# Patient Record
Sex: FEMALE | Race: WHITE | Employment: UNEMPLOYED | URBAN - METROPOLITAN AREA
[De-identification: names, ages, dates, MRNs, and addresses within clinical notes are randomized per-mention and may not be internally consistent; named-entity substitution may affect disease eponyms.]

---

## 2020-11-05 ENCOUNTER — TELEPHONE (OUTPATIENT)
Dept: INTERNAL MEDICINE | Age: 55
End: 2020-11-05

## 2021-03-25 ENCOUNTER — TELEPHONE (OUTPATIENT)
Dept: PODIATRY | Age: 56
End: 2021-03-25

## 2021-03-25 ENCOUNTER — TELEPHONE (OUTPATIENT)
Dept: PAIN MANAGEMENT | Age: 56
End: 2021-03-25

## 2021-06-07 ENCOUNTER — TELEPHONE (OUTPATIENT)
Dept: PAIN MANAGEMENT | Age: 56
End: 2021-06-07

## 2023-11-13 ENCOUNTER — OFFICE VISIT (OUTPATIENT)
Dept: NEUROLOGY | Age: 58
End: 2023-11-13
Payer: MEDICARE

## 2023-11-13 ENCOUNTER — TELEPHONE (OUTPATIENT)
Dept: NEUROLOGY | Age: 58
End: 2023-11-13

## 2023-11-13 VITALS
SYSTOLIC BLOOD PRESSURE: 104 MMHG | WEIGHT: 163 LBS | BODY MASS INDEX: 32 KG/M2 | DIASTOLIC BLOOD PRESSURE: 73 MMHG | HEART RATE: 89 BPM | HEIGHT: 60 IN

## 2023-11-13 DIAGNOSIS — Z79.899 HIGH RISK MEDICATION USE: Primary | ICD-10-CM

## 2023-11-13 DIAGNOSIS — G35 MULTIPLE SCLEROSIS (HCC): ICD-10-CM

## 2023-11-13 DIAGNOSIS — M79.2 NEUROPATHIC PAIN: ICD-10-CM

## 2023-11-13 DIAGNOSIS — G43.019 INTRACTABLE MIGRAINE WITHOUT AURA AND WITHOUT STATUS MIGRAINOSUS: ICD-10-CM

## 2023-11-13 DIAGNOSIS — Z11.59 NEED FOR HEPATITIS B SCREENING TEST: ICD-10-CM

## 2023-11-13 DIAGNOSIS — Z11.59 ENCOUNTER FOR SCREENING FOR OTHER VIRAL DISEASES: ICD-10-CM

## 2023-11-13 PROCEDURE — G8417 CALC BMI ABV UP PARAM F/U: HCPCS | Performed by: PSYCHIATRY & NEUROLOGY

## 2023-11-13 PROCEDURE — 4004F PT TOBACCO SCREEN RCVD TLK: CPT | Performed by: PSYCHIATRY & NEUROLOGY

## 2023-11-13 PROCEDURE — 3017F COLORECTAL CA SCREEN DOC REV: CPT | Performed by: PSYCHIATRY & NEUROLOGY

## 2023-11-13 PROCEDURE — 99205 OFFICE O/P NEW HI 60 MIN: CPT | Performed by: PSYCHIATRY & NEUROLOGY

## 2023-11-13 PROCEDURE — G8484 FLU IMMUNIZE NO ADMIN: HCPCS | Performed by: PSYCHIATRY & NEUROLOGY

## 2023-11-13 PROCEDURE — G8427 DOCREV CUR MEDS BY ELIG CLIN: HCPCS | Performed by: PSYCHIATRY & NEUROLOGY

## 2023-11-13 RX ORDER — ALBUTEROL SULFATE 90 UG/1
4 AEROSOL, METERED RESPIRATORY (INHALATION) PRN
OUTPATIENT
Start: 2023-11-14

## 2023-11-13 RX ORDER — TIZANIDINE 4 MG/1
TABLET ORAL
Status: ON HOLD | COMMUNITY
Start: 2023-10-31

## 2023-11-13 RX ORDER — TOLTERODINE 4 MG/1
4 CAPSULE, EXTENDED RELEASE ORAL DAILY
Status: ON HOLD | COMMUNITY
Start: 2023-10-16

## 2023-11-13 RX ORDER — SODIUM CHLORIDE 9 MG/ML
5-250 INJECTION, SOLUTION INTRAVENOUS PRN
OUTPATIENT
Start: 2023-11-14

## 2023-11-13 RX ORDER — ONDANSETRON 2 MG/ML
8 INJECTION INTRAMUSCULAR; INTRAVENOUS
OUTPATIENT
Start: 2023-11-14

## 2023-11-13 RX ORDER — BUPRENORPHINE 10 UG/H
PATCH TRANSDERMAL
Status: ON HOLD | COMMUNITY
Start: 2023-11-03

## 2023-11-13 RX ORDER — SODIUM CHLORIDE 0.9 % (FLUSH) 0.9 %
5-40 SYRINGE (ML) INJECTION PRN
OUTPATIENT
Start: 2023-11-14

## 2023-11-13 RX ORDER — SUCRALFATE 1 G/1
TABLET ORAL
Status: ON HOLD | COMMUNITY
Start: 2023-08-10

## 2023-11-13 RX ORDER — ZOLPIDEM TARTRATE 10 MG/1
TABLET ORAL
Status: ON HOLD | COMMUNITY
Start: 2023-11-07

## 2023-11-13 RX ORDER — DIAZEPAM 5 MG/1
5 TABLET ORAL DAILY
Status: ON HOLD | COMMUNITY
Start: 2023-11-07

## 2023-11-13 RX ORDER — RIMEGEPANT SULFATE 75 MG/75MG
TABLET, ORALLY DISINTEGRATING ORAL
Status: ON HOLD | COMMUNITY
Start: 2023-11-06

## 2023-11-13 RX ORDER — PREGABALIN 100 MG/1
100 CAPSULE ORAL 2 TIMES DAILY
Qty: 270 CAPSULE | Refills: 5 | Status: ON HOLD | OUTPATIENT
Start: 2023-11-13 | End: 2023-12-13

## 2023-11-13 RX ORDER — ACETAMINOPHEN 325 MG/1
650 TABLET ORAL
OUTPATIENT
Start: 2023-11-14

## 2023-11-13 RX ORDER — FAMOTIDINE 10 MG/ML
20 INJECTION, SOLUTION INTRAVENOUS
OUTPATIENT
Start: 2023-11-14

## 2023-11-13 RX ORDER — DIPHENHYDRAMINE HYDROCHLORIDE 50 MG/ML
50 INJECTION INTRAMUSCULAR; INTRAVENOUS
OUTPATIENT
Start: 2023-11-14

## 2023-11-13 RX ORDER — LORAZEPAM 0.5 MG/1
TABLET ORAL
Status: ON HOLD | COMMUNITY
Start: 2023-10-20

## 2023-11-13 RX ORDER — PREGABALIN 75 MG/1
75 CAPSULE ORAL 2 TIMES DAILY
COMMUNITY
Start: 2023-09-18 | End: 2023-11-13

## 2023-11-13 RX ORDER — SODIUM CHLORIDE 9 MG/ML
INJECTION, SOLUTION INTRAVENOUS CONTINUOUS
OUTPATIENT
Start: 2023-11-14

## 2023-11-13 RX ORDER — FLUTICASONE FUROATE AND VILANTEROL 100; 25 UG/1; UG/1
POWDER RESPIRATORY (INHALATION)
Status: ON HOLD | COMMUNITY
Start: 2023-10-19

## 2023-11-13 RX ORDER — DEXTROAMPHETAMINE SACCHARATE, AMPHETAMINE ASPARTATE, DEXTROAMPHETAMINE SULFATE AND AMPHETAMINE SULFATE 3.75; 3.75; 3.75; 3.75 MG/1; MG/1; MG/1; MG/1
TABLET ORAL
Status: ON HOLD | COMMUNITY
Start: 2023-11-07

## 2023-11-13 RX ORDER — HEPARIN SODIUM (PORCINE) LOCK FLUSH IV SOLN 100 UNIT/ML 100 UNIT/ML
500 SOLUTION INTRAVENOUS PRN
OUTPATIENT
Start: 2023-11-14

## 2023-11-13 RX ORDER — UBIDECARENONE 100 MG
CAPSULE ORAL
Status: ON HOLD | COMMUNITY
Start: 2023-08-07

## 2023-11-13 RX ORDER — FLUOXETINE HYDROCHLORIDE 40 MG/1
40 CAPSULE ORAL DAILY
Status: ON HOLD | COMMUNITY
Start: 2023-06-22

## 2023-11-13 RX ORDER — EPINEPHRINE 1 MG/ML
0.3 INJECTION, SOLUTION, CONCENTRATE INTRAVENOUS PRN
OUTPATIENT
Start: 2023-11-14

## 2023-11-13 RX ORDER — PALIPERIDONE PALMITATE 156 MG/ML
INJECTION INTRAMUSCULAR
Status: ON HOLD | COMMUNITY
Start: 2023-10-30

## 2023-11-13 NOTE — TELEPHONE ENCOUNTER
I called [facility] Radiology file room and requested her MRI images be pushed to [facility] system.

## 2023-11-14 ENCOUNTER — TELEPHONE (OUTPATIENT)
Dept: ONCOLOGY | Age: 58
End: 2023-11-14

## 2023-11-14 NOTE — TELEPHONE ENCOUNTER
I CALLED TO SCHEDULE KATHY FOR HER SOLUMEDROL INFUSION AND I OFFERED PT THURSDAY FOR TX AND SHE STATES THAT SHE HAS TO WORK. Eufemia Fernandez ASKED ME TO CALL HER BACK ON THURSDAY  AND SHE WILL KNOW HER SCHEDULE. I TOLD HER  THE ONLY DAY I HAVE TO ADD TO SCHEDULE IS MONDAY. PT STATES SHE WILL TRY TO GET MONDAY OFF. I WILL CALL KATHY BACK ON THURSDAY TO GET HER SCHEDULED.

## 2023-11-15 ENCOUNTER — TELEPHONE (OUTPATIENT)
Dept: NEUROLOGY | Age: 58
End: 2023-11-15

## 2023-11-15 NOTE — TELEPHONE ENCOUNTER
Patient called in requesting a prescription for Vicodin to help with the pain that she is having in her joints all over her body. She said that she has been treated with this before. She said that she has to work for the next 3 days and needs something to help her get through the day. Please advise.

## 2023-11-15 NOTE — TELEPHONE ENCOUNTER
I called [facility] again today and spoke with the file room at [phone].  She said she will send them through

## 2023-11-15 NOTE — TELEPHONE ENCOUNTER
Patient notified. She stated that she was not aware of this. She said that she started the increase dose of Lyrica on 11/14/23. Writer explained that it can take a few days to see change in chronic pain with some medications. She is asking if anything else can be sent in to help manage the pain or if she should wait a few more days to see the effects of the med increase.

## 2023-11-16 ENCOUNTER — TELEPHONE (OUTPATIENT)
Dept: NEUROLOGY | Age: 58
End: 2023-11-16

## 2023-11-17 ENCOUNTER — CLINICAL DOCUMENTATION (OUTPATIENT)
Facility: HOSPITAL | Age: 58
End: 2023-11-17

## 2023-11-17 DIAGNOSIS — G43.019 INTRACTABLE MIGRAINE WITHOUT AURA AND WITHOUT STATUS MIGRAINOSUS: Primary | ICD-10-CM

## 2023-11-17 RX ORDER — GALCANEZUMAB 120 MG/ML
120 INJECTION, SOLUTION SUBCUTANEOUS
Qty: 1 ML | Refills: 4 | Status: ON HOLD | OUTPATIENT
Start: 2023-12-17

## 2023-11-17 NOTE — TELEPHONE ENCOUNTER
Medication was denied by insurance stating Emgality needs to have been tried and failed first. Please advise, thanks.

## 2023-11-18 ENCOUNTER — HOSPITAL ENCOUNTER (EMERGENCY)
Age: 58
Discharge: ANOTHER HOSPITAL | DRG: 481 | End: 2023-11-19
Attending: EMERGENCY MEDICINE
Payer: MEDICARE

## 2023-11-18 ENCOUNTER — APPOINTMENT (OUTPATIENT)
Dept: GENERAL RADIOLOGY | Age: 58
DRG: 481 | End: 2023-11-18
Payer: MEDICARE

## 2023-11-18 ENCOUNTER — APPOINTMENT (OUTPATIENT)
Dept: CT IMAGING | Age: 58
DRG: 481 | End: 2023-11-18
Payer: MEDICARE

## 2023-11-18 VITALS
HEART RATE: 81 BPM | HEIGHT: 61 IN | TEMPERATURE: 99 F | BODY MASS INDEX: 29.45 KG/M2 | DIASTOLIC BLOOD PRESSURE: 73 MMHG | OXYGEN SATURATION: 94 % | SYSTOLIC BLOOD PRESSURE: 112 MMHG | WEIGHT: 156 LBS | RESPIRATION RATE: 17 BRPM

## 2023-11-18 DIAGNOSIS — S82.831A CLOSED FRACTURE OF PROXIMAL END OF RIGHT FIBULA, UNSPECIFIED FRACTURE MORPHOLOGY, INITIAL ENCOUNTER: ICD-10-CM

## 2023-11-18 DIAGNOSIS — M54.50 BILATERAL LOW BACK PAIN WITHOUT SCIATICA, UNSPECIFIED CHRONICITY: ICD-10-CM

## 2023-11-18 DIAGNOSIS — S82.101A CLOSED FRACTURE OF PROXIMAL END OF RIGHT TIBIA, UNSPECIFIED FRACTURE MORPHOLOGY, INITIAL ENCOUNTER: Primary | ICD-10-CM
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
## 2023-11-18 LAB
ANION GAP SERPL CALCULATED.3IONS-SCNC: 9 MMOL/L (ref 9–17)
BASOPHILS # BLD: <0.03 K/UL (ref 0–0.2)
BASOPHILS NFR BLD: 0 % (ref 0–2)
BUN SERPL-MCNC: 16 MG/DL (ref 6–20)
BUN/CREAT SERPL: 32 (ref 9–20)
CALCIUM SERPL-MCNC: 9.7 MG/DL (ref 8.6–10.4)
CHLORIDE SERPL-SCNC: 104 MMOL/L (ref 98–107)
CO2 SERPL-SCNC: 26 MMOL/L (ref 20–31)
CREAT SERPL-MCNC: 0.5 MG/DL (ref 0.5–0.9)
EOSINOPHIL # BLD: 0.03 K/UL (ref 0–0.44)
EOSINOPHILS RELATIVE PERCENT: 0 % (ref 1–4)
ERYTHROCYTE [DISTWIDTH] IN BLOOD BY AUTOMATED COUNT: 16.6 % (ref 11.8–14.4)
GFR SERPL CREATININE-BSD FRML MDRD: >60 ML/MIN/1.73M2
GLUCOSE SERPL-MCNC: 132 MG/DL (ref 70–99)
HCT VFR BLD AUTO: 38.6 % (ref 36.3–47.1)
HGB BLD-MCNC: 12 G/DL (ref 11.9–15.1)
IMM GRANULOCYTES # BLD AUTO: 0.05 K/UL (ref 0–0.3)
IMM GRANULOCYTES NFR BLD: 1 %
INR PPP: 1
LYMPHOCYTES NFR BLD: 0.95 K/UL (ref 1.1–3.7)
LYMPHOCYTES RELATIVE PERCENT: 14 % (ref 24–43)
MCH RBC QN AUTO: 27.2 PG (ref 25.2–33.5)
MCHC RBC AUTO-ENTMCNC: 31.1 G/DL (ref 28.4–34.8)
MCV RBC AUTO: 87.5 FL (ref 82.6–102.9)
MONOCYTES NFR BLD: 0.44 K/UL (ref 0.1–1.2)
MONOCYTES NFR BLD: 7 % (ref 3–12)
NEUTROPHILS NFR BLD: 78 % (ref 36–65)
NEUTS SEG NFR BLD: 5.2 K/UL (ref 1.5–8.1)
NRBC BLD-RTO: 0 PER 100 WBC
PARTIAL THROMBOPLASTIN TIME: 24.6 SEC (ref 23.9–33.8)
PLATELET # BLD AUTO: 317 K/UL (ref 138–453)
PMV BLD AUTO: 10 FL (ref 8.1–13.5)
POTASSIUM SERPL-SCNC: 4.2 MMOL/L (ref 3.7–5.3)
PROTHROMBIN TIME: 12.4 SEC (ref 11.5–14.2)
RBC # BLD AUTO: 4.41 M/UL (ref 3.95–5.11)
RBC # BLD: ABNORMAL 10*6/UL
SODIUM SERPL-SCNC: 139 MMOL/L (ref 135–144)
WBC OTHER # BLD: 6.7 K/UL (ref 3.5–11.3)

## 2023-11-18 PROCEDURE — 73590 X-RAY EXAM OF LOWER LEG: CPT

## 2023-11-18 PROCEDURE — 6360000002 HC RX W HCPCS: Performed by: NURSE PRACTITIONER

## 2023-11-18 PROCEDURE — 96374 THER/PROPH/DIAG INJ IV PUSH: CPT

## 2023-11-18 PROCEDURE — 73502 X-RAY EXAM HIP UNI 2-3 VIEWS: CPT

## 2023-11-18 PROCEDURE — 85025 COMPLETE CBC W/AUTO DIFF WBC: CPT

## 2023-11-18 PROCEDURE — 96372 THER/PROPH/DIAG INJ SC/IM: CPT

## 2023-11-18 PROCEDURE — 96376 TX/PRO/DX INJ SAME DRUG ADON: CPT

## 2023-11-18 PROCEDURE — 99285 EMERGENCY DEPT VISIT HI MDM: CPT

## 2023-11-18 PROCEDURE — 36415 COLL VENOUS BLD VENIPUNCTURE: CPT

## 2023-11-18 PROCEDURE — 96375 TX/PRO/DX INJ NEW DRUG ADDON: CPT

## 2023-11-18 PROCEDURE — 85730 THROMBOPLASTIN TIME PARTIAL: CPT

## 2023-11-18 PROCEDURE — 73560 X-RAY EXAM OF KNEE 1 OR 2: CPT

## 2023-11-18 PROCEDURE — 85610 PROTHROMBIN TIME: CPT

## 2023-11-18 PROCEDURE — 80048 BASIC METABOLIC PNL TOTAL CA: CPT

## 2023-11-18 PROCEDURE — 72131 CT LUMBAR SPINE W/O DYE: CPT

## 2023-11-18 RX ORDER — MORPHINE SULFATE 2 MG/ML
2 INJECTION, SOLUTION INTRAMUSCULAR; INTRAVENOUS ONCE
Status: COMPLETED | OUTPATIENT
Start: 2023-11-18 | End: 2023-11-18

## 2023-11-18 RX ORDER — HYDROMORPHONE HYDROCHLORIDE 1 MG/ML
1 INJECTION, SOLUTION INTRAMUSCULAR; INTRAVENOUS; SUBCUTANEOUS ONCE
Status: COMPLETED | OUTPATIENT
Start: 2023-11-18 | End: 2023-11-18

## 2023-11-18 RX ADMIN — HYDROMORPHONE HYDROCHLORIDE 0.5 MG: 1 INJECTION, SOLUTION INTRAMUSCULAR; INTRAVENOUS; SUBCUTANEOUS at 17:01

## 2023-11-18 RX ADMIN — MORPHINE SULFATE 2 MG: 2 INJECTION, SOLUTION INTRAMUSCULAR; INTRAVENOUS at 21:00

## 2023-11-18 RX ADMIN — HYDROMORPHONE HYDROCHLORIDE 0.5 MG: 1 INJECTION, SOLUTION INTRAMUSCULAR; INTRAVENOUS; SUBCUTANEOUS at 21:30

## 2023-11-18 RX ADMIN — HYDROMORPHONE HYDROCHLORIDE 0.5 MG: 1 INJECTION, SOLUTION INTRAMUSCULAR; INTRAVENOUS; SUBCUTANEOUS at 18:33

## 2023-11-18 RX ADMIN — HYDROMORPHONE HYDROCHLORIDE 1 MG: 1 INJECTION, SOLUTION INTRAMUSCULAR; INTRAVENOUS; SUBCUTANEOUS at 16:05

## 2023-11-18 RX ADMIN — HYDROMORPHONE HYDROCHLORIDE 0.5 MG: 1 INJECTION, SOLUTION INTRAMUSCULAR; INTRAVENOUS; SUBCUTANEOUS at 23:27

## 2023-11-18 ASSESSMENT — PAIN SCALES - GENERAL
PAINLEVEL_OUTOF10: 10
PAINLEVEL_OUTOF10: 8
PAINLEVEL_OUTOF10: 10

## 2023-11-18 ASSESSMENT — ENCOUNTER SYMPTOMS
NAUSEA: 0
BACK PAIN: 1
PHOTOPHOBIA: 0
ABDOMINAL PAIN: 0
EYE PAIN: 0
VOMITING: 0
SHORTNESS OF BREATH: 0
COLOR CHANGE: 0

## 2023-11-18 ASSESSMENT — PAIN - FUNCTIONAL ASSESSMENT: PAIN_FUNCTIONAL_ASSESSMENT: 0-10

## 2023-11-18 NOTE — ED NOTES
Patient presents to ER from home by EMS due to leg injury. Patient states she was going to dump coffee when she tripped and fell. Patient denies hitting head or LOC. EMS states while moving patient leg was unstable. EMS stabilized patients leg. Patient is A/O x 4, equal chest expansion with non labored breathing, wheels locked , bed in lowest position, call light in reach.      Julee Ch RN  11/18/23 7138 Marisol [place], Jacqueline, RN  11/18/23 7165

## 2023-11-19 ENCOUNTER — APPOINTMENT (OUTPATIENT)
Dept: CT IMAGING | Age: 58
DRG: 481 | End: 2023-11-19
Payer: MEDICARE

## 2023-11-19 ENCOUNTER — ANESTHESIA EVENT (OUTPATIENT)
Dept: OPERATING ROOM | Age: 58
End: 2023-11-19
Payer: MEDICARE

## 2023-11-19 ENCOUNTER — HOSPITAL ENCOUNTER (INPATIENT)
Age: 58
LOS: 15 days | Discharge: NURSING, REHAB OR LONG-TERM CARE | DRG: 481 | End: 2023-12-04
Attending: EMERGENCY MEDICINE | Admitting: SURGERY
Payer: MEDICARE

## 2023-11-19 ENCOUNTER — APPOINTMENT (OUTPATIENT)
Dept: GENERAL RADIOLOGY | Age: 58
DRG: 481 | End: 2023-11-19
Payer: MEDICARE

## 2023-11-19 ENCOUNTER — ANESTHESIA (OUTPATIENT)
Dept: OPERATING ROOM | Age: 58
End: 2023-11-19
Payer: MEDICARE

## 2023-11-19 DIAGNOSIS — S82.201A TIBIA/FIBULA FRACTURE, RIGHT, CLOSED, INITIAL ENCOUNTER: Primary | ICD-10-CM

## 2023-11-19 DIAGNOSIS — S82.401A TIBIA/FIBULA FRACTURE, RIGHT, CLOSED, INITIAL ENCOUNTER: Primary | ICD-10-CM

## 2023-11-19 PROBLEM — S82.201B: Status: ACTIVE | Noted: 2023-11-19

## 2023-11-19 PROBLEM — S82.401B: Status: ACTIVE | Noted: 2023-11-19

## 2023-11-19 PROBLEM — Z01.810 PREOPERATIVE CARDIOVASCULAR EXAMINATION: Status: ACTIVE | Noted: 2023-11-19

## 2023-11-19 LAB
25(OH)D3 SERPL-MCNC: 13.3 NG/ML (ref 30–100)
25(OH)D3 SERPL-MCNC: 15.2 NG/ML
ABO + RH BLD: NORMAL
ALBUMIN SERPL-MCNC: 3.1 G/DL (ref 3.5–5.2)
ANION GAP SERPL CALCULATED.3IONS-SCNC: 6 MMOL/L (ref 9–17)
ANION GAP SERPL CALCULATED.3IONS-SCNC: 8 MMOL/L (ref 9–17)
ARM BAND NUMBER: NORMAL
BASOPHILS # BLD: <0.03 K/UL (ref 0–0.2)
BASOPHILS NFR BLD: 0 % (ref 0–2)
BLOOD BANK SAMPLE EXPIRATION: NORMAL
BLOOD BANK SPECIMEN: ABNORMAL
BLOOD GROUP ANTIBODIES SERPL: NEGATIVE
BODY TEMPERATURE: 37
BUN SERPL-MCNC: 12 MG/DL (ref 6–20)
BUN SERPL-MCNC: 12 MG/DL (ref 6–20)
CALCIUM SERPL-MCNC: 9 MG/DL (ref 8.6–10.4)
CHLORIDE SERPL-SCNC: 104 MMOL/L (ref 98–107)
CHLORIDE SERPL-SCNC: 104 MMOL/L (ref 98–107)
CO2 SERPL-SCNC: 27 MMOL/L (ref 20–31)
CO2 SERPL-SCNC: 27 MMOL/L (ref 20–31)
COHGB MFR BLD: 2.9 % (ref 0–5)
CREAT SERPL-MCNC: 0.3 MG/DL (ref 0.5–0.9)
CREAT SERPL-MCNC: 0.4 MG/DL (ref 0.5–0.9)
EOSINOPHIL # BLD: 0.04 K/UL (ref 0–0.44)
EOSINOPHILS RELATIVE PERCENT: 1 % (ref 1–4)
ERYTHROCYTE [DISTWIDTH] IN BLOOD BY AUTOMATED COUNT: 16.3 % (ref 11.8–14.4)
ERYTHROCYTE [DISTWIDTH] IN BLOOD BY AUTOMATED COUNT: 16.3 % (ref 11.8–14.4)
EST. AVERAGE GLUCOSE BLD GHB EST-MCNC: 117 MG/DL
ETHANOL PERCENT: <0.01 %
ETHANOL PERCENT: <0.01 %
ETHANOLAMINE SERPL-MCNC: <10 MG/DL
ETHANOLAMINE SERPL-MCNC: <10 MG/DL
FIO2 ON VENT: ABNORMAL %
FOLATE SERPL-MCNC: 10.6 NG/ML
GFR SERPL CREATININE-BSD FRML MDRD: >60 ML/MIN/1.73M2
GFR SERPL CREATININE-BSD FRML MDRD: >60 ML/MIN/1.73M2
GLUCOSE BLD-MCNC: 171 MG/DL (ref 65–105)
GLUCOSE SERPL-MCNC: 100 MG/DL (ref 70–99)
GLUCOSE SERPL-MCNC: 103 MG/DL (ref 70–99)
HBA1C MFR BLD: 5.7 % (ref 4–6)
HCG SERPL QL: NEGATIVE
HCO3 VENOUS: 28.9 MMOL/L (ref 24–30)
HCT VFR BLD AUTO: 31.4 % (ref 36.3–47.1)
HCT VFR BLD AUTO: 32.1 % (ref 36.3–47.1)
HCT VFR BLD AUTO: 32.7 % (ref 36.3–47.1)
HGB BLD-MCNC: 10.2 G/DL (ref 11.9–15.1)
HGB BLD-MCNC: 10.2 G/DL (ref 11.9–15.1)
HGB BLD-MCNC: 9.6 G/DL (ref 11.9–15.1)
IMM GRANULOCYTES # BLD AUTO: 0.03 K/UL (ref 0–0.3)
IMM GRANULOCYTES NFR BLD: 0 %
INR PPP: 1
LYMPHOCYTES NFR BLD: 0.89 K/UL (ref 1.1–3.7)
LYMPHOCYTES RELATIVE PERCENT: 13 % (ref 24–43)
MCH RBC QN AUTO: 27.3 PG (ref 25.2–33.5)
MCH RBC QN AUTO: 27.7 PG (ref 25.2–33.5)
MCHC RBC AUTO-ENTMCNC: 31.2 G/DL (ref 28.4–34.8)
MCHC RBC AUTO-ENTMCNC: 31.8 G/DL (ref 28.4–34.8)
MCV RBC AUTO: 87.2 FL (ref 82.6–102.9)
MCV RBC AUTO: 87.4 FL (ref 82.6–102.9)
MONOCYTES NFR BLD: 0.65 K/UL (ref 0.1–1.2)
MONOCYTES NFR BLD: 9 % (ref 3–12)
NEUTROPHILS NFR BLD: 77 % (ref 36–65)
NEUTS SEG NFR BLD: 5.41 K/UL (ref 1.5–8.1)
NRBC BLD-RTO: 0 PER 100 WBC
NRBC BLD-RTO: 0 PER 100 WBC
O2 SAT, VEN: 73.7 % (ref 60–85)
PARTIAL THROMBOPLASTIN TIME: 24 SEC (ref 23–36.5)
PCO2, VEN: 55.4 MM HG (ref 39–55)
PH VENOUS: 7.34 (ref 7.32–7.42)
PLATELET # BLD AUTO: 275 K/UL (ref 138–453)
PLATELET # BLD AUTO: 281 K/UL (ref 138–453)
PMV BLD AUTO: 10.2 FL (ref 8.1–13.5)
PMV BLD AUTO: 9.9 FL (ref 8.1–13.5)
PO2, VEN: 43.2 MM HG (ref 30–50)
POSITIVE BASE EXCESS, VEN: 2.7 MMOL/L (ref 0–2)
POTASSIUM SERPL-SCNC: 4.6 MMOL/L (ref 3.7–5.3)
POTASSIUM SERPL-SCNC: 4.8 MMOL/L (ref 3.7–5.3)
PROTHROMBIN TIME: 12.6 SEC (ref 11.7–14.9)
RBC # BLD AUTO: 3.68 M/UL (ref 3.95–5.11)
RBC # BLD AUTO: 3.74 M/UL (ref 3.95–5.11)
RBC # BLD: ABNORMAL 10*6/UL
SODIUM SERPL-SCNC: 137 MMOL/L (ref 135–144)
SODIUM SERPL-SCNC: 139 MMOL/L (ref 135–144)
T4 FREE SERPL-MCNC: 1 NG/DL (ref 0.9–1.7)
TROPONIN I SERPL HS-MCNC: 6 NG/L (ref 0–14)
TROPONIN I SERPL HS-MCNC: <6 NG/L (ref 0–14)
TSH SERPL DL<=0.05 MIU/L-ACNC: 1.16 UIU/ML (ref 0.3–5)
VIT B12 SERPL-MCNC: 526 PG/ML (ref 232–1245)
WBC OTHER # BLD: 7 K/UL (ref 3.5–11.3)
WBC OTHER # BLD: 7 K/UL (ref 3.5–11.3)

## 2023-11-19 PROCEDURE — 2700000000 HC OXYGEN THERAPY PER DAY

## 2023-11-19 PROCEDURE — 82607 VITAMIN B-12: CPT

## 2023-11-19 PROCEDURE — 82040 ASSAY OF SERUM ALBUMIN: CPT

## 2023-11-19 PROCEDURE — 6360000002 HC RX W HCPCS: Performed by: ANESTHESIOLOGY

## 2023-11-19 PROCEDURE — 2709999900 HC NON-CHARGEABLE SUPPLY: Performed by: STUDENT IN AN ORGANIZED HEALTH CARE EDUCATION/TRAINING PROGRAM

## 2023-11-19 PROCEDURE — 84439 ASSAY OF FREE THYROXINE: CPT

## 2023-11-19 PROCEDURE — 6360000002 HC RX W HCPCS: Performed by: CHIROPRACTOR

## 2023-11-19 PROCEDURE — 94761 N-INVAS EAR/PLS OXIMETRY MLT: CPT

## 2023-11-19 PROCEDURE — 6360000002 HC RX W HCPCS: Performed by: STUDENT IN AN ORGANIZED HEALTH CARE EDUCATION/TRAINING PROGRAM

## 2023-11-19 PROCEDURE — 84520 ASSAY OF UREA NITROGEN: CPT

## 2023-11-19 PROCEDURE — 85027 COMPLETE CBC AUTOMATED: CPT

## 2023-11-19 PROCEDURE — 82947 ASSAY GLUCOSE BLOOD QUANT: CPT

## 2023-11-19 PROCEDURE — 85730 THROMBOPLASTIN TIME PARTIAL: CPT

## 2023-11-19 PROCEDURE — 82565 ASSAY OF CREATININE: CPT

## 2023-11-19 PROCEDURE — 7100000000 HC PACU RECOVERY - FIRST 15 MIN: Performed by: STUDENT IN AN ORGANIZED HEALTH CARE EDUCATION/TRAINING PROGRAM

## 2023-11-19 PROCEDURE — 85014 HEMATOCRIT: CPT

## 2023-11-19 PROCEDURE — 82746 ASSAY OF FOLIC ACID SERUM: CPT

## 2023-11-19 PROCEDURE — 7100000001 HC PACU RECOVERY - ADDTL 15 MIN: Performed by: STUDENT IN AN ORGANIZED HEALTH CARE EDUCATION/TRAINING PROGRAM

## 2023-11-19 PROCEDURE — 20690 APPL UNIPLN UNI EXT FIXJ SYS: CPT | Performed by: STUDENT IN AN ORGANIZED HEALTH CARE EDUCATION/TRAINING PROGRAM

## 2023-11-19 PROCEDURE — 6360000002 HC RX W HCPCS

## 2023-11-19 PROCEDURE — 82805 BLOOD GASES W/O2 SATURATION: CPT

## 2023-11-19 PROCEDURE — 27532 TREAT KNEE FRACTURE: CPT | Performed by: STUDENT IN AN ORGANIZED HEALTH CARE EDUCATION/TRAINING PROGRAM

## 2023-11-19 PROCEDURE — 84443 ASSAY THYROID STIM HORMONE: CPT

## 2023-11-19 PROCEDURE — 2580000003 HC RX 258

## 2023-11-19 PROCEDURE — 3700000000 HC ANESTHESIA ATTENDED CARE: Performed by: STUDENT IN AN ORGANIZED HEALTH CARE EDUCATION/TRAINING PROGRAM

## 2023-11-19 PROCEDURE — 73562 X-RAY EXAM OF KNEE 3: CPT

## 2023-11-19 PROCEDURE — 85025 COMPLETE CBC W/AUTO DIFF WBC: CPT

## 2023-11-19 PROCEDURE — 6370000000 HC RX 637 (ALT 250 FOR IP): Performed by: STUDENT IN AN ORGANIZED HEALTH CARE EDUCATION/TRAINING PROGRAM

## 2023-11-19 PROCEDURE — 99222 1ST HOSP IP/OBS MODERATE 55: CPT | Performed by: INTERNAL MEDICINE

## 2023-11-19 PROCEDURE — 94640 AIRWAY INHALATION TREATMENT: CPT

## 2023-11-19 PROCEDURE — 1200000000 HC SEMI PRIVATE

## 2023-11-19 PROCEDURE — 73700 CT LOWER EXTREMITY W/O DYE: CPT

## 2023-11-19 PROCEDURE — 2580000003 HC RX 258: Performed by: CHIROPRACTOR

## 2023-11-19 PROCEDURE — 2580000003 HC RX 258: Performed by: STUDENT IN AN ORGANIZED HEALTH CARE EDUCATION/TRAINING PROGRAM

## 2023-11-19 PROCEDURE — 84484 ASSAY OF TROPONIN QUANT: CPT

## 2023-11-19 PROCEDURE — 99222 1ST HOSP IP/OBS MODERATE 55: CPT | Performed by: STUDENT IN AN ORGANIZED HEALTH CARE EDUCATION/TRAINING PROGRAM

## 2023-11-19 PROCEDURE — 86900 BLOOD TYPING SEROLOGIC ABO: CPT

## 2023-11-19 PROCEDURE — 3700000001 HC ADD 15 MINUTES (ANESTHESIA): Performed by: STUDENT IN AN ORGANIZED HEALTH CARE EDUCATION/TRAINING PROGRAM

## 2023-11-19 PROCEDURE — 99282 EMERGENCY DEPT VISIT SF MDM: CPT | Performed by: SURGERY

## 2023-11-19 PROCEDURE — 2500000003 HC RX 250 WO HCPCS: Performed by: REGISTERED NURSE

## 2023-11-19 PROCEDURE — G0480 DRUG TEST DEF 1-7 CLASSES: HCPCS

## 2023-11-19 PROCEDURE — 76376 3D RENDER W/INTRP POSTPROCES: CPT

## 2023-11-19 PROCEDURE — 93005 ELECTROCARDIOGRAM TRACING: CPT | Performed by: SURGERY

## 2023-11-19 PROCEDURE — 36415 COLL VENOUS BLD VENIPUNCTURE: CPT

## 2023-11-19 PROCEDURE — 93005 ELECTROCARDIOGRAM TRACING: CPT

## 2023-11-19 PROCEDURE — 80048 BASIC METABOLIC PNL TOTAL CA: CPT

## 2023-11-19 PROCEDURE — 71260 CT THORAX DX C+: CPT

## 2023-11-19 PROCEDURE — 86901 BLOOD TYPING SEROLOGIC RH(D): CPT

## 2023-11-19 PROCEDURE — 70450 CT HEAD/BRAIN W/O DYE: CPT

## 2023-11-19 PROCEDURE — 6360000004 HC RX CONTRAST MEDICATION

## 2023-11-19 PROCEDURE — 85018 HEMOGLOBIN: CPT

## 2023-11-19 PROCEDURE — 84703 CHORIONIC GONADOTROPIN ASSAY: CPT

## 2023-11-19 PROCEDURE — 73552 X-RAY EXAM OF FEMUR 2/>: CPT

## 2023-11-19 PROCEDURE — 82306 VITAMIN D 25 HYDROXY: CPT

## 2023-11-19 PROCEDURE — 72125 CT NECK SPINE W/O DYE: CPT

## 2023-11-19 PROCEDURE — 2720000010 HC SURG SUPPLY STERILE: Performed by: STUDENT IN AN ORGANIZED HEALTH CARE EDUCATION/TRAINING PROGRAM

## 2023-11-19 PROCEDURE — 3600000014 HC SURGERY LEVEL 4 ADDTL 15MIN: Performed by: STUDENT IN AN ORGANIZED HEALTH CARE EDUCATION/TRAINING PROGRAM

## 2023-11-19 PROCEDURE — 83036 HEMOGLOBIN GLYCOSYLATED A1C: CPT

## 2023-11-19 PROCEDURE — 99285 EMERGENCY DEPT VISIT HI MDM: CPT

## 2023-11-19 PROCEDURE — 6370000000 HC RX 637 (ALT 250 FOR IP): Performed by: CHIROPRACTOR

## 2023-11-19 PROCEDURE — 6360000002 HC RX W HCPCS: Performed by: REGISTERED NURSE

## 2023-11-19 PROCEDURE — 96374 THER/PROPH/DIAG INJ IV PUSH: CPT

## 2023-11-19 PROCEDURE — 3600000004 HC SURGERY LEVEL 4 BASE: Performed by: STUDENT IN AN ORGANIZED HEALTH CARE EDUCATION/TRAINING PROGRAM

## 2023-11-19 PROCEDURE — 6370000000 HC RX 637 (ALT 250 FOR IP)

## 2023-11-19 PROCEDURE — 85610 PROTHROMBIN TIME: CPT

## 2023-11-19 PROCEDURE — 80051 ELECTROLYTE PANEL: CPT

## 2023-11-19 PROCEDURE — 86850 RBC ANTIBODY SCREEN: CPT

## 2023-11-19 RX ORDER — PANTOPRAZOLE SODIUM 40 MG/1
40 TABLET, DELAYED RELEASE ORAL
Status: DISCONTINUED | OUTPATIENT
Start: 2023-11-20 | End: 2023-12-04 | Stop reason: HOSPADM

## 2023-11-19 RX ORDER — ENOXAPARIN SODIUM 100 MG/ML
30 INJECTION SUBCUTANEOUS DAILY
Status: DISCONTINUED | OUTPATIENT
Start: 2023-11-20 | End: 2023-11-20

## 2023-11-19 RX ORDER — PANTOPRAZOLE SODIUM 40 MG/1
40 TABLET, DELAYED RELEASE ORAL
Status: DISCONTINUED | OUTPATIENT
Start: 2023-11-19 | End: 2023-11-19

## 2023-11-19 RX ORDER — ATROPINE SULFATE 10 MG/ML
1 SOLUTION/ DROPS OPHTHALMIC 3 TIMES DAILY PRN
Status: DISCONTINUED | OUTPATIENT
Start: 2023-11-19 | End: 2023-11-28

## 2023-11-19 RX ORDER — SODIUM CHLORIDE 0.9 % (FLUSH) 0.9 %
5-40 SYRINGE (ML) INJECTION PRN
Status: DISCONTINUED | OUTPATIENT
Start: 2023-11-19 | End: 2023-11-19 | Stop reason: HOSPADM

## 2023-11-19 RX ORDER — FAMOTIDINE 20 MG/1
20 TABLET, FILM COATED ORAL 2 TIMES DAILY
Status: DISCONTINUED | OUTPATIENT
Start: 2023-11-19 | End: 2023-11-19

## 2023-11-19 RX ORDER — ERGOCALCIFEROL 1.25 MG/1
50000 CAPSULE ORAL WEEKLY
Qty: 8 CAPSULE | Refills: 1 | Status: SHIPPED | OUTPATIENT
Start: 2023-11-19

## 2023-11-19 RX ORDER — ALBUTEROL SULFATE 2.5 MG/.5ML
5 SOLUTION RESPIRATORY (INHALATION)
Status: DISCONTINUED | OUTPATIENT
Start: 2023-11-19 | End: 2023-11-20

## 2023-11-19 RX ORDER — FENTANYL CITRATE 50 UG/ML
INJECTION, SOLUTION INTRAMUSCULAR; INTRAVENOUS PRN
Status: DISCONTINUED | OUTPATIENT
Start: 2023-11-19 | End: 2023-11-19 | Stop reason: SDUPTHER

## 2023-11-19 RX ORDER — BUDESONIDE AND FORMOTEROL FUMARATE DIHYDRATE 80; 4.5 UG/1; UG/1
2 AEROSOL RESPIRATORY (INHALATION)
Status: DISCONTINUED | OUTPATIENT
Start: 2023-11-19 | End: 2023-12-04 | Stop reason: HOSPADM

## 2023-11-19 RX ORDER — SODIUM CHLORIDE, SODIUM LACTATE, POTASSIUM CHLORIDE, CALCIUM CHLORIDE 600; 310; 30; 20 MG/100ML; MG/100ML; MG/100ML; MG/100ML
INJECTION, SOLUTION INTRAVENOUS CONTINUOUS
Status: DISCONTINUED | OUTPATIENT
Start: 2023-11-19 | End: 2023-11-20

## 2023-11-19 RX ORDER — FENTANYL CITRATE 50 UG/ML
25 INJECTION, SOLUTION INTRAMUSCULAR; INTRAVENOUS ONCE
Status: COMPLETED | OUTPATIENT
Start: 2023-11-19 | End: 2023-11-19

## 2023-11-19 RX ORDER — METHOCARBAMOL 750 MG/1
750 TABLET, FILM COATED ORAL 4 TIMES DAILY
Status: DISCONTINUED | OUTPATIENT
Start: 2023-11-19 | End: 2023-11-20

## 2023-11-19 RX ORDER — GABAPENTIN 100 MG/1
100 CAPSULE ORAL EVERY 8 HOURS
Status: DISCONTINUED | OUTPATIENT
Start: 2023-11-19 | End: 2023-11-19

## 2023-11-19 RX ORDER — ALBUTEROL SULFATE 90 UG/1
2 AEROSOL, METERED RESPIRATORY (INHALATION) EVERY 6 HOURS PRN
Status: DISCONTINUED | OUTPATIENT
Start: 2023-11-19 | End: 2023-12-04 | Stop reason: HOSPADM

## 2023-11-19 RX ORDER — SENNA AND DOCUSATE SODIUM 50; 8.6 MG/1; MG/1
1 TABLET, FILM COATED ORAL 2 TIMES DAILY
Status: DISCONTINUED | OUTPATIENT
Start: 2023-11-19 | End: 2023-12-04 | Stop reason: HOSPADM

## 2023-11-19 RX ORDER — LIDOCAINE HYDROCHLORIDE 10 MG/ML
INJECTION, SOLUTION EPIDURAL; INFILTRATION; INTRACAUDAL; PERINEURAL PRN
Status: DISCONTINUED | OUTPATIENT
Start: 2023-11-19 | End: 2023-11-19 | Stop reason: SDUPTHER

## 2023-11-19 RX ORDER — ONDANSETRON 2 MG/ML
INJECTION INTRAMUSCULAR; INTRAVENOUS PRN
Status: DISCONTINUED | OUTPATIENT
Start: 2023-11-19 | End: 2023-11-19 | Stop reason: SDUPTHER

## 2023-11-19 RX ORDER — ONDANSETRON 4 MG/1
4 TABLET, ORALLY DISINTEGRATING ORAL EVERY 8 HOURS PRN
Status: DISCONTINUED | OUTPATIENT
Start: 2023-11-19 | End: 2023-12-04 | Stop reason: HOSPADM

## 2023-11-19 RX ORDER — DEXAMETHASONE SODIUM PHOSPHATE 10 MG/ML
INJECTION INTRAMUSCULAR; INTRAVENOUS PRN
Status: DISCONTINUED | OUTPATIENT
Start: 2023-11-19 | End: 2023-11-19 | Stop reason: SDUPTHER

## 2023-11-19 RX ORDER — CEFAZOLIN SODIUM 1 G/3ML
INJECTION, POWDER, FOR SOLUTION INTRAMUSCULAR; INTRAVENOUS PRN
Status: DISCONTINUED | OUTPATIENT
Start: 2023-11-19 | End: 2023-11-19 | Stop reason: SDUPTHER

## 2023-11-19 RX ORDER — KETAMINE HCL IN NACL, ISO-OSM 100MG/10ML
SYRINGE (ML) INJECTION PRN
Status: DISCONTINUED | OUTPATIENT
Start: 2023-11-19 | End: 2023-11-19 | Stop reason: SDUPTHER

## 2023-11-19 RX ORDER — MAGNESIUM SULFATE IN WATER 40 MG/ML
2000 INJECTION, SOLUTION INTRAVENOUS PRN
Status: DISCONTINUED | OUTPATIENT
Start: 2023-11-19 | End: 2023-11-20

## 2023-11-19 RX ORDER — OXYCODONE HYDROCHLORIDE 5 MG/1
5 TABLET ORAL EVERY 4 HOURS PRN
Status: DISCONTINUED | OUTPATIENT
Start: 2023-11-19 | End: 2023-11-20

## 2023-11-19 RX ORDER — ONDANSETRON 2 MG/ML
4 INJECTION INTRAMUSCULAR; INTRAVENOUS EVERY 6 HOURS PRN
Status: DISCONTINUED | OUTPATIENT
Start: 2023-11-19 | End: 2023-12-04 | Stop reason: HOSPADM

## 2023-11-19 RX ORDER — SODIUM CHLORIDE 0.9 % (FLUSH) 0.9 %
5-40 SYRINGE (ML) INJECTION PRN
Status: DISCONTINUED | OUTPATIENT
Start: 2023-11-19 | End: 2023-12-04 | Stop reason: HOSPADM

## 2023-11-19 RX ORDER — GABAPENTIN 300 MG/1
600 CAPSULE ORAL EVERY 8 HOURS
Status: DISCONTINUED | OUTPATIENT
Start: 2023-11-19 | End: 2023-11-27

## 2023-11-19 RX ORDER — SODIUM CHLORIDE 9 MG/ML
INJECTION, SOLUTION INTRAVENOUS PRN
Status: DISCONTINUED | OUTPATIENT
Start: 2023-11-19 | End: 2023-12-04 | Stop reason: HOSPADM

## 2023-11-19 RX ORDER — FENTANYL CITRATE 50 UG/ML
50 INJECTION, SOLUTION INTRAMUSCULAR; INTRAVENOUS ONCE
Status: COMPLETED | OUTPATIENT
Start: 2023-11-19 | End: 2023-11-19

## 2023-11-19 RX ORDER — PROPOFOL 10 MG/ML
INJECTION, EMULSION INTRAVENOUS PRN
Status: DISCONTINUED | OUTPATIENT
Start: 2023-11-19 | End: 2023-11-19 | Stop reason: SDUPTHER

## 2023-11-19 RX ORDER — ROCURONIUM BROMIDE 10 MG/ML
INJECTION, SOLUTION INTRAVENOUS PRN
Status: DISCONTINUED | OUTPATIENT
Start: 2023-11-19 | End: 2023-11-19 | Stop reason: SDUPTHER

## 2023-11-19 RX ORDER — DEXTROAMPHETAMINE SACCHARATE, AMPHETAMINE ASPARTATE, DEXTROAMPHETAMINE SULFATE AND AMPHETAMINE SULFATE 1.25; 1.25; 1.25; 1.25 MG/1; MG/1; MG/1; MG/1
15 TABLET ORAL DAILY
Status: DISCONTINUED | OUTPATIENT
Start: 2023-11-19 | End: 2023-11-30

## 2023-11-19 RX ORDER — MAGNESIUM HYDROXIDE 1200 MG/15ML
LIQUID ORAL CONTINUOUS PRN
Status: COMPLETED | OUTPATIENT
Start: 2023-11-19 | End: 2023-11-19

## 2023-11-19 RX ORDER — FENTANYL CITRATE 50 UG/ML
50 INJECTION, SOLUTION INTRAMUSCULAR; INTRAVENOUS
Status: DISCONTINUED | OUTPATIENT
Start: 2023-11-19 | End: 2023-11-20

## 2023-11-19 RX ORDER — ALBUTEROL SULFATE 2.5 MG/3ML
2.5 SOLUTION RESPIRATORY (INHALATION) EVERY 6 HOURS PRN
Status: DISCONTINUED | OUTPATIENT
Start: 2023-11-19 | End: 2023-11-20

## 2023-11-19 RX ORDER — ACETAMINOPHEN 500 MG
1000 TABLET ORAL EVERY 6 HOURS
Status: DISCONTINUED | OUTPATIENT
Start: 2023-11-19 | End: 2023-11-26

## 2023-11-19 RX ORDER — FENTANYL CITRATE 50 UG/ML
INJECTION, SOLUTION INTRAMUSCULAR; INTRAVENOUS
Status: DISPENSED
Start: 2023-11-19 | End: 2023-11-19

## 2023-11-19 RX ORDER — MEPERIDINE HYDROCHLORIDE 50 MG/ML
12.5 INJECTION INTRAMUSCULAR; INTRAVENOUS; SUBCUTANEOUS EVERY 5 MIN PRN
Status: DISCONTINUED | OUTPATIENT
Start: 2023-11-19 | End: 2023-11-19 | Stop reason: HOSPADM

## 2023-11-19 RX ORDER — FLUOXETINE HYDROCHLORIDE 20 MG/1
40 CAPSULE ORAL DAILY
Status: DISCONTINUED | OUTPATIENT
Start: 2023-11-19 | End: 2023-12-04 | Stop reason: HOSPADM

## 2023-11-19 RX ORDER — POLYETHYLENE GLYCOL 3350 17 G/17G
17 POWDER, FOR SOLUTION ORAL DAILY
Status: DISCONTINUED | OUTPATIENT
Start: 2023-11-19 | End: 2023-12-04 | Stop reason: HOSPADM

## 2023-11-19 RX ORDER — SODIUM CHLORIDE 0.9 % (FLUSH) 0.9 %
5-40 SYRINGE (ML) INJECTION EVERY 12 HOURS SCHEDULED
Status: DISCONTINUED | OUTPATIENT
Start: 2023-11-19 | End: 2023-11-19 | Stop reason: HOSPADM

## 2023-11-19 RX ORDER — ONDANSETRON 2 MG/ML
4 INJECTION INTRAMUSCULAR; INTRAVENOUS
Status: DISCONTINUED | OUTPATIENT
Start: 2023-11-19 | End: 2023-11-19 | Stop reason: HOSPADM

## 2023-11-19 RX ORDER — MIDAZOLAM HYDROCHLORIDE 1 MG/ML
INJECTION INTRAMUSCULAR; INTRAVENOUS PRN
Status: DISCONTINUED | OUTPATIENT
Start: 2023-11-19 | End: 2023-11-19 | Stop reason: SDUPTHER

## 2023-11-19 RX ORDER — SODIUM CHLORIDE 0.9 % (FLUSH) 0.9 %
5-40 SYRINGE (ML) INJECTION EVERY 12 HOURS SCHEDULED
Status: DISCONTINUED | OUTPATIENT
Start: 2023-11-19 | End: 2023-12-04 | Stop reason: HOSPADM

## 2023-11-19 RX ORDER — SODIUM CHLORIDE 9 MG/ML
INJECTION, SOLUTION INTRAVENOUS PRN
Status: DISCONTINUED | OUTPATIENT
Start: 2023-11-19 | End: 2023-11-19 | Stop reason: HOSPADM

## 2023-11-19 RX ADMIN — DOCUSATE SODIUM 50 MG AND SENNOSIDES 8.6 MG 1 TABLET: 8.6; 5 TABLET, FILM COATED ORAL at 04:43

## 2023-11-19 RX ADMIN — SODIUM CHLORIDE, POTASSIUM CHLORIDE, SODIUM LACTATE AND CALCIUM CHLORIDE: 600; 310; 30; 20 INJECTION, SOLUTION INTRAVENOUS at 10:44

## 2023-11-19 RX ADMIN — Medication 20 MG: at 10:10

## 2023-11-19 RX ADMIN — MIDAZOLAM 1 MG: 1 INJECTION INTRAMUSCULAR; INTRAVENOUS at 09:57

## 2023-11-19 RX ADMIN — CEFAZOLIN 2 G: 1 INJECTION, POWDER, FOR SOLUTION INTRAMUSCULAR; INTRAVENOUS at 10:00

## 2023-11-19 RX ADMIN — SUGAMMADEX 200 MG: 100 INJECTION, SOLUTION INTRAVENOUS at 10:28

## 2023-11-19 RX ADMIN — DOCUSATE SODIUM 50 MG AND SENNOSIDES 8.6 MG 1 TABLET: 8.6; 5 TABLET, FILM COATED ORAL at 19:26

## 2023-11-19 RX ADMIN — PHENYLEPHRINE HYDROCHLORIDE 100 MCG: 10 INJECTION INTRAVENOUS at 09:46

## 2023-11-19 RX ADMIN — IOPAMIDOL 75 ML: 755 INJECTION, SOLUTION INTRAVENOUS at 03:26

## 2023-11-19 RX ADMIN — METHOCARBAMOL TABLETS 750 MG: 750 TABLET, COATED ORAL at 17:54

## 2023-11-19 RX ADMIN — GABAPENTIN 600 MG: 300 CAPSULE ORAL at 19:26

## 2023-11-19 RX ADMIN — ACETAMINOPHEN 1000 MG: 500 TABLET ORAL at 19:26

## 2023-11-19 RX ADMIN — SODIUM CHLORIDE, PRESERVATIVE FREE 10 ML: 5 INJECTION INTRAVENOUS at 19:27

## 2023-11-19 RX ADMIN — METHOCARBAMOL TABLETS 750 MG: 750 TABLET, COATED ORAL at 19:26

## 2023-11-19 RX ADMIN — BUDESONIDE AND FORMOTEROL FUMARATE DIHYDRATE 2 PUFF: 80; 4.5 AEROSOL RESPIRATORY (INHALATION) at 19:51

## 2023-11-19 RX ADMIN — ACETAMINOPHEN 1000 MG: 500 TABLET ORAL at 04:42

## 2023-11-19 RX ADMIN — ROCURONIUM BROMIDE 50 MG: 10 INJECTION, SOLUTION INTRAVENOUS at 09:44

## 2023-11-19 RX ADMIN — SODIUM CHLORIDE, POTASSIUM CHLORIDE, SODIUM LACTATE AND CALCIUM CHLORIDE 100 ML/HR: 600; 310; 30; 20 INJECTION, SOLUTION INTRAVENOUS at 04:20

## 2023-11-19 RX ADMIN — DEXAMETHASONE SODIUM PHOSPHATE 10 MG: 10 INJECTION INTRAMUSCULAR; INTRAVENOUS at 10:03

## 2023-11-19 RX ADMIN — DOCUSATE SODIUM 50 MG AND SENNOSIDES 8.6 MG 1 TABLET: 8.6; 5 TABLET, FILM COATED ORAL at 07:42

## 2023-11-19 RX ADMIN — GABAPENTIN 600 MG: 300 CAPSULE ORAL at 04:41

## 2023-11-19 RX ADMIN — FENTANYL CITRATE 50 MCG: 0.05 INJECTION, SOLUTION INTRAMUSCULAR; INTRAVENOUS at 09:44

## 2023-11-19 RX ADMIN — FENTANYL CITRATE 50 MCG: 50 INJECTION, SOLUTION INTRAMUSCULAR; INTRAVENOUS at 07:42

## 2023-11-19 RX ADMIN — OXYCODONE 5 MG: 5 TABLET ORAL at 15:30

## 2023-11-19 RX ADMIN — ALBUTEROL SULFATE 5 MG: 2.5 SOLUTION RESPIRATORY (INHALATION) at 19:51

## 2023-11-19 RX ADMIN — OXYCODONE 5 MG: 5 TABLET ORAL at 19:25

## 2023-11-19 RX ADMIN — ACETAMINOPHEN 1000 MG: 500 TABLET ORAL at 15:29

## 2023-11-19 RX ADMIN — FENTANYL CITRATE 50 MCG: 50 INJECTION, SOLUTION INTRAMUSCULAR; INTRAVENOUS at 13:42

## 2023-11-19 RX ADMIN — PANTOPRAZOLE SODIUM 40 MG: 40 TABLET, DELAYED RELEASE ORAL at 07:42

## 2023-11-19 RX ADMIN — FENTANYL CITRATE 50 MCG: 50 INJECTION, SOLUTION INTRAMUSCULAR; INTRAVENOUS at 01:44

## 2023-11-19 RX ADMIN — SODIUM CHLORIDE, PRESERVATIVE FREE 10 ML: 5 INJECTION INTRAVENOUS at 07:48

## 2023-11-19 RX ADMIN — FENTANYL CITRATE 50 MCG: 50 INJECTION, SOLUTION INTRAMUSCULAR; INTRAVENOUS at 17:54

## 2023-11-19 RX ADMIN — HYDROMORPHONE HYDROCHLORIDE 0.5 MG: 1 INJECTION, SOLUTION INTRAMUSCULAR; INTRAVENOUS; SUBCUTANEOUS at 11:07

## 2023-11-19 RX ADMIN — FENTANYL CITRATE 25 MCG: 50 INJECTION, SOLUTION INTRAMUSCULAR; INTRAVENOUS at 02:35

## 2023-11-19 RX ADMIN — Medication 2000 MG: at 17:54

## 2023-11-19 RX ADMIN — OXYCODONE 5 MG: 5 TABLET ORAL at 04:42

## 2023-11-19 RX ADMIN — METHOCARBAMOL TABLETS 750 MG: 750 TABLET, COATED ORAL at 07:42

## 2023-11-19 RX ADMIN — FENTANYL CITRATE 50 MCG: 50 INJECTION, SOLUTION INTRAMUSCULAR; INTRAVENOUS at 23:37

## 2023-11-19 RX ADMIN — LIDOCAINE HYDROCHLORIDE 50 MG: 10 INJECTION, SOLUTION EPIDURAL; INFILTRATION; INTRACAUDAL; PERINEURAL at 09:44

## 2023-11-19 RX ADMIN — ONDANSETRON 4 MG: 2 INJECTION INTRAMUSCULAR; INTRAVENOUS at 10:28

## 2023-11-19 RX ADMIN — ACETAMINOPHEN 1000 MG: 500 TABLET ORAL at 07:42

## 2023-11-19 RX ADMIN — PROPOFOL 120 MG: 10 INJECTION, EMULSION INTRAVENOUS at 09:44

## 2023-11-19 ASSESSMENT — PAIN DESCRIPTION - ONSET: ONSET: ON-GOING

## 2023-11-19 ASSESSMENT — PAIN DESCRIPTION - DESCRIPTORS
DESCRIPTORS: ACHING;DISCOMFORT;STABBING;THROBBING
DESCRIPTORS: ACHING;DISCOMFORT;STABBING
DESCRIPTORS: ACHING;STABBING;THROBBING

## 2023-11-19 ASSESSMENT — PAIN SCALES - GENERAL
PAINLEVEL_OUTOF10: 10
PAINLEVEL_OUTOF10: 9
PAINLEVEL_OUTOF10: 10
PAINLEVEL_OUTOF10: 9
PAINLEVEL_OUTOF10: 10
PAINLEVEL_OUTOF10: 9

## 2023-11-19 ASSESSMENT — PAIN DESCRIPTION - LOCATION
LOCATION: LEG

## 2023-11-19 ASSESSMENT — PAIN DESCRIPTION - ORIENTATION
ORIENTATION: RIGHT
ORIENTATION: RIGHT
ORIENTATION: LOWER
ORIENTATION: RIGHT

## 2023-11-19 ASSESSMENT — ENCOUNTER SYMPTOMS
COUGH: 0
BACK PAIN: 0
DIARRHEA: 0
ABDOMINAL PAIN: 0
SHORTNESS OF BREATH: 0
NAUSEA: 0
BACK PAIN: 1
VOMITING: 0

## 2023-11-19 ASSESSMENT — PAIN DESCRIPTION - FREQUENCY: FREQUENCY: CONTINUOUS

## 2023-11-19 ASSESSMENT — PAIN - FUNCTIONAL ASSESSMENT
PAIN_FUNCTIONAL_ASSESSMENT: PREVENTS OR INTERFERES WITH ALL ACTIVE AND SOME PASSIVE ACTIVITIES
PAIN_FUNCTIONAL_ASSESSMENT: PREVENTS OR INTERFERES SOME ACTIVE ACTIVITIES AND ADLS
PAIN_FUNCTIONAL_ASSESSMENT: 0-10

## 2023-11-19 ASSESSMENT — PAIN DESCRIPTION - PAIN TYPE: TYPE: ACUTE PAIN

## 2023-11-19 NOTE — CONSULTS
Orthopaedic Surgery Consult  (Dr. Sabiha Stoddard)      CC/Reason for consult: Right proximal tibia and fibula fracture. HPI:      The patient is a 66 y.o. right hand-dominant female who was transferred from an outlying facility. Patient roughly 3 PM the prior day was returning home from her part-time job. Around her front door there is a large step. Patient lost footing while trying to open the door resulting her tripping. She immediately felt and heard a large pop. While on the ground patient was not able to get up or place any weight through the right leg. Patient denies hitting her head or any loss of consciousness. Her  found her on the floor and called EMS. She was subsequently transferred to [facility] with obtained imaging of the right knee demonstrating a proximal tibial plateau fracture with associated proximal fibular fracture. 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 She is on Xarelto for prior DVTs and pulmonary embolisms. Patient at baseline can ambulate without a cane or rolling walker. No prior injury to the right lower extremity. Back in 2020 patient sustained a nondisplaced fracture of the proximal phalanx of the left little finger that Dr. Jacqueline García managed. She has bilateral carpal tunnel releases done by Dr. Kerri Vick in 2010. In 2010 she had a left tibia shaft and trimalleolar ankle fracture treated with ORIF done by Dr. Anel Tejada. She has a past medical history of recurrent DVTs, recurrent PEs, MS, stroke, alcohol abuse, and bipolar 1 disorder. We are consulted for evaluation and treatment of a right tibial plateau fracture and associated proximal fibula fracture. Of note patient removed c-collar on her own and refuses to place back on. Trauma surgery has not yet cleared her c-spine.       Past Medical History:    Past Medical History:   Diagnosis Date    Alcoholism ([address])     hx of alcoholism; pt sober since November 12, 2019; pt currently

## 2023-11-19 NOTE — ED NOTES
Yola Rios  73-year-old female  S/p fall right tib/fib fracture  On Xarelto for history of DVT  No head injury     Savage Luna, RN  11/19/23 1510

## 2023-11-19 NOTE — ED NOTES
Patient removed aspen collar. Writer explain and risks of removing it and encourage to put it back but patient refused to.      Edwin Corey RN  11/19/23 8365

## 2023-11-19 NOTE — ED TRIAGE NOTES
Patient to ED via [facility], Patient was transferred from [address] [place] [address] for Trauma consult due to Mechanical fall, Patient states she loss her balance. Patient had severe comminuted fracture right tib fib (Splinted) as per x-ray from [facility] as EMS reports, Bilateral pedal pulses present, CT spine negative,. Patient states she's been taking xarelto, history of MS, osteoporosis, DVT. GCS of 15, A&OX4, VSS, On full cardiac monitor. Will continue to monitor.

## 2023-11-19 NOTE — ED NOTES
Dr. Elisa Villagran from trauma at bedside for consult and evaluation, C-spine precaution verbal order, aspen collar placed by Dr. Elisa Villagran.      Hal Garcia RN  11/19/23 5645

## 2023-11-19 NOTE — H&P
Conjunctiva/sclera: Conjunctivae normal.      Pupils: Pupils are equal, round, and reactive to light. Cardiovascular:      Rate and Rhythm: Normal rate and regular rhythm. Pulses:           Radial pulses are 2+ on the right side and 2+ on the left side. Femoral pulses are 2+ on the right side and 2+ on the left side. Dorsalis pedis pulses are 2+ on the right side and 2+ on the left side. Heart sounds: Normal heart sounds. Pulmonary:      Effort: Pulmonary effort is normal. No respiratory distress. Breath sounds: Normal breath sounds. Abdominal:      General: Bowel sounds are normal.      Palpations: Abdomen is soft. Tenderness: There is no abdominal tenderness. Musculoskeletal:         General: Normal range of motion. Cervical back: Tenderness and bony tenderness present. Thoracic back: Tenderness and bony tenderness present. Right lower leg: Tenderness and bony tenderness present. Left lower leg: No tenderness or bony tenderness. Skin:     General: Skin is warm and dry. Neurological:      Mental Status: She is alert and oriented to person, place, and time. GCS: GCS eye subscore is 4. GCS verbal subscore is 5. GCS motor subscore is 6. Sensory: Sensation is intact. FOCUSED ABDOMINAL SONOGRAM FOR TRAUMA (FAST): A good  quality examination was performed by Dr. Du Varner and representative images were obtained.     [x] No free fluid in the abdomen   [] Free fluid in RUQ   [] Free fluid in LUQ  [] Free fluid in Pelvis  [] Pericardial fluid  [] Other:        RADIOLOGY  CT HEAD WO CONTRAST    (Results Pending)   CT CERVICAL SPINE WO CONTRAST    (Results Pending)   CT CHEST ABDOMEN PELVIS W CONTRAST Additional Contrast? None    (Results Pending)   CT THORACIC SPINE BONY RECONSTRUCTION    (Results Pending)   XR FEMUR RIGHT (MIN 2 VIEWS)    (Results Pending)   CT KNEE RIGHT WO CONTRAST    (Results Pending)         LABS  Labs Reviewed   CBC WITH

## 2023-11-19 NOTE — ED PROVIDER NOTES
eMERGENCY dEPARTMENT eNCOUnter   [address]  Name: Mona Madden  MRN: [id number]  [address] [facility], [place], operated by [facility] 1965  Date of evaluation: 11/18/23     Mona Madden is a 66 y.o. female with CC: Leg Injury (Right leg)        This visit was performed by both a physician and an APC. I performed all aspects of the MDM as documented.       Amalia Babcock MD  Attending Emergency Physician           Amalia Babcock MD  11/18/23 7682

## 2023-11-19 NOTE — ED NOTES
Dr. Du Varner from trauma at bedside for consult and evaluation.        Heather Varela, ARCHIE  11/19/23 5741

## 2023-11-19 NOTE — ED NOTES
Dr. Uyen Saldaña from [facility] at bedside for consult and evaluation.        Thi Lyles, RN  11/19/23 7210

## 2023-11-19 NOTE — CARE COORDINATION
Case Management Assessment  Initial Evaluation    Date/Time of Evaluation: 11/19/2023 12:16 PM  Assessment Completed by: Melanie Baca RN    If patient is discharged prior to next notation, then this note serves as note for discharge by case management. Patient Name: Anamaria Mckoy                   YOB: 1965  Diagnosis: Tibia and fibula open fracture, right, type I or II, initial encounter [S82.201B, S82.401B]  Tibia/fibula fracture, right, closed, initial encounter [S82.201A, S82.401A]                   Date / Time: 11/19/2023  1:34 AM    Patient Admission Status: Inpatient   Readmission Risk (Low < 19, Mod (19-27), High > 27): Readmission Risk Score: 16.2    Current PCP: Nadja Eagle MD  PCP verified by CM? No (states goes to [facility])    Chart Reviewed: Yes      History Provided by: Patient  Patient Orientation: Alert and Oriented    Patient Cognition: Alert    Hospitalization in the last 30 days (Readmission):  No    If yes, Readmission Assessment in CM Navigator will be completed. Advance Directives:      Code Status: Full Code   Patient's Primary Decision Maker is: Legal Next of Kin    Primary Decision Maker: Brisa Morse *HIPAA* - Brother/Sister - [phone]    Discharge Planning:    Patient lives with: Spouse/Significant Other Type of Home: Acute Rehab  Primary Care Giver: Self  Patient Support Systems include: Spouse/Significant Other   Current Financial resources: Medicaid, Medicare  Current community resources:    Current services prior to admission: None            Current DME:              Type of Home Care services:  None    ADLS  Prior functional level: Independent in ADLs/IADLs  Current functional level: Mobility, Assistance with the following:    PT AM-PAC:   /24  OT AM-PAC:   /24    Family can provide assistance at DC: Yes  Would you like Case Management to discuss the discharge plan with any other family members/significant others, and if so, who?     Plans to Return

## 2023-11-19 NOTE — CONSULTS
[facility]   Consult Note                 Date:   2023  Patient name: Jerrod Sharma  Date of admission:  2023  1:34 AM  MRN:   [id number]  YOB: 1965    Reason for Consult:  cardiac evaluation    CHIEF COMPLAINT:  fracture    History Obtained From:  Patient     HISTORY OF PRESENT ILLNESS:    The patient is a 66 y.o. female  presenting to the hospital after a mechanical fall. Patient was evaluated in the in the ED and was noted to have tibial fracture. Patient plan for surgery with Ortho. Cardio consulted for further evaluation and preoperative stratification. Patient history of DVT and PE and is on home Xarelto. Past Medical History:   has a past medical history of Alcoholism ([address]), Anxiety, Asthma, Asthma, Bipolar 1 disorder ([address]), Borderline personality disorder ([address]), Chronic uveitis of both eyes, Depression, Drug abuse, opioid type ([address]), DVT (deep vein thrombosis) in pregnancy, History of blood transfusion, May-Thurner syndrome, Pneumonia, Pulmonary emboli ([address]), Suicide gesture ([address]), and Uveitis of both eyes. Past Surgical History:   has a past surgical history that includes Gastric bypass surgery; Cholecystectomy; Hysterectomy;  section; other surgical history (); Leg Surgery (Left, 2011); and Carpal tunnel release (Bilateral). Home Medications:    Prior to Admission medications    Medication Sig Start Date End Date Taking? Authorizing Provider   vitamin D (ERGOCALCIFEROL) 1.25 MG (44799 UT) CAPS capsule Take 1 capsule by mouth once a week 23  Yes Mindy Asencio DO   Galcanezumab-gnlm (EMGALITY) 120 MG/ML SOAJ Inject 120 mg into the skin every 30 days 23   Soraida Stone,    diazePAM (VALIUM) 5 MG tablet Take 1 tablet by mouth daily.  23   Rossy Michel MD   FLUoxetine (PROZAC) 40 MG capsule Take 1 capsule by mouth daily 23   Provider, Historical, MD   INVEGA SUSTENNA 156 MG/ML OMAR IM injection Inject 1

## 2023-11-20 ENCOUNTER — HOSPITAL ENCOUNTER (OUTPATIENT)
Dept: INFUSION THERAPY | Facility: MEDICAL CENTER | Age: 58
Discharge: HOME | End: 2023-11-20

## 2023-11-20 ENCOUNTER — ANESTHESIA EVENT (OUTPATIENT)
Dept: OPERATING ROOM | Age: 58
End: 2023-11-20
Payer: MEDICARE

## 2023-11-20 LAB
AMPHET UR QL SCN: POSITIVE
ANION GAP SERPL CALCULATED.3IONS-SCNC: 13 MMOL/L (ref 9–17)
ANION GAP SERPL CALCULATED.3IONS-SCNC: 6 MMOL/L (ref 9–17)
BACTERIA URNS QL MICRO: ABNORMAL
BARBITURATES UR QL SCN: NEGATIVE
BASOPHILS # BLD: <0.03 K/UL (ref 0–0.2)
BASOPHILS NFR BLD: 0 % (ref 0–2)
BENZODIAZ UR QL: POSITIVE
BILIRUB UR QL STRIP: NEGATIVE
BUN SERPL-MCNC: 16 MG/DL (ref 6–20)
BUN SERPL-MCNC: 17 MG/DL (ref 6–20)
CA-I BLD-SCNC: 1.25 MMOL/L (ref 1.13–1.33)
CALCIUM SERPL-MCNC: 9.1 MG/DL (ref 8.6–10.4)
CALCIUM SERPL-MCNC: 9.3 MG/DL (ref 8.6–10.4)
CANNABINOIDS UR QL SCN: POSITIVE
CASTS #/AREA URNS LPF: ABNORMAL /LPF (ref 0–8)
CHLORIDE SERPL-SCNC: 106 MMOL/L (ref 98–107)
CHLORIDE SERPL-SCNC: 107 MMOL/L (ref 98–107)
CLARITY UR: CLEAR
CO2 SERPL-SCNC: 21 MMOL/L (ref 20–31)
CO2 SERPL-SCNC: 25 MMOL/L (ref 20–31)
COCAINE UR QL SCN: NEGATIVE
COLOR UR: ABNORMAL
CREAT SERPL-MCNC: 0.3 MG/DL (ref 0.5–0.9)
CREAT SERPL-MCNC: 0.4 MG/DL (ref 0.5–0.9)
EKG ATRIAL RATE: 67 BPM
EKG ATRIAL RATE: 70 BPM
EKG P AXIS: 56 DEGREES
EKG P AXIS: 56 DEGREES
EKG P-R INTERVAL: 120 MS
EKG P-R INTERVAL: 122 MS
EKG Q-T INTERVAL: 404 MS
EKG Q-T INTERVAL: 406 MS
EKG QRS DURATION: 76 MS
EKG QRS DURATION: 76 MS
EKG QTC CALCULATION (BAZETT): 426 MS
EKG QTC CALCULATION (BAZETT): 438 MS
EKG R AXIS: 18 DEGREES
EKG R AXIS: 19 DEGREES
EKG T AXIS: 17 DEGREES
EKG T AXIS: 20 DEGREES
EKG VENTRICULAR RATE: 67 BPM
EKG VENTRICULAR RATE: 70 BPM
EOSINOPHIL # BLD: <0.03 K/UL (ref 0–0.44)
EOSINOPHILS RELATIVE PERCENT: 0 % (ref 1–4)
EPI CELLS #/AREA URNS HPF: ABNORMAL /HPF (ref 0–5)
ERYTHROCYTE [DISTWIDTH] IN BLOOD BY AUTOMATED COUNT: 16.7 % (ref 11.8–14.4)
FENTANYL UR QL: POSITIVE
GFR SERPL CREATININE-BSD FRML MDRD: >60 ML/MIN/1.73M2
GFR SERPL CREATININE-BSD FRML MDRD: >60 ML/MIN/1.73M2
GLUCOSE SERPL-MCNC: 116 MG/DL (ref 70–99)
GLUCOSE SERPL-MCNC: 62 MG/DL (ref 70–99)
GLUCOSE UR STRIP-MCNC: ABNORMAL MG/DL
HCT VFR BLD AUTO: 34.7 % (ref 36.3–47.1)
HGB BLD-MCNC: 10.5 G/DL (ref 11.9–15.1)
HGB UR QL STRIP.AUTO: NEGATIVE
IMM GRANULOCYTES # BLD AUTO: 0.06 K/UL (ref 0–0.3)
IMM GRANULOCYTES NFR BLD: 1 %
KETONES UR STRIP-MCNC: NEGATIVE MG/DL
LEUKOCYTE ESTERASE UR QL STRIP: NEGATIVE
LYMPHOCYTES NFR BLD: 0.89 K/UL (ref 1.1–3.7)
LYMPHOCYTES RELATIVE PERCENT: 7 % (ref 24–43)
MAGNESIUM SERPL-MCNC: 2.1 MG/DL (ref 1.6–2.6)
MCH RBC QN AUTO: 27.5 PG (ref 25.2–33.5)
MCHC RBC AUTO-ENTMCNC: 30.3 G/DL (ref 28.4–34.8)
MCV RBC AUTO: 90.8 FL (ref 82.6–102.9)
METHADONE UR QL: NEGATIVE
MONOCYTES NFR BLD: 1.22 K/UL (ref 0.1–1.2)
MONOCYTES NFR BLD: 10 % (ref 3–12)
NEUTROPHILS NFR BLD: 82 % (ref 36–65)
NEUTS SEG NFR BLD: 9.91 K/UL (ref 1.5–8.1)
NITRITE UR QL STRIP: NEGATIVE
NRBC BLD-RTO: 0 PER 100 WBC
OPIATES UR QL SCN: POSITIVE
OXYCODONE UR QL SCN: POSITIVE
PCP UR QL SCN: NEGATIVE
PH UR STRIP: 6.5 [PH] (ref 5–8)
PHOSPHATE SERPL-MCNC: 3.3 MG/DL (ref 2.6–4.5)
PLATELET # BLD AUTO: 286 K/UL (ref 138–453)
PMV BLD AUTO: 10.4 FL (ref 8.1–13.5)
POTASSIUM SERPL-SCNC: 3.6 MMOL/L (ref 3.7–5.3)
POTASSIUM SERPL-SCNC: 4.5 MMOL/L (ref 3.7–5.3)
PROT UR STRIP-MCNC: ABNORMAL MG/DL
RBC # BLD AUTO: 3.82 M/UL (ref 3.95–5.11)
RBC # BLD: ABNORMAL 10*6/UL
RBC #/AREA URNS HPF: ABNORMAL /HPF (ref 0–4)
SODIUM SERPL-SCNC: 138 MMOL/L (ref 135–144)
SODIUM SERPL-SCNC: 140 MMOL/L (ref 135–144)
SP GR UR STRIP: 1.04 (ref 1–1.03)
TEST INFORMATION: ABNORMAL
UROBILINOGEN UR STRIP-ACNC: NORMAL EU/DL (ref 0–1)
WBC #/AREA URNS HPF: ABNORMAL /HPF (ref 0–5)
WBC OTHER # BLD: 12.1 K/UL (ref 3.5–11.3)

## 2023-11-20 PROCEDURE — 80048 BASIC METABOLIC PNL TOTAL CA: CPT

## 2023-11-20 PROCEDURE — 1200000000 HC SEMI PRIVATE

## 2023-11-20 PROCEDURE — 2580000003 HC RX 258

## 2023-11-20 PROCEDURE — 6370000000 HC RX 637 (ALT 250 FOR IP): Performed by: STUDENT IN AN ORGANIZED HEALTH CARE EDUCATION/TRAINING PROGRAM

## 2023-11-20 PROCEDURE — 80307 DRUG TEST PRSMV CHEM ANLYZR: CPT

## 2023-11-20 PROCEDURE — 6360000002 HC RX W HCPCS: Performed by: CHIROPRACTOR

## 2023-11-20 PROCEDURE — 6370000000 HC RX 637 (ALT 250 FOR IP)

## 2023-11-20 PROCEDURE — 83735 ASSAY OF MAGNESIUM: CPT

## 2023-11-20 PROCEDURE — 82330 ASSAY OF CALCIUM: CPT

## 2023-11-20 PROCEDURE — 81001 URINALYSIS AUTO W/SCOPE: CPT

## 2023-11-20 PROCEDURE — 36415 COLL VENOUS BLD VENIPUNCTURE: CPT

## 2023-11-20 PROCEDURE — 85025 COMPLETE CBC W/AUTO DIFF WBC: CPT

## 2023-11-20 PROCEDURE — 6370000000 HC RX 637 (ALT 250 FOR IP): Performed by: NURSE PRACTITIONER

## 2023-11-20 PROCEDURE — 6370000000 HC RX 637 (ALT 250 FOR IP): Performed by: CHIROPRACTOR

## 2023-11-20 PROCEDURE — 6360000002 HC RX W HCPCS: Performed by: STUDENT IN AN ORGANIZED HEALTH CARE EDUCATION/TRAINING PROGRAM

## 2023-11-20 PROCEDURE — 93010 ELECTROCARDIOGRAM REPORT: CPT | Performed by: INTERNAL MEDICINE

## 2023-11-20 PROCEDURE — 6360000002 HC RX W HCPCS: Performed by: NURSE PRACTITIONER

## 2023-11-20 PROCEDURE — 94761 N-INVAS EAR/PLS OXIMETRY MLT: CPT

## 2023-11-20 PROCEDURE — 84100 ASSAY OF PHOSPHORUS: CPT

## 2023-11-20 PROCEDURE — 2580000003 HC RX 258: Performed by: CHIROPRACTOR

## 2023-11-20 PROCEDURE — 99232 SBSQ HOSP IP/OBS MODERATE 35: CPT | Performed by: SURGERY

## 2023-11-20 PROCEDURE — 94640 AIRWAY INHALATION TREATMENT: CPT

## 2023-11-20 RX ORDER — ENOXAPARIN SODIUM 100 MG/ML
30 INJECTION SUBCUTANEOUS 2 TIMES DAILY
Status: DISCONTINUED | OUTPATIENT
Start: 2023-11-20 | End: 2023-11-20

## 2023-11-20 RX ORDER — DIAZEPAM 5 MG/1
5 TABLET ORAL DAILY PRN
Status: DISCONTINUED | OUTPATIENT
Start: 2023-11-20 | End: 2023-11-25

## 2023-11-20 RX ORDER — OXYCODONE HYDROCHLORIDE 5 MG/1
10 TABLET ORAL EVERY 4 HOURS PRN
Status: DISCONTINUED | OUTPATIENT
Start: 2023-11-20 | End: 2023-11-26

## 2023-11-20 RX ORDER — OXYCODONE HYDROCHLORIDE 5 MG/1
5 TABLET ORAL EVERY 4 HOURS PRN
Status: DISCONTINUED | OUTPATIENT
Start: 2023-11-20 | End: 2023-11-26

## 2023-11-20 RX ORDER — ENOXAPARIN SODIUM 100 MG/ML
30 INJECTION SUBCUTANEOUS DAILY
Status: DISCONTINUED | OUTPATIENT
Start: 2023-11-20 | End: 2023-11-20

## 2023-11-20 RX ORDER — ENOXAPARIN SODIUM 100 MG/ML
30 INJECTION SUBCUTANEOUS DAILY
Status: DISCONTINUED | OUTPATIENT
Start: 2023-11-20 | End: 2023-11-21

## 2023-11-20 RX ORDER — TROSPIUM CHLORIDE 20 MG/1
20 TABLET, FILM COATED ORAL
Status: DISCONTINUED | OUTPATIENT
Start: 2023-11-20 | End: 2023-12-04 | Stop reason: HOSPADM

## 2023-11-20 RX ORDER — MORPHINE SULFATE 2 MG/ML
1 INJECTION, SOLUTION INTRAMUSCULAR; INTRAVENOUS
Status: DISCONTINUED | OUTPATIENT
Start: 2023-11-20 | End: 2023-11-23

## 2023-11-20 RX ORDER — ZOLPIDEM TARTRATE 5 MG/1
10 TABLET ORAL NIGHTLY PRN
Status: DISCONTINUED | OUTPATIENT
Start: 2023-11-20 | End: 2023-12-04 | Stop reason: HOSPADM

## 2023-11-20 RX ORDER — LORAZEPAM 0.5 MG/1
0.5 TABLET ORAL 2 TIMES DAILY PRN
Status: DISCONTINUED | OUTPATIENT
Start: 2023-11-20 | End: 2023-11-25

## 2023-11-20 RX ORDER — TIZANIDINE 4 MG/1
4 TABLET ORAL 3 TIMES DAILY
Status: DISCONTINUED | OUTPATIENT
Start: 2023-11-20 | End: 2023-11-26

## 2023-11-20 RX ORDER — PREGABALIN 100 MG/1
200 CAPSULE ORAL 2 TIMES DAILY
Status: DISCONTINUED | OUTPATIENT
Start: 2023-11-20 | End: 2023-12-04 | Stop reason: HOSPADM

## 2023-11-20 RX ORDER — ALBUTEROL SULFATE 2.5 MG/3ML
2.5 SOLUTION RESPIRATORY (INHALATION) EVERY 4 HOURS PRN
Status: DISCONTINUED | OUTPATIENT
Start: 2023-11-20 | End: 2023-12-04 | Stop reason: HOSPADM

## 2023-11-20 RX ORDER — KETOROLAC TROMETHAMINE 15 MG/ML
15 INJECTION, SOLUTION INTRAMUSCULAR; INTRAVENOUS EVERY 6 HOURS
Status: DISCONTINUED | OUTPATIENT
Start: 2023-11-20 | End: 2023-11-20

## 2023-11-20 RX ORDER — SODIUM CHLORIDE, SODIUM LACTATE, POTASSIUM CHLORIDE, CALCIUM CHLORIDE 600; 310; 30; 20 MG/100ML; MG/100ML; MG/100ML; MG/100ML
INJECTION, SOLUTION INTRAVENOUS CONTINUOUS
Status: DISCONTINUED | OUTPATIENT
Start: 2023-11-21 | End: 2023-11-22

## 2023-11-20 RX ORDER — TOLTERODINE 2 MG/1
4 CAPSULE, EXTENDED RELEASE ORAL DAILY
Status: DISCONTINUED | OUTPATIENT
Start: 2023-11-20 | End: 2023-11-20 | Stop reason: CLARIF

## 2023-11-20 RX ORDER — SUCRALFATE 1 G/1
1 TABLET ORAL 4 TIMES DAILY
Status: DISCONTINUED | OUTPATIENT
Start: 2023-11-20 | End: 2023-11-28

## 2023-11-20 RX ADMIN — TIZANIDINE 4 MG: 4 TABLET ORAL at 15:25

## 2023-11-20 RX ADMIN — TROSPIUM CHLORIDE 20 MG: 20 TABLET, FILM COATED ORAL at 15:23

## 2023-11-20 RX ADMIN — LORAZEPAM 0.5 MG: 0.5 TABLET ORAL at 11:00

## 2023-11-20 RX ADMIN — OXYCODONE 10 MG: 5 TABLET ORAL at 23:49

## 2023-11-20 RX ADMIN — FLUOXETINE HYDROCHLORIDE 40 MG: 20 CAPSULE ORAL at 09:23

## 2023-11-20 RX ADMIN — GABAPENTIN 600 MG: 300 CAPSULE ORAL at 03:37

## 2023-11-20 RX ADMIN — OXYCODONE 10 MG: 5 TABLET ORAL at 15:20

## 2023-11-20 RX ADMIN — ZOLPIDEM TARTRATE 10 MG: 5 TABLET ORAL at 20:35

## 2023-11-20 RX ADMIN — DOCUSATE SODIUM 50 MG AND SENNOSIDES 8.6 MG 1 TABLET: 8.6; 5 TABLET, FILM COATED ORAL at 09:23

## 2023-11-20 RX ADMIN — FENTANYL CITRATE 50 MCG: 50 INJECTION, SOLUTION INTRAMUSCULAR; INTRAVENOUS at 07:05

## 2023-11-20 RX ADMIN — MORPHINE SULFATE 1 MG: 2 INJECTION, SOLUTION INTRAMUSCULAR; INTRAVENOUS at 11:39

## 2023-11-20 RX ADMIN — PREGABALIN 200 MG: 100 CAPSULE ORAL at 20:32

## 2023-11-20 RX ADMIN — SODIUM CHLORIDE, PRESERVATIVE FREE 10 ML: 5 INJECTION INTRAVENOUS at 20:32

## 2023-11-20 RX ADMIN — Medication 2000 MG: at 03:31

## 2023-11-20 RX ADMIN — PREGABALIN 200 MG: 100 CAPSULE ORAL at 11:38

## 2023-11-20 RX ADMIN — GABAPENTIN 600 MG: 300 CAPSULE ORAL at 09:24

## 2023-11-20 RX ADMIN — ACETAMINOPHEN 1000 MG: 500 TABLET ORAL at 03:37

## 2023-11-20 RX ADMIN — DIAZEPAM 5 MG: 5 TABLET ORAL at 11:00

## 2023-11-20 RX ADMIN — SODIUM CHLORIDE, POTASSIUM CHLORIDE, SODIUM LACTATE AND CALCIUM CHLORIDE: 600; 310; 30; 20 INJECTION, SOLUTION INTRAVENOUS at 23:53

## 2023-11-20 RX ADMIN — SODIUM CHLORIDE, PRESERVATIVE FREE 10 ML: 5 INJECTION INTRAVENOUS at 09:26

## 2023-11-20 RX ADMIN — METHOCARBAMOL TABLETS 750 MG: 750 TABLET, COATED ORAL at 09:24

## 2023-11-20 RX ADMIN — MORPHINE SULFATE 1 MG: 2 INJECTION, SOLUTION INTRAMUSCULAR; INTRAVENOUS at 20:32

## 2023-11-20 RX ADMIN — TIZANIDINE 4 MG: 4 TABLET ORAL at 20:33

## 2023-11-20 RX ADMIN — PANTOPRAZOLE SODIUM 40 MG: 40 TABLET, DELAYED RELEASE ORAL at 09:24

## 2023-11-20 RX ADMIN — OXYCODONE 10 MG: 5 TABLET ORAL at 19:37

## 2023-11-20 RX ADMIN — ACETAMINOPHEN 1000 MG: 500 TABLET ORAL at 09:24

## 2023-11-20 RX ADMIN — FENTANYL CITRATE 50 MCG: 50 INJECTION, SOLUTION INTRAMUSCULAR; INTRAVENOUS at 04:24

## 2023-11-20 RX ADMIN — BUDESONIDE AND FORMOTEROL FUMARATE DIHYDRATE 2 PUFF: 80; 4.5 AEROSOL RESPIRATORY (INHALATION) at 15:44

## 2023-11-20 RX ADMIN — MORPHINE SULFATE 1 MG: 2 INJECTION, SOLUTION INTRAMUSCULAR; INTRAVENOUS at 17:17

## 2023-11-20 RX ADMIN — SUCRALFATE 1 G: 1 TABLET ORAL at 20:32

## 2023-11-20 RX ADMIN — OXYCODONE 5 MG: 5 TABLET ORAL at 03:37

## 2023-11-20 RX ADMIN — OXYCODONE 10 MG: 5 TABLET ORAL at 07:46

## 2023-11-20 RX ADMIN — ALBUTEROL SULFATE 2.5 MG: 2.5 SOLUTION RESPIRATORY (INHALATION) at 15:44

## 2023-11-20 RX ADMIN — BUDESONIDE AND FORMOTEROL FUMARATE DIHYDRATE 2 PUFF: 80; 4.5 AEROSOL RESPIRATORY (INHALATION) at 21:42

## 2023-11-20 RX ADMIN — DOCUSATE SODIUM 50 MG AND SENNOSIDES 8.6 MG 1 TABLET: 8.6; 5 TABLET, FILM COATED ORAL at 20:32

## 2023-11-20 ASSESSMENT — PAIN DESCRIPTION - DESCRIPTORS
DESCRIPTORS: ACHING;DISCOMFORT
DESCRIPTORS: ACHING;DISCOMFORT;STABBING;THROBBING
DESCRIPTORS: ACHING
DESCRIPTORS: ACHING;BURNING;SHARP;SHOOTING
DESCRIPTORS: BURNING
DESCRIPTORS: ACHING
DESCRIPTORS: ACHING
DESCRIPTORS: ACHING;DISCOMFORT;STABBING;THROBBING

## 2023-11-20 ASSESSMENT — PAIN DESCRIPTION - LOCATION
LOCATION: LEG

## 2023-11-20 ASSESSMENT — PAIN SCALES - GENERAL
PAINLEVEL_OUTOF10: 8
PAINLEVEL_OUTOF10: 7
PAINLEVEL_OUTOF10: 9
PAINLEVEL_OUTOF10: 8
PAINLEVEL_OUTOF10: 7
PAINLEVEL_OUTOF10: 10
PAINLEVEL_OUTOF10: 9
PAINLEVEL_OUTOF10: 8
PAINLEVEL_OUTOF10: 9
PAINLEVEL_OUTOF10: 9

## 2023-11-20 ASSESSMENT — PAIN DESCRIPTION - ORIENTATION
ORIENTATION: RIGHT

## 2023-11-20 ASSESSMENT — PAIN - FUNCTIONAL ASSESSMENT
PAIN_FUNCTIONAL_ASSESSMENT: PREVENTS OR INTERFERES SOME ACTIVE ACTIVITIES AND ADLS
PAIN_FUNCTIONAL_ASSESSMENT: PREVENTS OR INTERFERES SOME ACTIVE ACTIVITIES AND ADLS

## 2023-11-20 NOTE — OP NOTE
[address] [facility]                                OPERATIVE REPORT    PATIENT NAME: Hiwot Degroot                  :        1965  MED REC NO:   [id number]                             ROOM:       0424  ACCOUNT NO:   [de-identified]                           ADMIT DATE: 2023  PROVIDER:     Michoacano Prakash    DATE OF PROCEDURE:  2023    PREOPERATIVE DIAGNOSIS:  Right bicondylar tibial plateau fracture. POSTOPERATIVE DIAGNOSIS:  Right bicondylar tibial plateau fracture. OPERATIONS PERFORMED:  1.  Closed reduction of right proximal tibial fracture under anesthesia  and manipulation. 2.  Application of right knee spanning external fixator. SURGEON:  Michoacano Kruger. Cyr,     ASSISTANTS:  Tim Mandujano DO, PGY-2; Brianda Andrews DO, PGY-5; and  Sindhu Elias MD, PGY-1.    ANESTHESIA:  General.    ESTIMATED BLOOD LOSS:  10 mL. COMPLICATIONS:  None. SPECIMENS:  None. IMPLANTS:  5.0 mm Schanz pins x4. FINDINGS:  Right displaced bicondylar tibial plateau fracture. INDICATIONS:  This is a 42-year-old female who presented to [email or web address] as a transfer from [facility] after she  fell while at her part-time job directly on her right side. Imaging  performed demonstrated right bicondylar tibial plateau fracture for  which Orthopedics was consulted. Given some swelling, recommended  operative intervention in the form of application of knee spanning  external fixator as part of a staged procedure until soft tissue  amenable for definitive fixation. The patient was amenable to this. Consent was obtained and placed in the chart. All questions were  answered appropriately.   Surgical risks including but not limited to  bleeding; blood clots; infection; damage to nearby tissues, vessels, and  nerves; wound healing complications; failure of procedure; stiffness;  loss of

## 2023-11-20 NOTE — DISCHARGE INSTRUCTIONS
Discharge Instructions for Trauma       What to do after you leave the hospital:      Please continue to use your Incentive Spirometer as directed. You can practice 10 deep breaths/hour while awake. Using the [address] will promote the health of your lungs by taking slow, deep breaths in. It is also important in preventing pneumonia or a pneumothorax from developing. For resources transitioning back to the community following your trauma, visit [facility]    General questions or concerns please call the Trauma and [address] [place] [address] at [phone]. If needed, the clinic fax number is [phone]. Trauma is a life-threatening condition. Your doctor will want to closely monitor you. Be sure to go to all of your appointments. Orthopaedic Instructions:  -Weight bearing status: Non weight bearing with the right leg  -Do not remove Optifoam bandage (the large sealed \"Band-aid\"-like dressing) until your follow-up date in clinic. It is okay to shower with the Optifoam bandage. Should it fall off, replace with band-aids or gauze & tape until dry. It is still okay to shower if it falls off, but avoid baths and underwater submersion.  -Always look for signs of compartment syndrome: pain out of proportion to the injury, pain not controlled with pain medication, numbness in digits, changing of color of digits (paleness). If these signs occur return to ED immediately for reassessment.  -Always work on toe motion to decrease swelling.  -Ice (20 minutes on and off 1 hour) and elevate above the level of the heart to reduce swelling and throbbing pain.  -Should urinate within 8 hours of surgery.  -Call the office or come to Emergency Room if signs of infection appear (hot, swollen, red, draining pus, fever)  -Take medications as prescribed.  -Wean off narcotics (percocet/norco) as soon as possible.  Do not take tylenol if still taking narcotics.  -Follow up with

## 2023-11-20 NOTE — CARE COORDINATION
Transitional planning    Plan for OR tomorrow, referral to [facility]. 1100 Call from Alfonzo Jj with [facility], referral received, most likely can accept, will follow.

## 2023-11-20 NOTE — CARE COORDINATION
Met with pt to complete an SBIRT. Pt states that she fell at home. She denies alcohol and drug use. She also denies depression. SBIRT is negative. Alcohol Screening and Brief Intervention        Recent Labs     11/19/23  0258   ALC <10       Alcohol Pre-screening     (WOMEN ONLY) How many times in the past year have you had 4 or more drinks in a day?: None       Drug Pre-Screening        Drug Screening DAST       Mood Pre-Screening (PHQ-2)  During the past 2 weeks, have you been bothered by, feeling down, depressed or hopeless? No        I have interviewed Tim Winslow, [id number] regarding  Her alcohol consumption/drug use and risk for excessive use. Screenings were negative. Patient  N/A intervention at this time.    Deferred []    Completed on: 11/20/2023   SINA Layton

## 2023-11-21 ENCOUNTER — APPOINTMENT (OUTPATIENT)
Dept: GENERAL RADIOLOGY | Age: 58
DRG: 481 | End: 2023-11-21
Payer: MEDICARE

## 2023-11-21 ENCOUNTER — ANESTHESIA (OUTPATIENT)
Dept: OPERATING ROOM | Age: 58
End: 2023-11-21
Payer: MEDICARE

## 2023-11-21 LAB
ANION GAP SERPL CALCULATED.3IONS-SCNC: 9 MMOL/L (ref 9–17)
BUN SERPL-MCNC: 14 MG/DL (ref 6–20)
CALCIUM SERPL-MCNC: 9.1 MG/DL (ref 8.6–10.4)
CHLORIDE SERPL-SCNC: 107 MMOL/L (ref 98–107)
CO2 SERPL-SCNC: 24 MMOL/L (ref 20–31)
CREAT SERPL-MCNC: 0.3 MG/DL (ref 0.5–0.9)
ERYTHROCYTE [DISTWIDTH] IN BLOOD BY AUTOMATED COUNT: 17 % (ref 11.8–14.4)
GFR SERPL CREATININE-BSD FRML MDRD: >60 ML/MIN/1.73M2
GLUCOSE SERPL-MCNC: 79 MG/DL (ref 70–99)
HCT VFR BLD AUTO: 32.2 % (ref 36.3–47.1)
HGB BLD-MCNC: 9.7 G/DL (ref 11.9–15.1)
MCH RBC QN AUTO: 27.7 PG (ref 25.2–33.5)
MCHC RBC AUTO-ENTMCNC: 30.1 G/DL (ref 28.4–34.8)
MCV RBC AUTO: 92 FL (ref 82.6–102.9)
NRBC BLD-RTO: 0 PER 100 WBC
PLATELET # BLD AUTO: 221 K/UL (ref 138–453)
PMV BLD AUTO: 10.6 FL (ref 8.1–13.5)
POTASSIUM SERPL-SCNC: 4.3 MMOL/L (ref 3.7–5.3)
RBC # BLD AUTO: 3.5 M/UL (ref 3.95–5.11)
SODIUM SERPL-SCNC: 140 MMOL/L (ref 135–144)
WBC OTHER # BLD: 8.1 K/UL (ref 3.5–11.3)

## 2023-11-21 PROCEDURE — 27536 TREAT KNEE FRACTURE: CPT | Performed by: STUDENT IN AN ORGANIZED HEALTH CARE EDUCATION/TRAINING PROGRAM

## 2023-11-21 PROCEDURE — 7100000000 HC PACU RECOVERY - FIRST 15 MIN: Performed by: STUDENT IN AN ORGANIZED HEALTH CARE EDUCATION/TRAINING PROGRAM

## 2023-11-21 PROCEDURE — 80048 BASIC METABOLIC PNL TOTAL CA: CPT

## 2023-11-21 PROCEDURE — 0QHB3BZ INSERTION OF MONOPLANAR EXTERNAL FIXATION DEVICE INTO RIGHT LOWER FEMUR, PERCUTANEOUS APPROACH: ICD-10-PCS | Performed by: STUDENT IN AN ORGANIZED HEALTH CARE EDUCATION/TRAINING PROGRAM

## 2023-11-21 PROCEDURE — 36415 COLL VENOUS BLD VENIPUNCTURE: CPT

## 2023-11-21 PROCEDURE — 94640 AIRWAY INHALATION TREATMENT: CPT

## 2023-11-21 PROCEDURE — C1713 ANCHOR/SCREW BN/BN,TIS/BN: HCPCS | Performed by: STUDENT IN AN ORGANIZED HEALTH CARE EDUCATION/TRAINING PROGRAM

## 2023-11-21 PROCEDURE — 6360000002 HC RX W HCPCS: Performed by: NURSE PRACTITIONER

## 2023-11-21 PROCEDURE — 3600000004 HC SURGERY LEVEL 4 BASE: Performed by: STUDENT IN AN ORGANIZED HEALTH CARE EDUCATION/TRAINING PROGRAM

## 2023-11-21 PROCEDURE — 99232 SBSQ HOSP IP/OBS MODERATE 35: CPT | Performed by: SURGERY

## 2023-11-21 PROCEDURE — 0QPG35Z REMOVAL OF EXTERNAL FIXATION DEVICE FROM RIGHT TIBIA, PERCUTANEOUS APPROACH: ICD-10-PCS | Performed by: STUDENT IN AN ORGANIZED HEALTH CARE EDUCATION/TRAINING PROGRAM

## 2023-11-21 PROCEDURE — 6360000002 HC RX W HCPCS: Performed by: NURSE ANESTHETIST, CERTIFIED REGISTERED

## 2023-11-21 PROCEDURE — 0QSG04Z REPOSITION RIGHT TIBIA WITH INTERNAL FIXATION DEVICE, OPEN APPROACH: ICD-10-PCS | Performed by: STUDENT IN AN ORGANIZED HEALTH CARE EDUCATION/TRAINING PROGRAM

## 2023-11-21 PROCEDURE — 2580000003 HC RX 258: Performed by: NURSE ANESTHETIST, CERTIFIED REGISTERED

## 2023-11-21 PROCEDURE — 27403 REPAIR OF KNEE CARTILAGE: CPT | Performed by: STUDENT IN AN ORGANIZED HEALTH CARE EDUCATION/TRAINING PROGRAM

## 2023-11-21 PROCEDURE — 27780 TREATMENT OF FIBULA FRACTURE: CPT | Performed by: STUDENT IN AN ORGANIZED HEALTH CARE EDUCATION/TRAINING PROGRAM

## 2023-11-21 PROCEDURE — 6360000002 HC RX W HCPCS: Performed by: STUDENT IN AN ORGANIZED HEALTH CARE EDUCATION/TRAINING PROGRAM

## 2023-11-21 PROCEDURE — 6360000002 HC RX W HCPCS

## 2023-11-21 PROCEDURE — 2580000003 HC RX 258

## 2023-11-21 PROCEDURE — 6370000000 HC RX 637 (ALT 250 FOR IP)

## 2023-11-21 PROCEDURE — 85027 COMPLETE CBC AUTOMATED: CPT

## 2023-11-21 PROCEDURE — 1200000000 HC SEMI PRIVATE

## 2023-11-21 PROCEDURE — 2580000003 HC RX 258: Performed by: STUDENT IN AN ORGANIZED HEALTH CARE EDUCATION/TRAINING PROGRAM

## 2023-11-21 PROCEDURE — 20694 RMVL EXT FIXJ SYS UNDER ANES: CPT | Performed by: STUDENT IN AN ORGANIZED HEALTH CARE EDUCATION/TRAINING PROGRAM

## 2023-11-21 PROCEDURE — 0SQC0ZZ REPAIR RIGHT KNEE JOINT, OPEN APPROACH: ICD-10-PCS | Performed by: STUDENT IN AN ORGANIZED HEALTH CARE EDUCATION/TRAINING PROGRAM

## 2023-11-21 PROCEDURE — 3700000000 HC ANESTHESIA ATTENDED CARE: Performed by: STUDENT IN AN ORGANIZED HEALTH CARE EDUCATION/TRAINING PROGRAM

## 2023-11-21 PROCEDURE — 0QSG3BZ REPOSITION RIGHT TIBIA WITH MONOPLANAR EXTERNAL FIXATION DEVICE, PERCUTANEOUS APPROACH: ICD-10-PCS | Performed by: STUDENT IN AN ORGANIZED HEALTH CARE EDUCATION/TRAINING PROGRAM

## 2023-11-21 PROCEDURE — 3700000001 HC ADD 15 MINUTES (ANESTHESIA): Performed by: STUDENT IN AN ORGANIZED HEALTH CARE EDUCATION/TRAINING PROGRAM

## 2023-11-21 PROCEDURE — 6370000000 HC RX 637 (ALT 250 FOR IP): Performed by: CHIROPRACTOR

## 2023-11-21 PROCEDURE — 73562 X-RAY EXAM OF KNEE 3: CPT

## 2023-11-21 PROCEDURE — 7100000001 HC PACU RECOVERY - ADDTL 15 MIN: Performed by: STUDENT IN AN ORGANIZED HEALTH CARE EDUCATION/TRAINING PROGRAM

## 2023-11-21 PROCEDURE — 2720000010 HC SURG SUPPLY STERILE: Performed by: STUDENT IN AN ORGANIZED HEALTH CARE EDUCATION/TRAINING PROGRAM

## 2023-11-21 PROCEDURE — 94760 N-INVAS EAR/PLS OXIMETRY 1: CPT

## 2023-11-21 PROCEDURE — 2500000003 HC RX 250 WO HCPCS: Performed by: NURSE ANESTHETIST, CERTIFIED REGISTERED

## 2023-11-21 PROCEDURE — 2709999900 HC NON-CHARGEABLE SUPPLY: Performed by: STUDENT IN AN ORGANIZED HEALTH CARE EDUCATION/TRAINING PROGRAM

## 2023-11-21 PROCEDURE — 0QPB35Z REMOVAL OF EXTERNAL FIXATION DEVICE FROM RIGHT LOWER FEMUR, PERCUTANEOUS APPROACH: ICD-10-PCS | Performed by: STUDENT IN AN ORGANIZED HEALTH CARE EDUCATION/TRAINING PROGRAM

## 2023-11-21 PROCEDURE — 6370000000 HC RX 637 (ALT 250 FOR IP): Performed by: STUDENT IN AN ORGANIZED HEALTH CARE EDUCATION/TRAINING PROGRAM

## 2023-11-21 PROCEDURE — 3600000014 HC SURGERY LEVEL 4 ADDTL 15MIN: Performed by: STUDENT IN AN ORGANIZED HEALTH CARE EDUCATION/TRAINING PROGRAM

## 2023-11-21 PROCEDURE — 64447 NJX AA&/STRD FEMORAL NRV IMG: CPT | Performed by: ANESTHESIOLOGY

## 2023-11-21 DEVICE — IMPLANTABLE DEVICE: Type: IMPLANTABLE DEVICE | Site: TIBIA | Status: FUNCTIONAL

## 2023-11-21 DEVICE — SCREW BNE L26MM DIA3.5MM CORT S STL ST NONCANNULATED LOK: Type: IMPLANTABLE DEVICE | Site: TIBIA | Status: FUNCTIONAL

## 2023-11-21 DEVICE — SCREW BNE L70MM DIA3.5MM PROX TIB S STL ST FULL THRD T15: Type: IMPLANTABLE DEVICE | Site: TIBIA | Status: FUNCTIONAL

## 2023-11-21 DEVICE — SCREW BNE L60MM DIA3.5MM PROX TIB S STL ST FULL THRD T15: Type: IMPLANTABLE DEVICE | Site: TIBIA | Status: FUNCTIONAL

## 2023-11-21 DEVICE — SCREW BNE L40MM DIA3.5MM PROX TIB S STL ST FULL THRD W/ T15: Type: IMPLANTABLE DEVICE | Site: TIBIA | Status: FUNCTIONAL

## 2023-11-21 DEVICE — SCREW BNE L32MM DIA3.5MM CORT S STL ST NONCANNULATED LOK: Type: IMPLANTABLE DEVICE | Site: TIBIA | Status: FUNCTIONAL

## 2023-11-21 DEVICE — SCREW BNE L48MM DIA3.5MM CORT S STL ST NONCANNULATED LOK: Type: IMPLANTABLE DEVICE | Site: TIBIA | Status: FUNCTIONAL

## 2023-11-21 DEVICE — SCREW BNE L30MM DIA3.5MM PROX TIB S STL ST FULL THRD T15: Type: IMPLANTABLE DEVICE | Site: TIBIA | Status: FUNCTIONAL

## 2023-11-21 DEVICE — SCREW BNE L75MM DIA3.5MM PROX TIB S STL ST FULL THRD T15: Type: IMPLANTABLE DEVICE | Site: TIBIA | Status: FUNCTIONAL

## 2023-11-21 DEVICE — SCREW BNE L50MM DIA3.5MM PROX TIB S STL ST FULL THRD T15: Type: IMPLANTABLE DEVICE | Site: TIBIA | Status: FUNCTIONAL

## 2023-11-21 DEVICE — SCREW BNE L65MM DIA3.5MM PROX TIB S STL ST FULL THRD T15: Type: IMPLANTABLE DEVICE | Site: TIBIA | Status: FUNCTIONAL

## 2023-11-21 DEVICE — GRAFT BNE SUB 15CC 1 8MM CANC CRUSH CHIP READIGRFT: Type: IMPLANTABLE DEVICE | Site: TIBIA | Status: FUNCTIONAL

## 2023-11-21 DEVICE — SCREW BNE L24MM DIA3.5MM CORT S STL ST NONCANNULATED LOK: Type: IMPLANTABLE DEVICE | Site: TIBIA | Status: FUNCTIONAL

## 2023-11-21 DEVICE — SCREW BNE L80MM DIA3.5MM PROX TIB S STL ST FULL THRD T15: Type: IMPLANTABLE DEVICE | Site: TIBIA | Status: FUNCTIONAL

## 2023-11-21 RX ORDER — SODIUM CHLORIDE 0.9 % (FLUSH) 0.9 %
5-40 SYRINGE (ML) INJECTION PRN
Status: DISCONTINUED | OUTPATIENT
Start: 2023-11-21 | End: 2023-11-21 | Stop reason: HOSPADM

## 2023-11-21 RX ORDER — FENTANYL CITRATE 50 UG/ML
100 INJECTION, SOLUTION INTRAMUSCULAR; INTRAVENOUS ONCE
Status: COMPLETED | OUTPATIENT
Start: 2023-11-21 | End: 2023-11-21

## 2023-11-21 RX ORDER — MIDAZOLAM HYDROCHLORIDE 2 MG/2ML
2 INJECTION, SOLUTION INTRAMUSCULAR; INTRAVENOUS ONCE
Status: COMPLETED | OUTPATIENT
Start: 2023-11-21 | End: 2023-11-21

## 2023-11-21 RX ORDER — GLYCOPYRROLATE 0.2 MG/ML
INJECTION INTRAMUSCULAR; INTRAVENOUS PRN
Status: DISCONTINUED | OUTPATIENT
Start: 2023-11-21 | End: 2023-11-21 | Stop reason: SDUPTHER

## 2023-11-21 RX ORDER — ONDANSETRON 2 MG/ML
INJECTION INTRAMUSCULAR; INTRAVENOUS PRN
Status: DISCONTINUED | OUTPATIENT
Start: 2023-11-21 | End: 2023-11-21 | Stop reason: SDUPTHER

## 2023-11-21 RX ORDER — LIDOCAINE HYDROCHLORIDE 10 MG/ML
INJECTION, SOLUTION EPIDURAL; INFILTRATION; INTRACAUDAL; PERINEURAL PRN
Status: DISCONTINUED | OUTPATIENT
Start: 2023-11-21 | End: 2023-11-21 | Stop reason: SDUPTHER

## 2023-11-21 RX ORDER — NEOSTIGMINE METHYLSULFATE 5 MG/5 ML
SYRINGE (ML) INTRAVENOUS PRN
Status: DISCONTINUED | OUTPATIENT
Start: 2023-11-21 | End: 2023-11-21 | Stop reason: SDUPTHER

## 2023-11-21 RX ORDER — FENTANYL CITRATE 50 UG/ML
INJECTION, SOLUTION INTRAMUSCULAR; INTRAVENOUS PRN
Status: DISCONTINUED | OUTPATIENT
Start: 2023-11-21 | End: 2023-11-21 | Stop reason: SDUPTHER

## 2023-11-21 RX ORDER — FENTANYL CITRATE 50 UG/ML
25 INJECTION, SOLUTION INTRAMUSCULAR; INTRAVENOUS EVERY 5 MIN PRN
Status: DISCONTINUED | OUTPATIENT
Start: 2023-11-21 | End: 2023-11-21 | Stop reason: HOSPADM

## 2023-11-21 RX ORDER — PROPOFOL 10 MG/ML
INJECTION, EMULSION INTRAVENOUS PRN
Status: DISCONTINUED | OUTPATIENT
Start: 2023-11-21 | End: 2023-11-21 | Stop reason: SDUPTHER

## 2023-11-21 RX ORDER — SODIUM CHLORIDE, SODIUM LACTATE, POTASSIUM CHLORIDE, CALCIUM CHLORIDE 600; 310; 30; 20 MG/100ML; MG/100ML; MG/100ML; MG/100ML
INJECTION, SOLUTION INTRAVENOUS CONTINUOUS PRN
Status: DISCONTINUED | OUTPATIENT
Start: 2023-11-21 | End: 2023-11-21 | Stop reason: SDUPTHER

## 2023-11-21 RX ORDER — SODIUM CHLORIDE 0.9 % (FLUSH) 0.9 %
5-40 SYRINGE (ML) INJECTION EVERY 12 HOURS SCHEDULED
Status: DISCONTINUED | OUTPATIENT
Start: 2023-11-21 | End: 2023-11-21 | Stop reason: HOSPADM

## 2023-11-21 RX ORDER — BUPIVACAINE HYDROCHLORIDE 5 MG/ML
INJECTION, SOLUTION EPIDURAL; INTRACAUDAL
Status: COMPLETED | OUTPATIENT
Start: 2023-11-21 | End: 2023-11-21

## 2023-11-21 RX ORDER — VANCOMYCIN HYDROCHLORIDE 1 G/20ML
INJECTION, POWDER, LYOPHILIZED, FOR SOLUTION INTRAVENOUS PRN
Status: DISCONTINUED | OUTPATIENT
Start: 2023-11-21 | End: 2023-11-21 | Stop reason: ALTCHOICE

## 2023-11-21 RX ORDER — TOBRAMYCIN 1.2 G/30ML
INJECTION, POWDER, LYOPHILIZED, FOR SOLUTION INTRAVENOUS PRN
Status: DISCONTINUED | OUTPATIENT
Start: 2023-11-21 | End: 2023-11-21 | Stop reason: ALTCHOICE

## 2023-11-21 RX ORDER — SODIUM CHLORIDE 9 MG/ML
INJECTION, SOLUTION INTRAVENOUS PRN
Status: DISCONTINUED | OUTPATIENT
Start: 2023-11-21 | End: 2023-11-21 | Stop reason: HOSPADM

## 2023-11-21 RX ORDER — SODIUM CHLORIDE, SODIUM LACTATE, POTASSIUM CHLORIDE, CALCIUM CHLORIDE 600; 310; 30; 20 MG/100ML; MG/100ML; MG/100ML; MG/100ML
INJECTION, SOLUTION INTRAVENOUS CONTINUOUS
Status: DISCONTINUED | OUTPATIENT
Start: 2023-11-21 | End: 2023-11-21

## 2023-11-21 RX ORDER — ACETAMINOPHEN 650 MG
TABLET, EXTENDED RELEASE ORAL PRN
Status: DISCONTINUED | OUTPATIENT
Start: 2023-11-21 | End: 2023-11-21 | Stop reason: ALTCHOICE

## 2023-11-21 RX ORDER — ROCURONIUM BROMIDE 10 MG/ML
INJECTION, SOLUTION INTRAVENOUS PRN
Status: DISCONTINUED | OUTPATIENT
Start: 2023-11-21 | End: 2023-11-21 | Stop reason: SDUPTHER

## 2023-11-21 RX ORDER — TRANEXAMIC ACID 10 MG/ML
INJECTION, SOLUTION INTRAVENOUS PRN
Status: DISCONTINUED | OUTPATIENT
Start: 2023-11-21 | End: 2023-11-21 | Stop reason: SDUPTHER

## 2023-11-21 RX ORDER — DEXAMETHASONE SODIUM PHOSPHATE 10 MG/ML
INJECTION INTRAMUSCULAR; INTRAVENOUS PRN
Status: DISCONTINUED | OUTPATIENT
Start: 2023-11-21 | End: 2023-11-21 | Stop reason: SDUPTHER

## 2023-11-21 RX ORDER — MAGNESIUM HYDROXIDE 1200 MG/15ML
LIQUID ORAL CONTINUOUS PRN
Status: COMPLETED | OUTPATIENT
Start: 2023-11-21 | End: 2023-11-21

## 2023-11-21 RX ADMIN — FENTANYL CITRATE 50 MCG: 0.05 INJECTION, SOLUTION INTRAMUSCULAR; INTRAVENOUS at 16:38

## 2023-11-21 RX ADMIN — FENTANYL CITRATE 50 MCG: 0.05 INJECTION, SOLUTION INTRAMUSCULAR; INTRAVENOUS at 17:45

## 2023-11-21 RX ADMIN — HYDROMORPHONE HYDROCHLORIDE 0.5 MG: 1 INJECTION, SOLUTION INTRAMUSCULAR; INTRAVENOUS; SUBCUTANEOUS at 20:31

## 2023-11-21 RX ADMIN — DEXAMETHASONE SODIUM PHOSPHATE 4 MG: 10 INJECTION INTRAMUSCULAR; INTRAVENOUS at 16:04

## 2023-11-21 RX ADMIN — GABAPENTIN 600 MG: 300 CAPSULE ORAL at 06:17

## 2023-11-21 RX ADMIN — Medication 2 G: at 16:10

## 2023-11-21 RX ADMIN — LIDOCAINE HYDROCHLORIDE 50 MG: 10 INJECTION, SOLUTION EPIDURAL; INFILTRATION; INTRACAUDAL; PERINEURAL at 15:51

## 2023-11-21 RX ADMIN — MIDAZOLAM HYDROCHLORIDE 2 MG: 1 INJECTION, SOLUTION INTRAMUSCULAR; INTRAVENOUS at 11:21

## 2023-11-21 RX ADMIN — PROPOFOL 100 MG: 10 INJECTION, EMULSION INTRAVENOUS at 15:51

## 2023-11-21 RX ADMIN — MORPHINE SULFATE 1 MG: 2 INJECTION, SOLUTION INTRAMUSCULAR; INTRAVENOUS at 01:50

## 2023-11-21 RX ADMIN — GLYCOPYRROLATE 0.4 MG: 0.2 INJECTION INTRAMUSCULAR; INTRAVENOUS at 19:45

## 2023-11-21 RX ADMIN — ONDANSETRON 4 MG: 2 INJECTION INTRAMUSCULAR; INTRAVENOUS at 19:40

## 2023-11-21 RX ADMIN — FENTANYL CITRATE 25 MCG: 50 INJECTION, SOLUTION INTRAMUSCULAR; INTRAVENOUS at 20:58

## 2023-11-21 RX ADMIN — MORPHINE SULFATE 1 MG: 2 INJECTION, SOLUTION INTRAMUSCULAR; INTRAVENOUS at 07:54

## 2023-11-21 RX ADMIN — ROCURONIUM BROMIDE 20 MG: 10 INJECTION, SOLUTION INTRAVENOUS at 18:30

## 2023-11-21 RX ADMIN — BUDESONIDE AND FORMOTEROL FUMARATE DIHYDRATE 2 PUFF: 80; 4.5 AEROSOL RESPIRATORY (INHALATION) at 09:20

## 2023-11-21 RX ADMIN — FENTANYL CITRATE 50 MCG: 50 INJECTION, SOLUTION INTRAMUSCULAR; INTRAVENOUS at 11:21

## 2023-11-21 RX ADMIN — MORPHINE SULFATE 1 MG: 2 INJECTION, SOLUTION INTRAMUSCULAR; INTRAVENOUS at 22:46

## 2023-11-21 RX ADMIN — FENTANYL CITRATE 100 MCG: 0.05 INJECTION, SOLUTION INTRAMUSCULAR; INTRAVENOUS at 15:51

## 2023-11-21 RX ADMIN — FENTANYL CITRATE 50 MCG: 0.05 INJECTION, SOLUTION INTRAMUSCULAR; INTRAVENOUS at 18:04

## 2023-11-21 RX ADMIN — SODIUM CHLORIDE, POTASSIUM CHLORIDE, SODIUM LACTATE AND CALCIUM CHLORIDE: 600; 310; 30; 20 INJECTION, SOLUTION INTRAVENOUS at 22:35

## 2023-11-21 RX ADMIN — SODIUM CHLORIDE, POTASSIUM CHLORIDE, SODIUM LACTATE AND CALCIUM CHLORIDE: 600; 310; 30; 20 INJECTION, SOLUTION INTRAVENOUS at 18:50

## 2023-11-21 RX ADMIN — BUPIVACAINE HYDROCHLORIDE 15 ML: 5 INJECTION, SOLUTION EPIDURAL; INTRACAUDAL; PERINEURAL at 11:21

## 2023-11-21 RX ADMIN — OXYCODONE 10 MG: 5 TABLET ORAL at 04:02

## 2023-11-21 RX ADMIN — SODIUM CHLORIDE, POTASSIUM CHLORIDE, SODIUM LACTATE AND CALCIUM CHLORIDE: 600; 310; 30; 20 INJECTION, SOLUTION INTRAVENOUS at 15:45

## 2023-11-21 RX ADMIN — Medication 3 MG: at 19:45

## 2023-11-21 RX ADMIN — ZOLPIDEM TARTRATE 10 MG: 5 TABLET ORAL at 22:48

## 2023-11-21 RX ADMIN — ROCURONIUM BROMIDE 20 MG: 10 INJECTION, SOLUTION INTRAVENOUS at 18:00

## 2023-11-21 RX ADMIN — TRANEXAMIC ACID 1 G: 10 INJECTION, SOLUTION INTRAVENOUS at 16:24

## 2023-11-21 RX ADMIN — MORPHINE SULFATE 1 MG: 2 INJECTION, SOLUTION INTRAMUSCULAR; INTRAVENOUS at 05:16

## 2023-11-21 RX ADMIN — ROCURONIUM BROMIDE 50 MG: 10 INJECTION, SOLUTION INTRAVENOUS at 15:51

## 2023-11-21 ASSESSMENT — PAIN SCALES - WONG BAKER: WONGBAKER_NUMERICALRESPONSE: 0

## 2023-11-21 ASSESSMENT — PAIN DESCRIPTION - FREQUENCY
FREQUENCY: CONTINUOUS
FREQUENCY: CONTINUOUS

## 2023-11-21 ASSESSMENT — PAIN DESCRIPTION - LOCATION
LOCATION: LEG

## 2023-11-21 ASSESSMENT — PAIN DESCRIPTION - DESCRIPTORS
DESCRIPTORS: ACHING
DESCRIPTORS: ACHING
DESCRIPTORS: SHARP;THROBBING;DISCOMFORT
DESCRIPTORS: DISCOMFORT
DESCRIPTORS: ACHING;DISCOMFORT;STABBING
DESCRIPTORS: ACHING

## 2023-11-21 ASSESSMENT — PAIN DESCRIPTION - ORIENTATION
ORIENTATION: LEFT
ORIENTATION: RIGHT

## 2023-11-21 ASSESSMENT — PAIN DESCRIPTION - ONSET
ONSET: GRADUAL
ONSET: ON-GOING

## 2023-11-21 ASSESSMENT — PAIN SCALES - GENERAL
PAINLEVEL_OUTOF10: 8
PAINLEVEL_OUTOF10: 10
PAINLEVEL_OUTOF10: 9
PAINLEVEL_OUTOF10: 8
PAINLEVEL_OUTOF10: 8
PAINLEVEL_OUTOF10: 7
PAINLEVEL_OUTOF10: 10
PAINLEVEL_OUTOF10: 8
PAINLEVEL_OUTOF10: 8
PAINLEVEL_OUTOF10: 10

## 2023-11-21 ASSESSMENT — PAIN DESCRIPTION - PAIN TYPE
TYPE: SURGICAL PAIN
TYPE: ACUTE PAIN

## 2023-11-21 ASSESSMENT — PAIN - FUNCTIONAL ASSESSMENT
PAIN_FUNCTIONAL_ASSESSMENT: PREVENTS OR INTERFERES SOME ACTIVE ACTIVITIES AND ADLS
PAIN_FUNCTIONAL_ASSESSMENT: PREVENTS OR INTERFERES WITH ALL ACTIVE AND SOME PASSIVE ACTIVITIES
PAIN_FUNCTIONAL_ASSESSMENT: 0-10

## 2023-11-21 NOTE — CARE COORDINATION
Transitional planning    Writer to room to discuss d/c plan,  plan is OR tomorrow, referral to [facility]. 1045 call from [facility], cannot accept. Call from Samia Polanco with [facility], [address] accept, insurance OON. Referrals to [facility].

## 2023-11-21 NOTE — ANESTHESIA PROCEDURE NOTES
Peripheral Block    Patient location during procedure: pre-op  Reason for block: post-op pain management  Start time: 11/21/2023 11:21 AM  End time: 11/21/2023 11:26 AM  Staffing  Performed: anesthesiologist   Anesthesiologist: Gita Powers MD  Performed by: Gita Powers MD  Authorized by: Gita Powers MD    Preanesthetic Checklist  Completed: patient identified, IV checked, site marked, risks and benefits discussed, surgical/procedural consents, equipment checked, pre-op evaluation, timeout performed, anesthesia consent given, oxygen available, monitors applied/VS acknowledged, fire risk safety assessment completed and verbalized and blood product R/B/A discussed and consented  Peripheral Block   Patient position: supine  Prep: ChloraPrep  Provider prep: mask and sterile gloves  Patient monitoring: cardiac monitor, continuous pulse ox, frequent blood pressure checks, IV access, oxygen and responsive to questions  Block type: Femoral  Adductor canal  Laterality: right  Injection technique: single-shot  Guidance: ultrasound guided    Needle   Needle type: insulated echogenic nerve stimulator needle   Needle localization: ultrasound guidance  Assessment   Injection assessment: negative aspiration for heme, no paresthesia on injection, local visualized surrounding nerve on ultrasound and no intravascular symptoms  Paresthesia pain: none  Slow fractionated injection: yes  Hemodynamics: stable  Outcomes: uncomplicated and patient tolerated procedure well    Medications Administered  bupivacaine (MARCAINE) PF injection 0.5% - Perineural   15 mL - 11/21/2023 11:21:00 AM

## 2023-11-22 LAB
ANION GAP SERPL CALCULATED.3IONS-SCNC: 10 MMOL/L (ref 9–17)
BASOPHILS # BLD: <0.03 K/UL (ref 0–0.2)
BASOPHILS NFR BLD: 0 % (ref 0–2)
BUN SERPL-MCNC: 11 MG/DL (ref 6–20)
CALCIUM SERPL-MCNC: 9.3 MG/DL (ref 8.6–10.4)
CHLORIDE SERPL-SCNC: 101 MMOL/L (ref 98–107)
CO2 SERPL-SCNC: 26 MMOL/L (ref 20–31)
CREAT SERPL-MCNC: 0.4 MG/DL (ref 0.5–0.9)
EOSINOPHIL # BLD: <0.03 K/UL (ref 0–0.44)
EOSINOPHILS RELATIVE PERCENT: 0 % (ref 1–4)
ERYTHROCYTE [DISTWIDTH] IN BLOOD BY AUTOMATED COUNT: 17.1 % (ref 11.8–14.4)
GFR SERPL CREATININE-BSD FRML MDRD: >60 ML/MIN/1.73M2
GLUCOSE SERPL-MCNC: 102 MG/DL (ref 70–99)
HCT VFR BLD AUTO: 29.9 % (ref 36.3–47.1)
HGB BLD-MCNC: 9.7 G/DL (ref 11.9–15.1)
IMM GRANULOCYTES # BLD AUTO: 0.04 K/UL (ref 0–0.3)
IMM GRANULOCYTES NFR BLD: 0 %
LYMPHOCYTES NFR BLD: 0.98 K/UL (ref 1.1–3.7)
LYMPHOCYTES RELATIVE PERCENT: 11 % (ref 24–43)
MCH RBC QN AUTO: 27.6 PG (ref 25.2–33.5)
MCHC RBC AUTO-ENTMCNC: 32.4 G/DL (ref 28.4–34.8)
MCV RBC AUTO: 85.2 FL (ref 82.6–102.9)
MONOCYTES NFR BLD: 0.88 K/UL (ref 0.1–1.2)
MONOCYTES NFR BLD: 10 % (ref 3–12)
NEUTROPHILS NFR BLD: 79 % (ref 36–65)
NEUTS SEG NFR BLD: 7.08 K/UL (ref 1.5–8.1)
NRBC BLD-RTO: 0 PER 100 WBC
PLATELET # BLD AUTO: 239 K/UL (ref 138–453)
PMV BLD AUTO: 10.4 FL (ref 8.1–13.5)
POTASSIUM SERPL-SCNC: 4.1 MMOL/L (ref 3.7–5.3)
RBC # BLD AUTO: 3.51 M/UL (ref 3.95–5.11)
RBC # BLD: ABNORMAL 10*6/UL
SODIUM SERPL-SCNC: 137 MMOL/L (ref 135–144)
WBC OTHER # BLD: 9 K/UL (ref 3.5–11.3)

## 2023-11-22 PROCEDURE — 6370000000 HC RX 637 (ALT 250 FOR IP)

## 2023-11-22 PROCEDURE — 97162 PT EVAL MOD COMPLEX 30 MIN: CPT

## 2023-11-22 PROCEDURE — 80048 BASIC METABOLIC PNL TOTAL CA: CPT

## 2023-11-22 PROCEDURE — 94640 AIRWAY INHALATION TREATMENT: CPT

## 2023-11-22 PROCEDURE — 2580000003 HC RX 258

## 2023-11-22 PROCEDURE — 85025 COMPLETE CBC W/AUTO DIFF WBC: CPT

## 2023-11-22 PROCEDURE — 97535 SELF CARE MNGMENT TRAINING: CPT

## 2023-11-22 PROCEDURE — 6360000002 HC RX W HCPCS: Performed by: SURGERY

## 2023-11-22 PROCEDURE — 6360000002 HC RX W HCPCS

## 2023-11-22 PROCEDURE — 99232 SBSQ HOSP IP/OBS MODERATE 35: CPT | Performed by: SURGERY

## 2023-11-22 PROCEDURE — 1200000000 HC SEMI PRIVATE

## 2023-11-22 PROCEDURE — 97166 OT EVAL MOD COMPLEX 45 MIN: CPT

## 2023-11-22 PROCEDURE — 97530 THERAPEUTIC ACTIVITIES: CPT

## 2023-11-22 PROCEDURE — 36415 COLL VENOUS BLD VENIPUNCTURE: CPT

## 2023-11-22 RX ORDER — ENOXAPARIN SODIUM 100 MG/ML
40 INJECTION SUBCUTANEOUS DAILY
Status: DISCONTINUED | OUTPATIENT
Start: 2023-11-22 | End: 2023-11-27

## 2023-11-22 RX ADMIN — GABAPENTIN 600 MG: 300 CAPSULE ORAL at 18:30

## 2023-11-22 RX ADMIN — TIZANIDINE 4 MG: 4 TABLET ORAL at 08:39

## 2023-11-22 RX ADMIN — ZOLPIDEM TARTRATE 10 MG: 5 TABLET ORAL at 20:51

## 2023-11-22 RX ADMIN — ACETAMINOPHEN 1000 MG: 500 TABLET ORAL at 01:18

## 2023-11-22 RX ADMIN — TIZANIDINE 4 MG: 4 TABLET ORAL at 14:32

## 2023-11-22 RX ADMIN — ACETAMINOPHEN 1000 MG: 500 TABLET ORAL at 08:39

## 2023-11-22 RX ADMIN — DOCUSATE SODIUM 50 MG AND SENNOSIDES 8.6 MG 1 TABLET: 8.6; 5 TABLET, FILM COATED ORAL at 20:45

## 2023-11-22 RX ADMIN — OXYCODONE 10 MG: 5 TABLET ORAL at 01:18

## 2023-11-22 RX ADMIN — OXYCODONE 10 MG: 5 TABLET ORAL at 10:41

## 2023-11-22 RX ADMIN — DIAZEPAM 5 MG: 5 TABLET ORAL at 09:12

## 2023-11-22 RX ADMIN — ENOXAPARIN SODIUM 40 MG: 100 INJECTION SUBCUTANEOUS at 10:41

## 2023-11-22 RX ADMIN — OXYCODONE 10 MG: 5 TABLET ORAL at 14:32

## 2023-11-22 RX ADMIN — GABAPENTIN 600 MG: 300 CAPSULE ORAL at 10:41

## 2023-11-22 RX ADMIN — MORPHINE SULFATE 1 MG: 2 INJECTION, SOLUTION INTRAMUSCULAR; INTRAVENOUS at 08:34

## 2023-11-22 RX ADMIN — TIZANIDINE 4 MG: 4 TABLET ORAL at 20:45

## 2023-11-22 RX ADMIN — OXYCODONE 10 MG: 5 TABLET ORAL at 18:30

## 2023-11-22 RX ADMIN — MORPHINE SULFATE 1 MG: 2 INJECTION, SOLUTION INTRAMUSCULAR; INTRAVENOUS at 15:49

## 2023-11-22 RX ADMIN — LORAZEPAM 0.5 MG: 0.5 TABLET ORAL at 14:37

## 2023-11-22 RX ADMIN — MORPHINE SULFATE 1 MG: 2 INJECTION, SOLUTION INTRAMUSCULAR; INTRAVENOUS at 12:02

## 2023-11-22 RX ADMIN — FLUOXETINE HYDROCHLORIDE 40 MG: 20 CAPSULE ORAL at 08:38

## 2023-11-22 RX ADMIN — Medication 2000 MG: at 08:39

## 2023-11-22 RX ADMIN — BUDESONIDE AND FORMOTEROL FUMARATE DIHYDRATE 2 PUFF: 80; 4.5 AEROSOL RESPIRATORY (INHALATION) at 19:54

## 2023-11-22 RX ADMIN — ACETAMINOPHEN 1000 MG: 500 TABLET ORAL at 20:50

## 2023-11-22 RX ADMIN — PREGABALIN 200 MG: 100 CAPSULE ORAL at 08:39

## 2023-11-22 RX ADMIN — Medication 2000 MG: at 01:08

## 2023-11-22 RX ADMIN — SODIUM CHLORIDE, PRESERVATIVE FREE 10 ML: 5 INJECTION INTRAVENOUS at 20:51

## 2023-11-22 RX ADMIN — OXYCODONE 10 MG: 5 TABLET ORAL at 06:32

## 2023-11-22 RX ADMIN — ACETAMINOPHEN 1000 MG: 500 TABLET ORAL at 14:32

## 2023-11-22 RX ADMIN — MORPHINE SULFATE 1 MG: 2 INJECTION, SOLUTION INTRAMUSCULAR; INTRAVENOUS at 19:27

## 2023-11-22 RX ADMIN — DOCUSATE SODIUM 50 MG AND SENNOSIDES 8.6 MG 1 TABLET: 8.6; 5 TABLET, FILM COATED ORAL at 08:39

## 2023-11-22 RX ADMIN — PANTOPRAZOLE SODIUM 40 MG: 40 TABLET, DELAYED RELEASE ORAL at 08:39

## 2023-11-22 RX ADMIN — GABAPENTIN 600 MG: 300 CAPSULE ORAL at 01:18

## 2023-11-22 RX ADMIN — PREGABALIN 200 MG: 100 CAPSULE ORAL at 20:46

## 2023-11-22 RX ADMIN — SODIUM CHLORIDE, POTASSIUM CHLORIDE, SODIUM LACTATE AND CALCIUM CHLORIDE: 600; 310; 30; 20 INJECTION, SOLUTION INTRAVENOUS at 01:28

## 2023-11-22 RX ADMIN — OXYCODONE 10 MG: 5 TABLET ORAL at 23:41

## 2023-11-22 ASSESSMENT — PAIN DESCRIPTION - LOCATION
LOCATION: LEG

## 2023-11-22 ASSESSMENT — PAIN DESCRIPTION - ORIENTATION
ORIENTATION: RIGHT

## 2023-11-22 ASSESSMENT — PAIN SCALES - GENERAL
PAINLEVEL_OUTOF10: 7
PAINLEVEL_OUTOF10: 10
PAINLEVEL_OUTOF10: 8
PAINLEVEL_OUTOF10: 7
PAINLEVEL_OUTOF10: 10
PAINLEVEL_OUTOF10: 9
PAINLEVEL_OUTOF10: 10
PAINLEVEL_OUTOF10: 9

## 2023-11-22 ASSESSMENT — PAIN DESCRIPTION - DESCRIPTORS
DESCRIPTORS: DISCOMFORT

## 2023-11-22 NOTE — OP NOTE
[address] [facility] [place]                                OPERATIVE REPORT    PATIENT NAME: Hiwot Degroot                  :        1965  MED REC NO:   [id number]                             ROOM:       7778  ACCOUNT NO:   [de-identified]                           ADMIT DATE: 2023  PROVIDER:     Michoacano Prakash    DATE OF PROCEDURE:  2023    PREOPERATIVE DIAGNOSIS:  Right bicondylar tibial plateau fracture. POSTOPERATIVE DIAGNOSES:  1. Right bicondylar tibial plateau fracture. 2.  Right lateral meniscus tear. OPERATION PERFORMED:  1. Open reduction and internal fixation of right bicondylar tibial  plateau fracture. 2.  Removal of the knee spanning external fixator. 3.  Open right lateral meniscus repair. 4.  Closed treatment, right proximal fibula fracture. SURGEON:  Dereck Hernandez DO    ASSISTANTS:  Brianda Andrews DO, PGY-5; Carlos Alberto Soto DO, PGY-3    ANESTHESIA:  General.    ESTIMATED BLOOD LOSS:  100 mL. IV FLUIDS:  1500 mL crystalloid. COMPLICATIONS:  None. SPECIMENS:  None. IMPLANTS:  Synthes direct medial plate and Synthes anterior lateral  proximal tibial plate and cortical cancellous bone graft. FINDINGS:  Right displaced bicondylar tibial plateau fracture with right  lateral meniscus tear. INDICATIONS:  This is a 82-year-old female, who presented to [facility] this past weekend after she sustained a fall while  at work onto her right side. She was found to have a bicondylar tibial  plateau fracture. She initially underwent application of knee spanning  external fixator as part of a staged procedure. She presents today for  definitive fixation of her right lower extremity. Consent was obtained,  placed in chart. All questions were answered appropriately.   Surgical  risks including but not limited to bleeding, blood clots, infection,  damage

## 2023-11-22 NOTE — DISCHARGE INSTR - COC
Discharging to Facility/ Agency   Name: [facility]  Address:  Phone:  Fax:    Dialysis Facility (if applicable)   Name:  Address:  Dialysis Schedule:  Phone:  Fax:    / signature: Electronically signed by Leila Wolf RN on 12/4/23 at 8:45 AM EST    PHYSICIAN SECTION    Prognosis: Good    Condition at Discharge: Stable    Rehab Potential (if transferring to Rehab): Good    Recommended Labs or Other Treatments After Discharge: PT/OT    Physician Certification: I certify the above information and transfer of Katina Kong  is necessary for the continuing treatment of the diagnosis listed and that she requires Acute Rehab for less 30 days.      Update Admission H&P: No change in H&P    PHYSICIAN SIGNATURE:  Electronically signed by Ami Martin MD ON 12/04/2023 AT 10:14 AM EST

## 2023-11-23 LAB
ANION GAP SERPL CALCULATED.3IONS-SCNC: 9 MMOL/L (ref 9–17)
BASOPHILS # BLD: <0.03 K/UL (ref 0–0.2)
BASOPHILS NFR BLD: 0 % (ref 0–2)
BUN SERPL-MCNC: 13 MG/DL (ref 6–20)
CALCIUM SERPL-MCNC: 9.2 MG/DL (ref 8.6–10.4)
CHLORIDE SERPL-SCNC: 100 MMOL/L (ref 98–107)
CO2 SERPL-SCNC: 26 MMOL/L (ref 20–31)
CREAT SERPL-MCNC: 0.4 MG/DL (ref 0.5–0.9)
EOSINOPHIL # BLD: 0.06 K/UL (ref 0–0.44)
EOSINOPHILS RELATIVE PERCENT: 1 % (ref 1–4)
ERYTHROCYTE [DISTWIDTH] IN BLOOD BY AUTOMATED COUNT: 17.4 % (ref 11.8–14.4)
GFR SERPL CREATININE-BSD FRML MDRD: >60 ML/MIN/1.73M2
GLUCOSE SERPL-MCNC: 103 MG/DL (ref 70–99)
HCT VFR BLD AUTO: 29.5 % (ref 36.3–47.1)
HGB BLD-MCNC: 8.9 G/DL (ref 11.9–15.1)
IMM GRANULOCYTES # BLD AUTO: 0.05 K/UL (ref 0–0.3)
IMM GRANULOCYTES NFR BLD: 1 %
LYMPHOCYTES NFR BLD: 0.83 K/UL (ref 1.1–3.7)
LYMPHOCYTES RELATIVE PERCENT: 11 % (ref 24–43)
MCH RBC QN AUTO: 27.1 PG (ref 25.2–33.5)
MCHC RBC AUTO-ENTMCNC: 30.2 G/DL (ref 28.4–34.8)
MCV RBC AUTO: 89.7 FL (ref 82.6–102.9)
MONOCYTES NFR BLD: 0.63 K/UL (ref 0.1–1.2)
MONOCYTES NFR BLD: 8 % (ref 3–12)
NEUTROPHILS NFR BLD: 79 % (ref 36–65)
NEUTS SEG NFR BLD: 6.09 K/UL (ref 1.5–8.1)
NRBC BLD-RTO: 0 PER 100 WBC
PLATELET # BLD AUTO: 240 K/UL (ref 138–453)
PMV BLD AUTO: 10.3 FL (ref 8.1–13.5)
POTASSIUM SERPL-SCNC: 3.7 MMOL/L (ref 3.7–5.3)
RBC # BLD AUTO: 3.29 M/UL (ref 3.95–5.11)
RBC # BLD: ABNORMAL 10*6/UL
SODIUM SERPL-SCNC: 135 MMOL/L (ref 135–144)
WBC OTHER # BLD: 7.7 K/UL (ref 3.5–11.3)

## 2023-11-23 PROCEDURE — 1200000000 HC SEMI PRIVATE

## 2023-11-23 PROCEDURE — 85025 COMPLETE CBC W/AUTO DIFF WBC: CPT

## 2023-11-23 PROCEDURE — 99232 SBSQ HOSP IP/OBS MODERATE 35: CPT | Performed by: SURGERY

## 2023-11-23 PROCEDURE — 97116 GAIT TRAINING THERAPY: CPT

## 2023-11-23 PROCEDURE — 6360000002 HC RX W HCPCS

## 2023-11-23 PROCEDURE — 97530 THERAPEUTIC ACTIVITIES: CPT

## 2023-11-23 PROCEDURE — 6360000002 HC RX W HCPCS: Performed by: CHIROPRACTOR

## 2023-11-23 PROCEDURE — 94640 AIRWAY INHALATION TREATMENT: CPT

## 2023-11-23 PROCEDURE — 6370000000 HC RX 637 (ALT 250 FOR IP)

## 2023-11-23 PROCEDURE — 97110 THERAPEUTIC EXERCISES: CPT

## 2023-11-23 PROCEDURE — 36415 COLL VENOUS BLD VENIPUNCTURE: CPT

## 2023-11-23 PROCEDURE — 94761 N-INVAS EAR/PLS OXIMETRY MLT: CPT

## 2023-11-23 PROCEDURE — 80048 BASIC METABOLIC PNL TOTAL CA: CPT

## 2023-11-23 RX ORDER — ASPIRIN 81 MG/1
81 TABLET ORAL 2 TIMES DAILY
Qty: 60 TABLET | Refills: 0 | Status: SHIPPED | OUTPATIENT
Start: 2023-11-23

## 2023-11-23 RX ORDER — MORPHINE SULFATE 2 MG/ML
1 INJECTION, SOLUTION INTRAMUSCULAR; INTRAVENOUS EVERY 4 HOURS PRN
Status: DISCONTINUED | OUTPATIENT
Start: 2023-11-23 | End: 2023-11-24

## 2023-11-23 RX ORDER — ASPIRIN 81 MG/1
81 TABLET ORAL 2 TIMES DAILY
Qty: 30 TABLET | Refills: 0 | Status: SHIPPED | OUTPATIENT
Start: 2023-11-23 | End: 2023-11-23 | Stop reason: SDUPTHER

## 2023-11-23 RX ORDER — FENTANYL CITRATE 50 UG/ML
INJECTION, SOLUTION INTRAMUSCULAR; INTRAVENOUS
Status: DISCONTINUED
Start: 2023-11-23 | End: 2023-11-23

## 2023-11-23 RX ORDER — FENTANYL CITRATE 50 UG/ML
100 INJECTION, SOLUTION INTRAMUSCULAR; INTRAVENOUS ONCE
Status: COMPLETED | OUTPATIENT
Start: 2023-11-23 | End: 2023-11-23

## 2023-11-23 RX ADMIN — OXYCODONE 10 MG: 5 TABLET ORAL at 03:47

## 2023-11-23 RX ADMIN — GABAPENTIN 600 MG: 300 CAPSULE ORAL at 01:39

## 2023-11-23 RX ADMIN — MORPHINE SULFATE 1 MG: 2 INJECTION, SOLUTION INTRAMUSCULAR; INTRAVENOUS at 17:50

## 2023-11-23 RX ADMIN — MORPHINE SULFATE 1 MG: 2 INJECTION, SOLUTION INTRAMUSCULAR; INTRAVENOUS at 21:47

## 2023-11-23 RX ADMIN — GABAPENTIN 600 MG: 300 CAPSULE ORAL at 17:50

## 2023-11-23 RX ADMIN — ZOLPIDEM TARTRATE 10 MG: 5 TABLET ORAL at 22:02

## 2023-11-23 RX ADMIN — PREGABALIN 200 MG: 100 CAPSULE ORAL at 08:26

## 2023-11-23 RX ADMIN — FENTANYL CITRATE 100 MCG: 50 INJECTION, SOLUTION INTRAMUSCULAR; INTRAVENOUS at 07:13

## 2023-11-23 RX ADMIN — TIZANIDINE 4 MG: 4 TABLET ORAL at 21:47

## 2023-11-23 RX ADMIN — DIAZEPAM 5 MG: 5 TABLET ORAL at 08:23

## 2023-11-23 RX ADMIN — MORPHINE SULFATE 1 MG: 2 INJECTION, SOLUTION INTRAMUSCULAR; INTRAVENOUS at 06:07

## 2023-11-23 RX ADMIN — TIZANIDINE 4 MG: 4 TABLET ORAL at 08:26

## 2023-11-23 RX ADMIN — DOCUSATE SODIUM 50 MG AND SENNOSIDES 8.6 MG 1 TABLET: 8.6; 5 TABLET, FILM COATED ORAL at 08:26

## 2023-11-23 RX ADMIN — TIZANIDINE 4 MG: 4 TABLET ORAL at 16:42

## 2023-11-23 RX ADMIN — OXYCODONE 10 MG: 5 TABLET ORAL at 12:43

## 2023-11-23 RX ADMIN — MORPHINE SULFATE 1 MG: 2 INJECTION, SOLUTION INTRAMUSCULAR; INTRAVENOUS at 09:46

## 2023-11-23 RX ADMIN — TROSPIUM CHLORIDE 20 MG: 20 TABLET, FILM COATED ORAL at 08:26

## 2023-11-23 RX ADMIN — ACETAMINOPHEN 1000 MG: 500 TABLET ORAL at 20:48

## 2023-11-23 RX ADMIN — ACETAMINOPHEN 1000 MG: 500 TABLET ORAL at 09:45

## 2023-11-23 RX ADMIN — PANTOPRAZOLE SODIUM 40 MG: 40 TABLET, DELAYED RELEASE ORAL at 08:26

## 2023-11-23 RX ADMIN — LORAZEPAM 0.5 MG: 0.5 TABLET ORAL at 12:45

## 2023-11-23 RX ADMIN — FLUOXETINE HYDROCHLORIDE 40 MG: 20 CAPSULE ORAL at 08:27

## 2023-11-23 RX ADMIN — ENOXAPARIN SODIUM 40 MG: 100 INJECTION SUBCUTANEOUS at 08:27

## 2023-11-23 RX ADMIN — BUDESONIDE AND FORMOTEROL FUMARATE DIHYDRATE 2 PUFF: 80; 4.5 AEROSOL RESPIRATORY (INHALATION) at 20:20

## 2023-11-23 RX ADMIN — MORPHINE SULFATE 1 MG: 2 INJECTION, SOLUTION INTRAMUSCULAR; INTRAVENOUS at 13:43

## 2023-11-23 RX ADMIN — OXYCODONE 10 MG: 5 TABLET ORAL at 08:23

## 2023-11-23 RX ADMIN — OXYCODONE 10 MG: 5 TABLET ORAL at 16:42

## 2023-11-23 RX ADMIN — ACETAMINOPHEN 1000 MG: 500 TABLET ORAL at 16:42

## 2023-11-23 RX ADMIN — LORAZEPAM 0.5 MG: 0.5 TABLET ORAL at 02:14

## 2023-11-23 RX ADMIN — GABAPENTIN 600 MG: 300 CAPSULE ORAL at 09:45

## 2023-11-23 RX ADMIN — DOCUSATE SODIUM 50 MG AND SENNOSIDES 8.6 MG 1 TABLET: 8.6; 5 TABLET, FILM COATED ORAL at 21:47

## 2023-11-23 RX ADMIN — PREGABALIN 200 MG: 100 CAPSULE ORAL at 21:47

## 2023-11-23 RX ADMIN — OXYCODONE 10 MG: 5 TABLET ORAL at 20:48

## 2023-11-23 RX ADMIN — MORPHINE SULFATE 1 MG: 2 INJECTION, SOLUTION INTRAMUSCULAR; INTRAVENOUS at 01:36

## 2023-11-23 RX ADMIN — BUDESONIDE AND FORMOTEROL FUMARATE DIHYDRATE 2 PUFF: 80; 4.5 AEROSOL RESPIRATORY (INHALATION) at 07:26

## 2023-11-23 ASSESSMENT — PAIN SCALES - GENERAL
PAINLEVEL_OUTOF10: 9
PAINLEVEL_OUTOF10: 6
PAINLEVEL_OUTOF10: 10
PAINLEVEL_OUTOF10: 8
PAINLEVEL_OUTOF10: 7
PAINLEVEL_OUTOF10: 10
PAINLEVEL_OUTOF10: 8
PAINLEVEL_OUTOF10: 9
PAINLEVEL_OUTOF10: 8
PAINLEVEL_OUTOF10: 10
PAINLEVEL_OUTOF10: 8
PAINLEVEL_OUTOF10: 9
PAINLEVEL_OUTOF10: 10
PAINLEVEL_OUTOF10: 10

## 2023-11-23 ASSESSMENT — PAIN DESCRIPTION - LOCATION
LOCATION: LEG

## 2023-11-23 ASSESSMENT — PAIN DESCRIPTION - ORIENTATION
ORIENTATION: RIGHT

## 2023-11-23 ASSESSMENT — PAIN DESCRIPTION - DESCRIPTORS
DESCRIPTORS: DISCOMFORT

## 2023-11-23 ASSESSMENT — PAIN SCALES - WONG BAKER: WONGBAKER_NUMERICALRESPONSE: 0

## 2023-11-24 PROCEDURE — 6370000000 HC RX 637 (ALT 250 FOR IP)

## 2023-11-24 PROCEDURE — 94640 AIRWAY INHALATION TREATMENT: CPT

## 2023-11-24 PROCEDURE — 1200000000 HC SEMI PRIVATE

## 2023-11-24 PROCEDURE — 94761 N-INVAS EAR/PLS OXIMETRY MLT: CPT

## 2023-11-24 PROCEDURE — 99232 SBSQ HOSP IP/OBS MODERATE 35: CPT | Performed by: SURGERY

## 2023-11-24 PROCEDURE — 2580000003 HC RX 258

## 2023-11-24 PROCEDURE — 97110 THERAPEUTIC EXERCISES: CPT

## 2023-11-24 PROCEDURE — 6360000002 HC RX W HCPCS

## 2023-11-24 PROCEDURE — 97530 THERAPEUTIC ACTIVITIES: CPT

## 2023-11-24 PROCEDURE — 6360000002 HC RX W HCPCS: Performed by: STUDENT IN AN ORGANIZED HEALTH CARE EDUCATION/TRAINING PROGRAM

## 2023-11-24 RX ORDER — KETOROLAC TROMETHAMINE 15 MG/ML
15 INJECTION, SOLUTION INTRAMUSCULAR; INTRAVENOUS EVERY 6 HOURS
Status: DISCONTINUED | OUTPATIENT
Start: 2023-11-24 | End: 2023-11-25

## 2023-11-24 RX ORDER — MORPHINE SULFATE 2 MG/ML
1 INJECTION, SOLUTION INTRAMUSCULAR; INTRAVENOUS EVERY 4 HOURS PRN
Status: DISCONTINUED | OUTPATIENT
Start: 2023-11-24 | End: 2023-11-26

## 2023-11-24 RX ORDER — MORPHINE SULFATE 2 MG/ML
1 INJECTION, SOLUTION INTRAMUSCULAR; INTRAVENOUS EVERY 6 HOURS PRN
Status: DISCONTINUED | OUTPATIENT
Start: 2023-11-24 | End: 2023-11-24

## 2023-11-24 RX ADMIN — LORAZEPAM 0.5 MG: 0.5 TABLET ORAL at 02:50

## 2023-11-24 RX ADMIN — NYSTATIN 500000 UNITS: 100000 SUSPENSION ORAL at 09:40

## 2023-11-24 RX ADMIN — GABAPENTIN 600 MG: 300 CAPSULE ORAL at 01:47

## 2023-11-24 RX ADMIN — BUDESONIDE AND FORMOTEROL FUMARATE DIHYDRATE 2 PUFF: 80; 4.5 AEROSOL RESPIRATORY (INHALATION) at 20:20

## 2023-11-24 RX ADMIN — OXYCODONE 10 MG: 5 TABLET ORAL at 09:37

## 2023-11-24 RX ADMIN — NYSTATIN 500000 UNITS: 100000 SUSPENSION ORAL at 17:35

## 2023-11-24 RX ADMIN — TIZANIDINE 4 MG: 4 TABLET ORAL at 13:21

## 2023-11-24 RX ADMIN — PANTOPRAZOLE SODIUM 40 MG: 40 TABLET, DELAYED RELEASE ORAL at 07:41

## 2023-11-24 RX ADMIN — GABAPENTIN 600 MG: 300 CAPSULE ORAL at 17:37

## 2023-11-24 RX ADMIN — LORAZEPAM 0.5 MG: 0.5 TABLET ORAL at 19:10

## 2023-11-24 RX ADMIN — TIZANIDINE 4 MG: 4 TABLET ORAL at 21:31

## 2023-11-24 RX ADMIN — MORPHINE SULFATE 1 MG: 2 INJECTION, SOLUTION INTRAMUSCULAR; INTRAVENOUS at 07:03

## 2023-11-24 RX ADMIN — ACETAMINOPHEN 1000 MG: 500 TABLET ORAL at 01:46

## 2023-11-24 RX ADMIN — GABAPENTIN 600 MG: 300 CAPSULE ORAL at 08:38

## 2023-11-24 RX ADMIN — SODIUM CHLORIDE, PRESERVATIVE FREE 10 ML: 5 INJECTION INTRAVENOUS at 00:03

## 2023-11-24 RX ADMIN — MORPHINE SULFATE 1 MG: 2 INJECTION, SOLUTION INTRAMUSCULAR; INTRAVENOUS at 02:45

## 2023-11-24 RX ADMIN — MORPHINE SULFATE 1 MG: 2 INJECTION, SOLUTION INTRAMUSCULAR; INTRAVENOUS at 20:33

## 2023-11-24 RX ADMIN — PREGABALIN 200 MG: 100 CAPSULE ORAL at 08:38

## 2023-11-24 RX ADMIN — OXYCODONE 10 MG: 5 TABLET ORAL at 21:29

## 2023-11-24 RX ADMIN — TIZANIDINE 4 MG: 4 TABLET ORAL at 08:13

## 2023-11-24 RX ADMIN — DOCUSATE SODIUM 50 MG AND SENNOSIDES 8.6 MG 1 TABLET: 8.6; 5 TABLET, FILM COATED ORAL at 08:37

## 2023-11-24 RX ADMIN — FLUOXETINE HYDROCHLORIDE 40 MG: 20 CAPSULE ORAL at 08:38

## 2023-11-24 RX ADMIN — DIAZEPAM 5 MG: 5 TABLET ORAL at 08:14

## 2023-11-24 RX ADMIN — ACETAMINOPHEN 1000 MG: 500 TABLET ORAL at 21:29

## 2023-11-24 RX ADMIN — KETOROLAC TROMETHAMINE 15 MG: 15 INJECTION, SOLUTION INTRAMUSCULAR; INTRAVENOUS at 09:40

## 2023-11-24 RX ADMIN — NYSTATIN 500000 UNITS: 100000 SUSPENSION ORAL at 13:53

## 2023-11-24 RX ADMIN — OXYCODONE 10 MG: 5 TABLET ORAL at 01:47

## 2023-11-24 RX ADMIN — NYSTATIN 500000 UNITS: 100000 SUSPENSION ORAL at 21:38

## 2023-11-24 RX ADMIN — BUDESONIDE AND FORMOTEROL FUMARATE DIHYDRATE 2 PUFF: 80; 4.5 AEROSOL RESPIRATORY (INHALATION) at 08:48

## 2023-11-24 RX ADMIN — KETOROLAC TROMETHAMINE 15 MG: 15 INJECTION, SOLUTION INTRAMUSCULAR; INTRAVENOUS at 16:36

## 2023-11-24 RX ADMIN — PREGABALIN 200 MG: 100 CAPSULE ORAL at 21:37

## 2023-11-24 RX ADMIN — ZOLPIDEM TARTRATE 10 MG: 5 TABLET ORAL at 21:42

## 2023-11-24 RX ADMIN — OXYCODONE 10 MG: 5 TABLET ORAL at 17:37

## 2023-11-24 RX ADMIN — KETOROLAC TROMETHAMINE 15 MG: 15 INJECTION, SOLUTION INTRAMUSCULAR; INTRAVENOUS at 21:31

## 2023-11-24 RX ADMIN — MORPHINE SULFATE 1 MG: 2 INJECTION, SOLUTION INTRAMUSCULAR; INTRAVENOUS at 16:36

## 2023-11-24 RX ADMIN — ENOXAPARIN SODIUM 40 MG: 100 INJECTION SUBCUTANEOUS at 08:37

## 2023-11-24 RX ADMIN — MORPHINE SULFATE 1 MG: 2 INJECTION, SOLUTION INTRAMUSCULAR; INTRAVENOUS at 11:14

## 2023-11-24 RX ADMIN — SODIUM CHLORIDE, PRESERVATIVE FREE 10 ML: 5 INJECTION INTRAVENOUS at 21:37

## 2023-11-24 RX ADMIN — DOCUSATE SODIUM 50 MG AND SENNOSIDES 8.6 MG 1 TABLET: 8.6; 5 TABLET, FILM COATED ORAL at 21:37

## 2023-11-24 RX ADMIN — SODIUM CHLORIDE, PRESERVATIVE FREE 10 ML: 5 INJECTION INTRAVENOUS at 08:39

## 2023-11-24 RX ADMIN — OXYCODONE 10 MG: 5 TABLET ORAL at 13:21

## 2023-11-24 RX ADMIN — ACETAMINOPHEN 1000 MG: 500 TABLET ORAL at 13:21

## 2023-11-24 RX ADMIN — OXYCODONE 10 MG: 5 TABLET ORAL at 05:40

## 2023-11-24 ASSESSMENT — PAIN DESCRIPTION - LOCATION
LOCATION: LEG

## 2023-11-24 ASSESSMENT — PAIN SCALES - GENERAL
PAINLEVEL_OUTOF10: 8
PAINLEVEL_OUTOF10: 8
PAINLEVEL_OUTOF10: 7
PAINLEVEL_OUTOF10: 8
PAINLEVEL_OUTOF10: 6
PAINLEVEL_OUTOF10: 8
PAINLEVEL_OUTOF10: 6
PAINLEVEL_OUTOF10: 10
PAINLEVEL_OUTOF10: 6
PAINLEVEL_OUTOF10: 7
PAINLEVEL_OUTOF10: 6
PAINLEVEL_OUTOF10: 8
PAINLEVEL_OUTOF10: 8
PAINLEVEL_OUTOF10: 5
PAINLEVEL_OUTOF10: 8
PAINLEVEL_OUTOF10: 6
PAINLEVEL_OUTOF10: 7
PAINLEVEL_OUTOF10: 10

## 2023-11-24 ASSESSMENT — PAIN DESCRIPTION - ORIENTATION
ORIENTATION: RIGHT
ORIENTATION: RIGHT
ORIENTATION: RIGHT;LOWER;UPPER
ORIENTATION: RIGHT

## 2023-11-24 ASSESSMENT — PAIN DESCRIPTION - DESCRIPTORS
DESCRIPTORS: DISCOMFORT;SHARP
DESCRIPTORS: SHARP;DISCOMFORT

## 2023-11-24 ASSESSMENT — PAIN - FUNCTIONAL ASSESSMENT: PAIN_FUNCTIONAL_ASSESSMENT: PREVENTS OR INTERFERES SOME ACTIVE ACTIVITIES AND ADLS

## 2023-11-24 ASSESSMENT — PAIN DESCRIPTION - PAIN TYPE
TYPE: SURGICAL PAIN
TYPE: SURGICAL PAIN

## 2023-11-24 ASSESSMENT — PAIN DESCRIPTION - FREQUENCY: FREQUENCY: CONTINUOUS

## 2023-11-24 NOTE — CARE COORDINATION
Transitional planning: mami woody/ Darnell Barone at [facility] and precert still pending

## 2023-11-25 PROCEDURE — 94640 AIRWAY INHALATION TREATMENT: CPT

## 2023-11-25 PROCEDURE — 6370000000 HC RX 637 (ALT 250 FOR IP)

## 2023-11-25 PROCEDURE — 6360000002 HC RX W HCPCS: Performed by: STUDENT IN AN ORGANIZED HEALTH CARE EDUCATION/TRAINING PROGRAM

## 2023-11-25 PROCEDURE — 97116 GAIT TRAINING THERAPY: CPT

## 2023-11-25 PROCEDURE — 99232 SBSQ HOSP IP/OBS MODERATE 35: CPT | Performed by: SURGERY

## 2023-11-25 PROCEDURE — 94760 N-INVAS EAR/PLS OXIMETRY 1: CPT

## 2023-11-25 PROCEDURE — 2580000003 HC RX 258

## 2023-11-25 PROCEDURE — 97530 THERAPEUTIC ACTIVITIES: CPT

## 2023-11-25 PROCEDURE — 97110 THERAPEUTIC EXERCISES: CPT

## 2023-11-25 PROCEDURE — 6360000002 HC RX W HCPCS

## 2023-11-25 PROCEDURE — 1200000000 HC SEMI PRIVATE

## 2023-11-25 RX ORDER — KETOROLAC TROMETHAMINE 30 MG/ML
30 INJECTION, SOLUTION INTRAMUSCULAR; INTRAVENOUS EVERY 6 HOURS
Status: DISCONTINUED | OUTPATIENT
Start: 2023-11-25 | End: 2023-11-28

## 2023-11-25 RX ORDER — DIAZEPAM 5 MG/1
5 TABLET ORAL DAILY
Status: DISCONTINUED | OUTPATIENT
Start: 2023-11-26 | End: 2023-11-26

## 2023-11-25 RX ORDER — LORAZEPAM 0.5 MG/1
0.5 TABLET ORAL 2 TIMES DAILY
Status: DISCONTINUED | OUTPATIENT
Start: 2023-11-25 | End: 2023-12-04 | Stop reason: HOSPADM

## 2023-11-25 RX ADMIN — OXYCODONE 10 MG: 5 TABLET ORAL at 07:40

## 2023-11-25 RX ADMIN — DIAZEPAM 5 MG: 5 TABLET ORAL at 05:43

## 2023-11-25 RX ADMIN — GABAPENTIN 600 MG: 300 CAPSULE ORAL at 17:42

## 2023-11-25 RX ADMIN — OXYCODONE 10 MG: 5 TABLET ORAL at 03:35

## 2023-11-25 RX ADMIN — KETOROLAC TROMETHAMINE 30 MG: 30 INJECTION, SOLUTION INTRAMUSCULAR; INTRAVENOUS at 21:27

## 2023-11-25 RX ADMIN — GABAPENTIN 600 MG: 300 CAPSULE ORAL at 03:35

## 2023-11-25 RX ADMIN — MORPHINE SULFATE 1 MG: 2 INJECTION, SOLUTION INTRAMUSCULAR; INTRAVENOUS at 18:33

## 2023-11-25 RX ADMIN — ACETAMINOPHEN 1000 MG: 500 TABLET ORAL at 20:24

## 2023-11-25 RX ADMIN — NYSTATIN 500000 UNITS: 100000 SUSPENSION ORAL at 21:28

## 2023-11-25 RX ADMIN — SODIUM CHLORIDE, PRESERVATIVE FREE 10 ML: 5 INJECTION INTRAVENOUS at 21:26

## 2023-11-25 RX ADMIN — SODIUM CHLORIDE, PRESERVATIVE FREE 10 ML: 5 INJECTION INTRAVENOUS at 08:51

## 2023-11-25 RX ADMIN — PANTOPRAZOLE SODIUM 40 MG: 40 TABLET, DELAYED RELEASE ORAL at 07:40

## 2023-11-25 RX ADMIN — MORPHINE SULFATE 1 MG: 2 INJECTION, SOLUTION INTRAMUSCULAR; INTRAVENOUS at 14:18

## 2023-11-25 RX ADMIN — PREGABALIN 200 MG: 100 CAPSULE ORAL at 21:27

## 2023-11-25 RX ADMIN — ACETAMINOPHEN 1000 MG: 500 TABLET ORAL at 03:35

## 2023-11-25 RX ADMIN — BUDESONIDE AND FORMOTEROL FUMARATE DIHYDRATE 2 PUFF: 80; 4.5 AEROSOL RESPIRATORY (INHALATION) at 19:55

## 2023-11-25 RX ADMIN — LORAZEPAM 0.5 MG: 0.5 TABLET ORAL at 21:26

## 2023-11-25 RX ADMIN — KETOROLAC TROMETHAMINE 15 MG: 15 INJECTION, SOLUTION INTRAMUSCULAR; INTRAVENOUS at 08:50

## 2023-11-25 RX ADMIN — FLUOXETINE HYDROCHLORIDE 40 MG: 20 CAPSULE ORAL at 08:51

## 2023-11-25 RX ADMIN — MORPHINE SULFATE 1 MG: 2 INJECTION, SOLUTION INTRAMUSCULAR; INTRAVENOUS at 06:31

## 2023-11-25 RX ADMIN — TIZANIDINE 4 MG: 4 TABLET ORAL at 13:57

## 2023-11-25 RX ADMIN — NYSTATIN 500000 UNITS: 100000 SUSPENSION ORAL at 11:57

## 2023-11-25 RX ADMIN — DOCUSATE SODIUM 50 MG AND SENNOSIDES 8.6 MG 1 TABLET: 8.6; 5 TABLET, FILM COATED ORAL at 21:28

## 2023-11-25 RX ADMIN — MORPHINE SULFATE 1 MG: 2 INJECTION, SOLUTION INTRAMUSCULAR; INTRAVENOUS at 22:30

## 2023-11-25 RX ADMIN — MORPHINE SULFATE 1 MG: 2 INJECTION, SOLUTION INTRAMUSCULAR; INTRAVENOUS at 10:26

## 2023-11-25 RX ADMIN — LORAZEPAM 0.5 MG: 0.5 TABLET ORAL at 09:03

## 2023-11-25 RX ADMIN — BUDESONIDE AND FORMOTEROL FUMARATE DIHYDRATE 2 PUFF: 80; 4.5 AEROSOL RESPIRATORY (INHALATION) at 09:59

## 2023-11-25 RX ADMIN — NYSTATIN 500000 UNITS: 100000 SUSPENSION ORAL at 08:50

## 2023-11-25 RX ADMIN — OXYCODONE 10 MG: 5 TABLET ORAL at 16:57

## 2023-11-25 RX ADMIN — ACETAMINOPHEN 1000 MG: 500 TABLET ORAL at 14:20

## 2023-11-25 RX ADMIN — TIZANIDINE 4 MG: 4 TABLET ORAL at 08:51

## 2023-11-25 RX ADMIN — TIZANIDINE 4 MG: 4 TABLET ORAL at 21:27

## 2023-11-25 RX ADMIN — NYSTATIN 500000 UNITS: 100000 SUSPENSION ORAL at 17:42

## 2023-11-25 RX ADMIN — KETOROLAC TROMETHAMINE 15 MG: 15 INJECTION, SOLUTION INTRAMUSCULAR; INTRAVENOUS at 04:27

## 2023-11-25 RX ADMIN — DOCUSATE SODIUM 50 MG AND SENNOSIDES 8.6 MG 1 TABLET: 8.6; 5 TABLET, FILM COATED ORAL at 08:51

## 2023-11-25 RX ADMIN — OXYCODONE 10 MG: 5 TABLET ORAL at 11:57

## 2023-11-25 RX ADMIN — ENOXAPARIN SODIUM 40 MG: 100 INJECTION SUBCUTANEOUS at 08:50

## 2023-11-25 RX ADMIN — ZOLPIDEM TARTRATE 10 MG: 5 TABLET ORAL at 22:27

## 2023-11-25 RX ADMIN — OXYCODONE 10 MG: 5 TABLET ORAL at 20:23

## 2023-11-25 RX ADMIN — PREGABALIN 200 MG: 100 CAPSULE ORAL at 08:51

## 2023-11-25 RX ADMIN — KETOROLAC TROMETHAMINE 30 MG: 30 INJECTION, SOLUTION INTRAMUSCULAR; INTRAVENOUS at 15:17

## 2023-11-25 ASSESSMENT — PAIN SCALES - GENERAL
PAINLEVEL_OUTOF10: 10
PAINLEVEL_OUTOF10: 8
PAINLEVEL_OUTOF10: 7
PAINLEVEL_OUTOF10: 7
PAINLEVEL_OUTOF10: 9
PAINLEVEL_OUTOF10: 7
PAINLEVEL_OUTOF10: 9
PAINLEVEL_OUTOF10: 9
PAINLEVEL_OUTOF10: 5
PAINLEVEL_OUTOF10: 7
PAINLEVEL_OUTOF10: 6
PAINLEVEL_OUTOF10: 10
PAINLEVEL_OUTOF10: 7
PAINLEVEL_OUTOF10: 6
PAINLEVEL_OUTOF10: 7

## 2023-11-25 ASSESSMENT — PAIN DESCRIPTION - LOCATION
LOCATION: LEG

## 2023-11-25 ASSESSMENT — PAIN DESCRIPTION - FREQUENCY: FREQUENCY: CONTINUOUS

## 2023-11-25 ASSESSMENT — PAIN DESCRIPTION - ORIENTATION
ORIENTATION: RIGHT

## 2023-11-25 ASSESSMENT — PAIN DESCRIPTION - DESCRIPTORS: DESCRIPTORS: ACHING;DISCOMFORT;SHARP

## 2023-11-25 ASSESSMENT — PAIN DESCRIPTION - PAIN TYPE: TYPE: SURGICAL PAIN

## 2023-11-26 ENCOUNTER — ANESTHESIA EVENT (OUTPATIENT)
Dept: INPATIENT UNIT | Age: 58
End: 2023-11-26
Payer: MEDICARE

## 2023-11-26 ENCOUNTER — ANESTHESIA (OUTPATIENT)
Dept: INPATIENT UNIT | Age: 58
End: 2023-11-26
Payer: MEDICARE

## 2023-11-26 PROCEDURE — 6370000000 HC RX 637 (ALT 250 FOR IP): Performed by: STUDENT IN AN ORGANIZED HEALTH CARE EDUCATION/TRAINING PROGRAM

## 2023-11-26 PROCEDURE — 94640 AIRWAY INHALATION TREATMENT: CPT

## 2023-11-26 PROCEDURE — 6370000000 HC RX 637 (ALT 250 FOR IP)

## 2023-11-26 PROCEDURE — 1200000000 HC SEMI PRIVATE

## 2023-11-26 PROCEDURE — 2580000003 HC RX 258

## 2023-11-26 PROCEDURE — 6360000002 HC RX W HCPCS

## 2023-11-26 PROCEDURE — 99232 SBSQ HOSP IP/OBS MODERATE 35: CPT | Performed by: SURGERY

## 2023-11-26 RX ORDER — DIAZEPAM 5 MG/1
5 TABLET ORAL EVERY 8 HOURS
Status: DISCONTINUED | OUTPATIENT
Start: 2023-11-26 | End: 2023-12-04 | Stop reason: HOSPADM

## 2023-11-26 RX ORDER — TIZANIDINE 4 MG/1
4 TABLET ORAL EVERY 8 HOURS
Status: DISCONTINUED | OUTPATIENT
Start: 2023-11-26 | End: 2023-12-04 | Stop reason: HOSPADM

## 2023-11-26 RX ORDER — HYDROCODONE BITARTRATE AND ACETAMINOPHEN 5; 325 MG/1; MG/1
2 TABLET ORAL EVERY 4 HOURS
Status: DISCONTINUED | OUTPATIENT
Start: 2023-11-26 | End: 2023-11-27

## 2023-11-26 RX ORDER — NICOTINE 21 MG/24HR
1 PATCH, TRANSDERMAL 24 HOURS TRANSDERMAL DAILY
Status: DISCONTINUED | OUTPATIENT
Start: 2023-11-26 | End: 2023-12-04 | Stop reason: HOSPADM

## 2023-11-26 RX ADMIN — KETOROLAC TROMETHAMINE 30 MG: 30 INJECTION, SOLUTION INTRAMUSCULAR; INTRAVENOUS at 03:31

## 2023-11-26 RX ADMIN — TROSPIUM CHLORIDE 20 MG: 20 TABLET, FILM COATED ORAL at 06:55

## 2023-11-26 RX ADMIN — OXYCODONE 10 MG: 5 TABLET ORAL at 08:25

## 2023-11-26 RX ADMIN — SODIUM CHLORIDE, PRESERVATIVE FREE 10 ML: 5 INJECTION INTRAVENOUS at 21:34

## 2023-11-26 RX ADMIN — MORPHINE SULFATE 1 MG: 2 INJECTION, SOLUTION INTRAMUSCULAR; INTRAVENOUS at 06:27

## 2023-11-26 RX ADMIN — GABAPENTIN 600 MG: 300 CAPSULE ORAL at 18:31

## 2023-11-26 RX ADMIN — MORPHINE SULFATE 1 MG: 2 INJECTION, SOLUTION INTRAMUSCULAR; INTRAVENOUS at 02:28

## 2023-11-26 RX ADMIN — DIAZEPAM 5 MG: 5 TABLET ORAL at 21:34

## 2023-11-26 RX ADMIN — PANTOPRAZOLE SODIUM 40 MG: 40 TABLET, DELAYED RELEASE ORAL at 06:55

## 2023-11-26 RX ADMIN — PREGABALIN 200 MG: 100 CAPSULE ORAL at 22:21

## 2023-11-26 RX ADMIN — NYSTATIN 500000 UNITS: 100000 SUSPENSION ORAL at 07:59

## 2023-11-26 RX ADMIN — DOCUSATE SODIUM 50 MG AND SENNOSIDES 8.6 MG 1 TABLET: 8.6; 5 TABLET, FILM COATED ORAL at 21:33

## 2023-11-26 RX ADMIN — TIZANIDINE 4 MG: 4 TABLET ORAL at 08:00

## 2023-11-26 RX ADMIN — ACETAMINOPHEN 1000 MG: 500 TABLET ORAL at 02:27

## 2023-11-26 RX ADMIN — PREGABALIN 200 MG: 100 CAPSULE ORAL at 08:00

## 2023-11-26 RX ADMIN — SODIUM CHLORIDE, PRESERVATIVE FREE 10 ML: 5 INJECTION INTRAVENOUS at 08:29

## 2023-11-26 RX ADMIN — LORAZEPAM 0.5 MG: 0.5 TABLET ORAL at 08:24

## 2023-11-26 RX ADMIN — GABAPENTIN 600 MG: 300 CAPSULE ORAL at 10:48

## 2023-11-26 RX ADMIN — BUDESONIDE AND FORMOTEROL FUMARATE DIHYDRATE 2 PUFF: 80; 4.5 AEROSOL RESPIRATORY (INHALATION) at 19:49

## 2023-11-26 RX ADMIN — NYSTATIN 500000 UNITS: 100000 SUSPENSION ORAL at 12:20

## 2023-11-26 RX ADMIN — TIZANIDINE 4 MG: 4 TABLET ORAL at 13:52

## 2023-11-26 RX ADMIN — DIAZEPAM 5 MG: 5 TABLET ORAL at 13:52

## 2023-11-26 RX ADMIN — HYDROCODONE BITARTRATE AND ACETAMINOPHEN 2 TABLET: 5; 325 TABLET ORAL at 20:00

## 2023-11-26 RX ADMIN — DIAZEPAM 5 MG: 5 TABLET ORAL at 08:00

## 2023-11-26 RX ADMIN — NYSTATIN 500000 UNITS: 100000 SUSPENSION ORAL at 21:30

## 2023-11-26 RX ADMIN — OXYCODONE 10 MG: 5 TABLET ORAL at 00:28

## 2023-11-26 RX ADMIN — HYDROCODONE BITARTRATE AND ACETAMINOPHEN 2 TABLET: 5; 325 TABLET ORAL at 12:20

## 2023-11-26 RX ADMIN — KETOROLAC TROMETHAMINE 30 MG: 30 INJECTION, SOLUTION INTRAMUSCULAR; INTRAVENOUS at 14:51

## 2023-11-26 RX ADMIN — KETOROLAC TROMETHAMINE 30 MG: 30 INJECTION, SOLUTION INTRAMUSCULAR; INTRAVENOUS at 21:34

## 2023-11-26 RX ADMIN — ZOLPIDEM TARTRATE 10 MG: 5 TABLET ORAL at 21:33

## 2023-11-26 RX ADMIN — FLUOXETINE HYDROCHLORIDE 40 MG: 20 CAPSULE ORAL at 08:00

## 2023-11-26 RX ADMIN — NYSTATIN 500000 UNITS: 100000 SUSPENSION ORAL at 16:13

## 2023-11-26 RX ADMIN — TIZANIDINE 4 MG: 4 TABLET ORAL at 21:33

## 2023-11-26 RX ADMIN — LORAZEPAM 0.5 MG: 0.5 TABLET ORAL at 20:00

## 2023-11-26 RX ADMIN — KETOROLAC TROMETHAMINE 30 MG: 30 INJECTION, SOLUTION INTRAMUSCULAR; INTRAVENOUS at 09:29

## 2023-11-26 RX ADMIN — GABAPENTIN 600 MG: 300 CAPSULE ORAL at 02:28

## 2023-11-26 RX ADMIN — DOCUSATE SODIUM 50 MG AND SENNOSIDES 8.6 MG 1 TABLET: 8.6; 5 TABLET, FILM COATED ORAL at 08:27

## 2023-11-26 RX ADMIN — BUDESONIDE AND FORMOTEROL FUMARATE DIHYDRATE 2 PUFF: 80; 4.5 AEROSOL RESPIRATORY (INHALATION) at 08:48

## 2023-11-26 RX ADMIN — HYDROCODONE BITARTRATE AND ACETAMINOPHEN 2 TABLET: 5; 325 TABLET ORAL at 16:11

## 2023-11-26 RX ADMIN — OXYCODONE 10 MG: 5 TABLET ORAL at 04:31

## 2023-11-26 RX ADMIN — ENOXAPARIN SODIUM 40 MG: 100 INJECTION SUBCUTANEOUS at 07:59

## 2023-11-26 ASSESSMENT — PAIN DESCRIPTION - LOCATION
LOCATION: LEG

## 2023-11-26 ASSESSMENT — PAIN SCALES - GENERAL
PAINLEVEL_OUTOF10: 10
PAINLEVEL_OUTOF10: 7
PAINLEVEL_OUTOF10: 7
PAINLEVEL_OUTOF10: 6
PAINLEVEL_OUTOF10: 7
PAINLEVEL_OUTOF10: 10
PAINLEVEL_OUTOF10: 8
PAINLEVEL_OUTOF10: 10
PAINLEVEL_OUTOF10: 7
PAINLEVEL_OUTOF10: 7

## 2023-11-26 ASSESSMENT — PAIN DESCRIPTION - FREQUENCY: FREQUENCY: CONTINUOUS

## 2023-11-26 ASSESSMENT — PAIN DESCRIPTION - PAIN TYPE: TYPE: SURGICAL PAIN;ACUTE PAIN

## 2023-11-26 ASSESSMENT — PAIN DESCRIPTION - ORIENTATION
ORIENTATION: RIGHT

## 2023-11-26 ASSESSMENT — PAIN DESCRIPTION - DESCRIPTORS
DESCRIPTORS: ACHING;DISCOMFORT
DESCRIPTORS: ACHING;DISCOMFORT

## 2023-11-26 ASSESSMENT — PAIN DESCRIPTION - ONSET: ONSET: GRADUAL

## 2023-11-26 NOTE — CARE COORDINATION
Transitional planning      Writer to room to update patient on plan for [facility] and waiting on insurance approval.

## 2023-11-27 ENCOUNTER — TELEPHONE (OUTPATIENT)
Dept: ORTHOPEDIC SURGERY | Age: 58
End: 2023-11-27

## 2023-11-27 PROCEDURE — 97535 SELF CARE MNGMENT TRAINING: CPT

## 2023-11-27 PROCEDURE — 99231 SBSQ HOSP IP/OBS SF/LOW 25: CPT | Performed by: SURGERY

## 2023-11-27 PROCEDURE — 6370000000 HC RX 637 (ALT 250 FOR IP)

## 2023-11-27 PROCEDURE — 6370000000 HC RX 637 (ALT 250 FOR IP): Performed by: STUDENT IN AN ORGANIZED HEALTH CARE EDUCATION/TRAINING PROGRAM

## 2023-11-27 PROCEDURE — 6370000000 HC RX 637 (ALT 250 FOR IP): Performed by: NURSE PRACTITIONER

## 2023-11-27 PROCEDURE — 6360000002 HC RX W HCPCS

## 2023-11-27 PROCEDURE — 97116 GAIT TRAINING THERAPY: CPT

## 2023-11-27 PROCEDURE — 97110 THERAPEUTIC EXERCISES: CPT

## 2023-11-27 PROCEDURE — 2580000003 HC RX 258

## 2023-11-27 PROCEDURE — 1200000000 HC SEMI PRIVATE

## 2023-11-27 PROCEDURE — 6360000002 HC RX W HCPCS: Performed by: NURSE PRACTITIONER

## 2023-11-27 PROCEDURE — 97530 THERAPEUTIC ACTIVITIES: CPT

## 2023-11-27 PROCEDURE — 94640 AIRWAY INHALATION TREATMENT: CPT

## 2023-11-27 RX ORDER — ENOXAPARIN SODIUM 100 MG/ML
30 INJECTION SUBCUTANEOUS 2 TIMES DAILY
Status: DISCONTINUED | OUTPATIENT
Start: 2023-11-27 | End: 2023-12-04 | Stop reason: HOSPADM

## 2023-11-27 RX ORDER — OXYCODONE HYDROCHLORIDE 5 MG/1
10 TABLET ORAL EVERY 4 HOURS PRN
Status: DISCONTINUED | OUTPATIENT
Start: 2023-11-27 | End: 2023-12-04

## 2023-11-27 RX ORDER — GABAPENTIN 300 MG/1
900 CAPSULE ORAL EVERY 8 HOURS SCHEDULED
Status: DISCONTINUED | OUTPATIENT
Start: 2023-11-27 | End: 2023-12-04 | Stop reason: HOSPADM

## 2023-11-27 RX ORDER — SENNA AND DOCUSATE SODIUM 50; 8.6 MG/1; MG/1
1 TABLET, FILM COATED ORAL 2 TIMES DAILY
Qty: 60 TABLET | Refills: 0
Start: 2023-11-27 | End: 2023-11-30 | Stop reason: SDUPTHER

## 2023-11-27 RX ORDER — ACETAMINOPHEN 500 MG
1000 TABLET ORAL EVERY 8 HOURS SCHEDULED
Status: DISCONTINUED | OUTPATIENT
Start: 2023-11-27 | End: 2023-12-04 | Stop reason: HOSPADM

## 2023-11-27 RX ORDER — OXYCODONE HYDROCHLORIDE 5 MG/1
15 TABLET ORAL EVERY 4 HOURS PRN
Status: DISCONTINUED | OUTPATIENT
Start: 2023-11-27 | End: 2023-12-04

## 2023-11-27 RX ORDER — POLYETHYLENE GLYCOL 3350 17 G/17G
17 POWDER, FOR SOLUTION ORAL DAILY
Qty: 30 PACKET | Refills: 0
Start: 2023-11-28 | End: 2023-11-30 | Stop reason: SDUPTHER

## 2023-11-27 RX ADMIN — KETOROLAC TROMETHAMINE 30 MG: 30 INJECTION, SOLUTION INTRAMUSCULAR; INTRAVENOUS at 02:47

## 2023-11-27 RX ADMIN — TIZANIDINE 4 MG: 4 TABLET ORAL at 21:56

## 2023-11-27 RX ADMIN — KETOROLAC TROMETHAMINE 30 MG: 30 INJECTION, SOLUTION INTRAMUSCULAR; INTRAVENOUS at 15:42

## 2023-11-27 RX ADMIN — LORAZEPAM 0.5 MG: 0.5 TABLET ORAL at 21:56

## 2023-11-27 RX ADMIN — OXYCODONE 15 MG: 5 TABLET ORAL at 20:04

## 2023-11-27 RX ADMIN — KETOROLAC TROMETHAMINE 30 MG: 30 INJECTION, SOLUTION INTRAMUSCULAR; INTRAVENOUS at 21:54

## 2023-11-27 RX ADMIN — DIAZEPAM 5 MG: 5 TABLET ORAL at 21:55

## 2023-11-27 RX ADMIN — SODIUM CHLORIDE, PRESERVATIVE FREE 10 ML: 5 INJECTION INTRAVENOUS at 08:47

## 2023-11-27 RX ADMIN — GABAPENTIN 900 MG: 300 CAPSULE ORAL at 13:43

## 2023-11-27 RX ADMIN — ACETAMINOPHEN 1000 MG: 500 TABLET ORAL at 21:54

## 2023-11-27 RX ADMIN — FLUOXETINE HYDROCHLORIDE 40 MG: 20 CAPSULE ORAL at 08:45

## 2023-11-27 RX ADMIN — DEXTROAMPHETAMINE SACCHARATE, AMPHETAMINE ASPARTATE, DEXTROAMPHETAMINE SULFATE, AMPHETAMINE SULFATE TABLETS, 5 MG,CLL 15 MG: 1.25; 1.25; 1.25; 1.25 TABLET ORAL at 08:45

## 2023-11-27 RX ADMIN — ENOXAPARIN SODIUM 30 MG: 100 INJECTION SUBCUTANEOUS at 21:54

## 2023-11-27 RX ADMIN — OXYCODONE 15 MG: 5 TABLET ORAL at 16:05

## 2023-11-27 RX ADMIN — KETOROLAC TROMETHAMINE 30 MG: 30 INJECTION, SOLUTION INTRAMUSCULAR; INTRAVENOUS at 09:50

## 2023-11-27 RX ADMIN — DIAZEPAM 5 MG: 5 TABLET ORAL at 06:04

## 2023-11-27 RX ADMIN — DIAZEPAM 5 MG: 5 TABLET ORAL at 13:43

## 2023-11-27 RX ADMIN — HYDROCODONE BITARTRATE AND ACETAMINOPHEN 2 TABLET: 5; 325 TABLET ORAL at 00:56

## 2023-11-27 RX ADMIN — GABAPENTIN 900 MG: 300 CAPSULE ORAL at 21:55

## 2023-11-27 RX ADMIN — HYDROCODONE BITARTRATE AND ACETAMINOPHEN 2 TABLET: 5; 325 TABLET ORAL at 04:00

## 2023-11-27 RX ADMIN — GABAPENTIN 600 MG: 300 CAPSULE ORAL at 02:20

## 2023-11-27 RX ADMIN — LORAZEPAM 0.5 MG: 0.5 TABLET ORAL at 08:46

## 2023-11-27 RX ADMIN — DOCUSATE SODIUM 50 MG AND SENNOSIDES 8.6 MG 1 TABLET: 8.6; 5 TABLET, FILM COATED ORAL at 08:45

## 2023-11-27 RX ADMIN — PANTOPRAZOLE SODIUM 40 MG: 40 TABLET, DELAYED RELEASE ORAL at 08:45

## 2023-11-27 RX ADMIN — SODIUM CHLORIDE, PRESERVATIVE FREE 10 ML: 5 INJECTION INTRAVENOUS at 21:57

## 2023-11-27 RX ADMIN — PREGABALIN 200 MG: 100 CAPSULE ORAL at 08:45

## 2023-11-27 RX ADMIN — TIZANIDINE 4 MG: 4 TABLET ORAL at 13:45

## 2023-11-27 RX ADMIN — ZOLPIDEM TARTRATE 10 MG: 5 TABLET ORAL at 21:56

## 2023-11-27 RX ADMIN — ENOXAPARIN SODIUM 40 MG: 100 INJECTION SUBCUTANEOUS at 09:50

## 2023-11-27 RX ADMIN — PREGABALIN 200 MG: 100 CAPSULE ORAL at 21:56

## 2023-11-27 RX ADMIN — OXYCODONE 15 MG: 5 TABLET ORAL at 11:59

## 2023-11-27 RX ADMIN — TIZANIDINE 4 MG: 4 TABLET ORAL at 06:04

## 2023-11-27 RX ADMIN — NYSTATIN 500000 UNITS: 100000 SUSPENSION ORAL at 21:50

## 2023-11-27 RX ADMIN — HYDROCODONE BITARTRATE AND ACETAMINOPHEN 2 TABLET: 5; 325 TABLET ORAL at 08:46

## 2023-11-27 RX ADMIN — BUDESONIDE AND FORMOTEROL FUMARATE DIHYDRATE 2 PUFF: 80; 4.5 AEROSOL RESPIRATORY (INHALATION) at 07:58

## 2023-11-27 ASSESSMENT — PAIN SCALES - GENERAL
PAINLEVEL_OUTOF10: 7
PAINLEVEL_OUTOF10: 7
PAINLEVEL_OUTOF10: 9
PAINLEVEL_OUTOF10: 10
PAINLEVEL_OUTOF10: 8
PAINLEVEL_OUTOF10: 7
PAINLEVEL_OUTOF10: 10

## 2023-11-27 ASSESSMENT — PAIN DESCRIPTION - LOCATION
LOCATION: LEG

## 2023-11-27 ASSESSMENT — PAIN DESCRIPTION - DESCRIPTORS
DESCRIPTORS: ACHING;DISCOMFORT
DESCRIPTORS: ACHING;DISCOMFORT
DESCRIPTORS: ACHING;DISCOMFORT;THROBBING

## 2023-11-27 ASSESSMENT — PAIN DESCRIPTION - ORIENTATION
ORIENTATION: RIGHT

## 2023-11-27 NOTE — DISCHARGE SUMMARY
DISCHARGE SUMMARY:    PATIENT NAME:  Sierra Gilbert  YOB: 1965  MEDICAL RECORD NO. [id number]  DATE: 12/05/23  PRIMARY CARE PHYSICIAN: Sidney Sage MD  ADMIT DATE:  11/19/2023    DISCHARGE DATE:  12/4/202312/4/2023  DISPOSITION:  rehab  ADMITTING DIAGNOSIS:   Fall    DIAGNOSIS:   Patient Active Problem List   Diagnosis    Overdose of drug/medicinal substance, accidental or unintentional, initial encounter    Chronic anticoagulation    Multifocal pneumonia    Pulmonary hypertension (HCC)    Mild cardiomegaly    Borderline personality disorder ([address])    Bipolar 1 disorder (HCC)    Recurrent major depressive disorder, in remission ([address])    Anxiety disorder    Transient alteration of awareness    Symptomatic anemia    Hyponatremia    Hypocalcemia    Hypoalbuminemia    COVID-19 virus not detected    Encephalopathy, metabolic    Iron deficiency anemia secondary to inadequate dietary iron intake    Iron malabsorption    History of psychiatric disorder    Chronic bilateral low back pain with left-sided sciatica    H/O opioid abuse ([address])    Polypharmacy    Tremor due to multiple drugs    Serotonin syndrome    Confusion    Asthma with acute exacerbation    May-Thurner syndrome    Migraine without status migrainosus, not intractable    Change in mental status    Multiple sclerosis ([address])    Tibia and fibula open fracture, right, type I or II, initial encounter    Preoperative cardiovascular examination    Tibia/fibula fracture, right, closed, initial encounter       CONSULTANTS:  Ortho, Cardio, anesthesia    PROCEDURES:   11/19: OR-closed treatment of right bicondylar tibial plateau fracture.  Application of ex fix  73/21: OR-ORIF right bicondylar tibial plateau, right lateral meniscus repair, removal of ex fix, closed treatment right proximal fibula    HOSPITAL COURSE:   Sierra Gilbert is a 66 y.o. female who was admitted on 11/19/2023  Hospital Course:  TRAUMA  CC: [address], (-) LOC, AC w/ Xarelto for DVT/PE's

## 2023-11-28 ENCOUNTER — TELEPHONE (OUTPATIENT)
Dept: NEUROLOGY | Age: 58
End: 2023-11-28

## 2023-11-28 LAB
ABO + RH BLD: NORMAL
ANION GAP SERPL CALCULATED.3IONS-SCNC: 7 MMOL/L (ref 9–17)
ARM BAND NUMBER: NORMAL
BASOPHILS # BLD: <0.03 K/UL (ref 0–0.2)
BASOPHILS NFR BLD: 0 % (ref 0–2)
BLOOD BANK SAMPLE EXPIRATION: NORMAL
BLOOD GROUP ANTIBODIES SERPL: NEGATIVE
BUN SERPL-MCNC: 29 MG/DL (ref 6–20)
CA-I BLD-SCNC: 1.28 MMOL/L (ref 1.13–1.33)
CALCIUM SERPL-MCNC: 8.6 MG/DL (ref 8.6–10.4)
CHLORIDE SERPL-SCNC: 108 MMOL/L (ref 98–107)
CO2 SERPL-SCNC: 25 MMOL/L (ref 20–31)
CREAT SERPL-MCNC: 0.4 MG/DL (ref 0.5–0.9)
EOSINOPHIL # BLD: 0.39 K/UL (ref 0–0.44)
EOSINOPHILS RELATIVE PERCENT: 7 % (ref 1–4)
ERYTHROCYTE [DISTWIDTH] IN BLOOD BY AUTOMATED COUNT: 17.6 % (ref 11.8–14.4)
GFR SERPL CREATININE-BSD FRML MDRD: >60 ML/MIN/1.73M2
GLUCOSE SERPL-MCNC: 94 MG/DL (ref 70–99)
HCT VFR BLD AUTO: 21.7 % (ref 36.3–47.1)
HGB BLD-MCNC: 6.8 G/DL (ref 11.9–15.1)
IMM GRANULOCYTES # BLD AUTO: 0.03 K/UL (ref 0–0.3)
IMM GRANULOCYTES NFR BLD: 1 %
LACTIC ACID, WHOLE BLOOD: 1.1 MMOL/L (ref 0.7–2.1)
LYMPHOCYTES NFR BLD: 0.93 K/UL (ref 1.1–3.7)
LYMPHOCYTES RELATIVE PERCENT: 16 % (ref 24–43)
MAGNESIUM SERPL-MCNC: 2 MG/DL (ref 1.6–2.6)
MCH RBC QN AUTO: 27.5 PG (ref 25.2–33.5)
MCHC RBC AUTO-ENTMCNC: 31.3 G/DL (ref 28.4–34.8)
MCV RBC AUTO: 87.9 FL (ref 82.6–102.9)
MONOCYTES NFR BLD: 0.64 K/UL (ref 0.1–1.2)
MONOCYTES NFR BLD: 11 % (ref 3–12)
NEUTROPHILS NFR BLD: 66 % (ref 36–65)
NEUTS SEG NFR BLD: 3.95 K/UL (ref 1.5–8.1)
NRBC BLD-RTO: 0 PER 100 WBC
PHOSPHATE SERPL-MCNC: 3.6 MG/DL (ref 2.6–4.5)
PLATELET # BLD AUTO: 264 K/UL (ref 138–453)
PMV BLD AUTO: 10.7 FL (ref 8.1–13.5)
POTASSIUM SERPL-SCNC: 4.6 MMOL/L (ref 3.7–5.3)
RBC # BLD AUTO: 2.47 M/UL (ref 3.95–5.11)
RBC # BLD: ABNORMAL 10*6/UL
SODIUM SERPL-SCNC: 140 MMOL/L (ref 135–144)
WBC OTHER # BLD: 6 K/UL (ref 3.5–11.3)

## 2023-11-28 PROCEDURE — 97530 THERAPEUTIC ACTIVITIES: CPT

## 2023-11-28 PROCEDURE — 97110 THERAPEUTIC EXERCISES: CPT

## 2023-11-28 PROCEDURE — 6370000000 HC RX 637 (ALT 250 FOR IP)

## 2023-11-28 PROCEDURE — 6370000000 HC RX 637 (ALT 250 FOR IP): Performed by: STUDENT IN AN ORGANIZED HEALTH CARE EDUCATION/TRAINING PROGRAM

## 2023-11-28 PROCEDURE — 1200000000 HC SEMI PRIVATE

## 2023-11-28 PROCEDURE — 36415 COLL VENOUS BLD VENIPUNCTURE: CPT

## 2023-11-28 PROCEDURE — 6360000002 HC RX W HCPCS: Performed by: NURSE PRACTITIONER

## 2023-11-28 PROCEDURE — 86850 RBC ANTIBODY SCREEN: CPT

## 2023-11-28 PROCEDURE — 84100 ASSAY OF PHOSPHORUS: CPT

## 2023-11-28 PROCEDURE — 94761 N-INVAS EAR/PLS OXIMETRY MLT: CPT

## 2023-11-28 PROCEDURE — 97116 GAIT TRAINING THERAPY: CPT

## 2023-11-28 PROCEDURE — 86901 BLOOD TYPING SEROLOGIC RH(D): CPT

## 2023-11-28 PROCEDURE — 82330 ASSAY OF CALCIUM: CPT

## 2023-11-28 PROCEDURE — 83735 ASSAY OF MAGNESIUM: CPT

## 2023-11-28 PROCEDURE — 85025 COMPLETE CBC W/AUTO DIFF WBC: CPT

## 2023-11-28 PROCEDURE — 86900 BLOOD TYPING SEROLOGIC ABO: CPT

## 2023-11-28 PROCEDURE — 2580000003 HC RX 258

## 2023-11-28 PROCEDURE — 6360000002 HC RX W HCPCS

## 2023-11-28 PROCEDURE — 6370000000 HC RX 637 (ALT 250 FOR IP): Performed by: NURSE PRACTITIONER

## 2023-11-28 PROCEDURE — 80048 BASIC METABOLIC PNL TOTAL CA: CPT

## 2023-11-28 PROCEDURE — 99231 SBSQ HOSP IP/OBS SF/LOW 25: CPT | Performed by: SURGERY

## 2023-11-28 PROCEDURE — 94640 AIRWAY INHALATION TREATMENT: CPT

## 2023-11-28 PROCEDURE — 83605 ASSAY OF LACTIC ACID: CPT

## 2023-11-28 RX ORDER — 0.9 % SODIUM CHLORIDE 0.9 %
500 INTRAVENOUS SOLUTION INTRAVENOUS ONCE
Status: COMPLETED | OUTPATIENT
Start: 2023-11-28 | End: 2023-11-28

## 2023-11-28 RX ADMIN — NYSTATIN 500000 UNITS: 100000 SUSPENSION ORAL at 08:39

## 2023-11-28 RX ADMIN — GABAPENTIN 900 MG: 300 CAPSULE ORAL at 13:36

## 2023-11-28 RX ADMIN — FLUOXETINE HYDROCHLORIDE 40 MG: 20 CAPSULE ORAL at 09:27

## 2023-11-28 RX ADMIN — SODIUM CHLORIDE: 9 INJECTION, SOLUTION INTRAVENOUS at 01:28

## 2023-11-28 RX ADMIN — OXYCODONE 15 MG: 5 TABLET ORAL at 18:32

## 2023-11-28 RX ADMIN — ENOXAPARIN SODIUM 30 MG: 100 INJECTION SUBCUTANEOUS at 21:29

## 2023-11-28 RX ADMIN — DEXTROAMPHETAMINE SACCHARATE, AMPHETAMINE ASPARTATE, DEXTROAMPHETAMINE SULFATE, AMPHETAMINE SULFATE TABLETS, 5 MG,CLL 15 MG: 1.25; 1.25; 1.25; 1.25 TABLET ORAL at 08:44

## 2023-11-28 RX ADMIN — LORAZEPAM 0.5 MG: 0.5 TABLET ORAL at 21:28

## 2023-11-28 RX ADMIN — OXYCODONE 15 MG: 5 TABLET ORAL at 07:02

## 2023-11-28 RX ADMIN — PREGABALIN 200 MG: 100 CAPSULE ORAL at 21:28

## 2023-11-28 RX ADMIN — ZOLPIDEM TARTRATE 10 MG: 5 TABLET ORAL at 22:42

## 2023-11-28 RX ADMIN — KETOROLAC TROMETHAMINE 30 MG: 30 INJECTION, SOLUTION INTRAMUSCULAR; INTRAVENOUS at 03:30

## 2023-11-28 RX ADMIN — TIZANIDINE 4 MG: 4 TABLET ORAL at 15:19

## 2023-11-28 RX ADMIN — ACETAMINOPHEN 1000 MG: 500 TABLET ORAL at 13:34

## 2023-11-28 RX ADMIN — DIAZEPAM 5 MG: 5 TABLET ORAL at 21:28

## 2023-11-28 RX ADMIN — NYSTATIN 500000 UNITS: 100000 SUSPENSION ORAL at 17:10

## 2023-11-28 RX ADMIN — BUDESONIDE AND FORMOTEROL FUMARATE DIHYDRATE 2 PUFF: 80; 4.5 AEROSOL RESPIRATORY (INHALATION) at 07:34

## 2023-11-28 RX ADMIN — SODIUM CHLORIDE 500 ML: 9 INJECTION, SOLUTION INTRAVENOUS at 01:30

## 2023-11-28 RX ADMIN — OXYCODONE 15 MG: 5 TABLET ORAL at 15:04

## 2023-11-28 RX ADMIN — PREGABALIN 200 MG: 100 CAPSULE ORAL at 09:27

## 2023-11-28 RX ADMIN — DOCUSATE SODIUM 50 MG AND SENNOSIDES 8.6 MG 1 TABLET: 8.6; 5 TABLET, FILM COATED ORAL at 21:28

## 2023-11-28 RX ADMIN — ACETAMINOPHEN 1000 MG: 500 TABLET ORAL at 21:28

## 2023-11-28 RX ADMIN — OXYCODONE 15 MG: 5 TABLET ORAL at 22:42

## 2023-11-28 RX ADMIN — ACETAMINOPHEN 1000 MG: 500 TABLET ORAL at 05:50

## 2023-11-28 RX ADMIN — ENOXAPARIN SODIUM 30 MG: 100 INJECTION SUBCUTANEOUS at 08:39

## 2023-11-28 RX ADMIN — GABAPENTIN 900 MG: 300 CAPSULE ORAL at 21:28

## 2023-11-28 RX ADMIN — DIAZEPAM 5 MG: 5 TABLET ORAL at 13:32

## 2023-11-28 RX ADMIN — GABAPENTIN 900 MG: 300 CAPSULE ORAL at 05:50

## 2023-11-28 RX ADMIN — SODIUM CHLORIDE, PRESERVATIVE FREE 10 ML: 5 INJECTION INTRAVENOUS at 08:40

## 2023-11-28 RX ADMIN — DIAZEPAM 5 MG: 5 TABLET ORAL at 05:50

## 2023-11-28 RX ADMIN — OXYCODONE 10 MG: 5 TABLET ORAL at 03:30

## 2023-11-28 RX ADMIN — LORAZEPAM 0.5 MG: 0.5 TABLET ORAL at 08:39

## 2023-11-28 RX ADMIN — SODIUM CHLORIDE, PRESERVATIVE FREE 10 ML: 5 INJECTION INTRAVENOUS at 21:29

## 2023-11-28 RX ADMIN — OXYCODONE 15 MG: 5 TABLET ORAL at 11:11

## 2023-11-28 RX ADMIN — DOCUSATE SODIUM 50 MG AND SENNOSIDES 8.6 MG 1 TABLET: 8.6; 5 TABLET, FILM COATED ORAL at 08:39

## 2023-11-28 RX ADMIN — PANTOPRAZOLE SODIUM 40 MG: 40 TABLET, DELAYED RELEASE ORAL at 07:12

## 2023-11-28 ASSESSMENT — PAIN DESCRIPTION - ONSET
ONSET: ON-GOING

## 2023-11-28 ASSESSMENT — PAIN SCALES - GENERAL
PAINLEVEL_OUTOF10: 10
PAINLEVEL_OUTOF10: 7
PAINLEVEL_OUTOF10: 10
PAINLEVEL_OUTOF10: 10
PAINLEVEL_OUTOF10: 5
PAINLEVEL_OUTOF10: 7
PAINLEVEL_OUTOF10: 5
PAINLEVEL_OUTOF10: 10
PAINLEVEL_OUTOF10: 5
PAINLEVEL_OUTOF10: 3
PAINLEVEL_OUTOF10: 8
PAINLEVEL_OUTOF10: 9

## 2023-11-28 ASSESSMENT — PAIN DESCRIPTION - ORIENTATION
ORIENTATION: RIGHT

## 2023-11-28 ASSESSMENT — PAIN DESCRIPTION - DESCRIPTORS
DESCRIPTORS: DISCOMFORT;SORE;ACHING
DESCRIPTORS: DISCOMFORT;SORE;TENDER
DESCRIPTORS: ACHING;SHARP

## 2023-11-28 ASSESSMENT — PAIN DESCRIPTION - LOCATION
LOCATION: LEG

## 2023-11-28 ASSESSMENT — PAIN DESCRIPTION - FREQUENCY
FREQUENCY: CONTINUOUS

## 2023-11-28 ASSESSMENT — PAIN DESCRIPTION - PAIN TYPE
TYPE: ACUTE PAIN;SURGICAL PAIN
TYPE: ACUTE PAIN
TYPE: SURGICAL PAIN
TYPE: ACUTE PAIN;SURGICAL PAIN

## 2023-11-28 ASSESSMENT — PAIN - FUNCTIONAL ASSESSMENT
PAIN_FUNCTIONAL_ASSESSMENT: PREVENTS OR INTERFERES SOME ACTIVE ACTIVITIES AND ADLS
PAIN_FUNCTIONAL_ASSESSMENT: PREVENTS OR INTERFERES WITH MANY ACTIVE NOT PASSIVE ACTIVITIES
PAIN_FUNCTIONAL_ASSESSMENT: PREVENTS OR INTERFERES SOME ACTIVE ACTIVITIES AND ADLS

## 2023-11-28 ASSESSMENT — PAIN SCALES - WONG BAKER: WONGBAKER_NUMERICALRESPONSE: 4

## 2023-11-28 NOTE — CARE COORDINATION
Transitional planning: Phone call to Paris Tracy with [employer] regarding a missed Peer to Peer per CM manager. As far as Paris Rossana is aware there has not been any denial from the insurance or P2P offered. She will look into it and call back. 8600 Phone call from Paris Tracy with [employer]. The denial was for Karyle Ripple acute inpatient hospital stay, not acute inpatient rehab. She will let us know when she hears about rehab. 2267 Phone call from Paris Tracy with [employer]. Precert is still pending at this time. She will call if anything changes.

## 2023-11-28 NOTE — TELEPHONE ENCOUNTER
Bobby Singer called in today. She is currently in [facility]. V's. She had a bad fall and broke her leg and had to have surgery. She was wondering if she could just get her infusion (the solumedrol) while she is inpt. now. I told her that she will have to talk with her surgeon. They can see Dr. Carmine Rosenthal notes. They may want to consult neurology but if they want to talk to her they can call her.

## 2023-11-29 ENCOUNTER — TELEPHONE (OUTPATIENT)
Dept: NEUROLOGY | Age: 58
End: 2023-11-29

## 2023-11-29 DIAGNOSIS — G35 MULTIPLE SCLEROSIS (HCC): Primary | ICD-10-CM

## 2023-11-29 LAB
ANION GAP SERPL CALCULATED.3IONS-SCNC: 6 MMOL/L (ref 9–17)
BASOPHILS # BLD: 0 K/UL (ref 0–0.2)
BASOPHILS NFR BLD: 0 % (ref 0–2)
BUN SERPL-MCNC: 16 MG/DL (ref 6–20)
CALCIUM SERPL-MCNC: 8.9 MG/DL (ref 8.6–10.4)
CHLORIDE SERPL-SCNC: 108 MMOL/L (ref 98–107)
CO2 SERPL-SCNC: 18 MMOL/L (ref 20–31)
CREAT SERPL-MCNC: 0.4 MG/DL (ref 0.5–0.9)
EOSINOPHIL # BLD: 0.43 K/UL (ref 0–0.44)
EOSINOPHILS RELATIVE PERCENT: 7 % (ref 1–4)
ERYTHROCYTE [DISTWIDTH] IN BLOOD BY AUTOMATED COUNT: 18.2 % (ref 11.8–14.4)
GFR SERPL CREATININE-BSD FRML MDRD: >60 ML/MIN/1.73M2
GLUCOSE SERPL-MCNC: 85 MG/DL (ref 70–99)
HCT VFR BLD AUTO: 29.2 % (ref 36.3–47.1)
HGB BLD-MCNC: 8.2 G/DL (ref 11.9–15.1)
IMM GRANULOCYTES # BLD AUTO: 0.06 K/UL (ref 0–0.3)
IMM GRANULOCYTES NFR BLD: 1 %
LYMPHOCYTES NFR BLD: 1.16 K/UL (ref 1.1–3.7)
LYMPHOCYTES RELATIVE PERCENT: 19 % (ref 24–43)
MCH RBC QN AUTO: 27.2 PG (ref 25.2–33.5)
MCHC RBC AUTO-ENTMCNC: 28.1 G/DL (ref 28.4–34.8)
MCV RBC AUTO: 97 FL (ref 82.6–102.9)
MONOCYTES NFR BLD: 0.98 K/UL (ref 0.1–1.2)
MONOCYTES NFR BLD: 16 % (ref 3–12)
MORPHOLOGY: ABNORMAL
NEUTROPHILS NFR BLD: 57 % (ref 36–65)
NEUTS SEG NFR BLD: 3.47 K/UL (ref 1.5–8.1)
NRBC BLD-RTO: 0.3 PER 100 WBC
PLATELET # BLD AUTO: 301 K/UL (ref 138–453)
PMV BLD AUTO: 10.6 FL (ref 8.1–13.5)
POTASSIUM SERPL-SCNC: 5 MMOL/L (ref 3.7–5.3)
RBC # BLD AUTO: 3.01 M/UL (ref 3.95–5.11)
SODIUM SERPL-SCNC: 132 MMOL/L (ref 135–144)
WBC OTHER # BLD: 6.1 K/UL (ref 3.5–11.3)

## 2023-11-29 PROCEDURE — 97530 THERAPEUTIC ACTIVITIES: CPT

## 2023-11-29 PROCEDURE — 6370000000 HC RX 637 (ALT 250 FOR IP)

## 2023-11-29 PROCEDURE — 1200000000 HC SEMI PRIVATE

## 2023-11-29 PROCEDURE — 6370000000 HC RX 637 (ALT 250 FOR IP): Performed by: STUDENT IN AN ORGANIZED HEALTH CARE EDUCATION/TRAINING PROGRAM

## 2023-11-29 PROCEDURE — 2580000003 HC RX 258

## 2023-11-29 PROCEDURE — 97535 SELF CARE MNGMENT TRAINING: CPT

## 2023-11-29 PROCEDURE — 6360000002 HC RX W HCPCS: Performed by: NURSE PRACTITIONER

## 2023-11-29 PROCEDURE — 97116 GAIT TRAINING THERAPY: CPT

## 2023-11-29 PROCEDURE — 6370000000 HC RX 637 (ALT 250 FOR IP): Performed by: NURSE PRACTITIONER

## 2023-11-29 PROCEDURE — 80048 BASIC METABOLIC PNL TOTAL CA: CPT

## 2023-11-29 PROCEDURE — 94640 AIRWAY INHALATION TREATMENT: CPT

## 2023-11-29 PROCEDURE — 99231 SBSQ HOSP IP/OBS SF/LOW 25: CPT | Performed by: SURGERY

## 2023-11-29 PROCEDURE — 97110 THERAPEUTIC EXERCISES: CPT

## 2023-11-29 PROCEDURE — 85025 COMPLETE CBC W/AUTO DIFF WBC: CPT

## 2023-11-29 PROCEDURE — 36415 COLL VENOUS BLD VENIPUNCTURE: CPT

## 2023-11-29 RX ORDER — HEPARIN SODIUM (PORCINE) LOCK FLUSH IV SOLN 100 UNIT/ML 100 UNIT/ML
500 SOLUTION INTRAVENOUS PRN
OUTPATIENT
Start: 2023-11-29

## 2023-11-29 RX ORDER — ALBUTEROL SULFATE 90 UG/1
4 AEROSOL, METERED RESPIRATORY (INHALATION) PRN
OUTPATIENT
Start: 2023-11-29

## 2023-11-29 RX ORDER — ACETAMINOPHEN 325 MG/1
650 TABLET ORAL ONCE
OUTPATIENT
Start: 2023-11-29 | End: 2023-11-29

## 2023-11-29 RX ORDER — SODIUM CHLORIDE 0.9 % (FLUSH) 0.9 %
5-40 SYRINGE (ML) INJECTION PRN
OUTPATIENT
Start: 2023-11-29

## 2023-11-29 RX ORDER — SODIUM CHLORIDE 9 MG/ML
5-250 INJECTION, SOLUTION INTRAVENOUS PRN
OUTPATIENT
Start: 2023-11-29

## 2023-11-29 RX ORDER — ONDANSETRON 2 MG/ML
8 INJECTION INTRAMUSCULAR; INTRAVENOUS
OUTPATIENT
Start: 2023-11-29

## 2023-11-29 RX ORDER — FAMOTIDINE 10 MG/ML
20 INJECTION, SOLUTION INTRAVENOUS
OUTPATIENT
Start: 2023-11-29

## 2023-11-29 RX ORDER — EPINEPHRINE 1 MG/ML
0.3 INJECTION, SOLUTION, CONCENTRATE INTRAVENOUS PRN
OUTPATIENT
Start: 2023-11-29

## 2023-11-29 RX ORDER — ACETAMINOPHEN 325 MG/1
650 TABLET ORAL
OUTPATIENT
Start: 2023-11-29

## 2023-11-29 RX ORDER — DIPHENHYDRAMINE HYDROCHLORIDE 50 MG/ML
50 INJECTION INTRAMUSCULAR; INTRAVENOUS
OUTPATIENT
Start: 2023-11-29

## 2023-11-29 RX ORDER — DIPHENHYDRAMINE HYDROCHLORIDE 50 MG/ML
25 INJECTION INTRAMUSCULAR; INTRAVENOUS ONCE
OUTPATIENT
Start: 2023-11-29 | End: 2023-11-29

## 2023-11-29 RX ORDER — SODIUM CHLORIDE 9 MG/ML
INJECTION, SOLUTION INTRAVENOUS CONTINUOUS
OUTPATIENT
Start: 2023-11-29

## 2023-11-29 RX ADMIN — LORAZEPAM 0.5 MG: 0.5 TABLET ORAL at 22:22

## 2023-11-29 RX ADMIN — FLUOXETINE HYDROCHLORIDE 40 MG: 20 CAPSULE ORAL at 08:03

## 2023-11-29 RX ADMIN — OXYCODONE 15 MG: 5 TABLET ORAL at 07:58

## 2023-11-29 RX ADMIN — OXYCODONE 15 MG: 5 TABLET ORAL at 16:08

## 2023-11-29 RX ADMIN — LORAZEPAM 0.5 MG: 0.5 TABLET ORAL at 08:00

## 2023-11-29 RX ADMIN — ACETAMINOPHEN 1000 MG: 500 TABLET ORAL at 13:20

## 2023-11-29 RX ADMIN — TROSPIUM CHLORIDE 20 MG: 20 TABLET, FILM COATED ORAL at 08:02

## 2023-11-29 RX ADMIN — TIZANIDINE 4 MG: 4 TABLET ORAL at 13:22

## 2023-11-29 RX ADMIN — OXYCODONE 15 MG: 5 TABLET ORAL at 02:49

## 2023-11-29 RX ADMIN — PREGABALIN 200 MG: 100 CAPSULE ORAL at 08:01

## 2023-11-29 RX ADMIN — GABAPENTIN 900 MG: 300 CAPSULE ORAL at 05:59

## 2023-11-29 RX ADMIN — OXYCODONE 15 MG: 5 TABLET ORAL at 20:22

## 2023-11-29 RX ADMIN — ZOLPIDEM TARTRATE 10 MG: 5 TABLET ORAL at 22:20

## 2023-11-29 RX ADMIN — DOCUSATE SODIUM 50 MG AND SENNOSIDES 8.6 MG 1 TABLET: 8.6; 5 TABLET, FILM COATED ORAL at 22:22

## 2023-11-29 RX ADMIN — TROSPIUM CHLORIDE 20 MG: 20 TABLET, FILM COATED ORAL at 16:02

## 2023-11-29 RX ADMIN — DOCUSATE SODIUM 50 MG AND SENNOSIDES 8.6 MG 1 TABLET: 8.6; 5 TABLET, FILM COATED ORAL at 08:01

## 2023-11-29 RX ADMIN — PANTOPRAZOLE SODIUM 40 MG: 40 TABLET, DELAYED RELEASE ORAL at 08:02

## 2023-11-29 RX ADMIN — OXYCODONE 15 MG: 5 TABLET ORAL at 12:05

## 2023-11-29 RX ADMIN — DIAZEPAM 5 MG: 5 TABLET ORAL at 05:59

## 2023-11-29 RX ADMIN — PREGABALIN 200 MG: 100 CAPSULE ORAL at 22:21

## 2023-11-29 RX ADMIN — ACETAMINOPHEN 1000 MG: 500 TABLET ORAL at 05:59

## 2023-11-29 RX ADMIN — ENOXAPARIN SODIUM 30 MG: 100 INJECTION SUBCUTANEOUS at 22:23

## 2023-11-29 RX ADMIN — SODIUM CHLORIDE, PRESERVATIVE FREE 10 ML: 5 INJECTION INTRAVENOUS at 08:03

## 2023-11-29 RX ADMIN — DIAZEPAM 5 MG: 5 TABLET ORAL at 13:20

## 2023-11-29 RX ADMIN — ACETAMINOPHEN 1000 MG: 500 TABLET ORAL at 22:21

## 2023-11-29 RX ADMIN — DEXTROAMPHETAMINE SACCHARATE, AMPHETAMINE ASPARTATE, DEXTROAMPHETAMINE SULFATE, AMPHETAMINE SULFATE TABLETS, 5 MG,CLL 15 MG: 1.25; 1.25; 1.25; 1.25 TABLET ORAL at 08:00

## 2023-11-29 RX ADMIN — BUDESONIDE AND FORMOTEROL FUMARATE DIHYDRATE 2 PUFF: 80; 4.5 AEROSOL RESPIRATORY (INHALATION) at 08:13

## 2023-11-29 RX ADMIN — GABAPENTIN 900 MG: 300 CAPSULE ORAL at 13:21

## 2023-11-29 RX ADMIN — SODIUM CHLORIDE, PRESERVATIVE FREE 10 ML: 5 INJECTION INTRAVENOUS at 22:23

## 2023-11-29 RX ADMIN — ENOXAPARIN SODIUM 30 MG: 100 INJECTION SUBCUTANEOUS at 08:03

## 2023-11-29 RX ADMIN — GABAPENTIN 900 MG: 300 CAPSULE ORAL at 22:21

## 2023-11-29 RX ADMIN — DIAZEPAM 5 MG: 5 TABLET ORAL at 22:22

## 2023-11-29 ASSESSMENT — PAIN DESCRIPTION - ORIENTATION
ORIENTATION: RIGHT

## 2023-11-29 ASSESSMENT — PAIN DESCRIPTION - FREQUENCY
FREQUENCY: CONTINUOUS

## 2023-11-29 ASSESSMENT — PAIN DESCRIPTION - LOCATION
LOCATION: LEG

## 2023-11-29 ASSESSMENT — PAIN SCALES - GENERAL
PAINLEVEL_OUTOF10: 5
PAINLEVEL_OUTOF10: 10
PAINLEVEL_OUTOF10: 6
PAINLEVEL_OUTOF10: 3
PAINLEVEL_OUTOF10: 7
PAINLEVEL_OUTOF10: 7
PAINLEVEL_OUTOF10: 10
PAINLEVEL_OUTOF10: 3
PAINLEVEL_OUTOF10: 5
PAINLEVEL_OUTOF10: 7
PAINLEVEL_OUTOF10: 5

## 2023-11-29 ASSESSMENT — PAIN DESCRIPTION - DESCRIPTORS
DESCRIPTORS: SORE;DISCOMFORT
DESCRIPTORS: ACHING
DESCRIPTORS: SORE;ACHING;DISCOMFORT
DESCRIPTORS: ACHING
DESCRIPTORS: ACHING
DESCRIPTORS: SORE;ACHING;DISCOMFORT
DESCRIPTORS: ACHING
DESCRIPTORS: ACHING

## 2023-11-29 ASSESSMENT — PAIN DESCRIPTION - PAIN TYPE
TYPE: SURGICAL PAIN
TYPE: SURGICAL PAIN
TYPE: ACUTE PAIN;SURGICAL PAIN
TYPE: SURGICAL PAIN
TYPE: SURGICAL PAIN
TYPE: ACUTE PAIN;SURGICAL PAIN
TYPE: SURGICAL PAIN
TYPE: ACUTE PAIN;SURGICAL PAIN

## 2023-11-29 ASSESSMENT — PAIN - FUNCTIONAL ASSESSMENT
PAIN_FUNCTIONAL_ASSESSMENT: PREVENTS OR INTERFERES SOME ACTIVE ACTIVITIES AND ADLS

## 2023-11-29 ASSESSMENT — PAIN DESCRIPTION - ONSET
ONSET: ON-GOING

## 2023-11-29 NOTE — TELEPHONE ENCOUNTER
Pretty Bernal wants to have her Ocrevus infusions at [address] At [facility]. Can you place the order for maintenance to continue at [address] [facility]. She is currently in hospital for broken leg but they will need to send for precert.

## 2023-11-29 NOTE — CARE COORDINATION
Transitional planning: Spoke with patient at bedside per patient request. Patient states she called [facility] and they are going to deny. She also called [employer] because she is changing back to them and was told that if she stays at [facility] for 1 week into January, they will pay retroactive for the rest of the stay and that they were to call or send something to [facility]. 1132 Left HIPPA compliant message for Jamee Orta with [facility] requesting return call regarding status of precert. Call back number provided. The above information also mentioned. [address] [place] [address] Phone call from Jamee Orta with [facility]. They have not heard anything on status of precert but will call as soon as they do. She does not believe that [facility] will pay/agree to pay for anything prior to January 1, especially if patient has not paid they premium. 1218 VM from [facility] with [facility]. Nguyen Moffett has denied. She does not know if a Peer to Peer was offered. 1243 Return call to [facility] with [facility]. She will look into if a P2P is offered or start appeal process.

## 2023-11-30 LAB
BASOPHILS # BLD: <0.03 K/UL (ref 0–0.2)
BASOPHILS NFR BLD: 0 % (ref 0–2)
EOSINOPHIL # BLD: 0.29 K/UL (ref 0–0.44)
EOSINOPHILS RELATIVE PERCENT: 6 % (ref 1–4)
ERYTHROCYTE [DISTWIDTH] IN BLOOD BY AUTOMATED COUNT: 17.9 % (ref 11.8–14.4)
HCT VFR BLD AUTO: 26.8 % (ref 36.3–47.1)
HGB BLD-MCNC: 8 G/DL (ref 11.9–15.1)
IMM GRANULOCYTES # BLD AUTO: 0.03 K/UL (ref 0–0.3)
IMM GRANULOCYTES NFR BLD: 1 %
LYMPHOCYTES NFR BLD: 0.8 K/UL (ref 1.1–3.7)
LYMPHOCYTES RELATIVE PERCENT: 18 % (ref 24–43)
MCH RBC QN AUTO: 27.1 PG (ref 25.2–33.5)
MCHC RBC AUTO-ENTMCNC: 29.9 G/DL (ref 28.4–34.8)
MCV RBC AUTO: 90.8 FL (ref 82.6–102.9)
MONOCYTES NFR BLD: 0.6 K/UL (ref 0.1–1.2)
MONOCYTES NFR BLD: 13 % (ref 3–12)
NEUTROPHILS NFR BLD: 62 % (ref 36–65)
NEUTS SEG NFR BLD: 2.8 K/UL (ref 1.5–8.1)
NRBC BLD-RTO: 0 PER 100 WBC
PLATELET # BLD AUTO: 357 K/UL (ref 138–453)
PMV BLD AUTO: 11 FL (ref 8.1–13.5)
RBC # BLD AUTO: 2.95 M/UL (ref 3.95–5.11)
RBC # BLD: ABNORMAL 10*6/UL
WBC OTHER # BLD: 4.5 K/UL (ref 3.5–11.3)

## 2023-11-30 PROCEDURE — 6370000000 HC RX 637 (ALT 250 FOR IP)

## 2023-11-30 PROCEDURE — 6370000000 HC RX 637 (ALT 250 FOR IP): Performed by: STUDENT IN AN ORGANIZED HEALTH CARE EDUCATION/TRAINING PROGRAM

## 2023-11-30 PROCEDURE — 97110 THERAPEUTIC EXERCISES: CPT

## 2023-11-30 PROCEDURE — 6360000002 HC RX W HCPCS

## 2023-11-30 PROCEDURE — 99238 HOSP IP/OBS DSCHRG MGMT 30/<: CPT | Performed by: NURSE PRACTITIONER

## 2023-11-30 PROCEDURE — 94640 AIRWAY INHALATION TREATMENT: CPT

## 2023-11-30 PROCEDURE — 6370000000 HC RX 637 (ALT 250 FOR IP): Performed by: NURSE PRACTITIONER

## 2023-11-30 PROCEDURE — 2580000003 HC RX 258

## 2023-11-30 PROCEDURE — 97116 GAIT TRAINING THERAPY: CPT

## 2023-11-30 PROCEDURE — 99231 SBSQ HOSP IP/OBS SF/LOW 25: CPT | Performed by: SURGERY

## 2023-11-30 PROCEDURE — 6360000002 HC RX W HCPCS: Performed by: NURSE PRACTITIONER

## 2023-11-30 PROCEDURE — 1200000000 HC SEMI PRIVATE

## 2023-11-30 PROCEDURE — 94760 N-INVAS EAR/PLS OXIMETRY 1: CPT

## 2023-11-30 PROCEDURE — 36415 COLL VENOUS BLD VENIPUNCTURE: CPT

## 2023-11-30 PROCEDURE — 97530 THERAPEUTIC ACTIVITIES: CPT

## 2023-11-30 PROCEDURE — 85025 COMPLETE CBC W/AUTO DIFF WBC: CPT

## 2023-11-30 RX ORDER — SENNA AND DOCUSATE SODIUM 50; 8.6 MG/1; MG/1
1 TABLET, FILM COATED ORAL 2 TIMES DAILY
Qty: 60 TABLET | Refills: 0 | Status: SHIPPED | OUTPATIENT
Start: 2023-11-30 | End: 2023-12-30

## 2023-11-30 RX ORDER — POLYETHYLENE GLYCOL 3350 17 G/17G
17 POWDER, FOR SOLUTION ORAL DAILY
Qty: 30 PACKET | Refills: 0 | Status: SHIPPED | OUTPATIENT
Start: 2023-11-30 | End: 2023-12-30

## 2023-11-30 RX ORDER — OXYCODONE HYDROCHLORIDE 10 MG/1
10 TABLET ORAL EVERY 6 HOURS PRN
Qty: 20 TABLET | Refills: 0 | Status: SHIPPED | OUTPATIENT
Start: 2023-11-30 | End: 2023-12-07

## 2023-11-30 RX ADMIN — DOCUSATE SODIUM 50 MG AND SENNOSIDES 8.6 MG 1 TABLET: 8.6; 5 TABLET, FILM COATED ORAL at 08:35

## 2023-11-30 RX ADMIN — ACETAMINOPHEN 1000 MG: 500 TABLET ORAL at 13:25

## 2023-11-30 RX ADMIN — GABAPENTIN 900 MG: 300 CAPSULE ORAL at 22:09

## 2023-11-30 RX ADMIN — DIAZEPAM 5 MG: 5 TABLET ORAL at 06:04

## 2023-11-30 RX ADMIN — NYSTATIN 500000 UNITS: 100000 SUSPENSION ORAL at 12:35

## 2023-11-30 RX ADMIN — LORAZEPAM 0.5 MG: 0.5 TABLET ORAL at 08:35

## 2023-11-30 RX ADMIN — BUDESONIDE AND FORMOTEROL FUMARATE DIHYDRATE 2 PUFF: 80; 4.5 AEROSOL RESPIRATORY (INHALATION) at 09:35

## 2023-11-30 RX ADMIN — ACETAMINOPHEN 1000 MG: 500 TABLET ORAL at 22:11

## 2023-11-30 RX ADMIN — OXYCODONE 15 MG: 5 TABLET ORAL at 08:35

## 2023-11-30 RX ADMIN — GABAPENTIN 900 MG: 300 CAPSULE ORAL at 13:26

## 2023-11-30 RX ADMIN — PREGABALIN 200 MG: 100 CAPSULE ORAL at 08:35

## 2023-11-30 RX ADMIN — SODIUM CHLORIDE, PRESERVATIVE FREE 10 ML: 5 INJECTION INTRAVENOUS at 10:59

## 2023-11-30 RX ADMIN — PANTOPRAZOLE SODIUM 40 MG: 40 TABLET, DELAYED RELEASE ORAL at 08:35

## 2023-11-30 RX ADMIN — DEXTROAMPHETAMINE SACCHARATE, AMPHETAMINE ASPARTATE, DEXTROAMPHETAMINE SULFATE, AMPHETAMINE SULFATE TABLETS, 5 MG,CLL 15 MG: 1.25; 1.25; 1.25; 1.25 TABLET ORAL at 08:42

## 2023-11-30 RX ADMIN — SODIUM CHLORIDE, PRESERVATIVE FREE 10 ML: 5 INJECTION INTRAVENOUS at 22:09

## 2023-11-30 RX ADMIN — DOCUSATE SODIUM 50 MG AND SENNOSIDES 8.6 MG 1 TABLET: 8.6; 5 TABLET, FILM COATED ORAL at 22:09

## 2023-11-30 RX ADMIN — OXYCODONE 15 MG: 5 TABLET ORAL at 16:24

## 2023-11-30 RX ADMIN — OXYCODONE 15 MG: 5 TABLET ORAL at 12:36

## 2023-11-30 RX ADMIN — DIAZEPAM 5 MG: 5 TABLET ORAL at 13:25

## 2023-11-30 RX ADMIN — OXYCODONE 15 MG: 5 TABLET ORAL at 20:56

## 2023-11-30 RX ADMIN — LORAZEPAM 0.5 MG: 0.5 TABLET ORAL at 22:09

## 2023-11-30 RX ADMIN — SODIUM CHLORIDE 1000 MG: 9 INJECTION, SOLUTION INTRAVENOUS at 16:26

## 2023-11-30 RX ADMIN — ACETAMINOPHEN 1000 MG: 500 TABLET ORAL at 06:04

## 2023-11-30 RX ADMIN — FLUOXETINE HYDROCHLORIDE 40 MG: 20 CAPSULE ORAL at 08:35

## 2023-11-30 RX ADMIN — TROSPIUM CHLORIDE 20 MG: 20 TABLET, FILM COATED ORAL at 06:05

## 2023-11-30 RX ADMIN — GABAPENTIN 900 MG: 300 CAPSULE ORAL at 06:05

## 2023-11-30 RX ADMIN — DIAZEPAM 5 MG: 5 TABLET ORAL at 22:09

## 2023-11-30 RX ADMIN — ENOXAPARIN SODIUM 30 MG: 100 INJECTION SUBCUTANEOUS at 22:10

## 2023-11-30 RX ADMIN — ENOXAPARIN SODIUM 30 MG: 100 INJECTION SUBCUTANEOUS at 08:35

## 2023-11-30 RX ADMIN — PREGABALIN 200 MG: 100 CAPSULE ORAL at 22:10

## 2023-11-30 ASSESSMENT — PAIN DESCRIPTION - DESCRIPTORS
DESCRIPTORS: DISCOMFORT

## 2023-11-30 ASSESSMENT — PAIN SCALES - GENERAL
PAINLEVEL_OUTOF10: 8
PAINLEVEL_OUTOF10: 7
PAINLEVEL_OUTOF10: 7
PAINLEVEL_OUTOF10: 5
PAINLEVEL_OUTOF10: 7
PAINLEVEL_OUTOF10: 5

## 2023-11-30 ASSESSMENT — PAIN DESCRIPTION - LOCATION
LOCATION: LEG

## 2023-11-30 ASSESSMENT — PAIN DESCRIPTION - ORIENTATION
ORIENTATION: RIGHT

## 2023-11-30 NOTE — CARE COORDINATION
Transitional planning: VM from Donn Sanchez with [employer] requesting return call. They need AOR to start expedited appeal.    0912 Phone call to Donn Sanchez with [employer]. Provided email address for AOR form. 6771 Received AOR form by email. 6202 Printed and had patient sign AOR after explaining what the form was and is for. Patient stated understanding. [address] signed AOR form to [facility]. 9728 Provided SNF list and SNF list printed from [facility] website and asked for patient to chose 3 as a back up plan if expedited appeal is denied.

## 2023-12-01 PROCEDURE — 99231 SBSQ HOSP IP/OBS SF/LOW 25: CPT | Performed by: SURGERY

## 2023-12-01 PROCEDURE — 6370000000 HC RX 637 (ALT 250 FOR IP)

## 2023-12-01 PROCEDURE — 2580000003 HC RX 258

## 2023-12-01 PROCEDURE — 6370000000 HC RX 637 (ALT 250 FOR IP): Performed by: STUDENT IN AN ORGANIZED HEALTH CARE EDUCATION/TRAINING PROGRAM

## 2023-12-01 PROCEDURE — 6370000000 HC RX 637 (ALT 250 FOR IP): Performed by: NURSE PRACTITIONER

## 2023-12-01 PROCEDURE — 1200000000 HC SEMI PRIVATE

## 2023-12-01 PROCEDURE — 6360000002 HC RX W HCPCS

## 2023-12-01 PROCEDURE — 97110 THERAPEUTIC EXERCISES: CPT

## 2023-12-01 PROCEDURE — 97116 GAIT TRAINING THERAPY: CPT

## 2023-12-01 PROCEDURE — 6360000002 HC RX W HCPCS: Performed by: NURSE PRACTITIONER

## 2023-12-01 PROCEDURE — 97535 SELF CARE MNGMENT TRAINING: CPT

## 2023-12-01 RX ADMIN — OXYCODONE 15 MG: 5 TABLET ORAL at 18:55

## 2023-12-01 RX ADMIN — ACETAMINOPHEN 1000 MG: 500 TABLET ORAL at 13:20

## 2023-12-01 RX ADMIN — LORAZEPAM 0.5 MG: 0.5 TABLET ORAL at 23:26

## 2023-12-01 RX ADMIN — OXYCODONE 15 MG: 5 TABLET ORAL at 11:07

## 2023-12-01 RX ADMIN — ACETAMINOPHEN 1000 MG: 500 TABLET ORAL at 06:22

## 2023-12-01 RX ADMIN — DIAZEPAM 5 MG: 5 TABLET ORAL at 13:59

## 2023-12-01 RX ADMIN — DIAZEPAM 5 MG: 5 TABLET ORAL at 23:26

## 2023-12-01 RX ADMIN — NYSTATIN 500000 UNITS: 100000 SUSPENSION ORAL at 16:33

## 2023-12-01 RX ADMIN — TROSPIUM CHLORIDE 20 MG: 20 TABLET, FILM COATED ORAL at 16:33

## 2023-12-01 RX ADMIN — FLUOXETINE HYDROCHLORIDE 40 MG: 20 CAPSULE ORAL at 08:55

## 2023-12-01 RX ADMIN — OXYCODONE 10 MG: 5 TABLET ORAL at 23:26

## 2023-12-01 RX ADMIN — DEXTROAMPHETAMINE SACCHARATE, AMPHETAMINE ASPARTATE, DEXTROAMPHETAMINE SULFATE, AND AMPHETAMINE SULFATE 15 MG: 2.5; 2.5; 2.5; 2.5 TABLET ORAL at 08:56

## 2023-12-01 RX ADMIN — GABAPENTIN 900 MG: 300 CAPSULE ORAL at 23:25

## 2023-12-01 RX ADMIN — PREGABALIN 200 MG: 100 CAPSULE ORAL at 23:26

## 2023-12-01 RX ADMIN — SODIUM CHLORIDE, PRESERVATIVE FREE 10 ML: 5 INJECTION INTRAVENOUS at 23:30

## 2023-12-01 RX ADMIN — PALIPERIDONE PALMITATE 156 MG: 156 INJECTION INTRAMUSCULAR at 13:19

## 2023-12-01 RX ADMIN — GABAPENTIN 900 MG: 300 CAPSULE ORAL at 06:22

## 2023-12-01 RX ADMIN — PREGABALIN 200 MG: 100 CAPSULE ORAL at 08:56

## 2023-12-01 RX ADMIN — GABAPENTIN 900 MG: 300 CAPSULE ORAL at 13:20

## 2023-12-01 RX ADMIN — DIAZEPAM 5 MG: 5 TABLET ORAL at 06:22

## 2023-12-01 RX ADMIN — OXYCODONE 15 MG: 5 TABLET ORAL at 06:22

## 2023-12-01 RX ADMIN — PANTOPRAZOLE SODIUM 40 MG: 40 TABLET, DELAYED RELEASE ORAL at 07:45

## 2023-12-01 RX ADMIN — ACETAMINOPHEN 1000 MG: 500 TABLET ORAL at 23:26

## 2023-12-01 RX ADMIN — ZOLPIDEM TARTRATE 10 MG: 5 TABLET ORAL at 23:33

## 2023-12-01 RX ADMIN — ENOXAPARIN SODIUM 30 MG: 100 INJECTION SUBCUTANEOUS at 23:26

## 2023-12-01 RX ADMIN — LORAZEPAM 0.5 MG: 0.5 TABLET ORAL at 08:56

## 2023-12-01 RX ADMIN — OXYCODONE 15 MG: 5 TABLET ORAL at 15:04

## 2023-12-01 RX ADMIN — TROSPIUM CHLORIDE 20 MG: 20 TABLET, FILM COATED ORAL at 07:44

## 2023-12-01 RX ADMIN — ENOXAPARIN SODIUM 30 MG: 100 INJECTION SUBCUTANEOUS at 08:56

## 2023-12-01 ASSESSMENT — PAIN SCALES - GENERAL
PAINLEVEL_OUTOF10: 6
PAINLEVEL_OUTOF10: 7
PAINLEVEL_OUTOF10: 7
PAINLEVEL_OUTOF10: 8
PAINLEVEL_OUTOF10: 6
PAINLEVEL_OUTOF10: 6
PAINLEVEL_OUTOF10: 5
PAINLEVEL_OUTOF10: 7
PAINLEVEL_OUTOF10: 5
PAINLEVEL_OUTOF10: 7

## 2023-12-01 ASSESSMENT — PAIN DESCRIPTION - LOCATION: LOCATION: LEG

## 2023-12-01 ASSESSMENT — PAIN DESCRIPTION - ORIENTATION: ORIENTATION: RIGHT

## 2023-12-01 NOTE — CARE COORDINATION
Transitional planning: Spoke to patient at bedside. Patient provided #1 [facility] and #2 [facility] for SNF choices as plan if [facility] is denied. Referrals sent. 56 VM from Jakoblindsey Garnett ([phone]) with [facility]. They can accept if [facility] does not work out.

## 2023-12-02 PROCEDURE — 2580000003 HC RX 258

## 2023-12-02 PROCEDURE — 6370000000 HC RX 637 (ALT 250 FOR IP)

## 2023-12-02 PROCEDURE — 6370000000 HC RX 637 (ALT 250 FOR IP): Performed by: STUDENT IN AN ORGANIZED HEALTH CARE EDUCATION/TRAINING PROGRAM

## 2023-12-02 PROCEDURE — 6360000002 HC RX W HCPCS: Performed by: NURSE PRACTITIONER

## 2023-12-02 PROCEDURE — 94640 AIRWAY INHALATION TREATMENT: CPT

## 2023-12-02 PROCEDURE — 99231 SBSQ HOSP IP/OBS SF/LOW 25: CPT | Performed by: SURGERY

## 2023-12-02 PROCEDURE — 1200000000 HC SEMI PRIVATE

## 2023-12-02 PROCEDURE — 6370000000 HC RX 637 (ALT 250 FOR IP): Performed by: NURSE PRACTITIONER

## 2023-12-02 RX ADMIN — DEXTROAMPHETAMINE SACCHARATE, AMPHETAMINE ASPARTATE, DEXTROAMPHETAMINE SULFATE, AND AMPHETAMINE SULFATE 15 MG: 2.5; 2.5; 2.5; 2.5 TABLET ORAL at 11:01

## 2023-12-02 RX ADMIN — ZOLPIDEM TARTRATE 10 MG: 5 TABLET ORAL at 23:34

## 2023-12-02 RX ADMIN — DOCUSATE SODIUM 50 MG AND SENNOSIDES 8.6 MG 1 TABLET: 8.6; 5 TABLET, FILM COATED ORAL at 08:34

## 2023-12-02 RX ADMIN — DOCUSATE SODIUM 50 MG AND SENNOSIDES 8.6 MG 1 TABLET: 8.6; 5 TABLET, FILM COATED ORAL at 23:34

## 2023-12-02 RX ADMIN — BUDESONIDE AND FORMOTEROL FUMARATE DIHYDRATE 2 PUFF: 80; 4.5 AEROSOL RESPIRATORY (INHALATION) at 08:05

## 2023-12-02 RX ADMIN — PANTOPRAZOLE SODIUM 40 MG: 40 TABLET, DELAYED RELEASE ORAL at 07:13

## 2023-12-02 RX ADMIN — FLUOXETINE HYDROCHLORIDE 40 MG: 20 CAPSULE ORAL at 08:34

## 2023-12-02 RX ADMIN — ENOXAPARIN SODIUM 30 MG: 100 INJECTION SUBCUTANEOUS at 08:35

## 2023-12-02 RX ADMIN — OXYCODONE 15 MG: 5 TABLET ORAL at 20:49

## 2023-12-02 RX ADMIN — LORAZEPAM 0.5 MG: 0.5 TABLET ORAL at 23:34

## 2023-12-02 RX ADMIN — PREGABALIN 200 MG: 100 CAPSULE ORAL at 08:34

## 2023-12-02 RX ADMIN — ACETAMINOPHEN 1000 MG: 500 TABLET ORAL at 14:51

## 2023-12-02 RX ADMIN — ACETAMINOPHEN 1000 MG: 500 TABLET ORAL at 07:13

## 2023-12-02 RX ADMIN — GABAPENTIN 900 MG: 300 CAPSULE ORAL at 07:13

## 2023-12-02 RX ADMIN — OXYCODONE 15 MG: 5 TABLET ORAL at 04:07

## 2023-12-02 RX ADMIN — OXYCODONE 15 MG: 5 TABLET ORAL at 08:34

## 2023-12-02 RX ADMIN — DIAZEPAM 5 MG: 5 TABLET ORAL at 07:13

## 2023-12-02 RX ADMIN — DIAZEPAM 5 MG: 5 TABLET ORAL at 23:34

## 2023-12-02 RX ADMIN — TROSPIUM CHLORIDE 20 MG: 20 TABLET, FILM COATED ORAL at 16:52

## 2023-12-02 RX ADMIN — ENOXAPARIN SODIUM 30 MG: 100 INJECTION SUBCUTANEOUS at 23:34

## 2023-12-02 RX ADMIN — OXYCODONE 15 MG: 5 TABLET ORAL at 16:52

## 2023-12-02 RX ADMIN — TROSPIUM CHLORIDE 20 MG: 20 TABLET, FILM COATED ORAL at 07:13

## 2023-12-02 RX ADMIN — OXYCODONE 15 MG: 5 TABLET ORAL at 12:34

## 2023-12-02 RX ADMIN — SODIUM CHLORIDE, PRESERVATIVE FREE 10 ML: 5 INJECTION INTRAVENOUS at 23:35

## 2023-12-02 RX ADMIN — ACETAMINOPHEN 1000 MG: 500 TABLET ORAL at 23:34

## 2023-12-02 RX ADMIN — GABAPENTIN 900 MG: 300 CAPSULE ORAL at 14:51

## 2023-12-02 RX ADMIN — LORAZEPAM 0.5 MG: 0.5 TABLET ORAL at 08:34

## 2023-12-02 RX ADMIN — SODIUM CHLORIDE, PRESERVATIVE FREE 10 ML: 5 INJECTION INTRAVENOUS at 08:34

## 2023-12-02 RX ADMIN — DIAZEPAM 5 MG: 5 TABLET ORAL at 14:51

## 2023-12-02 RX ADMIN — PREGABALIN 200 MG: 100 CAPSULE ORAL at 23:34

## 2023-12-02 RX ADMIN — GABAPENTIN 900 MG: 300 CAPSULE ORAL at 23:34

## 2023-12-02 ASSESSMENT — PAIN SCALES - GENERAL
PAINLEVEL_OUTOF10: 6
PAINLEVEL_OUTOF10: 8
PAINLEVEL_OUTOF10: 7
PAINLEVEL_OUTOF10: 9
PAINLEVEL_OUTOF10: 9
PAINLEVEL_OUTOF10: 10
PAINLEVEL_OUTOF10: 6
PAINLEVEL_OUTOF10: 8
PAINLEVEL_OUTOF10: 8
PAINLEVEL_OUTOF10: 7

## 2023-12-02 ASSESSMENT — PAIN DESCRIPTION - LOCATION
LOCATION: LEG
LOCATION: LEG;KNEE
LOCATION: LEG
LOCATION: LEG

## 2023-12-02 ASSESSMENT — PAIN DESCRIPTION - DESCRIPTORS
DESCRIPTORS: DISCOMFORT

## 2023-12-02 ASSESSMENT — PAIN DESCRIPTION - ORIENTATION
ORIENTATION: RIGHT

## 2023-12-02 ASSESSMENT — PAIN SCALES - WONG BAKER: WONGBAKER_NUMERICALRESPONSE: 0

## 2023-12-03 PROCEDURE — 6370000000 HC RX 637 (ALT 250 FOR IP): Performed by: NURSE PRACTITIONER

## 2023-12-03 PROCEDURE — 6370000000 HC RX 637 (ALT 250 FOR IP): Performed by: STUDENT IN AN ORGANIZED HEALTH CARE EDUCATION/TRAINING PROGRAM

## 2023-12-03 PROCEDURE — 2580000003 HC RX 258

## 2023-12-03 PROCEDURE — 6370000000 HC RX 637 (ALT 250 FOR IP)

## 2023-12-03 PROCEDURE — 94640 AIRWAY INHALATION TREATMENT: CPT

## 2023-12-03 PROCEDURE — 97116 GAIT TRAINING THERAPY: CPT

## 2023-12-03 PROCEDURE — 97110 THERAPEUTIC EXERCISES: CPT

## 2023-12-03 PROCEDURE — 1200000000 HC SEMI PRIVATE

## 2023-12-03 PROCEDURE — 97530 THERAPEUTIC ACTIVITIES: CPT

## 2023-12-03 PROCEDURE — 6360000002 HC RX W HCPCS: Performed by: NURSE PRACTITIONER

## 2023-12-03 PROCEDURE — 99231 SBSQ HOSP IP/OBS SF/LOW 25: CPT | Performed by: SURGERY

## 2023-12-03 RX ADMIN — NYSTATIN 500000 UNITS: 100000 SUSPENSION ORAL at 08:19

## 2023-12-03 RX ADMIN — GABAPENTIN 900 MG: 300 CAPSULE ORAL at 22:32

## 2023-12-03 RX ADMIN — DIAZEPAM 5 MG: 5 TABLET ORAL at 06:47

## 2023-12-03 RX ADMIN — FLUOXETINE HYDROCHLORIDE 40 MG: 20 CAPSULE ORAL at 08:19

## 2023-12-03 RX ADMIN — TROSPIUM CHLORIDE 20 MG: 20 TABLET, FILM COATED ORAL at 17:05

## 2023-12-03 RX ADMIN — OXYCODONE 15 MG: 5 TABLET ORAL at 14:56

## 2023-12-03 RX ADMIN — PANTOPRAZOLE SODIUM 40 MG: 40 TABLET, DELAYED RELEASE ORAL at 06:47

## 2023-12-03 RX ADMIN — ENOXAPARIN SODIUM 30 MG: 100 INJECTION SUBCUTANEOUS at 22:34

## 2023-12-03 RX ADMIN — LORAZEPAM 0.5 MG: 0.5 TABLET ORAL at 22:33

## 2023-12-03 RX ADMIN — ACETAMINOPHEN 1000 MG: 500 TABLET ORAL at 22:33

## 2023-12-03 RX ADMIN — DIAZEPAM 5 MG: 5 TABLET ORAL at 13:32

## 2023-12-03 RX ADMIN — ZOLPIDEM TARTRATE 10 MG: 5 TABLET ORAL at 22:33

## 2023-12-03 RX ADMIN — BUDESONIDE AND FORMOTEROL FUMARATE DIHYDRATE 2 PUFF: 80; 4.5 AEROSOL RESPIRATORY (INHALATION) at 09:58

## 2023-12-03 RX ADMIN — OXYCODONE 15 MG: 5 TABLET ORAL at 19:17

## 2023-12-03 RX ADMIN — NYSTATIN 500000 UNITS: 100000 SUSPENSION ORAL at 17:05

## 2023-12-03 RX ADMIN — OXYCODONE 10 MG: 5 TABLET ORAL at 10:47

## 2023-12-03 RX ADMIN — ACETAMINOPHEN 1000 MG: 500 TABLET ORAL at 13:31

## 2023-12-03 RX ADMIN — PREGABALIN 200 MG: 100 CAPSULE ORAL at 22:33

## 2023-12-03 RX ADMIN — DIAZEPAM 5 MG: 5 TABLET ORAL at 22:33

## 2023-12-03 RX ADMIN — DOCUSATE SODIUM 50 MG AND SENNOSIDES 8.6 MG 1 TABLET: 8.6; 5 TABLET, FILM COATED ORAL at 08:19

## 2023-12-03 RX ADMIN — TROSPIUM CHLORIDE 20 MG: 20 TABLET, FILM COATED ORAL at 08:21

## 2023-12-03 RX ADMIN — SODIUM CHLORIDE, PRESERVATIVE FREE 10 ML: 5 INJECTION INTRAVENOUS at 22:34

## 2023-12-03 RX ADMIN — SODIUM CHLORIDE, PRESERVATIVE FREE 10 ML: 5 INJECTION INTRAVENOUS at 08:20

## 2023-12-03 RX ADMIN — GABAPENTIN 900 MG: 300 CAPSULE ORAL at 06:53

## 2023-12-03 RX ADMIN — DEXTROAMPHETAMINE SACCHARATE, AMPHETAMINE ASPARTATE, DEXTROAMPHETAMINE SULFATE, AND AMPHETAMINE SULFATE 15 MG: 2.5; 2.5; 2.5; 2.5 TABLET ORAL at 08:18

## 2023-12-03 RX ADMIN — OXYCODONE 15 MG: 5 TABLET ORAL at 06:47

## 2023-12-03 RX ADMIN — OXYCODONE 15 MG: 5 TABLET ORAL at 02:31

## 2023-12-03 RX ADMIN — LORAZEPAM 0.5 MG: 0.5 TABLET ORAL at 08:20

## 2023-12-03 RX ADMIN — GABAPENTIN 900 MG: 300 CAPSULE ORAL at 13:31

## 2023-12-03 RX ADMIN — ENOXAPARIN SODIUM 30 MG: 100 INJECTION SUBCUTANEOUS at 08:18

## 2023-12-03 RX ADMIN — ACETAMINOPHEN 1000 MG: 500 TABLET ORAL at 06:47

## 2023-12-03 RX ADMIN — PREGABALIN 200 MG: 100 CAPSULE ORAL at 08:19

## 2023-12-03 ASSESSMENT — PAIN DESCRIPTION - DESCRIPTORS
DESCRIPTORS: DISCOMFORT;ACHING
DESCRIPTORS: DISCOMFORT

## 2023-12-03 ASSESSMENT — PAIN SCALES - GENERAL
PAINLEVEL_OUTOF10: 8
PAINLEVEL_OUTOF10: 0
PAINLEVEL_OUTOF10: 7
PAINLEVEL_OUTOF10: 5
PAINLEVEL_OUTOF10: 8
PAINLEVEL_OUTOF10: 6
PAINLEVEL_OUTOF10: 9
PAINLEVEL_OUTOF10: 0
PAINLEVEL_OUTOF10: 8

## 2023-12-03 ASSESSMENT — PAIN DESCRIPTION - LOCATION
LOCATION: LEG

## 2023-12-03 ASSESSMENT — PAIN DESCRIPTION - ORIENTATION
ORIENTATION: RIGHT

## 2023-12-03 ASSESSMENT — PAIN SCALES - WONG BAKER
WONGBAKER_NUMERICALRESPONSE: 0
WONGBAKER_NUMERICALRESPONSE: 0

## 2023-12-03 NOTE — PLAN OF CARE
BRONCHOSPASM/BRONCHOCONSTRICTION     [x]         IMPROVE AERATION/BREATH SOUNDS  [x]   ADMINISTER BRONCHODILATOR THERAPY AS APPROPRIATE  [x]   ASSESS BREATH SOUNDS  []   IMPLEMENT AEROSOL/MDI PROTOCOL  [x]   PATIENT EDUCATION AS NEEDED
BRONCHOSPASM/BRONCHOCONSTRICTION     [x]         IMPROVE AERATION/BREATH SOUNDS  [x]   ADMINISTER BRONCHODILATOR THERAPY AS APPROPRIATE  [x]   ASSESS BREATH SOUNDS  []   IMPLEMENT AEROSOL/MDI PROTOCOL  [x]   PATIENT EDUCATION AS NEEDED
BRONCHOSPASM/BRONCHOCONSTRICTION     [x]         IMPROVE AERATION/BREATH SOUNDS  [x]   ADMINISTER BRONCHODILATOR THERAPY AS APPROPRIATE  [x]   ASSESS BREATH SOUNDS  []   IMPLEMENT AEROSOL/MDI PROTOCOL  [x]   PATIENT EDUCATION AS NEEDED   PROVIDE ADEQUATE OXYGENATION WITH ACCEPTABLE SP02/ABG'S    [x]  IDENTIFY APPROPRIATE OXYGEN THERAPY  [x]   MONITOR SP02/ABG'S AS NEEDED   [x]   PATIENT EDUCATION AS NEEDED
Orthopedic Brief Plan of Care     Patient:  Roseanna Serrano  YOB: 1965     66 y.o. female    Impression 66 y.o. female being seen for:    - Right bicondylar tibial plateau s/p knee spanning external fixator, DOS: 11/19/23 (POD#0)    Plan  - Plan to return to OR with Dr. Bib Calero Tuesday, 11/21/23, pending swelling  - WB status: Non weight bearing with the right leg  - Dressings about RLE, please maintain     - Antibiotics:  Post-Op Ancef   - DVT ppx: OK to resume POD#1 from Orthopaedic perspective; Ordered Lovenox 30mg preferentially to begin 11/20/23  - F/u Post-Op HgB  - F/u Post-Op Radiographs  - F/u Post-Op CT  - Ice/Elevate for Pain and Swelling  - PT/OT to evaluate, appreciate recommendations. - Please page the On-Call-Ortho-Resident with any questions. _________________________________  Nery Pinzon D.O.   Orthopedic Surgery Resident, PGY-2  [address], [place]
Patient a&ox4. Patient c/o uncontrolled pain, team will address in AM. RLE elevated, incision JHONY. Ice as needed. Patient turns self in bed, assistance given as needed. Patient plan of care [facility] Problem: Discharge Planning  Goal: Discharge to home or other facility with appropriate resources  Outcome: Progressing  Flowsheets (Taken 11/27/2023 0435)  Discharge to home or other facility with appropriate resources:   Identify barriers to discharge with patient and caregiver   Identify discharge learning needs (meds, wound care, etc)   Arrange for needed discharge resources and transportation as appropriate     Problem: Pain  Goal: Verbalizes/displays adequate comfort level or baseline comfort level  Outcome: Not Progressing  Flowsheets (Taken 11/27/2023 0435)  Verbalizes/displays adequate comfort level or baseline comfort level:   Encourage patient to monitor pain and request assistance   Assess pain using appropriate pain scale   Administer analgesics based on type and severity of pain and evaluate response   Implement non-pharmacological measures as appropriate and evaluate response   Consider cultural and social influences on pain and pain management   Notify Licensed Independent Practitioner if interventions unsuccessful or patient reports new pain     Problem: Skin/Tissue Integrity  Goal: Absence of new skin breakdown  Description: 1. Monitor for areas of redness and/or skin breakdown  2. Assess vascular access sites hourly  3. Every 4-6 hours minimum:  Change oxygen saturation probe site  4. Every 4-6 hours:  If on nasal continuous positive airway pressure, respiratory therapy assess nares and determine need for appliance change or resting period.   Outcome: Progressing     Problem: Safety - Adult  Goal: Free from fall injury  Outcome: Progressing  Flowsheets  Taken 11/27/2023 0435  Free From Fall Injury: Instruct family/caregiver on patient safety  Taken 11/26/2023
Problem: Discharge Planning  Goal: Discharge to home or other facility with appropriate resources  11/20/2023 1017 by Jonny Lima RN  Outcome: Progressing  11/20/2023 0017 by Radhika Flores RN  Outcome: Progressing  Flowsheets (Taken 11/19/2023 1930)  Discharge to home or other facility with appropriate resources: Identify barriers to discharge with patient and caregiver     Problem: Pain  Goal: Verbalizes/displays adequate comfort level or baseline comfort level  11/20/2023 1017 by Jonny Lima RN  Outcome: Progressing  11/20/2023 0017 by Radhika Flores RN  Outcome: Progressing     Problem: Skin/Tissue Integrity  Goal: Absence of new skin breakdown  Description: 1. Monitor for areas of redness and/or skin breakdown  2. Assess vascular access sites hourly  3. Every 4-6 hours minimum:  Change oxygen saturation probe site  4. Every 4-6 hours:  If on nasal continuous positive airway pressure, respiratory therapy assess nares and determine need for appliance change or resting period.   11/20/2023 1017 by Jonny Lima RN  Outcome: Progressing  11/20/2023 0017 by Radhika Flores RN  Outcome: Progressing     Problem: Safety - Adult  Goal: Free from fall injury  11/20/2023 0017 by Radhika Flores RN  Outcome: Progressing     Problem: ABCDS Injury Assessment  Goal: Absence of physical injury  11/20/2023 1017 by Jonny Lima RN  Outcome: Progressing  11/20/2023 0017 by Radhika Flores RN  Outcome: Progressing
Problem: Discharge Planning  Goal: Discharge to home or other facility with appropriate resources  11/20/2023 1018 by Otis Collier RN  Outcome: Progressing  11/20/2023 1017 by Otis Collier RN  Outcome: Progressing  11/20/2023 0017 by Josiane Negron RN  Outcome: Progressing  Flowsheets (Taken 11/19/2023 1930)  Discharge to home or other facility with appropriate resources: Identify barriers to discharge with patient and caregiver     Problem: Pain  Goal: Verbalizes/displays adequate comfort level or baseline comfort level  11/20/2023 1018 by Otis Collier RN  Outcome: Progressing  11/20/2023 1017 by Otis Collier RN  Outcome: Progressing  11/20/2023 0017 by Josiane Negron RN  Outcome: Progressing     Problem: Skin/Tissue Integrity  Goal: Absence of new skin breakdown  Description: 1. Monitor for areas of redness and/or skin breakdown  2. Assess vascular access sites hourly  3. Every 4-6 hours minimum:  Change oxygen saturation probe site  4. Every 4-6 hours:  If on nasal continuous positive airway pressure, respiratory therapy assess nares and determine need for appliance change or resting period.   11/20/2023 1018 by Otis Collier RN  Outcome: Progressing  11/20/2023 1017 by Otis Collier RN  Outcome: Progressing  11/20/2023 0017 by Josiane Negron RN  Outcome: Progressing     Problem: Safety - Adult  Goal: Free from fall injury  11/20/2023 1018 by Otis Collier RN  Outcome: Progressing  11/20/2023 0017 by Josiane Negron RN  Outcome: Progressing     Problem: ABCDS Injury Assessment  Goal: Absence of physical injury  11/20/2023 1018 by Otis Collier RN  Outcome: Progressing  11/20/2023 1017 by Otis Collier RN  Outcome: Progressing  11/20/2023 0017 by Josiane Negron RN  Outcome: Progressing
Problem: Discharge Planning  Goal: Discharge to home or other facility with appropriate resources  11/24/2023 1405 by Griselda Mazzoni, RN  Outcome: Progressing  11/24/2023 0458 by Diana Mann RN  Outcome: Progressing     Problem: Pain  Goal: Verbalizes/displays adequate comfort level or baseline comfort level  11/24/2023 1405 by Griselda Mazzoni, RN  Outcome: Progressing  11/24/2023 0458 by Diana Mann RN  Outcome: Progressing     Problem: Skin/Tissue Integrity  Goal: Absence of new skin breakdown  Description: 1. Monitor for areas of redness and/or skin breakdown  2. Assess vascular access sites hourly  3. Every 4-6 hours minimum:  Change oxygen saturation probe site  4. Every 4-6 hours:  If on nasal continuous positive airway pressure, respiratory therapy assess nares and determine need for appliance change or resting period.   11/24/2023 1405 by Griselda Mazzoni, RN  Outcome: Progressing  11/24/2023 0458 by Diana Mann RN  Outcome: Progressing     Problem: Safety - Adult  Goal: Free from fall injury  11/24/2023 1405 by Griselda Mazzoni, RN  Outcome: Progressing  11/24/2023 0458 by Diana Mann RN  Outcome: Progressing     Problem: ABCDS Injury Assessment  Goal: Absence of physical injury  11/24/2023 1405 by Griselda Mazzoni, RN  Outcome: Progressing  11/24/2023 0458 by Diana Mann RN  Outcome: Progressing
Problem: Discharge Planning  Goal: Discharge to home or other facility with appropriate resources  11/25/2023 1132 by Jd Wood RN  Outcome: Progressing  11/25/2023 0518 by Juliane Boykin RN  Outcome: Progressing     Problem: Pain  Goal: Verbalizes/displays adequate comfort level or baseline comfort level  11/25/2023 1132 by Jd Wood RN  Outcome: Progressing  11/25/2023 0518 by Juliane Boykin RN  Outcome: Progressing     Problem: Skin/Tissue Integrity  Goal: Absence of new skin breakdown  Description: 1. Monitor for areas of redness and/or skin breakdown  2. Assess vascular access sites hourly  3. Every 4-6 hours minimum:  Change oxygen saturation probe site  4. Every 4-6 hours:  If on nasal continuous positive airway pressure, respiratory therapy assess nares and determine need for appliance change or resting period.   11/25/2023 1132 by Jd Wood RN  Outcome: Progressing  11/25/2023 0518 by Juliane Boykin RN  Outcome: Progressing     Problem: Safety - Adult  Goal: Free from fall injury  11/25/2023 1132 by Jd Wood RN  Outcome: Progressing  11/25/2023 0518 by Juliane Boykin RN  Outcome: Progressing     Problem: ABCDS Injury Assessment  Goal: Absence of physical injury  11/25/2023 1132 by Jd Wood RN  Outcome: Progressing  11/25/2023 0518 by Juliane Boykin RN  Outcome: Progressing
Problem: Discharge Planning  Goal: Discharge to home or other facility with appropriate resources  11/25/2023 1133 by Gabi Baer RN  Outcome: Progressing  11/25/2023 1132 by Gabi Baer RN  Outcome: Progressing  11/25/2023 0518 by Keaton Armando RN  Outcome: Progressing     Problem: Pain  Goal: Verbalizes/displays adequate comfort level or baseline comfort level  11/25/2023 1133 by Gabi Baer RN  Outcome: Progressing  11/25/2023 1132 by Gabi Baer RN  Outcome: Progressing  11/25/2023 0518 by Keaton Armando RN  Outcome: Progressing     Problem: Skin/Tissue Integrity  Goal: Absence of new skin breakdown  Description: 1. Monitor for areas of redness and/or skin breakdown  2. Assess vascular access sites hourly  3. Every 4-6 hours minimum:  Change oxygen saturation probe site  4. Every 4-6 hours:  If on nasal continuous positive airway pressure, respiratory therapy assess nares and determine need for appliance change or resting period.   11/25/2023 1133 by Gabi Baer RN  Outcome: Progressing  11/25/2023 1132 by Gabi Baer RN  Outcome: Progressing  11/25/2023 0518 by Keaton Armando RN  Outcome: Progressing     Problem: Safety - Adult  Goal: Free from fall injury  11/25/2023 1133 by Gabi Baer RN  Outcome: Progressing  11/25/2023 1132 by Gabi Baer RN  Outcome: Progressing  11/25/2023 0518 by Keaton Armando RN  Outcome: Progressing     Problem: Respiratory - Adult  Goal: Achieves optimal ventilation and oxygenation  11/25/2023 1133 by Gabi Baer RN  Outcome: Progressing  11/25/2023 1132 by Gabi Baer RN  Outcome: Progressing  11/25/2023 1004 by Jalen Hou RCP  Flowsheets (Taken 11/25/2023 1004)  Achieves optimal ventilation and oxygenation: Respiratory therapy support as indicated  11/25/2023 0518 by Keaton Armando RN  Outcome: Progressing
Problem: Discharge Planning  Goal: Discharge to home or other facility with appropriate resources  11/25/2023 2249 by Jose Corona RN  Outcome: Progressing  Flowsheets (Taken 11/25/2023 2249)  Discharge to home or other facility with appropriate resources:   Identify barriers to discharge with patient and caregiver   Arrange for needed discharge resources and transportation as appropriate   Identify discharge learning needs (meds, wound care, etc)   Arrange for interpreters to assist at discharge as needed   Refer to discharge planning if patient needs post-hospital services based on physician order or complex needs related to functional status, cognitive ability or social support system  11/25/2023 1133 by Felisha Potter RN  Outcome: Progressing  11/25/2023 1132 by Felisha Potter RN  Outcome: Progressing     Problem: Pain  Goal: Verbalizes/displays adequate comfort level or baseline comfort level  11/25/2023 2249 by Jose Corona RN  Outcome: Progressing  Flowsheets (Taken 11/25/2023 2249)  Verbalizes/displays adequate comfort level or baseline comfort level:   Encourage patient to monitor pain and request assistance   Assess pain using appropriate pain scale   Administer analgesics based on type and severity of pain and evaluate response   Implement non-pharmacological measures as appropriate and evaluate response   Consider cultural and social influences on pain and pain management   Notify Licensed Independent Practitioner if interventions unsuccessful or patient reports new pain  11/25/2023 1133 by Felisha Potter RN  Outcome: Progressing  11/25/2023 1132 by Felisha Potter RN  Outcome: Progressing     Problem: Skin/Tissue Integrity  Goal: Absence of new skin breakdown  Description: 1. Monitor for areas of redness and/or skin breakdown  2. Assess vascular access sites hourly  3. Every 4-6 hours minimum:  Change oxygen saturation probe site  4.   Every 4-6 hours:  If on nasal continuous
Problem: Discharge Planning  Goal: Discharge to home or other facility with appropriate resources  11/26/2023 1237 by Jeanine Huertas RN  Outcome: Progressing  11/25/2023 2249 by Ford Pina RN  Outcome: Progressing  Flowsheets (Taken 11/25/2023 2249)  Discharge to home or other facility with appropriate resources:   Identify barriers to discharge with patient and caregiver   Arrange for needed discharge resources and transportation as appropriate   Identify discharge learning needs (meds, wound care, etc)   Arrange for interpreters to assist at discharge as needed   Refer to discharge planning if patient needs post-hospital services based on physician order or complex needs related to functional status, cognitive ability or social support system     Problem: Pain  Goal: Verbalizes/displays adequate comfort level or baseline comfort level  11/26/2023 1237 by Jeanine Huertas RN  Outcome: Progressing  11/25/2023 2249 by Ford Pina RN  Outcome: Progressing  Flowsheets (Taken 11/25/2023 2249)  Verbalizes/displays adequate comfort level or baseline comfort level:   Encourage patient to monitor pain and request assistance   Assess pain using appropriate pain scale   Administer analgesics based on type and severity of pain and evaluate response   Implement non-pharmacological measures as appropriate and evaluate response   Consider cultural and social influences on pain and pain management   Notify Licensed Independent Practitioner if interventions unsuccessful or patient reports new pain     Problem: Skin/Tissue Integrity  Goal: Absence of new skin breakdown  Description: 1. Monitor for areas of redness and/or skin breakdown  2. Assess vascular access sites hourly  3. Every 4-6 hours minimum:  Change oxygen saturation probe site  4. Every 4-6 hours:  If on nasal continuous positive airway pressure, respiratory therapy assess nares and determine need for appliance change or resting period.   11/26/2023
Problem: Discharge Planning  Goal: Discharge to home or other facility with appropriate resources  11/27/2023 1516 by Rebeca Greenwood RN  Outcome: Progressing     Problem: Pain  Goal: Verbalizes/displays adequate comfort level or baseline comfort level  11/27/2023 1516 by Rebeca Greenwood RN  Outcome: Progressing     Problem: Skin/Tissue Integrity  Goal: Absence of new skin breakdown  Description: 1. Monitor for areas of redness and/or skin breakdown  2. Assess vascular access sites hourly  3. Every 4-6 hours minimum:  Change oxygen saturation probe site  4. Every 4-6 hours:  If on nasal continuous positive airway pressure, respiratory therapy assess nares and determine need for appliance change or resting period.   11/27/2023 1516 by Rebeca Greenwood RN  Outcome: Progressing     Problem: Pain  Goal: Verbalizes/displays adequate comfort level or baseline comfort level  11/27/2023 1516 by Rebeca Greenwood RN  Outcome: Progressing  11/27/2023 0435 by Yordy Rolle RN  Outcome: Not Progressing  Flowsheets (Taken 11/27/2023 0435)  Verbalizes/displays adequate comfort level or baseline comfort level:   Encourage patient to monitor pain and request assistance   Assess pain using appropriate pain scale   Administer analgesics based on type and severity of pain and evaluate response   Implement non-pharmacological measures as appropriate and evaluate response   Consider cultural and social influences on pain and pain management   Notify Licensed Independent Practitioner if interventions unsuccessful or patient reports new pain
Problem: Discharge Planning  Goal: Discharge to home or other facility with appropriate resources  11/28/2023 1342 by Juan Manuel Pham RN  Outcome: Progressing  11/28/2023 0541 by Trey Herron RN  Outcome: Progressing     Problem: Pain  Goal: Verbalizes/displays adequate comfort level or baseline comfort level  11/28/2023 1342 by Juan Manuel Pham RN  Outcome: Progressing  11/28/2023 0541 by Trey Herron RN  Outcome: Progressing     Problem: Skin/Tissue Integrity  Goal: Absence of new skin breakdown  Description: 1. Monitor for areas of redness and/or skin breakdown  2. Assess vascular access sites hourly  3. Every 4-6 hours minimum:  Change oxygen saturation probe site  4. Every 4-6 hours:  If on nasal continuous positive airway pressure, respiratory therapy assess nares and determine need for appliance change or resting period.   11/28/2023 1342 by Juan Manuel Pham RN  Outcome: Progressing  11/28/2023 0541 by Trey Herron RN  Outcome: Progressing     Problem: Safety - Adult  Goal: Free from fall injury  11/28/2023 1342 by Juan Manuel Pham RN  Outcome: Progressing  11/28/2023 0541 by Trey Herron RN  Outcome: Progressing     Problem: ABCDS Injury Assessment  Goal: Absence of physical injury  11/28/2023 1342 by Juan Manuel Pham RN  Outcome: Progressing  11/28/2023 0541 by Trey Herron RN  Outcome: Progressing
Problem: Discharge Planning  Goal: Discharge to home or other facility with appropriate resources  11/29/2023 0221 by Steph Garcia RN  Outcome: Progressing  Flowsheets (Taken 11/28/2023 1935)  Discharge to home or other facility with appropriate resources:   Identify barriers to discharge with patient and caregiver   Identify discharge learning needs (meds, wound care, etc)  11/28/2023 1342 by Hollis Pete RN  Outcome: Progressing     Problem: Pain  Goal: Verbalizes/displays adequate comfort level or baseline comfort level  11/29/2023 0221 by Steph Garcia RN  Outcome: Progressing  Flowsheets (Taken 11/28/2023 1935)  Verbalizes/displays adequate comfort level or baseline comfort level:   Encourage patient to monitor pain and request assistance   Assess pain using appropriate pain scale   Administer analgesics based on type and severity of pain and evaluate response   Implement non-pharmacological measures as appropriate and evaluate response   Consider cultural and social influences on pain and pain management   Notify Licensed Independent Practitioner if interventions unsuccessful or patient reports new pain  11/28/2023 1342 by Hollis Pete RN  Outcome: Progressing     Problem: Skin/Tissue Integrity  Goal: Absence of new skin breakdown  Description: 1. Monitor for areas of redness and/or skin breakdown  2. Assess vascular access sites hourly  3. Every 4-6 hours minimum:  Change oxygen saturation probe site  4. Every 4-6 hours:  If on nasal continuous positive airway pressure, respiratory therapy assess nares and determine need for appliance change or resting period.   11/29/2023 0221 by Steph Garcia RN  Outcome: Progressing  11/28/2023 1342 by Hollis Pete RN  Outcome: Progressing     Problem: Safety - Adult  Goal: Free from fall injury  11/29/2023 0221 by Steph Garcia RN  Outcome: Progressing  11/28/2023 1342 by Hollis Pete RN  Outcome: Progressing     Problem:
Problem: Discharge Planning  Goal: Discharge to home or other facility with appropriate resources  12/2/2023 0510 by Carrillo Izaguirre RN  Outcome: Progressing  12/1/2023 1842 by Heather Guzman RN  Outcome: Progressing     Problem: Pain  Goal: Verbalizes/displays adequate comfort level or baseline comfort level  Outcome: Progressing     Problem: Skin/Tissue Integrity  Goal: Absence of new skin breakdown  Description: 1. Monitor for areas of redness and/or skin breakdown  2. Assess vascular access sites hourly  3. Every 4-6 hours minimum:  Change oxygen saturation probe site  4. Every 4-6 hours:  If on nasal continuous positive airway pressure, respiratory therapy assess nares and determine need for appliance change or resting period.   Outcome: Progressing     Problem: Safety - Adult  Goal: Free from fall injury  12/2/2023 0510 by Carrillo Izaguirre RN  Outcome: Progressing  12/1/2023 1842 by Heather Guzman RN  Outcome: Progressing     Problem: ABCDS Injury Assessment  Goal: Absence of physical injury  12/2/2023 0510 by Carrillo Izaguirre RN  Outcome: Progressing  12/1/2023 1842 by Heather Guzman RN  Outcome: Progressing     Problem: Respiratory - Adult  Goal: Achieves optimal ventilation and oxygenation  Outcome: Progressing
Problem: Discharge Planning  Goal: Discharge to home or other facility with appropriate resources  Outcome: Progressing     Problem: Pain  Goal: Verbalizes/displays adequate comfort level or baseline comfort level  Outcome: Progressing     Problem: Skin/Tissue Integrity  Goal: Absence of new skin breakdown  Description: 1. Monitor for areas of redness and/or skin breakdown  2. Assess vascular access sites hourly  3. Every 4-6 hours minimum:  Change oxygen saturation probe site  4. Every 4-6 hours:  If on nasal continuous positive airway pressure, respiratory therapy assess nares and determine need for appliance change or resting period.   Outcome: Progressing     Problem: Safety - Adult  Goal: Free from fall injury  Outcome: Progressing     Problem: ABCDS Injury Assessment  Goal: Absence of physical injury  Outcome: Progressing
Problem: Discharge Planning  Goal: Discharge to home or other facility with appropriate resources  Outcome: Progressing     Problem: Pain  Goal: Verbalizes/displays adequate comfort level or baseline comfort level  Outcome: Progressing     Problem: Skin/Tissue Integrity  Goal: Absence of new skin breakdown  Description: 1. Monitor for areas of redness and/or skin breakdown  2. Assess vascular access sites hourly  3. Every 4-6 hours minimum:  Change oxygen saturation probe site  4. Every 4-6 hours:  If on nasal continuous positive airway pressure, respiratory therapy assess nares and determine need for appliance change or resting period.   Outcome: Progressing     Problem: Safety - Adult  Goal: Free from fall injury  Outcome: Progressing     Problem: ABCDS Injury Assessment  Goal: Absence of physical injury  Outcome: Progressing     Problem: Respiratory - Adult  Goal: Achieves optimal ventilation and oxygenation  11/25/2023 0518 by Felisha Boyer RN  Outcome: Progressing  11/24/2023 2035 by Sabine Gage RCP  Outcome: Progressing
Problem: Discharge Planning  Goal: Discharge to home or other facility with appropriate resources  Outcome: Progressing     Problem: Pain  Goal: Verbalizes/displays adequate comfort level or baseline comfort level  Outcome: Progressing     Problem: Skin/Tissue Integrity  Goal: Absence of new skin breakdown  Description: 1. Monitor for areas of redness and/or skin breakdown  2. Assess vascular access sites hourly  3. Every 4-6 hours minimum:  Change oxygen saturation probe site  4. Every 4-6 hours:  If on nasal continuous positive airway pressure, respiratory therapy assess nares and determine need for appliance change or resting period.   Outcome: Progressing     Problem: Safety - Adult  Goal: Free from fall injury  Outcome: Progressing     Problem: ABCDS Injury Assessment  Goal: Absence of physical injury  Outcome: Progressing     Problem: Respiratory - Adult  Goal: Achieves optimal ventilation and oxygenation  11/28/2023 0541 by Josh Fernandes RN  Outcome: Progressing  11/28/2023 0531 by Chirag Saavedra RCP  Outcome: Progressing
Problem: Discharge Planning  Goal: Discharge to home or other facility with appropriate resources  Outcome: Progressing     Problem: Pain  Goal: Verbalizes/displays adequate comfort level or baseline comfort level  Outcome: Progressing     Problem: Skin/Tissue Integrity  Goal: Absence of new skin breakdown  Description: 1. Monitor for areas of redness and/or skin breakdown  2. Assess vascular access sites hourly  3. Every 4-6 hours minimum:  Change oxygen saturation probe site  4. Every 4-6 hours:  If on nasal continuous positive airway pressure, respiratory therapy assess nares and determine need for appliance change or resting period.   Outcome: Progressing     Problem: Safety - Adult  Goal: Free from fall injury  Outcome: Progressing     Problem: ABCDS Injury Assessment  Goal: Absence of physical injury  Outcome: Progressing     Problem: Respiratory - Adult  Goal: Achieves optimal ventilation and oxygenation  Outcome: Progressing
Problem: Discharge Planning  Goal: Discharge to home or other facility with appropriate resources  Outcome: Progressing  Flowsheets (Taken 11/19/2023 0426 by Lilly Cabrera, RN)  Discharge to home or other facility with appropriate resources: Identify barriers to discharge with patient and caregiver     Problem: Pain  Goal: Verbalizes/displays adequate comfort level or baseline comfort level  Outcome: Progressing     Problem: Skin/Tissue Integrity  Goal: Absence of new skin breakdown  Description: 1. Monitor for areas of redness and/or skin breakdown  2. Assess vascular access sites hourly  3. Every 4-6 hours minimum:  Change oxygen saturation probe site  4. Every 4-6 hours:  If on nasal continuous positive airway pressure, respiratory therapy assess nares and determine need for appliance change or resting period.   Outcome: Progressing     Problem: Safety - Adult  Goal: Free from fall injury  Outcome: Progressing     Problem: ABCDS Injury Assessment  Goal: Absence of physical injury  Outcome: Progressing
Problem: Discharge Planning  Goal: Discharge to home or other facility with appropriate resources  Outcome: Progressing  Flowsheets (Taken 11/19/2023 1930)  Discharge to home or other facility with appropriate resources: Identify barriers to discharge with patient and caregiver     Problem: Pain  Goal: Verbalizes/displays adequate comfort level or baseline comfort level  Outcome: Progressing     Problem: Skin/Tissue Integrity  Goal: Absence of new skin breakdown  Description: 1. Monitor for areas of redness and/or skin breakdown  2. Assess vascular access sites hourly  3. Every 4-6 hours minimum:  Change oxygen saturation probe site  4. Every 4-6 hours:  If on nasal continuous positive airway pressure, respiratory therapy assess nares and determine need for appliance change or resting period.   Outcome: Progressing     Problem: Safety - Adult  Goal: Free from fall injury  Outcome: Progressing     Problem: ABCDS Injury Assessment  Goal: Absence of physical injury  Outcome: Progressing
Problem: Respiratory - Adult  Goal: Achieves optimal ventilation and oxygenation  11/24/2023 0904 by Milton Mitchell RCP  Outcome: Progressing  Flowsheets (Taken 11/24/2023 0848)  Achieves optimal ventilation and oxygenation:   Assess for changes in respiratory status   Encourage broncho-pulmonary hygiene including cough, deep breathe, incentive spirometry   Assess and instruct to report shortness of breath or any respiratory difficulty   Respiratory therapy support as indicated     BRONCHOSPASM/BRONCHOCONSTRICTION     [x]         IMPROVE AERATION/BREATH SOUNDS  [x]   ADMINISTER BRONCHODILATOR THERAPY AS APPROPRIATE  [x]   ASSESS BREATH SOUNDS  []   IMPLEMENT AEROSOL/MDI PROTOCOL  [x]   PATIENT EDUCATION AS NEEDED
Problem: Respiratory - Adult  Goal: Achieves optimal ventilation and oxygenation  11/24/2023 2035 by Alma Parham RCP  Outcome: Progressing   BRONCHOSPASM/BRONCHOCONSTRICTION     [x]         IMPROVE AERATION/BREATH SOUNDS  [x]   ADMINISTER BRONCHODILATOR THERAPY AS APPROPRIATE  [x]   ASSESS BREATH SOUNDS  []   IMPLEMENT AEROSOL/MDI PROTOCOL  [x]   PATIENT EDUCATION AS NEEDED
Problem: Respiratory - Adult  Goal: Achieves optimal ventilation and oxygenation  11/25/2023 1004 by Rubi Escobar RCP  Flowsheets (Taken 11/25/2023 1004)  Achieves optimal ventilation and oxygenation: Respiratory therapy support as indicated
Problem: Respiratory - Adult  Goal: Achieves optimal ventilation and oxygenation  12/2/2023 0809 by Marissa Haywood RICHARD  Outcome: Progressing   BRONCHOSPASM/BRONCHOCONSTRICTION     [x]         IMPROVE AERATION/BREATH SOUNDS  [x]   ADMINISTER BRONCHODILATOR THERAPY AS APPROPRIATE  [x]   ASSESS BREATH SOUNDS  []   IMPLEMENT AEROSOL/MDI PROTOCOL  [x]   PATIENT EDUCATION AS NEEDED
Problem: Respiratory - Adult  Goal: Achieves optimal ventilation and oxygenation  Outcome: Progressing
Problem: Safety - Adult  Goal: Free from fall injury  12/1/2023 1842 by Alonzo Cogan, RN  Outcome: Progressing     Problem: ABCDS Injury Assessment  Goal: Absence of physical injury  12/1/2023 1842 by Alonzo Cogan, RN  Outcome: Progressing     Problem: Discharge Planning  Goal: Discharge to home or other facility with appropriate resources  12/1/2023 1842 by Alonzo Cogan, RN  Outcome: Progressing
If on nasal continuous positive airway pressure, respiratory therapy assess nares and determine need for appliance change or resting period.   11/29/2023 1002 by Cherylene Decree, RN  Outcome: Progressing  11/29/2023 0221 by Charisma Hahn RN  Outcome: Progressing     Problem: Safety - Adult  Goal: Free from fall injury  11/29/2023 1002 by Cherylene Decree, RN  Outcome: Progressing  Flowsheets (Taken 11/29/2023 1001)  Free From Fall Injury: Instruct family/caregiver on patient safety  11/29/2023 0221 by Charisma Hahn RN  Outcome: Progressing     Problem: ABCDS Injury Assessment  Goal: Absence of physical injury  11/29/2023 1002 by Cherylene Decree, RN  Outcome: Progressing  11/29/2023 0221 by Charisma Hahn RN  Outcome: Progressing  Flowsheets (Taken 11/29/2023 0200)  Absence of Physical Injury: Implement safety measures based on patient assessment     Problem: Respiratory - Adult  Goal: Achieves optimal ventilation and oxygenation  11/29/2023 1002 by Cherylene Decree, RN  Outcome: Progressing  Flowsheets (Taken 11/29/2023 0758)  Achieves optimal ventilation and oxygenation: Assess for changes in respiratory status  11/29/2023 0221 by Charisma Hahn RN  Outcome: Progressing

## 2023-12-04 VITALS
OXYGEN SATURATION: 97 % | SYSTOLIC BLOOD PRESSURE: 82 MMHG | HEART RATE: 88 BPM | DIASTOLIC BLOOD PRESSURE: 43 MMHG | RESPIRATION RATE: 15 BRPM | BODY MASS INDEX: 32.42 KG/M2 | HEIGHT: 61 IN | WEIGHT: 171.74 LBS | TEMPERATURE: 97.6 F

## 2023-12-04 PROCEDURE — 6370000000 HC RX 637 (ALT 250 FOR IP)

## 2023-12-04 PROCEDURE — 2580000003 HC RX 258

## 2023-12-04 PROCEDURE — 6360000002 HC RX W HCPCS: Performed by: NURSE PRACTITIONER

## 2023-12-04 PROCEDURE — 94640 AIRWAY INHALATION TREATMENT: CPT

## 2023-12-04 PROCEDURE — 6370000000 HC RX 637 (ALT 250 FOR IP): Performed by: STUDENT IN AN ORGANIZED HEALTH CARE EDUCATION/TRAINING PROGRAM

## 2023-12-04 PROCEDURE — 6370000000 HC RX 637 (ALT 250 FOR IP): Performed by: NURSE PRACTITIONER

## 2023-12-04 PROCEDURE — 99221 1ST HOSP IP/OBS SF/LOW 40: CPT | Performed by: SURGERY

## 2023-12-04 RX ORDER — OXYCODONE HYDROCHLORIDE 5 MG/1
10 TABLET ORAL EVERY 4 HOURS PRN
Status: DISCONTINUED | OUTPATIENT
Start: 2023-12-04 | End: 2023-12-04 | Stop reason: HOSPADM

## 2023-12-04 RX ORDER — OXYCODONE HYDROCHLORIDE 5 MG/1
5 TABLET ORAL EVERY 4 HOURS PRN
Status: DISCONTINUED | OUTPATIENT
Start: 2023-12-04 | End: 2023-12-04 | Stop reason: HOSPADM

## 2023-12-04 RX ADMIN — SODIUM CHLORIDE, PRESERVATIVE FREE 10 ML: 5 INJECTION INTRAVENOUS at 09:08

## 2023-12-04 RX ADMIN — FLUOXETINE HYDROCHLORIDE 40 MG: 20 CAPSULE ORAL at 09:07

## 2023-12-04 RX ADMIN — ACETAMINOPHEN 1000 MG: 500 TABLET ORAL at 06:13

## 2023-12-04 RX ADMIN — PREGABALIN 200 MG: 100 CAPSULE ORAL at 09:07

## 2023-12-04 RX ADMIN — ENOXAPARIN SODIUM 30 MG: 100 INJECTION SUBCUTANEOUS at 09:06

## 2023-12-04 RX ADMIN — DIAZEPAM 5 MG: 5 TABLET ORAL at 06:13

## 2023-12-04 RX ADMIN — OXYCODONE 15 MG: 5 TABLET ORAL at 01:11

## 2023-12-04 RX ADMIN — GABAPENTIN 900 MG: 300 CAPSULE ORAL at 06:56

## 2023-12-04 RX ADMIN — OXYCODONE HYDROCHLORIDE 10 MG: 5 TABLET ORAL at 09:04

## 2023-12-04 RX ADMIN — PANTOPRAZOLE SODIUM 40 MG: 40 TABLET, DELAYED RELEASE ORAL at 06:13

## 2023-12-04 RX ADMIN — TROSPIUM CHLORIDE 20 MG: 20 TABLET, FILM COATED ORAL at 06:56

## 2023-12-04 RX ADMIN — BUDESONIDE AND FORMOTEROL FUMARATE DIHYDRATE 2 PUFF: 80; 4.5 AEROSOL RESPIRATORY (INHALATION) at 08:59

## 2023-12-04 RX ADMIN — LORAZEPAM 0.5 MG: 0.5 TABLET ORAL at 09:04

## 2023-12-04 RX ADMIN — OXYCODONE 15 MG: 5 TABLET ORAL at 05:07

## 2023-12-04 ASSESSMENT — PAIN SCALES - GENERAL
PAINLEVEL_OUTOF10: 6
PAINLEVEL_OUTOF10: 8
PAINLEVEL_OUTOF10: 4
PAINLEVEL_OUTOF10: 8

## 2023-12-04 ASSESSMENT — PAIN DESCRIPTION - DESCRIPTORS
DESCRIPTORS: DISCOMFORT
DESCRIPTORS: DISCOMFORT
DESCRIPTORS: ACHING

## 2023-12-04 ASSESSMENT — PAIN DESCRIPTION - LOCATION
LOCATION: LEG

## 2023-12-04 ASSESSMENT — PAIN DESCRIPTION - ORIENTATION
ORIENTATION: RIGHT

## 2023-12-04 ASSESSMENT — PAIN DESCRIPTION - PAIN TYPE: TYPE: SURGICAL PAIN

## 2023-12-04 ASSESSMENT — PAIN DESCRIPTION - ONSET: ONSET: ON-GOING

## 2023-12-04 ASSESSMENT — PAIN DESCRIPTION - FREQUENCY: FREQUENCY: CONTINUOUS

## 2023-12-04 ASSESSMENT — PAIN - FUNCTIONAL ASSESSMENT: PAIN_FUNCTIONAL_ASSESSMENT: PREVENTS OR INTERFERES SOME ACTIVE ACTIVITIES AND ADLS

## 2023-12-04 NOTE — CARE COORDINATION
Transitional planning: Phone call from [facility] with [facility]. [facility] has been approved. 7000 Patient notified of approval.    0932 Phone call to Mindy with [facility] to check on transport time status. 11 am.    0658 Phone call to [facility] with [facility] to notify of transport time and to obtain report #[phone] and fax #[phone]. 9546 Notified patient and RN of transport time.     Elizabeth faxed to [facility]      Discharge [address] Case Management Department  Written by: Angela Rankin RN    Patient Name: Lisandro Diaz  Attending Provider: Nury Young MD  Admit Date: 2023  1:34 AM  MRN: [id number]  Account: [de-identified]                     : 1965  Discharge Date:       Disposition: Acute Inpatient Rehab    Angela Rankin RN

## 2023-12-05 ENCOUNTER — TELEPHONE (OUTPATIENT)
Dept: FAMILY MEDICINE CLINIC | Age: 58
End: 2023-12-05

## 2023-12-05 ENCOUNTER — TELEPHONE (OUTPATIENT)
Dept: ORTHOPEDIC SURGERY | Age: 58
End: 2023-12-05

## 2023-12-05 ENCOUNTER — HOSPITAL ENCOUNTER (OUTPATIENT)
Age: 58
Setting detail: SPECIMEN
Discharge: HOME | End: 2023-12-05

## 2023-12-05 LAB
ANION GAP SERPL CALCULATED.3IONS-SCNC: 11 MMOL/L (ref 9–17)
BUN SERPL-MCNC: 15 MG/DL (ref 6–20)
BUN/CREAT SERPL: ABNORMAL (ref 9–20)
CALCIUM SERPL-MCNC: 9.3 MG/DL (ref 8.6–10.4)
CHLORIDE SERPL-SCNC: 104 MMOL/L (ref 98–107)
CO2 SERPL-SCNC: 24 MMOL/L (ref 20–31)
CREAT SERPL-MCNC: <0.4 MG/DL (ref 0.5–0.9)
ERYTHROCYTE [DISTWIDTH] IN BLOOD BY AUTOMATED COUNT: 17.4 % (ref 11.8–14.4)
GFR SERPL CREATININE-BSD FRML MDRD: ABNORMAL ML/MIN/1.73M2
GLUCOSE SERPL-MCNC: 88 MG/DL (ref 70–99)
HCT VFR BLD AUTO: 27 % (ref 36.3–47.1)
HGB BLD-MCNC: 8.1 G/DL (ref 11.9–15.1)
MCH RBC QN AUTO: 26.2 PG (ref 25.2–33.5)
MCHC RBC AUTO-ENTMCNC: 30 G/DL (ref 28.4–34.8)
MCV RBC AUTO: 87.4 FL (ref 82.6–102.9)
NRBC BLD-RTO: 0 PER 100 WBC
PLATELET # BLD AUTO: 342 K/UL (ref 138–453)
PMV BLD AUTO: 10.6 FL (ref 8.1–13.5)
POTASSIUM SERPL-SCNC: 4.3 MMOL/L (ref 3.7–5.3)
RBC # BLD AUTO: 3.09 M/UL (ref 3.95–5.11)
SODIUM SERPL-SCNC: 139 MMOL/L (ref 135–144)
WBC OTHER # BLD: 6.3 K/UL (ref 3.5–11.3)

## 2023-12-05 PROCEDURE — 80048 BASIC METABOLIC PNL TOTAL CA: CPT

## 2023-12-05 PROCEDURE — P9603 ONE-WAY ALLOW PRORATED MILES: HCPCS

## 2023-12-05 PROCEDURE — 36415 COLL VENOUS BLD VENIPUNCTURE: CPT

## 2023-12-05 PROCEDURE — 85027 COMPLETE CBC AUTOMATED: CPT

## 2023-12-05 NOTE — TELEPHONE ENCOUNTER
Care Transitions Initial Follow Up Call    Outreach made within 2 business days of discharge: Yes    Patient: Chon Gonzalez Patient : 1965   MRN: [id number]  Reason for Admission: There are no discharge diagnoses documented for the most recent discharge. Discharge Date: 23       Spoke with: Patient was admitting to a skilled nursing facility        Follow Up  No future appointments.     Jp Barton

## 2023-12-05 NOTE — TELEPHONE ENCOUNTER
Nanda Conde from [facility] called in asking for someone to follow up with her on the pt brace wearing schedule and the pre-cautions on her PT guidance. Please call Nanda Conde with updates @ [phone].  States is ok to call back tomorrow 12/06/23 in AM

## 2023-12-06 ENCOUNTER — OFFICE VISIT (OUTPATIENT)
Dept: ORTHOPEDIC SURGERY | Age: 58
End: 2023-12-06

## 2023-12-06 VITALS — HEIGHT: 61 IN | BODY MASS INDEX: 32.28 KG/M2 | WEIGHT: 171 LBS

## 2023-12-06 DIAGNOSIS — S82.201A TIBIA/FIBULA FRACTURE, RIGHT, CLOSED, INITIAL ENCOUNTER: Primary | ICD-10-CM

## 2023-12-06 DIAGNOSIS — S82.401A TIBIA/FIBULA FRACTURE, RIGHT, CLOSED, INITIAL ENCOUNTER: Primary | ICD-10-CM

## 2023-12-06 PROCEDURE — 99024 POSTOP FOLLOW-UP VISIT: CPT | Performed by: STUDENT IN AN ORGANIZED HEALTH CARE EDUCATION/TRAINING PROGRAM

## 2023-12-07 ENCOUNTER — HOSPITAL ENCOUNTER (OUTPATIENT)
Age: 58
Setting detail: SPECIMEN
Discharge: HOME | End: 2023-12-07

## 2023-12-07 LAB
FERRITIN SERPL-MCNC: 43 NG/ML (ref 13–150)
FOLATE SERPL-MCNC: 6.5 NG/ML (ref 4.8–24.2)
IRON SERPL-MCNC: 21 UG/DL (ref 37–145)
TRANSFERRIN SERPL-MCNC: 322 MG/DL (ref 200–360)
VIT B12 SERPL-MCNC: 203 PG/ML (ref 232–1245)

## 2023-12-07 PROCEDURE — 36415 COLL VENOUS BLD VENIPUNCTURE: CPT

## 2023-12-07 PROCEDURE — 83540 ASSAY OF IRON: CPT

## 2023-12-07 PROCEDURE — P9603 ONE-WAY ALLOW PRORATED MILES: HCPCS

## 2023-12-07 PROCEDURE — 82746 ASSAY OF FOLIC ACID SERUM: CPT

## 2023-12-07 PROCEDURE — 82607 VITAMIN B-12: CPT

## 2023-12-07 PROCEDURE — 84466 ASSAY OF TRANSFERRIN: CPT

## 2023-12-07 PROCEDURE — 82728 ASSAY OF FERRITIN: CPT

## 2023-12-08 ENCOUNTER — HOSPITAL ENCOUNTER (OUTPATIENT)
Age: 58
Setting detail: SPECIMEN
Discharge: HOME | End: 2023-12-08

## 2023-12-08 LAB
ANION GAP SERPL CALCULATED.3IONS-SCNC: 13 MMOL/L (ref 9–17)
BUN SERPL-MCNC: 17 MG/DL (ref 6–20)
BUN/CREAT SERPL: 43 (ref 9–20)
CALCIUM SERPL-MCNC: 9.1 MG/DL (ref 8.6–10.4)
CHLORIDE SERPL-SCNC: 107 MMOL/L (ref 98–107)
CO2 SERPL-SCNC: 22 MMOL/L (ref 20–31)
CREAT SERPL-MCNC: 0.4 MG/DL (ref 0.5–0.9)
ERYTHROCYTE [DISTWIDTH] IN BLOOD BY AUTOMATED COUNT: 18.1 % (ref 11.8–14.4)
GFR SERPL CREATININE-BSD FRML MDRD: >60 ML/MIN/1.73M2
GLUCOSE SERPL-MCNC: 74 MG/DL (ref 70–99)
HCT VFR BLD AUTO: 27.8 % (ref 36.3–47.1)
HGB BLD-MCNC: 8.3 G/DL (ref 11.9–15.1)
MCH RBC QN AUTO: 26.5 PG (ref 25.2–33.5)
MCHC RBC AUTO-ENTMCNC: 29.9 G/DL (ref 28.4–34.8)
MCV RBC AUTO: 88.8 FL (ref 82.6–102.9)
NRBC BLD-RTO: 0 PER 100 WBC
PLATELET # BLD AUTO: 318 K/UL (ref 138–453)
PMV BLD AUTO: 10.4 FL (ref 8.1–13.5)
POTASSIUM SERPL-SCNC: 4.1 MMOL/L (ref 3.7–5.3)
RBC # BLD AUTO: 3.13 M/UL (ref 3.95–5.11)
SODIUM SERPL-SCNC: 142 MMOL/L (ref 135–144)
WBC OTHER # BLD: 7.8 K/UL (ref 3.5–11.3)

## 2023-12-08 PROCEDURE — 80048 BASIC METABOLIC PNL TOTAL CA: CPT

## 2023-12-08 PROCEDURE — 36415 COLL VENOUS BLD VENIPUNCTURE: CPT

## 2023-12-08 PROCEDURE — P9603 ONE-WAY ALLOW PRORATED MILES: HCPCS

## 2023-12-08 PROCEDURE — 85027 COMPLETE CBC AUTOMATED: CPT

## 2023-12-12 ENCOUNTER — HOSPITAL ENCOUNTER (OUTPATIENT)
Age: 58
Setting detail: SPECIMEN
Discharge: HOME | End: 2023-12-12

## 2023-12-12 LAB
ANION GAP SERPL CALCULATED.3IONS-SCNC: 7 MMOL/L (ref 9–17)
BUN SERPL-MCNC: 15 MG/DL (ref 6–20)
BUN/CREAT SERPL: 38 (ref 9–20)
CALCIUM SERPL-MCNC: 9.7 MG/DL (ref 8.6–10.4)
CHLORIDE SERPL-SCNC: 106 MMOL/L (ref 98–107)
CO2 SERPL-SCNC: 26 MMOL/L (ref 20–31)
CREAT SERPL-MCNC: 0.4 MG/DL (ref 0.5–0.9)
ERYTHROCYTE [DISTWIDTH] IN BLOOD BY AUTOMATED COUNT: 18.4 % (ref 11.8–14.4)
GFR SERPL CREATININE-BSD FRML MDRD: >60 ML/MIN/1.73M2
GLUCOSE SERPL-MCNC: 95 MG/DL (ref 70–99)
HCT VFR BLD AUTO: 28.9 % (ref 36.3–47.1)
HGB BLD-MCNC: 8.6 G/DL (ref 11.9–15.1)
MCH RBC QN AUTO: 26 PG (ref 25.2–33.5)
MCHC RBC AUTO-ENTMCNC: 29.8 G/DL (ref 28.4–34.8)
MCV RBC AUTO: 87.3 FL (ref 82.6–102.9)
NRBC BLD-RTO: 0 PER 100 WBC
PLATELET # BLD AUTO: 295 K/UL (ref 138–453)
PMV BLD AUTO: 10.8 FL (ref 8.1–13.5)
POTASSIUM SERPL-SCNC: 4.2 MMOL/L (ref 3.7–5.3)
RBC # BLD AUTO: 3.31 M/UL (ref 3.95–5.11)
SODIUM SERPL-SCNC: 139 MMOL/L (ref 135–144)
WBC OTHER # BLD: 6 K/UL (ref 3.5–11.3)

## 2023-12-12 PROCEDURE — 36415 COLL VENOUS BLD VENIPUNCTURE: CPT

## 2023-12-12 PROCEDURE — 80048 BASIC METABOLIC PNL TOTAL CA: CPT

## 2023-12-12 PROCEDURE — P9603 ONE-WAY ALLOW PRORATED MILES: HCPCS

## 2023-12-12 PROCEDURE — 85027 COMPLETE CBC AUTOMATED: CPT

## 2023-12-15 ENCOUNTER — HOSPITAL ENCOUNTER (OUTPATIENT)
Age: 58
Setting detail: SPECIMEN
Discharge: HOME | End: 2023-12-15

## 2023-12-15 LAB
ANION GAP SERPL CALCULATED.3IONS-SCNC: 10 MMOL/L (ref 9–17)
BUN SERPL-MCNC: 16 MG/DL (ref 6–20)
BUN/CREAT SERPL: 40 (ref 9–20)
CALCIUM SERPL-MCNC: 9.4 MG/DL (ref 8.6–10.4)
CHLORIDE SERPL-SCNC: 107 MMOL/L (ref 98–107)
CO2 SERPL-SCNC: 24 MMOL/L (ref 20–31)
CREAT SERPL-MCNC: 0.4 MG/DL (ref 0.5–0.9)
ERYTHROCYTE [DISTWIDTH] IN BLOOD BY AUTOMATED COUNT: 19.2 % (ref 11.8–14.4)
GFR SERPL CREATININE-BSD FRML MDRD: >60 ML/MIN/1.73M2
GLUCOSE SERPL-MCNC: 93 MG/DL (ref 70–99)
HCT VFR BLD AUTO: 30.4 % (ref 36.3–47.1)
HGB BLD-MCNC: 8.9 G/DL (ref 11.9–15.1)
MCH RBC QN AUTO: 26.2 PG (ref 25.2–33.5)
MCHC RBC AUTO-ENTMCNC: 29.3 G/DL (ref 28.4–34.8)
MCV RBC AUTO: 89.4 FL (ref 82.6–102.9)
NRBC BLD-RTO: 0 PER 100 WBC
PLATELET # BLD AUTO: 313 K/UL (ref 138–453)
PMV BLD AUTO: 11.4 FL (ref 8.1–13.5)
POTASSIUM SERPL-SCNC: 4.2 MMOL/L (ref 3.7–5.3)
RBC # BLD AUTO: 3.4 M/UL (ref 3.95–5.11)
SODIUM SERPL-SCNC: 141 MMOL/L (ref 135–144)
WBC OTHER # BLD: 5 K/UL (ref 3.5–11.3)

## 2023-12-15 PROCEDURE — 85027 COMPLETE CBC AUTOMATED: CPT

## 2023-12-15 PROCEDURE — 36415 COLL VENOUS BLD VENIPUNCTURE: CPT

## 2023-12-15 PROCEDURE — 80048 BASIC METABOLIC PNL TOTAL CA: CPT

## 2023-12-15 PROCEDURE — P9603 ONE-WAY ALLOW PRORATED MILES: HCPCS

## 2023-12-19 ENCOUNTER — TELEPHONE (OUTPATIENT)
Dept: ORTHOPEDIC SURGERY | Age: 58
End: 2023-12-19

## 2023-12-19 PROBLEM — Z01.810 PREOPERATIVE CARDIOVASCULAR EXAMINATION: Status: RESOLVED | Noted: 2023-11-19 | Resolved: 2023-12-19

## 2023-12-19 NOTE — TELEPHONE ENCOUNTER
DATE OF PROCEDURE:  11/21/2023     PREOPERATIVE DIAGNOSIS:  Right bicondylar tibial plateau fracture.     POSTOPERATIVE DIAGNOSES:  1.  Right bicondylar tibial plateau fracture.  2.  Right lateral meniscus tear.     OPERATION PERFORMED:  1.  Open reduction and internal fixation of right bicondylar tibial  plateau fracture.  2.  Removal of the knee spanning external fixator.  3.  Open right lateral meniscus repair.  4.  Closed treatment, right proximal fibula fracture.    Patient requesting pain medication. Please advise. Patient discharged from rehab facility.

## 2023-12-19 NOTE — TELEPHONE ENCOUNTER
Patient is unable to come to her appointment tomorrow because her insurance does not cover stretcher transport.  She's crying and upset because of this.  She also wants refills for oxyCODONE (OXY-IR) 10 MG immediate release tablet and 5 MG.  Please advise.

## 2023-12-19 NOTE — TELEPHONE ENCOUNTER
Pt is asking that any medications please sent to    [id number] - [place], [place] - [address]  - P [phone] - F [phone]      Please call with any questions related to the medications and also what she needs to do about her appts/transportation @ [phone]

## 2023-12-19 NOTE — TELEPHONE ENCOUNTER
Patient states she is unable to get a ride to her follow up appointments because she wants to be transported by stretcher and insurance WILL NOT PAY FOR A STRETCHER as this is not deemed appropriate.   Patient states she needs to be back in a nursing home, as she is to remain NWB until follow up.  Patient states she lives in an upstairs appt. And can not go anywhere due to her being stuck in her wheelchair upstairs, yet she would like to continue receiving pain medication.  Please advise?

## 2023-12-21 PROBLEM — K28.9 ANASTOMOTIC ULCER S/P GASTRIC BYPASS: Status: ACTIVE | Noted: 2019-10-20

## 2023-12-21 PROBLEM — J96.01 ACUTE RESPIRATORY FAILURE WITH HYPOXIA AND HYPERCAPNIA (HCC): Status: ACTIVE | Noted: 2019-04-26

## 2023-12-21 PROBLEM — T85.898A ANASTOMOTIC ULCER S/P GASTRIC BYPASS: Status: ACTIVE | Noted: 2019-10-20

## 2023-12-21 PROBLEM — F10.20 ALCOHOL USE DISORDER, SEVERE, DEPENDENCE (HCC): Status: ACTIVE | Noted: 2023-12-21

## 2023-12-21 PROBLEM — J96.02 ACUTE RESPIRATORY FAILURE WITH HYPOXIA AND HYPERCAPNIA (HCC): Status: ACTIVE | Noted: 2019-04-26

## 2023-12-21 PROBLEM — K21.9 GERD (GASTROESOPHAGEAL REFLUX DISEASE): Status: ACTIVE | Noted: 2017-01-02

## 2023-12-22 ENCOUNTER — TELEPHONE (OUTPATIENT)
Dept: ORTHOPEDIC SURGERY | Age: 58
End: 2023-12-22

## 2023-12-22 ENCOUNTER — TELEPHONE (OUTPATIENT)
Dept: FAMILY MEDICINE CLINIC | Age: 58
End: 2023-12-22

## 2023-12-22 NOTE — TELEPHONE ENCOUNTER
Pt called back in requesting a refill or help getting relief with her pain.      She did acknowledge getting the pain medicine on 12/19/23 when she was in the office but said her pcp was refusing to fill any pain meds for her.          Please call anny back @ [phone]

## 2023-12-22 NOTE — TELEPHONE ENCOUNTER
Pt's  has called back in and states he would like to be the one when we reach back out to them. He is on her HIPPA    Wants to wish Dr. Dwain Levine      Call back # is [phone]

## 2023-12-22 NOTE — TELEPHONE ENCOUNTER
Patient  (Desean) called in requesting refill on wife percocet 5-325 mg.  states wife surgeon Dr. Prakash (pain Management) refused to refill medication.  is asking what to don next?

## 2023-12-26 ENCOUNTER — TELEPHONE (OUTPATIENT)
Dept: ORTHOPEDIC SURGERY | Age: 58
End: 2023-12-26

## 2023-12-26 ENCOUNTER — TELEPHONE (OUTPATIENT)
Dept: FAMILY MEDICINE CLINIC | Age: 58
End: 2023-12-26

## 2023-12-26 NOTE — TELEPHONE ENCOUNTER
Cam Martinez from [facility] called in and reported that patient had 2 falls over the weekend while attempting to get on bedside commode. No redness, minimal swelling noted. Stated that patient was out of pain medications- informed her that Dr. Evelia Chang would not be prescribing any more pain medication and patient could present to ED for evaluation or could move appointment up to 1/8/24. She stated that she would contact patient and have her call office back with plan.

## 2023-12-26 NOTE — TELEPHONE ENCOUNTER
Writer spoke with patient and informed her office will not be prescribing her any narcotics, that she will need to discuss this with her Orthopedic and Pain Management. Patient states she can not get into pain management until 1/19/24. Patient was also told, she has not followed with a PCP in this office since 2021. Her previous appt was with the transitional physician. She will need to establish care with a PCP in office for any future appts. Patient did not schedule at this time.

## 2023-12-26 NOTE — TELEPHONE ENCOUNTER
Received call from Bridget from [facility]. She reports patient fell on Saturday morning trying to get out of bed to get to the commode. Out of pain meds, pain 7/10 pain. Messages were also sent to her Orthopedic for same thing. Please read all messages from 12/21/23 to current.  Please advise.

## 2023-12-27 ENCOUNTER — APPOINTMENT (OUTPATIENT)
Dept: CT IMAGING | Age: 58
End: 2023-12-27
Payer: MEDICARE

## 2023-12-27 ENCOUNTER — HOSPITAL ENCOUNTER (EMERGENCY)
Age: 58
Discharge: HOME | End: 2023-12-27
Attending: EMERGENCY MEDICINE
Payer: MEDICARE

## 2023-12-27 VITALS
BODY MASS INDEX: 30.23 KG/M2 | WEIGHT: 160 LBS | TEMPERATURE: 98.5 F | RESPIRATION RATE: 18 BRPM | SYSTOLIC BLOOD PRESSURE: 140 MMHG | HEART RATE: 77 BPM | OXYGEN SATURATION: 98 % | DIASTOLIC BLOOD PRESSURE: 75 MMHG

## 2023-12-27 DIAGNOSIS — G89.18 POST-OPERATIVE PAIN: Primary | ICD-10-CM

## 2023-12-27 PROCEDURE — 6370000000 HC RX 637 (ALT 250 FOR IP): Performed by: EMERGENCY MEDICINE

## 2023-12-27 PROCEDURE — 73700 CT LOWER EXTREMITY W/O DYE: CPT

## 2023-12-27 PROCEDURE — 99284 EMERGENCY DEPT VISIT MOD MDM: CPT

## 2023-12-27 RX ORDER — OXYCODONE HYDROCHLORIDE AND ACETAMINOPHEN 5; 325 MG/1; MG/1
1 TABLET ORAL EVERY 4 HOURS PRN
Qty: 20 TABLET | Refills: 0 | Status: SHIPPED | OUTPATIENT
Start: 2023-12-27 | End: 2024-01-03

## 2023-12-27 RX ORDER — OXYCODONE HYDROCHLORIDE AND ACETAMINOPHEN 5; 325 MG/1; MG/1
2 TABLET ORAL ONCE
Status: COMPLETED | OUTPATIENT
Start: 2023-12-27 | End: 2023-12-27

## 2023-12-27 RX ADMIN — OXYCODONE AND ACETAMINOPHEN 2 TABLET: 5; 325 TABLET ORAL at 20:14

## 2023-12-28 NOTE — ED NOTES
Pt arrived to the ed with c/o right leg pain that started after pt had a couple falls over the last few days with most recent fall today. Pt states she had surgery to right leg and is non weight bearing to that leg. Pt states surgery was in November. Respirations non labored. Pt A&Ox4.

## 2023-12-28 NOTE — ED PROVIDER NOTES
CARPAL TUNNEL RELEASE Bilateral      SECTION      x 3    CHOLECYSTECTOMY      GASTRIC BYPASS SURGERY      HYSTERECTOMY (CERVIX STATUS UNKNOWN)      LEG SURGERY Left     broken leg in 3 places     LEG SURGERY Right 2023    RIGHT EXTERNAL FIXATOR APPLICATION OF RIGHT LEG performed by Janey Gordon DO at [address] Right 2023    TIBIAL PLATEAU FRACTURE OPEN REDUCTION INTERNAL FIXATION, , EX- FIX REMOVAL ( SYNTHES , C-ARM performed by Janey Gordon DO at [address] [facility]      left iliac artery stenting, IVC filter placement and retrieval    TIBIA FRACTURE SURGERY Right 2023    TIBIAL PLATEAU KNEE SPANNING EXTERNAL FIXATION, LARGE EX-FIX SET    TIBIA FRACTURE SURGERY Right 2023    TIBIAL PLATEAU FRACTURE OPEN REDUCTION INTERNAL FIXATION, , EX- FIX REMOVAL ( SYNTHES , C-ARM - Right     CURRENT MEDICATIONS       Discharge Medication List as of 2023 10:30 PM        CONTINUE these medications which have NOT CHANGED    Details   XARELTO 20 MG TABS tablet DAWHistorical Med      FEROSUL 325 (65 Fe) MG tablet DAWHistorical Med      diclofenac sodium (VOLTAREN) 1 % GEL Historical Med      gabapentin (NEURONTIN) 600 MG tablet Historical Med      buPROPion (WELLBUTRIN XL) 150 MG extended release tablet Historical Med      !!  FLUoxetine (PROZAC) 20 MG capsule Historical Med      lithium 150 MG capsule Historical Med      zaleplon (SONATA) 5 MG capsule Historical Med      !! pantoprazole (PROTONIX) 20 MG tablet Historical Med      trospium (SANCTURA) 20 MG tablet Historical Med      sennosides-docusate sodium (SENOKOT-S) 8.6-50 MG tablet Take 1 tablet by mouth 2 times daily, Disp-60 tablet, R-0Normal      polyethylene glycol (GLYCOLAX) 17 g packet Take 1 packet by mouth daily, Disp-30 packet, R-0Normal      aspirin 81 MG EC tablet Take 1 tablet by mouth in the morning and at bedtime, Disp-60 tablet, R-0Normal      vitamin D (ERGOCALCIFEROL) 1.25

## 2024-01-02 ENCOUNTER — TELEPHONE (OUTPATIENT)
Dept: NEUROLOGY | Age: 59
End: 2024-01-02

## 2024-01-02 ENCOUNTER — TELEPHONE (OUTPATIENT)
Dept: ORTHOPEDIC SURGERY | Age: 59
End: 2024-01-02

## 2024-01-02 ENCOUNTER — TELEPHONE (OUTPATIENT)
Dept: FAMILY MEDICINE CLINIC | Age: 59
End: 2024-01-02

## 2024-01-02 DIAGNOSIS — F40.240 CLAUSTROPHOBIA: Primary | ICD-10-CM

## 2024-01-02 RX ORDER — DIAZEPAM 5 MG/1
5 TABLET ORAL SEE ADMIN INSTRUCTIONS
Qty: 2 TABLET | Refills: 0 | Status: SHIPPED | OUTPATIENT
Start: 2024-01-02 | End: 2024-01-30

## 2024-01-02 NOTE — TELEPHONE ENCOUNTER
Bridget calling from [facility] to report a fall - patient reported that she fell this morning, she was using her walker in the kitchen and fell on her RT knee, pain level was rated 7/10 on RT knee denies any other injuries.  helped patient up

## 2024-01-02 NOTE — TELEPHONE ENCOUNTER
Bridget from [facility] called in reporting that patient had a fall today on her right knee. Rates pain a 7. Patient did not present to ED for evaluation. Has pain management appointment on the 19th

## 2024-01-02 NOTE — TELEPHONE ENCOUNTER
Madonna called the office this morning requesting sedation for her upcoming MRI on 1/12/24.  Patient states that she is claustrophobic.  She would like this sent to [facility] on [address].  Writer advised that I would forward this request to Dr. Stone.

## 2024-01-02 NOTE — TELEPHONE ENCOUNTER
Call placed to the patient and this information was given.  Patient verbally stated their understanding.

## 2024-01-03 NOTE — TELEPHONE ENCOUNTER
Called patient to inform her about getting evaluation with xrays via appointment in the clinic or ED, no answer left VMM for patient

## 2024-01-04 ENCOUNTER — TELEPHONE (OUTPATIENT)
Dept: FAMILY MEDICINE CLINIC | Age: 59
End: 2024-01-04

## 2024-01-04 DIAGNOSIS — J96.01 ACUTE RESPIRATORY FAILURE WITH HYPOXIA AND HYPERCAPNIA (HCC): Primary | ICD-10-CM

## 2024-01-04 DIAGNOSIS — J96.02 ACUTE RESPIRATORY FAILURE WITH HYPOXIA AND HYPERCAPNIA (HCC): Primary | ICD-10-CM

## 2024-01-04 NOTE — TELEPHONE ENCOUNTER
Writer recommends [facility] (ph. [phone] fx. [phone]) due to their generally shorter timeframe of care delivered after referral received within their office. Order pended. Please review and advise.

## 2024-01-04 NOTE — TELEPHONE ENCOUNTER
PC from pt requesting Skilled Nursing orders, states she wasn't given them at discharge from hospital following leg break due to upcoming insurance change after first of the year. Writer did confirm [employer] insurance will be the primary and phone number in chart is accurate, please review and advise. Pt also requested pain medications before disconnecting call as she is out and still experiencing pain with day to day functions. Pharmacy confirmed.

## 2024-01-05 ENCOUNTER — TELEPHONE (OUTPATIENT)
Dept: ONCOLOGY | Age: 59
End: 2024-01-05

## 2024-01-05 ENCOUNTER — TELEPHONE (OUTPATIENT)
Dept: INFUSION THERAPY | Facility: MEDICAL CENTER | Age: 59
End: 2024-01-05

## 2024-01-05 ENCOUNTER — TELEPHONE (OUTPATIENT)
Dept: NEUROLOGY | Age: 59
End: 2024-01-05

## 2024-01-05 NOTE — TELEPHONE ENCOUNTER
OCREVUS IS APPROVED AND READY TO SCHEDULE FOR [facility]. PT IS CURRENTLY IN A SKILLED NURSING FACILITY D/T TO [facility]. WHEN PT IS DISCHARGED I WILL CALL PT TO GET HER SCHEDULED.

## 2024-01-05 NOTE — TELEPHONE ENCOUNTER
Madonna called the office to inform us of her new insurance due to the Ocrevus infusions she recieves. I verified the insurance in her chart during the call.      Please review.

## 2024-01-05 NOTE — TELEPHONE ENCOUNTER
Patient is calling stating she is asking to be put into a nursing home, she currently has home health but is asking to be placed in a nursing home to better take care of herself and get the help she needs.

## 2024-01-08 NOTE — TELEPHONE ENCOUNTER
Spoke with patient, offered her a referral for social determinants , and also recommended to reach out to the health department for another option on finding resources in the community to help and she declined and stated she wanted to just forget about it

## 2024-01-08 NOTE — TELEPHONE ENCOUNTER
Everything is noted in her chart. I called her to let her know that she can [facility] did have everything. She stated she will call their inf. center. The number is [phone]

## 2024-01-10 ENCOUNTER — TELEPHONE (OUTPATIENT)
Dept: ORTHOPEDIC SURGERY | Age: 59
End: 2024-01-10

## 2024-01-16 ENCOUNTER — TELEPHONE (OUTPATIENT)
Dept: ORTHOPEDIC SURGERY | Age: 59
End: 2024-01-16

## 2024-01-16 NOTE — TELEPHONE ENCOUNTER
Received call from patient requesting refill of her muscle relaxer. Pt stated she has 2 days worth left.    Please call into the [facility] on [address].    Please advise.    Call back#: [phone]

## 2024-01-16 NOTE — TELEPHONE ENCOUNTER
Patient has been advised on multiple occasions that Dr. Prakash will not be refilling medications. She should refer to pain management.

## 2024-01-17 ENCOUNTER — OFFICE VISIT (OUTPATIENT)
Dept: ORTHOPEDIC SURGERY | Age: 59
End: 2024-01-17

## 2024-01-17 VITALS — BODY MASS INDEX: 30.21 KG/M2 | WEIGHT: 160 LBS | HEIGHT: 61 IN

## 2024-01-17 DIAGNOSIS — S82.141D CLOSED FRACTURE OF RIGHT TIBIAL PLATEAU WITH ROUTINE HEALING, SUBSEQUENT ENCOUNTER: Primary | ICD-10-CM

## 2024-01-17 PROCEDURE — 99024 POSTOP FOLLOW-UP VISIT: CPT | Performed by: STUDENT IN AN ORGANIZED HEALTH CARE EDUCATION/TRAINING PROGRAM

## 2024-01-17 RX ORDER — TIZANIDINE 4 MG/1
4 TABLET ORAL 3 TIMES DAILY
Qty: 42 TABLET | Refills: 0 | Status: SHIPPED | OUTPATIENT
Start: 2024-01-17 | End: 2024-01-31

## 2024-01-17 NOTE — PROGRESS NOTES
[facility]  [address]  [place] [address]  Dept Phone: [phone]  Dept Fax: [phone]      Orthopaedic Trauma Clinic Follow Up      Subjective:   Date of Surgery: 11/21/2023    Madonna Manrique is a 58 y.o. year old female who presents to the clinic today for follow up status post open reduction internal fixation right bicondylar tibial plateau fracture.  Patient presents to clinic today with her .  Patient states she has continued right knee pain, and is inquiring when she can walk, as she states she has tried to walk, but has severe pain.  Patient does do home therapy where they do have been working on range of motion.  Patient states she has been placing a pillow under her knee as this has helped with her discomfort.  Patient did sustain a fall, which required a trip to the ED December 27, 2023, where a CT of the right lower extremity was performed, that failed to demonstrate any change in fracture alignment.  Patient has been to 1 pain management appointment, but is not received any help with pain control per the patient.  Patient's  is very frustrated, and thinks it is because of her past that she is not allowed to have any pain medication.  Patient is requesting refill of Zanaflex which she has been taking chronically for other issues.    Review of Systems  Gen: no fever, chills, malaise  CV: no chest pain or palpitations  Resp: no cough or shortness of breath  GI: no nausea, vomiting, diarrhea, or constipation  Neuro: no seizures, vertigo, or headache  Msk: Per HPI  10 remaining systems reviewed and negative    Objective :   There were no vitals filed for this visit.Body mass index is 30.25 kg/m².  General: No acute distress, resting comfortably in the clinic  Neuro: alert. oriented  Eyes: Extra-ocular muscles intact  Pulm: Respirations unlabored and regular.  Skin: warm, well perfused  Psych:   Patient has good fund of knowledge and displays

## 2024-01-18 ENCOUNTER — HOSPITAL ENCOUNTER (OUTPATIENT)
Facility: MEDICAL CENTER | Age: 59
Discharge: HOME | End: 2024-01-18
Payer: MEDICARE

## 2024-01-18 ENCOUNTER — TELEPHONE (OUTPATIENT)
Dept: NEUROLOGY | Age: 59
End: 2024-01-18

## 2024-01-18 ENCOUNTER — HOSPITAL ENCOUNTER (OUTPATIENT)
Dept: INFUSION THERAPY | Facility: MEDICAL CENTER | Age: 59
Discharge: HOME | End: 2024-01-18
Payer: MEDICARE

## 2024-01-18 VITALS — SYSTOLIC BLOOD PRESSURE: 102 MMHG | DIASTOLIC BLOOD PRESSURE: 48 MMHG | HEART RATE: 98 BPM

## 2024-01-18 DIAGNOSIS — G35 MULTIPLE SCLEROSIS (HCC): Primary | ICD-10-CM

## 2024-01-18 DIAGNOSIS — D50.8 IRON DEFICIENCY ANEMIA SECONDARY TO INADEQUATE DIETARY IRON INTAKE: ICD-10-CM

## 2024-01-18 DIAGNOSIS — M79.2 NEUROPATHIC PAIN: Primary | ICD-10-CM

## 2024-01-18 DIAGNOSIS — K90.9 IRON MALABSORPTION: ICD-10-CM

## 2024-01-18 LAB
ALBUMIN SERPL-MCNC: 3.6 G/DL (ref 3.5–5.2)
ALP SERPL-CCNC: 97 U/L (ref 35–104)
ALT SERPL-CCNC: 8 U/L (ref 5–33)
ANION GAP SERPL CALCULATED.3IONS-SCNC: 13 MMOL/L (ref 9–17)
AST SERPL-CCNC: 13 U/L
BASOPHILS # BLD: 0.05 K/UL (ref 0–0.2)
BASOPHILS NFR BLD: 1 %
BILIRUB SERPL-MCNC: 0.2 MG/DL (ref 0.3–1.2)
BUN SERPL-MCNC: 12 MG/DL (ref 6–20)
BUN/CREAT SERPL: 24 (ref 9–20)
CALCIUM SERPL-MCNC: 9.6 MG/DL (ref 8.6–10.4)
CHLORIDE SERPL-SCNC: 100 MMOL/L (ref 98–107)
CO2 SERPL-SCNC: 21 MMOL/L (ref 20–31)
CREAT SERPL-MCNC: 0.5 MG/DL (ref 0.5–0.9)
EOSINOPHIL # BLD: 0.36 K/UL (ref 0–0.4)
EOSINOPHILS RELATIVE PERCENT: 7 % (ref 1–4)
ERYTHROCYTE [DISTWIDTH] IN BLOOD BY AUTOMATED COUNT: 20.4 % (ref 11.8–14.4)
GFR SERPL CREATININE-BSD FRML MDRD: >60 ML/MIN/1.73M2
GLUCOSE SERPL-MCNC: 73 MG/DL (ref 70–99)
HCT VFR BLD AUTO: 38.7 % (ref 36.3–47.1)
HGB BLD-MCNC: 11.1 G/DL (ref 11.9–15.1)
IMM GRANULOCYTES # BLD AUTO: 0 K/UL (ref 0–0.3)
IMM GRANULOCYTES NFR BLD: 0 %
LYMPHOCYTES NFR BLD: 1.3 K/UL (ref 1–4.8)
LYMPHOCYTES RELATIVE PERCENT: 25 % (ref 24–44)
MCH RBC QN AUTO: 25.1 PG (ref 25.2–33.5)
MCHC RBC AUTO-ENTMCNC: 28.7 G/DL (ref 28.4–34.8)
MCV RBC AUTO: 87.6 FL (ref 82.6–102.9)
MONOCYTES NFR BLD: 0.47 K/UL (ref 0.2–0.8)
MONOCYTES NFR BLD: 9 % (ref 1–7)
MORPHOLOGY: ABNORMAL
NEUTROPHILS NFR BLD: 58 % (ref 36–66)
NEUTS SEG NFR BLD: 3.02 K/UL (ref 1.8–7.7)
NRBC BLD-RTO: 0 PER 100 WBC
PLATELET # BLD AUTO: 304 K/UL (ref 138–453)
PMV BLD AUTO: 9.7 FL (ref 8.1–13.5)
POTASSIUM SERPL-SCNC: 4.1 MMOL/L (ref 3.7–5.3)
PROT SERPL-MCNC: 6.5 G/DL (ref 6.4–8.3)
RBC # BLD AUTO: 4.42 M/UL (ref 3.95–5.11)
SODIUM SERPL-SCNC: 134 MMOL/L (ref 135–144)
WBC OTHER # BLD: 5.2 K/UL (ref 3.5–11.3)

## 2024-01-18 PROCEDURE — 2580000003 HC RX 258: Performed by: PSYCHIATRY & NEUROLOGY

## 2024-01-18 PROCEDURE — 96415 CHEMO IV INFUSION ADDL HR: CPT

## 2024-01-18 PROCEDURE — 96375 TX/PRO/DX INJ NEW DRUG ADDON: CPT

## 2024-01-18 PROCEDURE — 36415 COLL VENOUS BLD VENIPUNCTURE: CPT

## 2024-01-18 PROCEDURE — 6370000000 HC RX 637 (ALT 250 FOR IP): Performed by: PSYCHIATRY & NEUROLOGY

## 2024-01-18 PROCEDURE — 96413 CHEMO IV INFUSION 1 HR: CPT

## 2024-01-18 PROCEDURE — 85025 COMPLETE CBC W/AUTO DIFF WBC: CPT

## 2024-01-18 PROCEDURE — 6360000002 HC RX W HCPCS: Performed by: PSYCHIATRY & NEUROLOGY

## 2024-01-18 PROCEDURE — 80053 COMPREHEN METABOLIC PANEL: CPT

## 2024-01-18 RX ORDER — FAMOTIDINE 10 MG/ML
20 INJECTION, SOLUTION INTRAVENOUS
OUTPATIENT
Start: 2024-07-14

## 2024-01-18 RX ORDER — ACETAMINOPHEN 325 MG/1
650 TABLET ORAL
OUTPATIENT
Start: 2024-07-14

## 2024-01-18 RX ORDER — ALBUTEROL SULFATE 90 UG/1
4 AEROSOL, METERED RESPIRATORY (INHALATION) PRN
OUTPATIENT
Start: 2024-07-14

## 2024-01-18 RX ORDER — SODIUM CHLORIDE 9 MG/ML
INJECTION, SOLUTION INTRAVENOUS CONTINUOUS
OUTPATIENT
Start: 2024-07-14

## 2024-01-18 RX ORDER — DIPHENHYDRAMINE HYDROCHLORIDE 50 MG/ML
25 INJECTION INTRAMUSCULAR; INTRAVENOUS ONCE
Status: COMPLETED | OUTPATIENT
Start: 2024-01-18 | End: 2024-01-18

## 2024-01-18 RX ORDER — SODIUM CHLORIDE 9 MG/ML
5-250 INJECTION, SOLUTION INTRAVENOUS PRN
Status: DISCONTINUED | OUTPATIENT
Start: 2024-01-18 | End: 2024-01-19 | Stop reason: HOSPADM

## 2024-01-18 RX ORDER — DIPHENHYDRAMINE HYDROCHLORIDE 50 MG/ML
50 INJECTION INTRAMUSCULAR; INTRAVENOUS
OUTPATIENT
Start: 2024-07-14

## 2024-01-18 RX ORDER — HEPARIN 100 UNIT/ML
500 SYRINGE INTRAVENOUS PRN
OUTPATIENT
Start: 2024-07-14

## 2024-01-18 RX ORDER — SODIUM CHLORIDE 9 MG/ML
5-250 INJECTION, SOLUTION INTRAVENOUS PRN
OUTPATIENT
Start: 2024-07-14

## 2024-01-18 RX ORDER — ONDANSETRON 2 MG/ML
8 INJECTION INTRAMUSCULAR; INTRAVENOUS
OUTPATIENT
Start: 2024-07-14

## 2024-01-18 RX ORDER — EPINEPHRINE 1 MG/ML
0.3 INJECTION, SOLUTION INTRAMUSCULAR; SUBCUTANEOUS PRN
OUTPATIENT
Start: 2024-07-14

## 2024-01-18 RX ORDER — ACETAMINOPHEN 325 MG/1
650 TABLET ORAL ONCE
Status: COMPLETED | OUTPATIENT
Start: 2024-01-18 | End: 2024-01-18

## 2024-01-18 RX ORDER — DIPHENHYDRAMINE HYDROCHLORIDE 50 MG/ML
25 INJECTION INTRAMUSCULAR; INTRAVENOUS ONCE
OUTPATIENT
Start: 2024-07-14 | End: 2024-07-14

## 2024-01-18 RX ORDER — ACETAMINOPHEN 325 MG/1
650 TABLET ORAL ONCE
OUTPATIENT
Start: 2024-07-14 | End: 2024-07-14

## 2024-01-18 RX ORDER — SODIUM CHLORIDE 0.9 % (FLUSH) 0.9 %
5-40 SYRINGE (ML) INJECTION PRN
OUTPATIENT
Start: 2024-07-14

## 2024-01-18 RX ADMIN — METHYLPREDNISOLONE SODIUM SUCCINATE 125 MG: 125 INJECTION, POWDER, FOR SOLUTION INTRAMUSCULAR; INTRAVENOUS at 09:30

## 2024-01-18 RX ADMIN — DIPHENHYDRAMINE HYDROCHLORIDE 25 MG: 50 INJECTION INTRAMUSCULAR; INTRAVENOUS at 09:29

## 2024-01-18 RX ADMIN — OCRELIZUMAB 600 MG: 300 INJECTION INTRAVENOUS at 09:57

## 2024-01-18 RX ADMIN — SODIUM CHLORIDE 100 ML/HR: 9 INJECTION, SOLUTION INTRAVENOUS at 09:15

## 2024-01-18 RX ADMIN — ACETAMINOPHEN 650 MG: 325 TABLET ORAL at 09:29

## 2024-01-18 ASSESSMENT — PAIN SCALES - GENERAL: PAINLEVEL_OUTOF10: 0

## 2024-01-18 NOTE — TELEPHONE ENCOUNTER
Pt called in asking if we can send her Gabapentin? She said that her old neurologist had her on 900 mg TID. I told her that I do not see where she was prescribed that much. She said that if she can get any amount she will be ok with it.    Please advise, thanks.

## 2024-01-18 NOTE — PROGRESS NOTES
Patient arrived per wheelchair with  for Ocrevus infusion.  Patient denies complaints or concerns.  IV inserted per unit protocol.  Patient premedicated.  Ocrevus initiated at 40 ml/hr for 30 minutes with no sign adverse reaction.  Ocrevus increased to 80 ml/hr for 30 minutes with no sign adverse reaction.  Ocrevus increased to 120 ml/hr for 30 minutes with no sign adverse reaction.  Ocrevus increased to 160 ml/hr for 30 minutes with no sign adverse reaction.  Ocrevus increased to 200 ml for remainder of infusion with no sign adverse reaction;line flushed.  IV removed with pressure dressing applied.  Patient left unit per wheelchair with  at discharge.

## 2024-01-19 RX ORDER — GABAPENTIN 300 MG/1
900 CAPSULE ORAL 3 TIMES DAILY
Qty: 270 CAPSULE | Refills: 0 | Status: SHIPPED | OUTPATIENT
Start: 2024-01-19 | End: 2024-02-18

## 2024-01-19 NOTE — TELEPHONE ENCOUNTER
Patient called back in stating that she rather have Gabapentin. She stated that she is in a wheel chair so she didn't go to pain management and she done see them until feb 5th. She would like to know if she can get Gabapentin until her appt with pain management. Can you please advise

## 2024-01-29 ENCOUNTER — OFFICE VISIT (OUTPATIENT)
Dept: FAMILY MEDICINE CLINIC | Age: 59
End: 2024-01-29
Payer: MEDICARE

## 2024-01-29 VITALS
HEART RATE: 87 BPM | BODY MASS INDEX: 32.51 KG/M2 | DIASTOLIC BLOOD PRESSURE: 70 MMHG | WEIGHT: 172.2 LBS | SYSTOLIC BLOOD PRESSURE: 119 MMHG | HEIGHT: 61 IN

## 2024-01-29 DIAGNOSIS — G89.18 POSTOPERATIVE PAIN OF RIGHT KNEE: Primary | ICD-10-CM

## 2024-01-29 DIAGNOSIS — F31.9 BIPOLAR 1 DISORDER (HCC): ICD-10-CM

## 2024-01-29 DIAGNOSIS — I87.1 MAY-THURNER SYNDROME: ICD-10-CM

## 2024-01-29 DIAGNOSIS — M25.561 POSTOPERATIVE PAIN OF RIGHT KNEE: Primary | ICD-10-CM

## 2024-01-29 PROCEDURE — 3017F COLORECTAL CA SCREEN DOC REV: CPT

## 2024-01-29 PROCEDURE — G8482 FLU IMMUNIZE ORDER/ADMIN: HCPCS

## 2024-01-29 PROCEDURE — G8417 CALC BMI ABV UP PARAM F/U: HCPCS

## 2024-01-29 PROCEDURE — G8427 DOCREV CUR MEDS BY ELIG CLIN: HCPCS

## 2024-01-29 PROCEDURE — 4004F PT TOBACCO SCREEN RCVD TLK: CPT

## 2024-01-29 PROCEDURE — 99213 OFFICE O/P EST LOW 20 MIN: CPT

## 2024-01-29 RX ORDER — TIZANIDINE 4 MG/1
4 TABLET ORAL 3 TIMES DAILY
Qty: 42 TABLET | Refills: 0 | Status: SHIPPED | OUTPATIENT
Start: 2024-01-29 | End: 2024-02-12

## 2024-01-29 RX ORDER — HYDROCODONE BITARTRATE AND ACETAMINOPHEN 5; 325 MG/1; MG/1
1 TABLET ORAL 2 TIMES DAILY PRN
Qty: 14 TABLET | Refills: 0 | Status: SHIPPED | OUTPATIENT
Start: 2024-01-29 | End: 2024-02-05

## 2024-01-29 RX ORDER — TIRZEPATIDE 2.5 MG/.5ML
2.5 INJECTION, SOLUTION SUBCUTANEOUS WEEKLY
Qty: 2 ML | Refills: 0 | Status: SHIPPED | OUTPATIENT
Start: 2024-01-29 | End: 2024-02-26

## 2024-01-29 RX ORDER — NALOXONE HYDROCHLORIDE 2 MG/.4ML
2 INJECTION, SOLUTION INTRAMUSCULAR; SUBCUTANEOUS
Qty: 0.4 ML | Refills: 0 | Status: SHIPPED | OUTPATIENT
Start: 2024-01-29 | End: 2024-01-29

## 2024-01-29 ASSESSMENT — ENCOUNTER SYMPTOMS
ABDOMINAL PAIN: 0
COUGH: 0
DIARRHEA: 0
CONSTIPATION: 0
WHEEZING: 0
NAUSEA: 0
SHORTNESS OF BREATH: 0
VOMITING: 0

## 2024-01-29 NOTE — PROGRESS NOTES
Visit Information    Have you changed or started any medications since your last visit including any over-the-counter medicines, vitamins, or herbal medicines? no   Have you stopped taking any of your medications? Is so, why? -  no  Are you having any side effects from any of your medications? - no    Have you seen any other physician or provider since your last visit?  yes - Ortho   Have you had any other diagnostic tests since your last visit?  yes - Labs, CT, XR   Have you been seen in the emergency room and/or had an admission in a hospital since we last saw you?  yes - [facility]   Have you had your routine dental cleaning in the past 6 months?  no     Do you have an active MyChart account? If no, what is the barrier?  Yes    Patient Care Team:  Dioni Conn MD as PCP - General (Family Medicine)  Shaniqua Owens MD as Consulting Physician (Gastroenterology)  Tiana Silver as Financial Navigator    Medical History Review  Past Medical, Family, and Social History reviewed and does not contribute to the patient presenting condition    Health Maintenance   Topic Date Due    Hepatitis B vaccine (1 of 3 - 3-dose series) Never done    Low dose CT lung screening &/or counseling  Never done    Colorectal Cancer Screen  05/06/2021    Breast cancer screen  09/30/2022    Annual Wellness Visit (Medicare Advantage)  Never done    A1C test (Diabetic or Prediabetic)  11/19/2024    Lipids  12/17/2024    Depression Monitoring  12/21/2024    DTaP/Tdap/Td vaccine (3 - Td or Tdap) 06/13/2033    Flu vaccine  Completed    Shingles vaccine  Completed    Pneumococcal 0-64 years Vaccine  Completed    COVID-19 Vaccine  Completed    Hepatitis C screen  Completed    HIV screen  Completed    Hepatitis A vaccine  Aged Out    Hib vaccine  Aged Out    Polio vaccine  Aged Out    Meningococcal (ACWY) vaccine  Aged Out    Diabetes screen  Discontinued

## 2024-01-29 NOTE — PROGRESS NOTES
Madonna Manrique (:  1965) is a 58 y.o. female,Established patient, here for evaluation of the following chief complaint(s):  New Patient (NTP, need order for smaller walker with seat, will be walking in 4 week ), Leg Pain (On right leg ), and Weight Loss (Wants to be prescribed Tirzepatide for weight loss )         ASSESSMENT/PLAN:  1. Postoperative pain of right knee  -    Will give patient a short course of Norco until she is able to see pain management.  Patient was educated and counseled about benefit and risk of of opioids.  Patient was counseled about the complications of opioid including respiratory depression and addressed, CNS depression especially with benzodiazepines that she is already on.  Patient voiced understanding.  Patient that she takes the patient has been only as needed and not frequently.  Patient was given naloxone as needed for opioid overdose.   HYDROcodone-acetaminophen (NORCO) 5-325 MG per tablet; Take 1 tablet by mouth 2 times daily as needed for Pain for up to 7 days. Intended supply: 3 days. Take lowest dose possible to manage pain Max Daily Amount: 2 tablets, Disp-14 tablet, R-0Normal  2. May-Thurner syndrome  -Patient is on Xarelto  3. Bipolar 1 disorder (HCC)  -Stable.  Patient is multiple medications including Invega and lithium.  She follows up with psychiatry and gets her medications from there    Return in about 3 months (around 2024).         Subjective   SUBJECTIVE/OBJECTIVE:  58 years old patient with past medical history of multiple sclerosis, May-Thurner syndrome, DVT, bipolar disorder, recent tibia and fibula open fracture status post surgery came to the office complaining of severe pain after the surgery.  Patient was seen by orthopedics and was given pain medication for around 6 weeks.  Patient was referred for pain management.  Patient has an appointment with pain management on .  Patient is asking for some pain medication until she is able to

## 2024-01-29 NOTE — PATIENT INSTRUCTIONS
Thank you for letting us take care of you today. We hope all your questions were addressed. If a question was overlooked or something else comes to mind after you return home, please contact a member of your Care Team listed below.      Your Care Team at [facility] is Team #2  Rhona Jones M.D. (Faculty)  Lidya Jorge, (Resident)  Blake Motta, (Resident)  Sidney Faustin, (Resident)  Carrie Conn, (Resident)  Mabel Hopkins, (Resident)  Dayan Tilley, [facility]  Judd Rachel, [facility]  Oumou Dukes, [facility]  Sandra Tena, [facility]  Laila Zhou,  [facility]  Alis Cohn, [facility]  Shakira Park, [facility]  Shireen Carrasco, [facility]  Oswaldo (LJ) David WINSTON (Clinical Practice Manager)  Renea Ray [facility] (Clinical Pharmacist)     Office phone number: [phone]    If you need to get in right away due to illness, please be advised we have \"Same Day\" appointments available Monday-Friday. Please call us at [phone] option #3 to schedule your \"Same Day\" appointment.

## 2024-01-30 ENCOUNTER — HOSPITAL ENCOUNTER (OUTPATIENT)
Age: 59
Discharge: HOME | End: 2024-01-30
Payer: MEDICARE

## 2024-01-30 ENCOUNTER — HOSPITAL ENCOUNTER (OUTPATIENT)
Dept: MRI IMAGING | Age: 59
Discharge: HOME | End: 2024-02-01
Attending: PSYCHIATRY & NEUROLOGY
Payer: MEDICARE

## 2024-01-30 DIAGNOSIS — Z79.899 HIGH RISK MEDICATION USE: ICD-10-CM

## 2024-01-30 DIAGNOSIS — Z11.59 ENCOUNTER FOR SCREENING FOR OTHER VIRAL DISEASES: ICD-10-CM

## 2024-01-30 DIAGNOSIS — G35 MULTIPLE SCLEROSIS (HCC): ICD-10-CM

## 2024-01-30 DIAGNOSIS — Z11.59 NEED FOR HEPATITIS B SCREENING TEST: ICD-10-CM

## 2024-01-30 LAB
ALBUMIN SERPL-MCNC: 3.8 G/DL (ref 3.5–5.2)
ALP SERPL-CCNC: 93 U/L (ref 35–104)
ALT SERPL-CCNC: 9 U/L (ref 5–33)
ANION GAP SERPL CALCULATED.3IONS-SCNC: 10 MMOL/L (ref 9–17)
AST SERPL-CCNC: 12 U/L
BASOPHILS # BLD: 0.03 K/UL (ref 0–0.2)
BASOPHILS NFR BLD: 1 % (ref 0–2)
BILIRUB SERPL-MCNC: 0.2 MG/DL (ref 0.3–1.2)
BUN SERPL-MCNC: 13 MG/DL (ref 6–20)
BUN/CREAT SERPL: 26 (ref 9–20)
CALCIUM SERPL-MCNC: 9.7 MG/DL (ref 8.6–10.4)
CHLORIDE SERPL-SCNC: 99 MMOL/L (ref 98–107)
CO2 SERPL-SCNC: 28 MMOL/L (ref 20–31)
CREAT SERPL-MCNC: 0.5 MG/DL (ref 0.5–0.9)
EOSINOPHIL # BLD: 0.37 K/UL (ref 0–0.44)
EOSINOPHILS RELATIVE PERCENT: 6 % (ref 1–4)
ERYTHROCYTE [DISTWIDTH] IN BLOOD BY AUTOMATED COUNT: 19.9 % (ref 11.8–14.4)
GFR SERPL CREATININE-BSD FRML MDRD: >60 ML/MIN/1.73M2
GLUCOSE SERPL-MCNC: 92 MG/DL (ref 70–99)
HBV SURFACE AB SERPL IA-ACNC: <3.5 MIU/ML
HBV SURFACE AG SERPL QL IA: NONREACTIVE
HCT VFR BLD AUTO: 35 % (ref 36.3–47.1)
HGB BLD-MCNC: 10.3 G/DL (ref 11.9–15.1)
IMM GRANULOCYTES # BLD AUTO: <0.03 K/UL (ref 0–0.3)
IMM GRANULOCYTES NFR BLD: 0 %
LYMPHOCYTES NFR BLD: 1.37 K/UL (ref 1.1–3.7)
LYMPHOCYTES RELATIVE PERCENT: 23 % (ref 24–43)
MCH RBC QN AUTO: 24.4 PG (ref 25.2–33.5)
MCHC RBC AUTO-ENTMCNC: 29.4 G/DL (ref 28.4–34.8)
MCV RBC AUTO: 82.9 FL (ref 82.6–102.9)
MONOCYTES NFR BLD: 0.46 K/UL (ref 0.1–1.2)
MONOCYTES NFR BLD: 8 % (ref 3–12)
NEUTROPHILS NFR BLD: 62 % (ref 36–65)
NEUTS SEG NFR BLD: 3.85 K/UL (ref 1.5–8.1)
NRBC BLD-RTO: 0 PER 100 WBC
PLATELET # BLD AUTO: 306 K/UL (ref 138–453)
PMV BLD AUTO: 10 FL (ref 8.1–13.5)
POTASSIUM SERPL-SCNC: 4.3 MMOL/L (ref 3.7–5.3)
PROT SERPL-MCNC: 6.5 G/DL (ref 6.4–8.3)
RBC # BLD AUTO: 4.22 M/UL (ref 3.95–5.11)
RBC # BLD: ABNORMAL 10*6/UL
SODIUM SERPL-SCNC: 137 MMOL/L (ref 135–144)
WBC OTHER # BLD: 6.1 K/UL (ref 3.5–11.3)

## 2024-01-30 PROCEDURE — A9579 GAD-BASE MR CONTRAST NOS,1ML: HCPCS | Performed by: PSYCHIATRY & NEUROLOGY

## 2024-01-30 PROCEDURE — 82784 ASSAY IGA/IGD/IGG/IGM EACH: CPT

## 2024-01-30 PROCEDURE — 72157 MRI CHEST SPINE W/O & W/DYE: CPT

## 2024-01-30 PROCEDURE — 86317 IMMUNOASSAY INFECTIOUS AGENT: CPT

## 2024-01-30 PROCEDURE — 36415 COLL VENOUS BLD VENIPUNCTURE: CPT

## 2024-01-30 PROCEDURE — 87340 HEPATITIS B SURFACE AG IA: CPT

## 2024-01-30 PROCEDURE — 70553 MRI BRAIN STEM W/O & W/DYE: CPT

## 2024-01-30 PROCEDURE — 80053 COMPREHEN METABOLIC PANEL: CPT

## 2024-01-30 PROCEDURE — 86704 HEP B CORE ANTIBODY TOTAL: CPT

## 2024-01-30 PROCEDURE — 85025 COMPLETE CBC W/AUTO DIFF WBC: CPT

## 2024-01-30 PROCEDURE — 86480 TB TEST CELL IMMUN MEASURE: CPT

## 2024-01-30 PROCEDURE — 6360000004 HC RX CONTRAST MEDICATION: Performed by: PSYCHIATRY & NEUROLOGY

## 2024-01-30 RX ADMIN — GADOTERIDOL 15 ML: 279.3 INJECTION, SOLUTION INTRAVENOUS at 16:40

## 2024-01-30 NOTE — PROGRESS NOTES
Attending Physician Statement  I  have discussed the care of Madonna Manrique including pertinent history and exam findings with the resident. I agree with the assessment, plan and orders as documented by the resident.      /70 (Site: Right Upper Arm, Position: Sitting, Cuff Size: Large Adult)   Pulse 87   Ht 1.549 m (5' 0.98\")   Wt 78.1 kg (172 lb 3.2 oz)   BMI 32.55 kg/m²    BP Readings from Last 3 Encounters:   01/29/24 119/70   01/18/24 (!) 102/48   12/27/23 (!) 140/75     Wt Readings from Last 3 Encounters:   01/29/24 78.1 kg (172 lb 3.2 oz)   01/17/24 72.6 kg (160 lb)   12/27/23 72.6 kg (160 lb)          Diagnosis Orders   1. Postoperative pain of right knee  HYDROcodone-acetaminophen (NORCO) 5-325 MG per tablet      2. May-Thurner syndrome        3. Bipolar 1 disorder (HCC)                Rhona Jones MD 1/30/2024 1:46 PM

## 2024-01-31 ENCOUNTER — TELEPHONE (OUTPATIENT)
Dept: FAMILY MEDICINE CLINIC | Age: 59
End: 2024-01-31

## 2024-01-31 LAB
HBV CORE AB SER QL: NONREACTIVE
IGG SERPL-MCNC: 596 MG/DL (ref 700–1600)
IGM SERPL-MCNC: 41 MG/DL (ref 40–230)

## 2024-01-31 NOTE — TELEPHONE ENCOUNTER
PA request received for Ziyad BURR processed and submitted to pt insurance, waiting for response in regards to medication coverage

## 2024-02-02 LAB
QUANTI TB GOLD PLUS: NEGATIVE
QUANTI TB1 MINUS NIL: 0.01 IU/ML (ref 0–0.34)
QUANTI TB2 MINUS NIL: 0.01 IU/ML (ref 0–0.34)
QUANTIFERON MITOGEN: 8.72 IU/ML
QUANTIFERON NIL: 0.02 IU/ML

## 2024-02-05 ENCOUNTER — INITIAL CONSULT (OUTPATIENT)
Dept: PAIN MANAGEMENT | Age: 59
End: 2024-02-05
Payer: MEDICARE

## 2024-02-05 VITALS — BODY MASS INDEX: 32.51 KG/M2 | HEIGHT: 61 IN | WEIGHT: 172.2 LBS

## 2024-02-05 DIAGNOSIS — M25.561 CHRONIC PAIN OF RIGHT KNEE: Primary | ICD-10-CM

## 2024-02-05 DIAGNOSIS — Z79.891 ENCOUNTER FOR LONG-TERM OPIATE ANALGESIC USE: ICD-10-CM

## 2024-02-05 DIAGNOSIS — G89.29 CHRONIC PAIN OF RIGHT KNEE: Primary | ICD-10-CM

## 2024-02-05 DIAGNOSIS — G89.29 OTHER CHRONIC PAIN: ICD-10-CM

## 2024-02-05 PROCEDURE — 3017F COLORECTAL CA SCREEN DOC REV: CPT | Performed by: PAIN MEDICINE

## 2024-02-05 PROCEDURE — G8417 CALC BMI ABV UP PARAM F/U: HCPCS | Performed by: PAIN MEDICINE

## 2024-02-05 PROCEDURE — G8427 DOCREV CUR MEDS BY ELIG CLIN: HCPCS | Performed by: PAIN MEDICINE

## 2024-02-05 PROCEDURE — 99214 OFFICE O/P EST MOD 30 MIN: CPT | Performed by: PAIN MEDICINE

## 2024-02-05 PROCEDURE — G8482 FLU IMMUNIZE ORDER/ADMIN: HCPCS | Performed by: PAIN MEDICINE

## 2024-02-05 PROCEDURE — 4004F PT TOBACCO SCREEN RCVD TLK: CPT | Performed by: PAIN MEDICINE

## 2024-02-05 RX ORDER — NALOXONE HYDROCHLORIDE 4 MG/.1ML
1 SPRAY NASAL PRN
COMMUNITY
Start: 2024-02-02

## 2024-02-05 RX ORDER — PALIPERIDONE 3 MG/1
3 TABLET, EXTENDED RELEASE ORAL EVERY MORNING
COMMUNITY
Start: 2024-01-29

## 2024-02-05 NOTE — PROGRESS NOTES
for 30 days. Intended supply: 90 days, Disp: 270 capsule, Rfl: 0    XARELTO 20 MG TABS tablet, , Disp: , Rfl:     FEROSUL 325 (65 Fe) MG tablet, , Disp: , Rfl:     diclofenac sodium (VOLTAREN) 1 % GEL, , Disp: , Rfl:     buPROPion (WELLBUTRIN XL) 150 MG extended release tablet, , Disp: , Rfl:     trospium (SANCTURA) 20 MG tablet, , Disp: , Rfl:     vitamin D (ERGOCALCIFEROL) 1.25 MG (90596 UT) CAPS capsule, Take 1 capsule by mouth once a week, Disp: 8 capsule, Rfl: 1    diazePAM (VALIUM) 5 MG tablet, Take 1 tablet by mouth daily as needed for Anxiety., Disp: , Rfl:     FLUoxetine (PROZAC) 40 MG capsule, Take 1 capsule by mouth daily, Disp: , Rfl:     INVEGA SUSTENNA 156 MG/ML OMAR IM injection, Inject 1 Milliliter By Intramuscular Route 1 time per month, Disp: , Rfl:     amphetamine-dextroamphetamine (ADDERALL) 15 MG tablet, Take 1 tablet by mouth 2 times daily., Disp: , Rfl:     fluticasone furoate-vilanterol (BREO ELLIPTA) 100-25 MCG/ACT inhaler, , Disp: , Rfl:     LORazepam (ATIVAN) 0.5 MG tablet, Take 1 tablet by mouth 2 times daily as needed for Anxiety., Disp: , Rfl:     NURTEC 75 MG TBDP, , Disp: , Rfl:     zolpidem (AMBIEN) 10 MG tablet, Take 1 tablet by mouth nightly as needed for Sleep., Disp: , Rfl:     acetaminophen (TYLENOL) 500 MG tablet, Take 2 tablets by mouth every 6 hours as needed for Pain, Disp: 120 tablet, Rfl: 0    pantoprazole (PROTONIX) 40 MG tablet, Take one tablet once daily 30-60 minutes prior to a meal, Disp: 30 tablet, Rfl: 3    albuterol sulfate  (90 Base) MCG/ACT inhaler, INHALE 2 PUFFS INTO THE LUNGS EVERY 6 HOURS AS NEEDED FOR WHEEZING OR SHORTNESS OF BREATH , Disp: 1 Inhaler, Rfl: 3    atropine 1 % ophthalmic solution, , Disp: , Rfl:     prednisoLONE acetate (PRED FORTE) 1 % ophthalmic suspension, , Disp: , Rfl:     Family History   Problem Relation Age of Onset    Heart Disease Mother     Neuropathy Father     Migraines Sister     Alzheimer's Disease Neg Hx     Dementia Neg

## 2024-02-06 ENCOUNTER — TELEPHONE (OUTPATIENT)
Dept: FAMILY MEDICINE CLINIC | Age: 59
End: 2024-02-06

## 2024-02-06 DIAGNOSIS — M79.2 NEUROPATHIC PAIN: ICD-10-CM

## 2024-02-06 DIAGNOSIS — G89.18 POSTOPERATIVE PAIN OF RIGHT KNEE: Primary | ICD-10-CM

## 2024-02-06 DIAGNOSIS — M25.561 POSTOPERATIVE PAIN OF RIGHT KNEE: Primary | ICD-10-CM

## 2024-02-06 RX ORDER — GABAPENTIN 300 MG/1
600 CAPSULE ORAL 3 TIMES DAILY
Qty: 180 CAPSULE | Refills: 0 | Status: SHIPPED | OUTPATIENT
Start: 2024-02-06 | End: 2024-03-07

## 2024-02-06 RX ORDER — TIZANIDINE 4 MG/1
4 TABLET ORAL 3 TIMES DAILY
Qty: 42 TABLET | Refills: 0 | OUTPATIENT
Start: 2024-02-06 | End: 2024-02-20

## 2024-02-06 NOTE — TELEPHONE ENCOUNTER
----- Message from Meeta Flower sent at 2/5/2024  3:04 PM EST -----  Subject: Referral Request    Reason for referral request? Patient said, she went to see the pain   Management doctor that her provider referred her too but they don't do   post op pain management. So she wants the provider to referred her to   someone else and she wants her doctor to refer her to someone who can   check your back, right leg, knee down, and more.   Provider patient wants to be referred to(if known): Judd Valdes    Provider Phone Number(if known):    Additional Information for Provider? Fax number is [phone] The second   one is: Olman demarco   ---------------------------------------------------------------------------  --------------  CALL BACK INFO    [phone]; OK to leave message on voicemail  ---------------------------------------------------------------------------  --------------

## 2024-02-06 NOTE — TELEPHONE ENCOUNTER
Last visit: 1-29-24  Last Med refill: 1-19-24  Does patient have enough medication for 72 hours: Yes    Next Visit Date:  Future Appointments   Date Time Provider Department Center   2/21/2024  2:00 PM Seferino Prakash, DO ORTHO SPECIA [facility]   3/11/2024  1:20 PM Soraida Stone, DO Neuro Spec Neurology -   4/29/2024  2:00 PM [facility]       Health Maintenance   Topic Date Due    Hepatitis B vaccine (1 of 3 - 3-dose series) Never done    Low dose CT lung screening &/or counseling  Never done    Colorectal Cancer Screen  05/06/2021    Breast cancer screen  09/30/2022    Annual Wellness Visit (Medicare Advantage)  Never done    A1C test (Diabetic or Prediabetic)  11/19/2024    Lipids  12/17/2024    Depression Monitoring  12/21/2024    DTaP/Tdap/Td vaccine (3 - Td or Tdap) 06/13/2033    Flu vaccine  Completed    Shingles vaccine  Completed    Pneumococcal 0-64 years Vaccine  Completed    COVID-19 Vaccine  Completed    Hepatitis C screen  Completed    HIV screen  Completed    Hepatitis A vaccine  Aged Out    Hib vaccine  Aged Out    Polio vaccine  Aged Out    Meningococcal (ACWY) vaccine  Aged Out    Diabetes screen  Discontinued       Hemoglobin A1C (%)   Date Value   11/19/2023 5.7   12/29/2020 5.0             ( goal A1C is < 7)   No components found for: \"LABMICR\"  LDL Cholesterol (mg/dL)   Date Value   12/17/2019 110       (goal LDL is <100)   AST (U/L)   Date Value   01/30/2024 12     ALT (U/L)   Date Value   01/30/2024 9     BUN (mg/dL)   Date Value   01/30/2024 13     BP Readings from Last 3 Encounters:   01/29/24 119/70   01/18/24 (!) 102/48   12/27/23 (!) 140/75          (goal 120/80)    All Future Testing planned in [facility]  Lab Frequency Next Occurrence   CBC With Auto Differential Once 07/18/2024   Comprehensive metabolic panel Once 07/18/2024   Hemoglobin and Hematocrit, Blood, Post Transfusion POST TRANSFUSION    CBC Auto Differential q 3 months    Iron and TIBC q 3 months

## 2024-02-08 ENCOUNTER — TELEPHONE (OUTPATIENT)
Dept: FAMILY MEDICINE CLINIC | Age: 59
End: 2024-02-08

## 2024-02-08 NOTE — TELEPHONE ENCOUNTER
Patient need new referral to a different doctor.  Dr. Valdes office called to inform you that Dr. Valdes office has refused the patient referral because in the passed they office has dismissed the patient for being loud, rude and leaving out office.

## 2024-02-09 DIAGNOSIS — M25.561 POSTOPERATIVE PAIN OF RIGHT KNEE: Primary | ICD-10-CM

## 2024-02-09 DIAGNOSIS — G89.18 POSTOPERATIVE PAIN OF RIGHT KNEE: Primary | ICD-10-CM

## 2024-02-09 NOTE — TELEPHONE ENCOUNTER
Patient is requesting vicodin until she can get in to [facility]  Pain management.  
3 months    Comprehensive Metabolic Panel Every 12 Weeks 4/26/2024, 7/19/2024, 10/11/2024   CBC with Auto Differential Every 12 Weeks 4/26/2024, 7/19/2024, 10/11/2024               Patient Active Problem List:     Overdose of drug/medicinal substance, accidental or unintentional, initial encounter     Chronic anticoagulation     Multifocal pneumonia     Pulmonary hypertension (HCC)     Mild cardiomegaly     Borderline personality disorder (HCC)     Bipolar 1 disorder (HCC)     Recurrent major depressive disorder, in remission (HCC)     Anxiety disorder     Transient alteration of awareness     Symptomatic anemia     Hyponatremia     Hypocalcemia     Hypoalbuminemia     COVID-19 virus not detected     Encephalopathy, metabolic     Iron deficiency anemia secondary to inadequate dietary iron intake     Iron malabsorption     History of psychiatric disorder     Chronic bilateral low back pain with left-sided sciatica     H/O opioid abuse (HCC)     Polypharmacy     Tremor due to multiple drugs     Serotonin syndrome     Confusion     Asthma with acute exacerbation     May-Thurner syndrome     Migraine without status migrainosus, not intractable     Change in mental status     Multiple sclerosis (HCC)     Tibia and fibula open fracture, right, type I or II, initial encounter     Tibia/fibula fracture, right, closed, initial encounter     Acute respiratory failure with hypoxia and hypercapnia (HCC)     Alcohol use disorder, severe, dependence (HCC)     Anastomotic ulcer S/P gastric bypass     Bipolar affective disorder, currently depressed, moderate (HCC)     Chronic cholecystitis with calculus     Deep venous thrombosis of peroneal vein (HCC)     Displacement of lumbar intervertebral disc without myelopathy     Generalized osteoarthritis     GERD (gastroesophageal reflux disease)           Please address the medication refill and close the encounter.  If I can be of assistance, please route to the applicable pool.

## 2024-02-10 RX ORDER — TIZANIDINE 4 MG/1
4 TABLET ORAL 3 TIMES DAILY
Qty: 42 TABLET | Refills: 0 | Status: SHIPPED | OUTPATIENT
Start: 2024-02-10 | End: 2024-02-24

## 2024-02-13 ENCOUNTER — TELEPHONE (OUTPATIENT)
Dept: FAMILY MEDICINE CLINIC | Age: 59
End: 2024-02-13

## 2024-02-13 NOTE — TELEPHONE ENCOUNTER
----- Message from Kamilah Doherty sent at 2/13/2024 10:01 AM EST -----  Subject: Referral Request    Reason for referral request? Request Pain Management Referral change  Provider patient wants to be referred to(if known): Judd Valdes    Provider Phone Number(if known):    Additional Information for Provider? For Chronic pain, MS pain, Muscular   pain needs to be listed, not just post-op. Per insurance, referral needs   to be outside of [facility]. Referral needs to go to fax [phone]. Please   call pt when ready asap.  ---------------------------------------------------------------------------  --------------  CALL BACK INFO    [phone]; OK to leave message on voicemail  ---------------------------------------------------------------------------  --------------

## 2024-02-14 NOTE — TELEPHONE ENCOUNTER
PC from pt stating that she can't go to [facility] as she has to go outside of [facility], stated she would like to go to Dr. Valdes, writer relayed message that she had been dismissed from their office and would be unable to return, patient then stated that she would like a referral to Dr. Olman Sauer in [place], [place] as they are in her network as well. Referral pended (still needs pertinent information added). Callback number confirmed. Please review and advise.     Dr. Olman Sauer M.D.  [facility]  [address], [place], [place] [address]  Ph: [phone]  Fx: [phone]

## 2024-02-16 DIAGNOSIS — M25.561 POSTOPERATIVE PAIN OF RIGHT KNEE: Primary | ICD-10-CM

## 2024-02-16 DIAGNOSIS — G89.18 POSTOPERATIVE PAIN OF RIGHT KNEE: Primary | ICD-10-CM

## 2024-02-21 ENCOUNTER — OFFICE VISIT (OUTPATIENT)
Dept: ORTHOPEDIC SURGERY | Age: 59
End: 2024-02-21
Payer: MEDICARE

## 2024-02-21 VITALS — HEIGHT: 60 IN | WEIGHT: 172 LBS | BODY MASS INDEX: 33.77 KG/M2

## 2024-02-21 DIAGNOSIS — S82.141D CLOSED FRACTURE OF RIGHT TIBIAL PLATEAU WITH ROUTINE HEALING, SUBSEQUENT ENCOUNTER: Primary | ICD-10-CM

## 2024-02-21 PROCEDURE — G8427 DOCREV CUR MEDS BY ELIG CLIN: HCPCS | Performed by: STUDENT IN AN ORGANIZED HEALTH CARE EDUCATION/TRAINING PROGRAM

## 2024-02-21 PROCEDURE — G8482 FLU IMMUNIZE ORDER/ADMIN: HCPCS | Performed by: STUDENT IN AN ORGANIZED HEALTH CARE EDUCATION/TRAINING PROGRAM

## 2024-02-21 PROCEDURE — 99213 OFFICE O/P EST LOW 20 MIN: CPT | Performed by: STUDENT IN AN ORGANIZED HEALTH CARE EDUCATION/TRAINING PROGRAM

## 2024-02-21 PROCEDURE — G8417 CALC BMI ABV UP PARAM F/U: HCPCS | Performed by: STUDENT IN AN ORGANIZED HEALTH CARE EDUCATION/TRAINING PROGRAM

## 2024-02-21 PROCEDURE — 4004F PT TOBACCO SCREEN RCVD TLK: CPT | Performed by: STUDENT IN AN ORGANIZED HEALTH CARE EDUCATION/TRAINING PROGRAM

## 2024-02-21 PROCEDURE — 3017F COLORECTAL CA SCREEN DOC REV: CPT | Performed by: STUDENT IN AN ORGANIZED HEALTH CARE EDUCATION/TRAINING PROGRAM

## 2024-02-23 ENCOUNTER — TELEPHONE (OUTPATIENT)
Dept: FAMILY MEDICINE CLINIC | Age: 59
End: 2024-02-23

## 2024-02-23 DIAGNOSIS — G89.18 POSTOPERATIVE PAIN OF RIGHT KNEE: ICD-10-CM

## 2024-02-23 DIAGNOSIS — M25.561 POSTOPERATIVE PAIN OF RIGHT KNEE: ICD-10-CM

## 2024-02-23 RX ORDER — TIRZEPATIDE 5 MG/.5ML
5 INJECTION, SOLUTION SUBCUTANEOUS WEEKLY
Qty: 4 ADJUSTABLE DOSE PRE-FILLED PEN SYRINGE | Refills: 0 | Status: SHIPPED | OUTPATIENT
Start: 2024-02-23

## 2024-02-23 RX ORDER — TIRZEPATIDE 2.5 MG/.5ML
5 INJECTION, SOLUTION SUBCUTANEOUS WEEKLY
Qty: 4 ML | Refills: 0 | Status: SHIPPED | OUTPATIENT
Start: 2024-02-23 | End: 2024-02-23 | Stop reason: ALTCHOICE

## 2024-02-23 RX ORDER — TIZANIDINE 4 MG/1
4 TABLET ORAL 3 TIMES DAILY
Qty: 42 TABLET | Refills: 0 | Status: SHIPPED | OUTPATIENT
Start: 2024-02-23 | End: 2024-03-08

## 2024-02-23 RX ORDER — HYDROCODONE BITARTRATE AND ACETAMINOPHEN 5; 325 MG/1; MG/1
1 TABLET ORAL 2 TIMES DAILY PRN
Qty: 14 TABLET | Refills: 0 | OUTPATIENT
Start: 2024-02-23 | End: 2024-03-01

## 2024-02-23 NOTE — PROGRESS NOTES
studies from today were independently reviewed and read as listed below. Any relevant images obtained prior to today's visit were also independently interpreted.        History: 58-year-old female status post open reduction internal fixation right bicondylar tibial plateau fracture    Comparison: 1/17/2024    Findings: 3 views of the right knee (AP/lateral/oblique) in a skeletally mature patient showing redemonstration orthopedic hardware in the form plate and screws to right tibia.  No signs of hardware failure or loosening.  Interval callus formation visualized with compared to prior radiographs.  Interval signs of healing of proximal fibula fracture.  No acute orthopedic complication visualized.    Impression: Stable hardware right tibia with interval healing     Assessment:   58 y.o. year old female status post open reduction internal fixation right bicondylar tibial plateau fracture  Plan:   Lengthy discussion had with patient about current clinical state.  Imaging reviewed with the patient patient's .  At this time, patient may begin weightbearing as tolerated to the right lower extremity.  Given patient's feelings of instability, I do feel that a brace would benefit patient at least in the acute period of ambulation.  Hinged knee brace was provided.  Physical therapy instructions were provided patient as well.  Ice for pain and swelling.  I discussed with the patient that in terms of the left lower extremity, we can discuss hardware removal at a future date.  I would prefer patient have full ambulation on the right lower extremity prior to discussing removal of any hardware in the left to give patient a leg to stand on.  Patient expressed understanding patient will return in 3 months or sooner if any acute issues arise.  All questions answered.  Patient is amenable to this plan.        Electronically signed by Seferino Prakash DO on 2/23/2024 at 8:29 AM    This note is created with the assistance of

## 2024-02-23 NOTE — TELEPHONE ENCOUNTER
----- Message from Erik Chen sent at 2/23/2024  9:53 AM EST -----  Subject: Refill Request    QUESTIONS  Name of Medication? tiZANidine (ZANAFLEX) 4 MG tablet  Patient-reported dosage and instructions? 4 mg  How many days do you have left? 0  Preferred Pharmacy? [facility] [id number]  Pharmacy phone number (if available)? [phone]  ---------------------------------------------------------------------------  --------------  CALL BACK INFO  What is the best way for the office to contact you? OK to leave message on   voicemail  Preferred Call Back Phone Number? [phone]  ---------------------------------------------------------------------------  --------------  SCRIPT ANSWERS  Relationship to Patient? Self

## 2024-02-23 NOTE — TELEPHONE ENCOUNTER
Last visit: 1/29/2024  Last Med refill: 1/29/2024  Does patient have enough medication for 72 hours: no    Next Visit Date:  Future Appointments   Date Time Provider Department Center   3/11/2024  1:20 PM Soraida Stone, DO Neuro Spec Neurology -   4/29/2024  2:00 PM Dioni Conn MD [facility]   5/22/2024  1:45 PM Seferino Prakash, DO ORTHO SPECIA [facility]       Health Maintenance   Topic Date Due    Hepatitis B vaccine (1 of 3 - 3-dose series) Never done    Low dose CT lung screening &/or counseling  Never done    Colorectal Cancer Screen  05/06/2021    Breast cancer screen  09/30/2022    Annual Wellness Visit (Medicare Advantage)  Never done    A1C test (Diabetic or Prediabetic)  11/19/2024    Lipids  12/17/2024    Depression Monitoring  12/21/2024    DTaP/Tdap/Td vaccine (3 - Td or Tdap) 06/13/2033    Flu vaccine  Completed    Shingles vaccine  Completed    Pneumococcal 0-64 years Vaccine  Completed    COVID-19 Vaccine  Completed    Hepatitis C screen  Completed    HIV screen  Completed    Hepatitis A vaccine  Aged Out    Hib vaccine  Aged Out    Polio vaccine  Aged Out    Meningococcal (ACWY) vaccine  Aged Out    Diabetes screen  Discontinued       Hemoglobin A1C (%)   Date Value   11/19/2023 5.7   12/29/2020 5.0             ( goal A1C is < 7)   No components found for: \"LABMICR\"  LDL Cholesterol (mg/dL)   Date Value   12/17/2019 110       (goal LDL is <100)   AST (U/L)   Date Value   01/30/2024 12     ALT (U/L)   Date Value   01/30/2024 9     BUN (mg/dL)   Date Value   01/30/2024 13     BP Readings from Last 3 Encounters:   01/29/24 119/70   01/18/24 (!) 102/48   12/27/23 (!) 140/75          (goal 120/80)    All Future Testing planned in [facility]  Lab Frequency Next Occurrence   CBC With Auto Differential Once 07/14/2024   Comprehensive metabolic panel Once 07/14/2024   Hemoglobin and Hematocrit, Blood, Post Transfusion POST TRANSFUSION    CBC Auto Differential q 3 months    Iron and TIBC q 3

## 2024-02-23 NOTE — TELEPHONE ENCOUNTER
----- Message from Erik Chen sent at 2/23/2024  9:58 AM EST -----  Subject: Refill Request    QUESTIONS  Name of Medication? Tirzepatide (MOUNJARO) 2.5 MG/0.5ML SOPN SC injection  Patient-reported dosage and instructions? 5 mg   How many days do you have left? 0  [id number]  Pharmacy phone number (if available)? [phone]  ---------------------------------------------------------------------------  --------------,  Name of Medication? HYDROcodone-acetaminophen (NORCO) 5-325 MG per tablet  Patient-reported dosage and instructions? 5 - 325 mg  How many days do you have left? 0  [email or web address] [id number]  Pharmacy phone number (if available)? [phone]  Additional Information for Provider? patient ask if the doctor could   refill medication for norco cant put any pressure on her legs at this time   has been in wheel chair for five months pain medication doctor she cant   get into for two weeks   ---------------------------------------------------------------------------  --------------  CALL BACK INFO  What is the best way for the office to contact you? OK to leave message on   voicemail  Preferred Call Back Phone Number? [phone]  ---------------------------------------------------------------------------  --------------  SCRIPT ANSWERS  Relationship to Patient? Self

## 2024-02-23 NOTE — TELEPHONE ENCOUNTER
Patient contacted office and stated provider stated the mounjaro was to be the 5mg. The medication that was called in was for the 2.5mg. Writer spoke to provider and provider change RX. Writer informed patient. Patient will call pharmacy.       Patient wanted to know why provider didn't prescribe the norco for her. Writer informed per provider that patient it to follow up with pain management. Provider placed external referral  per patient request and with the information patient requested. patient was discharged prior from the first two referrals provider placed. Patient voiced understanding.

## 2024-03-01 ENCOUNTER — TELEPHONE (OUTPATIENT)
Dept: ORTHOPEDIC SURGERY | Age: 59
End: 2024-03-01

## 2024-03-01 DIAGNOSIS — S82.141D CLOSED FRACTURE OF RIGHT TIBIAL PLATEAU WITH ROUTINE HEALING, SUBSEQUENT ENCOUNTER: Primary | ICD-10-CM

## 2024-03-01 NOTE — TELEPHONE ENCOUNTER
Pt would like a referral for outpt PT at [facility].  Please order and call pt to let her know that order has been placed.

## 2024-03-05 ENCOUNTER — TELEPHONE (OUTPATIENT)
Dept: FAMILY MEDICINE CLINIC | Age: 59
End: 2024-03-05

## 2024-03-05 ENCOUNTER — HOSPITAL ENCOUNTER (OUTPATIENT)
Dept: PHYSICAL THERAPY | Facility: CLINIC | Age: 59
Setting detail: THERAPIES SERIES
Discharge: HOME | End: 2024-03-05
Payer: MEDICARE

## 2024-03-05 PROCEDURE — 97016 VASOPNEUMATIC DEVICE THERAPY: CPT

## 2024-03-05 PROCEDURE — 97161 PT EVAL LOW COMPLEX 20 MIN: CPT

## 2024-03-05 PROCEDURE — 97110 THERAPEUTIC EXERCISES: CPT

## 2024-03-05 NOTE — CONSULTS
Time Out: 1100    Electronically signed by: Joseph Perales PT        Physician Signature:________________________________Date:__________________  By signing above or cosigning this note, I have reviewed this plan of care and certify a need for medically necessary rehabilitation services.     *PLEASE SIGN ABOVE AND FAX BACK ALL PAGES*

## 2024-03-05 NOTE — TELEPHONE ENCOUNTER
Patient called in this morning to see if she can get a script for a Rollator with a seat. Patient states the script will need to be faxed to [facility] center fax number [phone]. Please Advise.

## 2024-03-06 ENCOUNTER — TELEPHONE (OUTPATIENT)
Dept: FAMILY MEDICINE CLINIC | Age: 59
End: 2024-03-06

## 2024-03-06 NOTE — TELEPHONE ENCOUNTER
Patient has called back looking for a Rollator walker. Writer looked into chart but  do not see the rollator walker order for patient.

## 2024-03-07 ENCOUNTER — TRANSCRIBE ORDERS (OUTPATIENT)
Dept: ADMINISTRATIVE | Age: 59
End: 2024-03-07

## 2024-03-07 DIAGNOSIS — M54.16 LUMBAR RADICULOPATHY: Primary | ICD-10-CM

## 2024-03-14 ENCOUNTER — HOSPITAL ENCOUNTER (OUTPATIENT)
Dept: PHYSICAL THERAPY | Facility: CLINIC | Age: 59
Setting detail: THERAPIES SERIES
Discharge: HOME | End: 2024-03-14
Payer: MEDICARE

## 2024-03-14 NOTE — FLOWSHEET NOTE
[] [facility]  Outpatient Rehabilitation &  Therapy  [address]  [phone]  [phone] [x] [facility]  Outpatient Rehabilitation &  Therapy  [address]  P: [phone]  F: [phone] [] [facility]  Outpatient Rehabilitation &  Therapy  [address]  P: [phone]  F: [phone] [] [facility]  Outpatient Rehabilitation &  Therapy  [address]  [phone]  [phone] [] [facility]  Outpatient Rehabilitation &  Therapy  [address]   P: [phone]  F: [phone]  [] [facility]  Outpatient Rehabilitation &  Therapy  [address]  P: [phone]  F: [phone] [] [facility]  Outpatient Rehabilitation &  Therapy  [address]  P: [phone]  F: [phone] [] [facility]  Outpatient Rehabilitation &  Therapy  [address]  P: [phone]  F: [phone] [] [facility]  Outpatient Rehabilitation &  Therapy  [address]  P: [phone]  F: [phone]  [] [facility] Outpatient Rehabilitation &  Therapy  [address]  P: [phone]  F: [phone]     Therapy Cancel/No Show note    Date: 3/14/2024  Patient: Madonna Manrique  : 1965  MRN: [id number]    Cancels/No Shows to date: 0    For today's appointment patient:    [x]  Cancelled    [] Rescheduled appointment    [] No-show     Reason given by patient:    [x]  Patient ill    []  Conflicting appointment    [] No transportation      [] Conflict with work    [] No reason given    [] Weather related    [] COVID-19    [] Other:      Comments:        [x] Next appointment was confirmed    Electronically signed by: Placido Turner, PTA

## 2024-03-18 RX ORDER — TIRZEPATIDE 5 MG/.5ML
INJECTION, SOLUTION SUBCUTANEOUS
Qty: 2 ML | OUTPATIENT
Start: 2024-03-18

## 2024-03-18 RX ORDER — TIRZEPATIDE 7.5 MG/.5ML
7.5 INJECTION, SOLUTION SUBCUTANEOUS WEEKLY
Qty: 4 ML | Refills: 2 | Status: SHIPPED | OUTPATIENT
Start: 2024-03-18 | End: 2024-03-26

## 2024-03-18 NOTE — TELEPHONE ENCOUNTER
Multiple sclerosis (HCC)     Tibia and fibula open fracture, right, type I or II, initial encounter     Tibia/fibula fracture, right, closed, initial encounter     Acute respiratory failure with hypoxia and hypercapnia (HCC)     Alcohol use disorder, severe, dependence (HCC)     Anastomotic ulcer S/P gastric bypass     Bipolar affective disorder, currently depressed, moderate (HCC)     Chronic cholecystitis with calculus     Deep venous thrombosis of peroneal vein (HCC)     Displacement of lumbar intervertebral disc without myelopathy     Generalized osteoarthritis     GERD (gastroesophageal reflux disease)

## 2024-03-19 ENCOUNTER — HOSPITAL ENCOUNTER (OUTPATIENT)
Dept: PHYSICAL THERAPY | Facility: CLINIC | Age: 59
Setting detail: THERAPIES SERIES
Discharge: HOME | End: 2024-03-19
Payer: MEDICARE

## 2024-03-19 PROCEDURE — 97110 THERAPEUTIC EXERCISES: CPT

## 2024-03-19 PROCEDURE — 97016 VASOPNEUMATIC DEVICE THERAPY: CPT

## 2024-03-19 NOTE — FLOWSHEET NOTE
[] [facility]  Outpatient Rehabilitation &  Therapy  [address]  [phone]  [phone] [x] [facility]  Outpatient Rehabilitation &  Therapy  [address]  P: [phone]  F: [phone] [] [facility]  Outpatient Rehabilitation &  Therapy  [address]  P: [phone]  F: [phone] [] [facility]  Outpatient Rehabilitation &  Therapy  [address]  [phone]  [phone] [] [facility]  Outpatient Rehabilitation &  Therapy  [address]   P: [phone]  F: [phone]  [] [facility]  Outpatient Rehabilitation &  Therapy  [address]  P: [phone]  F: [phone] [] [facility]  Outpatient Rehabilitation &  Therapy  [address]  P: [phone]  F: [phone] [] [facility]  Outpatient Rehabilitation &  Therapy  [address]  P: [phone]  F: [phone] [] [facility]  Outpatient Rehabilitation &  Therapy  [address]  P: [phone]  F: [phone]  [] [facility] Outpatient Rehabilitation &  Therapy  [address]  P: [phone]  F: [phone]     Physical Therapy Daily Treatment Note    Date:  3/19/2024  Patient Name:  Madonna Manrique    :  1965  MRN: [id number]  Physician: Seferino Prakash DO   Insurance: t Medicare HMO/[place] ([place]  Medical Diagnosis: S82.141D - Closed fracture of right tibial plateau with routine healing  Rehab Codes: S82.141D;M25.56;M25.66;R26.2 NEC, M62.81  Onset date: 2023 (DOS)                                Next Dr's appt.: 24  Visit# / total visits: ; Progress note for Medicare patient due at visit 6    Cancels/No Shows: 1/0    Subjective:    Pain:  [x] Yes  [] No Location: R knee Pain Rating: (0-10 scale)

## 2024-03-21 ENCOUNTER — HOSPITAL ENCOUNTER (OUTPATIENT)
Dept: MRI IMAGING | Age: 59
Discharge: HOME | End: 2024-03-23
Payer: MEDICARE

## 2024-03-21 DIAGNOSIS — M54.16 LUMBAR RADICULOPATHY: ICD-10-CM

## 2024-03-21 PROCEDURE — 72148 MRI LUMBAR SPINE W/O DYE: CPT

## 2024-03-25 ENCOUNTER — TELEPHONE (OUTPATIENT)
Dept: ORTHOPEDIC SURGERY | Age: 59
End: 2024-03-25

## 2024-03-25 NOTE — TELEPHONE ENCOUNTER
Left message for pt to return my call Pt called in requesting to speak to a nurse about getting Dr. Prakash to put in a referral for an MRI for her rt leg from the knee down.    State has been on weight bearing for the past 2 months and is still in the same amount of pain and wants to see what it going on.      Please call pt back with any questions or once MRI in so she can call to set up appt. @ [phone]

## 2024-03-25 NOTE — TELEPHONE ENCOUNTER
11/21/2023  OPERATION PERFORMED:  1.  Open reduction and internal fixation of right bicondylar tibial  plateau fracture.  2.  Removal of the knee spanning external fixator.  3.  Open right lateral meniscus repair.  4.  Closed treatment, right proximal fibula fracture.    Requesting MRI for RLE. Knee downward.     Please advise.

## 2024-03-26 ENCOUNTER — HOSPITAL ENCOUNTER (OUTPATIENT)
Dept: PHYSICAL THERAPY | Facility: CLINIC | Age: 59
Setting detail: THERAPIES SERIES
Discharge: HOME | End: 2024-03-26
Payer: MEDICARE

## 2024-03-26 ENCOUNTER — TELEPHONE (OUTPATIENT)
Dept: FAMILY MEDICINE CLINIC | Age: 59
End: 2024-03-26

## 2024-03-26 PROCEDURE — 97110 THERAPEUTIC EXERCISES: CPT

## 2024-03-26 PROCEDURE — 97016 VASOPNEUMATIC DEVICE THERAPY: CPT

## 2024-03-26 NOTE — TELEPHONE ENCOUNTER
LVM informing patient appointment was canceled on 04/29/2024  and asked patient to call the office to reschedule appointment,  due to provider not in office that day.

## 2024-03-26 NOTE — FLOWSHEET NOTE
[] [facility]  Outpatient Rehabilitation &  Therapy  [address]  [phone]  [phone] [x] [facility]  Outpatient Rehabilitation &  Therapy  [address]  P: [phone]  F: [phone] [] [facility]  Outpatient Rehabilitation &  Therapy  [address]  P: [phone]  F: [phone] [] [facility]  Outpatient Rehabilitation &  Therapy  [address]  [phone]  [phone] [] [facility]  Outpatient Rehabilitation &  Therapy  [address]   P: [phone]  F: [phone]  [] [facility]  Outpatient Rehabilitation &  Therapy  [address]  P: [phone]  F: [phone] [] [facility]  Outpatient Rehabilitation &  Therapy  [address]  P: [phone]  F: [phone] [] [facility]  Outpatient Rehabilitation &  Therapy  [address]  P: [phone]  F: [phone] [] [facility]  Outpatient Rehabilitation &  Therapy  [address]  P: [phone]  F: [phone]  [] [facility] Outpatient Rehabilitation &  Therapy  [address]  P: [phone]  F: [phone]     Physical Therapy Daily Treatment Note    Date:  3/26/2024  Patient Name:  Madonna Manrique    :  1965  MRN: [id number]  Physician: Seferino Prakash DO   Insurance: t Medicare HMO/[place]  Medical Diagnosis: S82.141D - Closed fracture of right tibial plateau with routine healing  Rehab Codes: S82.141D;M25.56;M25.66;R26.2 NEC, M62.81  Onset date: 2023 (DOS)                                Next Dr's appt.: 24  Visit# / total visits: 3/12; Progress note for Medicare patient due at visit 6    Cancels/No Shows: 1/0    Subjective:    Pain:  [x] Yes  [] No Location: R knee Pain Rating: (0-10 scale)

## 2024-04-02 ENCOUNTER — HOSPITAL ENCOUNTER (OUTPATIENT)
Dept: PHYSICAL THERAPY | Facility: CLINIC | Age: 59
Setting detail: THERAPIES SERIES
Discharge: HOME | End: 2024-04-02

## 2024-04-02 NOTE — FLOWSHEET NOTE
[] [facility]  Outpatient Rehabilitation &  Therapy  [address]  [phone]  [phone] [x] [facility]  Outpatient Rehabilitation &  Therapy  [address]  P: [phone]  F: [phone] [] [facility]  Outpatient Rehabilitation &  Therapy  [address]  P: [phone]  F: [phone] [] [facility]  Outpatient Rehabilitation &  Therapy  [address]  [phone]  [phone] [] [facility]  Outpatient Rehabilitation &  Therapy  [address]   P: [phone]  F: [phone]  [] [facility]  Outpatient Rehabilitation &  Therapy  [address]  P: [phone]  F: [phone] [] [facility]  Outpatient Rehabilitation &  Therapy  [address]  P: [phone]  F: [phone] [] [facility]  Outpatient Rehabilitation &  Therapy  [address]  P: [phone]  F: [phone] [] [facility]  Outpatient Rehabilitation &  Therapy  [address]  P: [phone]  F: [phone]  [] [facility] Outpatient Rehabilitation &  Therapy  [address]  P: [phone]  F: [phone]     Therapy Cancel/No Show note    Date: 2024  Patient: Madonna Manrique  : 1965  MRN: [id number]    Cancels/No Shows to date:     For today's appointment patient:    [x]  Cancelled    [] Rescheduled appointment    [] No-show     Reason given by patient:    []  Patient ill    []  Conflicting appointment    [] No transportation      [] Conflict with work    [] No reason given    [] Weather related    [] COVID-19    [x] Other:      Comments:  too sore       [x] Next appointment was confirmed     Electronically signed by: Amor Nogueira PTA

## 2024-04-19 ENCOUNTER — OFFICE VISIT (OUTPATIENT)
Dept: FAMILY MEDICINE CLINIC | Age: 59
End: 2024-04-19
Payer: MEDICARE

## 2024-04-19 ENCOUNTER — TELEPHONE (OUTPATIENT)
Dept: FAMILY MEDICINE CLINIC | Age: 59
End: 2024-04-19

## 2024-04-19 VITALS
HEART RATE: 102 BPM | DIASTOLIC BLOOD PRESSURE: 77 MMHG | BODY MASS INDEX: 32.22 KG/M2 | WEIGHT: 165 LBS | SYSTOLIC BLOOD PRESSURE: 107 MMHG

## 2024-04-19 DIAGNOSIS — Z12.31 ENCOUNTER FOR SCREENING MAMMOGRAM FOR MALIGNANT NEOPLASM OF BREAST: ICD-10-CM

## 2024-04-19 DIAGNOSIS — Z12.11 SCREENING FOR COLON CANCER: ICD-10-CM

## 2024-04-19 DIAGNOSIS — J45.909 ASTHMA WITH SEVERITY TO BE DETERMINED: ICD-10-CM

## 2024-04-19 DIAGNOSIS — Z87.891 PERSONAL HISTORY OF TOBACCO USE: ICD-10-CM

## 2024-04-19 DIAGNOSIS — M79.2 NEUROPATHIC PAIN: ICD-10-CM

## 2024-04-19 DIAGNOSIS — E66.09 CLASS 1 OBESITY DUE TO EXCESS CALORIES WITH BODY MASS INDEX (BMI) OF 33.0 TO 33.9 IN ADULT, UNSPECIFIED WHETHER SERIOUS COMORBIDITY PRESENT: Primary | ICD-10-CM

## 2024-04-19 DIAGNOSIS — I87.1 MAY-THURNER SYNDROME: ICD-10-CM

## 2024-04-19 PROCEDURE — 99213 OFFICE O/P EST LOW 20 MIN: CPT

## 2024-04-19 RX ORDER — BUPROPION HYDROCHLORIDE 150 MG/1
150 TABLET ORAL EVERY MORNING
Qty: 30 TABLET | Refills: 3 | Status: SHIPPED | OUTPATIENT
Start: 2024-04-19

## 2024-04-19 RX ORDER — ALBUTEROL SULFATE 90 UG/1
2 AEROSOL, METERED RESPIRATORY (INHALATION) EVERY 6 HOURS PRN
Qty: 1 EACH | Refills: 3 | Status: SHIPPED | OUTPATIENT
Start: 2024-04-19

## 2024-04-19 RX ORDER — TIZANIDINE 4 MG/1
4 TABLET ORAL 3 TIMES DAILY
Qty: 42 TABLET | Refills: 0 | Status: SHIPPED | OUTPATIENT
Start: 2024-04-19 | End: 2024-05-03

## 2024-04-19 RX ORDER — TROSPIUM CHLORIDE 20 MG/1
20 TABLET, FILM COATED ORAL 2 TIMES DAILY
Qty: 60 TABLET | Refills: 3 | Status: SHIPPED | OUTPATIENT
Start: 2024-04-19

## 2024-04-19 RX ORDER — TIRZEPATIDE 10 MG/.5ML
10 INJECTION, SOLUTION SUBCUTANEOUS WEEKLY
Qty: 4 ADJUSTABLE DOSE PRE-FILLED PEN SYRINGE | Refills: 0 | Status: SHIPPED | OUTPATIENT
Start: 2024-04-19

## 2024-04-19 ASSESSMENT — PATIENT HEALTH QUESTIONNAIRE - PHQ9
SUM OF ALL RESPONSES TO PHQ QUESTIONS 1-9: 0
SUM OF ALL RESPONSES TO PHQ QUESTIONS 1-9: 0
10. IF YOU CHECKED OFF ANY PROBLEMS, HOW DIFFICULT HAVE THESE PROBLEMS MADE IT FOR YOU TO DO YOUR WORK, TAKE CARE OF THINGS AT HOME, OR GET ALONG WITH OTHER PEOPLE: NOT DIFFICULT AT ALL
9. THOUGHTS THAT YOU WOULD BE BETTER OFF DEAD, OR OF HURTING YOURSELF: NOT AT ALL
8. MOVING OR SPEAKING SO SLOWLY THAT OTHER PEOPLE COULD HAVE NOTICED. OR THE OPPOSITE, BEING SO FIGETY OR RESTLESS THAT YOU HAVE BEEN MOVING AROUND A LOT MORE THAN USUAL: NOT AT ALL
1. LITTLE INTEREST OR PLEASURE IN DOING THINGS: NOT AT ALL
2. FEELING DOWN, DEPRESSED OR HOPELESS: NOT AT ALL
SUM OF ALL RESPONSES TO PHQ QUESTIONS 1-9: 0
3. TROUBLE FALLING OR STAYING ASLEEP: NOT AT ALL
6. FEELING BAD ABOUT YOURSELF - OR THAT YOU ARE A FAILURE OR HAVE LET YOURSELF OR YOUR FAMILY DOWN: NOT AT ALL
4. FEELING TIRED OR HAVING LITTLE ENERGY: NOT AT ALL
SUM OF ALL RESPONSES TO PHQ9 QUESTIONS 1 & 2: 0
7. TROUBLE CONCENTRATING ON THINGS, SUCH AS READING THE NEWSPAPER OR WATCHING TELEVISION: NOT AT ALL
SUM OF ALL RESPONSES TO PHQ QUESTIONS 1-9: 0
5. POOR APPETITE OR OVEREATING: NOT AT ALL

## 2024-04-19 NOTE — PROGRESS NOTES
Attending Physician Statement  I  have discussed the care of Madonna Manrique including pertinent history and exam findings with the resident. I agree with the assessment, plan and orders as documented by the resident.      /77   Pulse (!) 102   Wt 74.8 kg (165 lb)   BMI 32.22 kg/m²    BP Readings from Last 3 Encounters:   04/19/24 107/77   01/29/24 119/70   01/18/24 (!) 102/48     Wt Readings from Last 3 Encounters:   04/19/24 74.8 kg (165 lb)   02/21/24 78 kg (172 lb)   02/05/24 78.1 kg (172 lb 3.2 oz)          Diagnosis Orders   1. Class 1 obesity due to excess calories with body mass index (BMI) of 33.0 to 33.9 in adult, unspecified whether serious comorbidity present  Tirzepatide (MOUNJARO) 10 MG/0.5ML SOPN SC injection      2. Neuropathic pain        3. Screening for colon cancer  Sade Lopez MD, Gastroenterology, [facility]      4. Encounter for screening mammogram for malignant neoplasm of breast  MORIS DIGITAL SCREEN W OR WO CAD BILATERAL      5. Personal history of tobacco use        6. 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      
procedure and wants a clearance because she is on Xarelto.          Review of Systems   Constitutional:  Negative for chills, diaphoresis and fatigue.   Respiratory:  Negative for cough, shortness of breath and wheezing.    Cardiovascular:  Negative for chest pain, palpitations and leg swelling.   Gastrointestinal:  Negative for abdominal pain, constipation, diarrhea, nausea and vomiting.   Genitourinary:  Negative for dysuria, frequency, hematuria and urgency.   Neurological:  Negative for dizziness, weakness, light-headedness, numbness and headaches.          Objective   Physical Exam  Constitutional:       General: She is not in acute distress.     Appearance: Normal appearance.   HENT:      Head: Normocephalic and atraumatic.   Cardiovascular:      Rate and Rhythm: Normal rate and regular rhythm.      Pulses: Normal pulses.      Heart sounds: Normal heart sounds. No murmur heard.  Pulmonary:      Effort: Pulmonary effort is normal.      Breath sounds: Normal breath sounds. No wheezing, rhonchi or rales.   Abdominal:      General: Abdomen is flat. Bowel sounds are normal.      Palpations: Abdomen is soft.   Musculoskeletal:      Right lower leg: No edema.      Left lower leg: No edema.   Neurological:      Mental Status: She is alert.            On this date 4/19/2024 I have spent 25 minutes reviewing previous notes, test results and face to face with the patient discussing the diagnosis and importance of compliance with the treatment plan as well as documenting on the day of the visit.      An electronic signature was used to authenticate this note.    --Dioni Conn MD

## 2024-04-19 NOTE — TELEPHONE ENCOUNTER
Patient present in office for a visit today, she needed a clearance to be off of her blood thinners for a dental procedure- letter written, and faxed to [phone] per patients request. PCP verified that patient should be off of Xarelto for 24 hours prior to procedure.

## 2024-04-19 NOTE — TELEPHONE ENCOUNTER
Last visit: 4/19/2024  Last Med refill: 3/8/24  Does patient have enough medication for 72 hours: no    Next Visit Date:  Future Appointments   Date Time Provider Department Center   5/6/2024  2:00 PM Soraida Stone, DO Neuro Spec Neurology -   5/22/2024  1:45 PM Seferino Prakash, DO ORTHO SPECIA [facility]   5/31/2024  9:45 AM [facility]       Health Maintenance   Topic Date Due    Hepatitis B vaccine (1 of 3 - 3-dose series) Never done    Colorectal Cancer Screen  05/06/2021    Breast cancer screen  09/30/2022    Annual Wellness Visit (Medicare Advantage)  Never done    Low dose CT lung screening &/or counseling  11/01/2024    A1C test (Diabetic or Prediabetic)  11/19/2024    Lipids  12/17/2024    Depression Monitoring  12/21/2024    DTaP/Tdap/Td vaccine (3 - Td or Tdap) 06/13/2033    Flu vaccine  Completed    Shingles vaccine  Completed    Pneumococcal 0-64 years Vaccine  Completed    COVID-19 Vaccine  Completed    Hepatitis C screen  Completed    HIV screen  Completed    Hepatitis A vaccine  Aged Out    Hib vaccine  Aged Out    Polio vaccine  Aged Out    Meningococcal (ACWY) vaccine  Aged Out    Diabetes screen  Discontinued       Hemoglobin A1C (%)   Date Value   11/19/2023 5.7   12/29/2020 5.0             ( goal A1C is < 7)   No components found for: \"LABMICR\"  LDL Cholesterol (mg/dL)   Date Value   12/17/2019 110       (goal LDL is <100)   AST (U/L)   Date Value   01/30/2024 12     ALT (U/L)   Date Value   01/30/2024 9     BUN (mg/dL)   Date Value   01/30/2024 13     BP Readings from Last 3 Encounters:   04/19/24 107/77   01/29/24 119/70   01/18/24 (!) 102/48          (goal 120/80)    All Future Testing planned in [facility]  Lab Frequency Next Occurrence   CBC With Auto Differential Once 07/14/2024   Comprehensive metabolic panel Once 07/14/2024   MORIS DIGITAL SCREEN W OR WO CAD BILATERAL Once 04/19/2024   Hemoglobin and Hematocrit, Blood, Post Transfusion POST TRANSFUSION    CBC

## 2024-04-23 ENCOUNTER — TELEPHONE (OUTPATIENT)
Dept: FAMILY MEDICINE CLINIC | Age: 59
End: 2024-04-23

## 2024-04-23 RX ORDER — GUAIFENESIN 600 MG/1
600 TABLET, EXTENDED RELEASE ORAL 2 TIMES DAILY
Qty: 30 TABLET | Refills: 0 | Status: SHIPPED | OUTPATIENT
Start: 2024-04-23 | End: 2024-05-08

## 2024-04-23 NOTE — TELEPHONE ENCOUNTER
----- Message from Janeth Mclaughlin sent at 4/22/2024  2:56 PM EDT -----  Subject: Message to Provider    QUESTIONS  Information for Provider? Madonna is calling to ask if you are able to   call in Mucinex for congestion for her please. This will go to [facility] on   [address]. Please contact her to let her know.  ---------------------------------------------------------------------------  --------------  CALL BACK INFO  [phone]; OK to leave message on voicemail  ---------------------------------------------------------------------------  --------------  SCRIPT ANSWERS  Relationship to Patient? Self

## 2024-05-06 ENCOUNTER — OFFICE VISIT (OUTPATIENT)
Dept: NEUROLOGY | Age: 59
End: 2024-05-06
Payer: MEDICARE

## 2024-05-06 VITALS
HEART RATE: 101 BPM | DIASTOLIC BLOOD PRESSURE: 76 MMHG | WEIGHT: 162.6 LBS | BODY MASS INDEX: 31.92 KG/M2 | HEIGHT: 60 IN | SYSTOLIC BLOOD PRESSURE: 111 MMHG

## 2024-05-06 DIAGNOSIS — M79.2 NEUROPATHIC PAIN: ICD-10-CM

## 2024-05-06 DIAGNOSIS — G35 MULTIPLE SCLEROSIS (HCC): Primary | ICD-10-CM

## 2024-05-06 DIAGNOSIS — J45.909 ASTHMA WITH SEVERITY TO BE DETERMINED: ICD-10-CM

## 2024-05-06 DIAGNOSIS — E66.09 CLASS 1 OBESITY DUE TO EXCESS CALORIES WITH BODY MASS INDEX (BMI) OF 33.0 TO 33.9 IN ADULT, UNSPECIFIED WHETHER SERIOUS COMORBIDITY PRESENT: ICD-10-CM

## 2024-05-06 PROCEDURE — G8427 DOCREV CUR MEDS BY ELIG CLIN: HCPCS | Performed by: PSYCHIATRY & NEUROLOGY

## 2024-05-06 PROCEDURE — 99214 OFFICE O/P EST MOD 30 MIN: CPT | Performed by: PSYCHIATRY & NEUROLOGY

## 2024-05-06 PROCEDURE — 4004F PT TOBACCO SCREEN RCVD TLK: CPT | Performed by: PSYCHIATRY & NEUROLOGY

## 2024-05-06 PROCEDURE — 3017F COLORECTAL CA SCREEN DOC REV: CPT | Performed by: PSYCHIATRY & NEUROLOGY

## 2024-05-06 PROCEDURE — G8417 CALC BMI ABV UP PARAM F/U: HCPCS | Performed by: PSYCHIATRY & NEUROLOGY

## 2024-05-06 RX ORDER — PREDNISONE 50 MG/1
1250 TABLET ORAL DAILY
Qty: 25 TABLET | Refills: 0 | Status: SHIPPED | OUTPATIENT
Start: 2024-05-06 | End: 2024-05-07

## 2024-05-06 RX ORDER — ONDANSETRON 2 MG/ML
8 INJECTION INTRAMUSCULAR; INTRAVENOUS
OUTPATIENT
Start: 2024-07-12

## 2024-05-06 RX ORDER — GABAPENTIN 300 MG/1
600 CAPSULE ORAL 3 TIMES DAILY
Qty: 180 CAPSULE | Refills: 0 | Status: SHIPPED | OUTPATIENT
Start: 2024-05-06 | End: 2024-06-05

## 2024-05-06 RX ORDER — EPINEPHRINE 1 MG/ML
0.3 INJECTION, SOLUTION, CONCENTRATE INTRAVENOUS PRN
OUTPATIENT
Start: 2024-07-12

## 2024-05-06 RX ORDER — ACETAMINOPHEN 325 MG/1
650 TABLET ORAL ONCE
OUTPATIENT
Start: 2024-07-12 | End: 2024-07-12

## 2024-05-06 RX ORDER — FAMOTIDINE 10 MG/ML
20 INJECTION, SOLUTION INTRAVENOUS
OUTPATIENT
Start: 2024-07-12

## 2024-05-06 RX ORDER — DIPHENHYDRAMINE HYDROCHLORIDE 50 MG/ML
50 INJECTION INTRAMUSCULAR; INTRAVENOUS
OUTPATIENT
Start: 2024-07-12

## 2024-05-06 RX ORDER — AMOXICILLIN 250 MG
1 CAPSULE ORAL DAILY
Qty: 30 TABLET | Refills: 2 | Status: SHIPPED | OUTPATIENT
Start: 2024-05-06

## 2024-05-06 RX ORDER — HEPARIN SODIUM (PORCINE) LOCK FLUSH IV SOLN 100 UNIT/ML 100 UNIT/ML
500 SOLUTION INTRAVENOUS PRN
OUTPATIENT
Start: 2024-07-12

## 2024-05-06 RX ORDER — SODIUM CHLORIDE 9 MG/ML
INJECTION, SOLUTION INTRAVENOUS CONTINUOUS
OUTPATIENT
Start: 2024-07-12

## 2024-05-06 RX ORDER — DIPHENHYDRAMINE HYDROCHLORIDE 50 MG/ML
25 INJECTION INTRAMUSCULAR; INTRAVENOUS ONCE
OUTPATIENT
Start: 2024-07-12 | End: 2024-07-12

## 2024-05-06 RX ORDER — ACETAMINOPHEN 325 MG/1
650 TABLET ORAL
OUTPATIENT
Start: 2024-07-12

## 2024-05-06 RX ORDER — NALOXONE HYDROCHLORIDE 4 MG/.1ML
1 SPRAY NASAL PRN
Qty: 1 EACH | Refills: 1 | OUTPATIENT
Start: 2024-05-06

## 2024-05-06 RX ORDER — SODIUM CHLORIDE 9 MG/ML
5-250 INJECTION, SOLUTION INTRAVENOUS PRN
OUTPATIENT
Start: 2024-07-12

## 2024-05-06 RX ORDER — ALBUTEROL SULFATE 90 UG/1
2 AEROSOL, METERED RESPIRATORY (INHALATION) EVERY 6 HOURS PRN
Qty: 1 EACH | Refills: 3 | Status: SHIPPED | OUTPATIENT
Start: 2024-05-06

## 2024-05-06 RX ORDER — ALBUTEROL SULFATE 90 UG/1
4 AEROSOL, METERED RESPIRATORY (INHALATION) PRN
OUTPATIENT
Start: 2024-07-12

## 2024-05-06 RX ORDER — DEXTROAMPHETAMINE SACCHARATE, AMPHETAMINE ASPARTATE, DEXTROAMPHETAMINE SULFATE AND AMPHETAMINE SULFATE 5; 5; 5; 5 MG/1; MG/1; MG/1; MG/1
20 TABLET ORAL 2 TIMES DAILY
COMMUNITY
Start: 2024-04-18

## 2024-05-06 RX ORDER — SODIUM CHLORIDE 0.9 % (FLUSH) 0.9 %
5-40 SYRINGE (ML) INJECTION PRN
OUTPATIENT
Start: 2024-07-12

## 2024-05-06 NOTE — PROGRESS NOTES
[facility] NEUROLOGY SPECIALIST  [address]  [place] [address]  Dept: [phone]     PATIENT NAME: Madonna Manrique  PATIENT MRN: [id number]  PRIMARY CARE PHYSICIAN: Dioni Conn MD     History      Madonna Manrique is a 58 y.o. female who I initially saw in consultation on 11/13/2023.  Her history is summarized as follows:      Madonna Manrique has a history of substance use disorder in the past, Bipolar disorder, and anxiety and depression, who presents to clinic today for evaluation of multiple sclerosis.  She presented today to clinic alone. She was previously followed at [facility] and presented today to transfer care due to insurance coverage concerns.      I reviewed records from [facility] and her history is summarized as follows:  She was initially seen by outpatient neurology on 12/10/2022. She had presented prior to this in the ER with cognitive concerns and behavioral changes.  She was found to have a UTI. Her neurological exam was abnormal and Mris were done, revealing T2-hyperintense lesions in the brain, cervical, and thoracic spinal cord concerning for multiple sclerosis.  Per reports, there were no enhancing lesions.  An EEG was done given altered mental status, which showed some intermittent generalized slowing.  LP was recommended but the patient left AMA.  CSF studies were later done and did show unpaired oligoclonal bands.  She was diagnosed with multiple sclerosis and started on ocrelizumab.  She received her first set of infusions on 4/5/23 and 4/21/23 and states that she tolerated them well.  She has not received her next dose, which was due in 10/2023. Ms. Manrique also has a history of chronic headache and was started on rimegepant every other day within the past year for preventative therapy.      Ms. Manrique also reports a history of recurrent uveitis involving both eyes.  She has been seen by Dr. Olman Lim  in ophthalmology in the past.  She underwent a

## 2024-05-06 NOTE — TELEPHONE ENCOUNTER
----- Message from Flory Carter sent at 5/6/2024  9:59 AM EDT -----  Subject: Refill Request    QUESTIONS  Name of Medication? gabapentin (NEURONTIN) 300 MG capsule  Patient-reported dosage and instructions? 3x a day   How many days do you have left? 0  Preferred Pharmacy? [facility] [id number]  Pharmacy phone number (if available)? [phone]  ---------------------------------------------------------------------------  --------------,  Name of Medication? Other - nexeum  Patient-reported dosage and instructions? 2x a day   How many days do you have left? 0  [facility] [id number]  Pharmacy phone number (if available)? [phone]  ---------------------------------------------------------------------------  --------------,  Name of Medication? Naloxone HCl 2 MG/0.4ML SOAJ  Patient-reported dosage and instructions? once day  How many days do you have left? 0  [id number]  Pharmacy phone number (if available)? [phone]  ---------------------------------------------------------------------------  --------------,  Name of Medication? albuterol sulfate HFA (PROVENTIL;VENTOLIN;PROAIR) 108   (90 Base) MCG/ACT inhaler  Patient-reported dosage and instructions? as neded  How many days do you have left? 0  Preferred Pharmacy? [facility] [id number]  Pharmacy phone number (if available)? [phone]  ---------------------------------------------------------------------------  --------------,  Name of Medication? sennosides-docusate sodium (SENOKOT-S) 8.6-50 MG   tablet  Patient-reported dosage and instructions? 3x day   How many days do you have left? 0  Preferred Pharmacy? [facility] [id number]  Pharmacy phone number (if available)? [phone]  ---------------------------------------------------------------------------  --------------  CALL BACK INFO  What is the best way for the office to contact you? OK to leave message on   voicemail  Preferred Call

## 2024-05-07 ENCOUNTER — TELEPHONE (OUTPATIENT)
Dept: FAMILY MEDICINE CLINIC | Age: 59
End: 2024-05-07

## 2024-05-07 ENCOUNTER — TELEPHONE (OUTPATIENT)
Dept: GASTROENTEROLOGY | Age: 59
End: 2024-05-07

## 2024-05-07 RX ORDER — GABAPENTIN 300 MG/1
600 CAPSULE ORAL 3 TIMES DAILY
Qty: 180 CAPSULE | Refills: 0 | OUTPATIENT
Start: 2024-05-07 | End: 2024-06-06

## 2024-05-07 NOTE — TELEPHONE ENCOUNTER
----- Message from Rosa Elena Cantu sent at 5/7/2024  3:33 PM EDT -----  Subject: Refill Request    QUESTIONS  Name of Medication? tiZANidine (ZANAFLEX) 4 MG tablet  Patient-reported dosage and instructions? 4 MG 3 times daily   How many days do you have left? 0  Preferred Pharmacy? [facility] [id number]  Pharmacy phone number (if available)? [phone]  ---------------------------------------------------------------------------  --------------,  Name of Medication? Tirzepatide (MOUNJARO) 10 MG/0.5ML SOPN SC injection  Patient-reported dosage and instructions? mounjaro 10MG/.5ML  How many days do you have left? 0  Preferred Pharmacy? [facility] [id number]  Pharmacy phone number (if available)? [phone]  ---------------------------------------------------------------------------  --------------,  Name of Medication? rivaroxaban (XARELTO) 20 MG TABS tablet  Patient-reported dosage and instructions? 20 MG 1 daily   How many days do you have left? 5  Preferred Pharmacy? [facility] [id number]  Pharmacy phone number (if available)? [phone]  ---------------------------------------------------------------------------  --------------,  Name of Medication? pantoprazole (PROTONIX) 40 MG tablet  Patient-reported dosage and instructions? 40 MG 2 times daily   How many days do you have left? 0  Preferred Pharmacy? [facility] [id number]  Pharmacy phone number (if available)? [phone]  ---------------------------------------------------------------------------  --------------  CALL BACK INFO  What is the best way for the office to contact you? OK to leave message on   voicemail  Preferred Call Back Phone Number? [phone]  ---------------------------------------------------------------------------  --------------  SCRIPT ANSWERS  Relationship to Patient? Self

## 2024-05-07 NOTE — TELEPHONE ENCOUNTER
E-scribe request for mounjaro. Please review and e-scribe if applicable.     Last Visit Date:  4/19/2024  Next Visit Date:  5/6/2024    Hemoglobin A1C (%)   Date Value   11/19/2023 5.7   12/29/2020 5.0             ( goal A1C is < 7)   No components found for: \"LABMICR\"  No components found for: \"LDLCHOLESTEROL\", \"LDLCALC\"    (goal LDL is <100)   AST (U/L)   Date Value   01/30/2024 12     ALT (U/L)   Date Value   01/30/2024 9     BUN (mg/dL)   Date Value   01/30/2024 13     BP Readings from Last 3 Encounters:   05/06/24 111/76   04/19/24 107/77   01/29/24 119/70          (goal 120/80)        Patient Active Problem List:     Overdose of drug/medicinal substance, accidental or unintentional, initial encounter     Chronic anticoagulation     Multifocal pneumonia     Pulmonary hypertension (HCC)     Mild cardiomegaly     Borderline personality disorder (HCC)     Bipolar 1 disorder (HCC)     Recurrent major depressive disorder, in remission (HCC)     Anxiety disorder     Transient alteration of awareness     Symptomatic anemia     Hyponatremia     Hypocalcemia     Hypoalbuminemia     COVID-19 virus not detected     Encephalopathy, metabolic     Iron deficiency anemia secondary to inadequate dietary iron intake     Iron malabsorption     History of psychiatric disorder     Chronic bilateral low back pain with left-sided sciatica     H/O opioid abuse (HCC)     Polypharmacy     Tremor due to multiple drugs     Serotonin syndrome     Confusion     Asthma with acute exacerbation     May-Thurner syndrome     Migraine without status migrainosus, not intractable     Change in mental status     Multiple sclerosis (HCC)     Tibia and fibula open fracture, right, type I or II, initial encounter     Tibia/fibula fracture, right, closed, initial encounter     Acute respiratory failure with hypoxia and hypercapnia (HCC)     Alcohol use disorder, severe, dependence (HCC)     Anastomotic ulcer S/P gastric bypass     Bipolar affective

## 2024-05-08 ENCOUNTER — TELEPHONE (OUTPATIENT)
Dept: NEUROLOGY | Age: 59
End: 2024-05-08

## 2024-05-08 RX ORDER — TIRZEPATIDE 10 MG/.5ML
10 INJECTION, SOLUTION SUBCUTANEOUS WEEKLY
Qty: 4 ADJUSTABLE DOSE PRE-FILLED PEN SYRINGE | Refills: 0 | Status: SHIPPED | OUTPATIENT
Start: 2024-05-08

## 2024-05-08 NOTE — TELEPHONE ENCOUNTER
Dr. Stone saw Madonna in the office. She will be due for her Ocrevus infusion in July. I called [facility] and spoke with Stefani.  She did pull her up and reviewed. She said they will call her to get her on the schedule for July.

## 2024-05-09 ENCOUNTER — CLINICAL DOCUMENTATION (OUTPATIENT)
Facility: HOSPITAL | Age: 59
End: 2024-05-09

## 2024-05-09 ENCOUNTER — TELEPHONE (OUTPATIENT)
Dept: FAMILY MEDICINE CLINIC | Age: 59
End: 2024-05-09

## 2024-05-09 NOTE — TELEPHONE ENCOUNTER
QUESTIONS   Information for Provider? Patient called requesting a medication to be   called in for Thrush. She was put on antibiotic, she states that she has   had in in the past. [facility] [address] Please return call.

## 2024-05-14 DIAGNOSIS — E66.09 CLASS 1 OBESITY DUE TO EXCESS CALORIES WITH BODY MASS INDEX (BMI) OF 33.0 TO 33.9 IN ADULT, UNSPECIFIED WHETHER SERIOUS COMORBIDITY PRESENT: ICD-10-CM

## 2024-05-14 RX ORDER — TIRZEPATIDE 10 MG/.5ML
INJECTION, SOLUTION SUBCUTANEOUS
Qty: 2 ML | OUTPATIENT
Start: 2024-05-14

## 2024-05-14 NOTE — TELEPHONE ENCOUNTER
E-scribe request for mounjaro. Please review and e-scribe if applicable.     Last Visit Date:  4/19/24  Next Visit Date:  5/31/2024    Hemoglobin A1C (%)   Date Value   11/19/2023 5.7   12/29/2020 5.0             ( goal A1C is < 7)   No components found for: \"LABMICR\"  No components found for: \"LDLCHOLESTEROL\", \"LDLCALC\"    (goal LDL is <100)   AST (U/L)   Date Value   01/30/2024 12     ALT (U/L)   Date Value   01/30/2024 9     BUN (mg/dL)   Date Value   01/30/2024 13     BP Readings from Last 3 Encounters:   05/06/24 111/76   04/19/24 107/77   01/29/24 119/70          (goal 120/80)        Patient Active Problem List:     Overdose of drug/medicinal substance, accidental or unintentional, initial encounter     Chronic anticoagulation     Multifocal pneumonia     Pulmonary hypertension (HCC)     Mild cardiomegaly     Borderline personality disorder (HCC)     Bipolar 1 disorder (HCC)     Recurrent major depressive disorder, in remission (HCC)     Anxiety disorder     Transient alteration of awareness     Symptomatic anemia     Hyponatremia     Hypocalcemia     Hypoalbuminemia     COVID-19 virus not detected     Encephalopathy, metabolic     Iron deficiency anemia secondary to inadequate dietary iron intake     Iron malabsorption     History of psychiatric disorder     Chronic bilateral low back pain with left-sided sciatica     H/O opioid abuse (HCC)     Polypharmacy     Tremor due to multiple drugs     Serotonin syndrome     Confusion     Asthma with acute exacerbation     May-Thurner syndrome     Migraine without status migrainosus, not intractable     Change in mental status     Multiple sclerosis (HCC)     Tibia and fibula open fracture, right, type I or II, initial encounter     Tibia/fibula fracture, right, closed, initial encounter     Acute respiratory failure with hypoxia and hypercapnia (HCC)     Alcohol use disorder, severe, dependence (HCC)     Anastomotic ulcer S/P gastric bypass     Bipolar affective

## 2024-05-16 DIAGNOSIS — M54.50 CHRONIC BILATERAL LOW BACK PAIN WITHOUT SCIATICA: ICD-10-CM

## 2024-05-16 DIAGNOSIS — G89.29 CHRONIC BILATERAL LOW BACK PAIN WITHOUT SCIATICA: ICD-10-CM

## 2024-05-16 DIAGNOSIS — E66.09 CLASS 1 OBESITY DUE TO EXCESS CALORIES WITH BODY MASS INDEX (BMI) OF 33.0 TO 33.9 IN ADULT, UNSPECIFIED WHETHER SERIOUS COMORBIDITY PRESENT: ICD-10-CM

## 2024-05-16 NOTE — TELEPHONE ENCOUNTER
Last visit: 4/19/24  Last Med refill:   Does patient have enough medication for 72 hours: No:   Next Visit Date:  Future Appointments   Date Time Provider Department Center   5/31/2024  9:45 AM [facility]   8/5/2024  8:20 AM Soraida Stone DO Neuro Spec Neurology -   9/3/2024  2:15 PM [facility]       Health Maintenance   Topic Date Due    Hepatitis B vaccine (1 of 3 - 3-dose series) Never done    Colorectal Cancer Screen  05/06/2021    Breast cancer screen  09/30/2022    Annual Wellness Visit (Medicare Advantage)  Never done    Low dose CT lung screening &/or counseling  11/01/2024    A1C test (Diabetic or Prediabetic)  11/19/2024    Lipids  12/17/2024    Depression Monitoring  04/19/2025    DTaP/Tdap/Td vaccine (3 - Td or Tdap) 06/13/2033    Flu vaccine  Completed    Shingles vaccine  Completed    Pneumococcal 0-64 years Vaccine  Completed    COVID-19 Vaccine  Completed    Hepatitis C screen  Completed    HIV screen  Completed    Hepatitis A vaccine  Aged Out    Hib vaccine  Aged Out    Polio vaccine  Aged Out    Meningococcal (ACWY) vaccine  Aged Out    Diabetes screen  Discontinued       Hemoglobin A1C (%)   Date Value   11/19/2023 5.7   12/29/2020 5.0             ( goal A1C is < 7)   No components found for: \"LABMICR\"  No components found for: \"LDLCHOLESTEROL\", \"LDLCALC\"    (goal LDL is <100)   AST (U/L)   Date Value   01/30/2024 12     ALT (U/L)   Date Value   01/30/2024 9     BUN (mg/dL)   Date Value   01/30/2024 13     BP Readings from Last 3 Encounters:   05/06/24 111/76   04/19/24 107/77   01/29/24 119/70          (goal 120/80)    All Future Testing planned in [facility]  Lab Frequency Next Occurrence   MORIS DIGITAL SCREEN W OR WO CAD BILATERAL Once 04/19/2024   CBC With Auto Differential Once 07/12/2024   Comprehensive metabolic panel Once 07/12/2024   Hemoglobin and Hematocrit, Blood, Post Transfusion POST TRANSFUSION    CBC Auto Differential q 3 months

## 2024-05-18 RX ORDER — TIRZEPATIDE 10 MG/.5ML
10 INJECTION, SOLUTION SUBCUTANEOUS WEEKLY
Qty: 4 ADJUSTABLE DOSE PRE-FILLED PEN SYRINGE | Refills: 0 | Status: SHIPPED | OUTPATIENT
Start: 2024-05-18

## 2024-05-18 RX ORDER — TIZANIDINE 4 MG/1
4 TABLET ORAL EVERY 8 HOURS PRN
Qty: 90 TABLET | Refills: 2 | OUTPATIENT
Start: 2024-05-18

## 2024-05-20 RX ORDER — TIZANIDINE 4 MG/1
TABLET ORAL
Qty: 42 TABLET | Refills: 0 | OUTPATIENT
Start: 2024-05-20

## 2024-05-29 ENCOUNTER — TELEPHONE (OUTPATIENT)
Dept: NEUROLOGY | Age: 59
End: 2024-05-29

## 2024-05-29 NOTE — TELEPHONE ENCOUNTER
Armando called back in.  He said that Madonna hasn't been taking her psych meds. She gets a shot once a month for her bipolar disorder. I advised him we weren't her psych and I was not sure who her psychiatrist was. I suggested he take her to ER where they have psych such as [facility] or [facility]. He stated that they won't do anything for her as she is in her right mind and she won't agree to an admission but that is what she needs.  I told him to contact her CP that they might know who her psychiatrist is.

## 2024-05-29 NOTE — TELEPHONE ENCOUNTER
Received a voice mail message today from Armando pt spouse advising he had called yesterday and left a message. He would like a call back as soon as possible. He did not leave anything more specific.  He left a phone number of [phone].  I called that number and left a message.  I called her number and lm that [facility]  had called.

## 2024-05-31 ENCOUNTER — OFFICE VISIT (OUTPATIENT)
Dept: FAMILY MEDICINE CLINIC | Age: 59
End: 2024-05-31
Payer: MEDICARE

## 2024-05-31 VITALS
HEART RATE: 101 BPM | BODY MASS INDEX: 29.68 KG/M2 | SYSTOLIC BLOOD PRESSURE: 104 MMHG | DIASTOLIC BLOOD PRESSURE: 74 MMHG | HEIGHT: 60 IN | WEIGHT: 151.2 LBS

## 2024-05-31 DIAGNOSIS — G35 MULTIPLE SCLEROSIS (HCC): ICD-10-CM

## 2024-05-31 DIAGNOSIS — F31.9 BIPOLAR 1 DISORDER (HCC): Primary | ICD-10-CM

## 2024-05-31 PROCEDURE — 99213 OFFICE O/P EST LOW 20 MIN: CPT

## 2024-05-31 PROCEDURE — 4004F PT TOBACCO SCREEN RCVD TLK: CPT

## 2024-05-31 PROCEDURE — G8427 DOCREV CUR MEDS BY ELIG CLIN: HCPCS

## 2024-05-31 PROCEDURE — 3017F COLORECTAL CA SCREEN DOC REV: CPT

## 2024-05-31 PROCEDURE — G8417 CALC BMI ABV UP PARAM F/U: HCPCS

## 2024-05-31 NOTE — PATIENT INSTRUCTIONS
Thank you for letting us take care of you today. We hope all your questions were addressed. If a question was overlooked or something else comes to mind after you return home, please contact a member of your Care Team listed below.      Your Care Team at [facility] is Team #2  Rhona Jones M.D. (Faculty)  Lidya Jorge, (Resident)  Blake Motta, (Resident)  Janice Cooley (Resident)  Carrie Conn, (Resident)  Dayan Tilley, [facility]  Judd Rachel, WINSTON Dukes, [facility]  Sandra Tena, [facility]  Laila Zhou,  [facility]  Alis Cohn, [facility]  Shakira Park, [facility]  Shireen Carrasco, [facility]  Oswaldo (LJ) David WINSTON (Clinical Practice Manager)  Renea Ray [facility] (Clinical Pharmacist)     Office phone number: [phone]    If you need to get in right away due to illness, please be advised we have \"Same Day\" appointments available Monday-Friday. Please call us at [phone] option #3 to schedule your \"Same Day\" appointment.

## 2024-05-31 NOTE — PROGRESS NOTES
Attending Physician Statement  I  have discussed the care of Madonna Manrique including pertinent history and exam findings with the resident. I agree with the assessment, plan and orders as documented by the resident.      /74 (Site: Right Upper Arm, Position: Sitting, Cuff Size: Medium Adult)   Pulse (!) 101   Ht 1.524 m (5')   Wt 68.6 kg (151 lb 3.2 oz)   BMI 29.53 kg/m²    BP Readings from Last 3 Encounters:   05/31/24 104/74   05/06/24 111/76   04/19/24 107/77     Wt Readings from Last 3 Encounters:   05/31/24 68.6 kg (151 lb 3.2 oz)   05/06/24 73.8 kg (162 lb 9.6 oz)   04/19/24 74.8 kg (165 lb)     Patient on multiple side affects   Needs psych evaluation  No suicidal ideations plan or intent but seems depressed      Diagnosis Orders   1. Bipolar 1 disorder (HCC)        2. Multiple sclerosis (HCC)                Rhona Jones MD 6/3/2024 8:43 AM      
Measurements taken/recorded by MD.  
COVID-19 Vaccine  Completed    Hepatitis C screen  Completed    HIV screen  Completed    Hepatitis A vaccine  Aged Out    Hib vaccine  Aged Out    Polio vaccine  Aged Out    Meningococcal (ACWY) vaccine  Aged Out    Diabetes screen  Discontinued

## 2024-06-07 ENCOUNTER — TELEPHONE (OUTPATIENT)
Dept: FAMILY MEDICINE CLINIC | Age: 59
End: 2024-06-07

## 2024-06-07 DIAGNOSIS — M79.2 NEUROPATHIC PAIN: ICD-10-CM

## 2024-06-07 DIAGNOSIS — J45.909 ASTHMA WITH SEVERITY TO BE DETERMINED: ICD-10-CM

## 2024-06-07 RX ORDER — ALBUTEROL SULFATE 90 UG/1
2 AEROSOL, METERED RESPIRATORY (INHALATION) EVERY 6 HOURS PRN
Qty: 1 EACH | Refills: 3 | Status: SHIPPED | OUTPATIENT
Start: 2024-06-07

## 2024-06-07 RX ORDER — GABAPENTIN 300 MG/1
600 CAPSULE ORAL 3 TIMES DAILY
Qty: 180 CAPSULE | Refills: 0 | Status: SHIPPED | OUTPATIENT
Start: 2024-06-07 | End: 2024-07-07

## 2024-06-07 NOTE — TELEPHONE ENCOUNTER
Care Transitions Initial Follow Up Call    Outreach made within 2 business days of discharge: Yes    Patient: Madonna Manrique Patient : 1965   MRN: [id number]  Reason for Admission: There are no discharge diagnoses documented for the most recent discharge.  Discharge Date: 23       Spoke with: patient, patient stated she would keep the appointment she has scheduled on the      Discharge department/facility: [facility]           Shakira Park MA

## 2024-06-07 NOTE — TELEPHONE ENCOUNTER
Last visit: 5/31/24  Last Med refill:   Does patient have enough medication for 72 hours: No: patient also requesting refill for Tizanidine    Next Visit Date:  Future Appointments   Date Time Provider Department Center   6/18/2024  3:00 PM Soraida Stone DO [facility] -   6/24/2024  1:30 PM Sierra De Oliveira MD [facility]   8/5/2024  8:20 AM Soraida Stone DO Neuro Spec Neurology -   9/3/2024  2:15 PM Sade Addison MD [facility]       Health Maintenance   Topic Date Due    Hepatitis B vaccine (1 of 3 - 3-dose series) Never done    Colorectal Cancer Screen  05/06/2021    Breast cancer screen  09/30/2022    Annual Wellness Visit (Medicare Advantage)  Never done    Low dose CT lung screening &/or counseling  11/01/2024    A1C test (Diabetic or Prediabetic)  11/19/2024    Lipids  12/17/2024    Depression Monitoring  04/19/2025    DTaP/Tdap/Td vaccine (3 - Td or Tdap) 06/13/2033    Flu vaccine  Completed    Shingles vaccine  Completed    Pneumococcal 0-64 years Vaccine  Completed    COVID-19 Vaccine  Completed    Hepatitis C screen  Completed    HIV screen  Completed    Hepatitis A vaccine  Aged Out    Hib vaccine  Aged Out    Polio vaccine  Aged Out    Meningococcal (ACWY) vaccine  Aged Out    Diabetes screen  Discontinued       Hemoglobin A1C (%)   Date Value   11/19/2023 5.7   12/29/2020 5.0             ( goal A1C is < 7)   No components found for: \"LABMICR\"  No components found for: \"LDLCHOLESTEROL\", \"LDLCALC\"    (goal LDL is <100)   AST (U/L)   Date Value   01/30/2024 12     ALT (U/L)   Date Value   01/30/2024 9     BUN (mg/dL)   Date Value   01/30/2024 13     BP Readings from Last 3 Encounters:   05/31/24 104/74   05/06/24 111/76   04/19/24 107/77          (goal 120/80)    All Future Testing planned in [facility]  Lab Frequency Next Occurrence   MORIS DIGITAL SCREEN W OR WO CAD BILATERAL Once 04/19/2024   CBC With Auto Differential Once 07/12/2024   Comprehensive metabolic panel Once 07/12/2024

## 2024-06-13 DIAGNOSIS — E66.09 CLASS 1 OBESITY DUE TO EXCESS CALORIES WITH BODY MASS INDEX (BMI) OF 33.0 TO 33.9 IN ADULT, UNSPECIFIED WHETHER SERIOUS COMORBIDITY PRESENT: ICD-10-CM

## 2024-06-13 NOTE — TELEPHONE ENCOUNTER
Last visit: 5/31/2024  Last Med refill: 5/18/2024  Does patient have enough medication for 72 hours: no  Patient states she needs her Mounjaro upped and she states she needs the Zanaflex for her MS.     Next Visit Date:  Future Appointments   Date Time Provider Department Center   6/18/2024  3:00 PM Soraida Stone DO [facility] -   6/24/2024  1:30 PM Sierra De Oliveira MD [facility]   8/5/2024  8:20 AM Soraida Stone DO Neuro Spec Neurology -   9/3/2024  2:15 PM Sade Addison MD [facility]       Health Maintenance   Topic Date Due    Hepatitis B vaccine (1 of 3 - 3-dose series) Never done    Colorectal Cancer Screen  05/06/2021    Breast cancer screen  09/30/2022    Annual Wellness Visit (Medicare Advantage)  Never done    Low dose CT lung screening &/or counseling  11/01/2024    A1C test (Diabetic or Prediabetic)  11/19/2024    Lipids  12/17/2024    Depression Monitoring  04/19/2025    DTaP/Tdap/Td vaccine (3 - Td or Tdap) 06/13/2033    Flu vaccine  Completed    Shingles vaccine  Completed    Pneumococcal 0-64 years Vaccine  Completed    COVID-19 Vaccine  Completed    Hepatitis C screen  Completed    HIV screen  Completed    Hepatitis A vaccine  Aged Out    Hib vaccine  Aged Out    Polio vaccine  Aged Out    Meningococcal (ACWY) vaccine  Aged Out    Diabetes screen  Discontinued       Hemoglobin A1C (%)   Date Value   11/19/2023 5.7   12/29/2020 5.0             ( goal A1C is < 7)   No components found for: \"LABMICR\"  No components found for: \"LDLCHOLESTEROL\", \"LDLCALC\"    (goal LDL is <100)   AST (U/L)   Date Value   01/30/2024 12     ALT (U/L)   Date Value   01/30/2024 9     BUN (mg/dL)   Date Value   01/30/2024 13     BP Readings from Last 3 Encounters:   05/31/24 104/74   05/06/24 111/76   04/19/24 107/77          (goal 120/80)    All Future Testing planned in [facility]  Lab Frequency Next Occurrence   MORIS DIGITAL SCREEN W OR WO CAD BILATERAL Once 04/19/2024   CBC With Auto Differential Once

## 2024-06-14 RX ORDER — TIZANIDINE 4 MG/1
4 TABLET ORAL 3 TIMES DAILY
Qty: 42 TABLET | Refills: 0 | Status: SHIPPED | OUTPATIENT
Start: 2024-06-14 | End: 2024-06-28

## 2024-06-14 RX ORDER — TIRZEPATIDE 10 MG/.5ML
10 INJECTION, SOLUTION SUBCUTANEOUS WEEKLY
Qty: 4 ADJUSTABLE DOSE PRE-FILLED PEN SYRINGE | Refills: 0 | Status: SHIPPED | OUTPATIENT
Start: 2024-06-14

## 2024-06-24 ENCOUNTER — OFFICE VISIT (OUTPATIENT)
Dept: FAMILY MEDICINE CLINIC | Age: 59
End: 2024-06-24
Payer: MEDICARE

## 2024-06-24 VITALS
BODY MASS INDEX: 29.29 KG/M2 | WEIGHT: 149.2 LBS | SYSTOLIC BLOOD PRESSURE: 109 MMHG | HEART RATE: 109 BPM | DIASTOLIC BLOOD PRESSURE: 73 MMHG | HEIGHT: 60 IN

## 2024-06-24 DIAGNOSIS — K21.9 GASTROESOPHAGEAL REFLUX DISEASE, UNSPECIFIED WHETHER ESOPHAGITIS PRESENT: ICD-10-CM

## 2024-06-24 DIAGNOSIS — E66.09 CLASS 1 OBESITY DUE TO EXCESS CALORIES WITH BODY MASS INDEX (BMI) OF 33.0 TO 33.9 IN ADULT, UNSPECIFIED WHETHER SERIOUS COMORBIDITY PRESENT: Primary | ICD-10-CM

## 2024-06-24 PROCEDURE — G8427 DOCREV CUR MEDS BY ELIG CLIN: HCPCS

## 2024-06-24 PROCEDURE — 4004F PT TOBACCO SCREEN RCVD TLK: CPT

## 2024-06-24 PROCEDURE — 99213 OFFICE O/P EST LOW 20 MIN: CPT

## 2024-06-24 PROCEDURE — G8417 CALC BMI ABV UP PARAM F/U: HCPCS

## 2024-06-24 PROCEDURE — 3017F COLORECTAL CA SCREEN DOC REV: CPT

## 2024-06-24 RX ORDER — PANTOPRAZOLE SODIUM 40 MG/1
TABLET, DELAYED RELEASE ORAL
Qty: 30 TABLET | Refills: 3 | Status: SHIPPED | OUTPATIENT
Start: 2024-06-24

## 2024-06-24 NOTE — PROGRESS NOTES
Attending Physician Statement  I  have discussed the care of Madonna Manrique including pertinent history and exam findings with the resident. I agree with the assessment, plan and orders as documented by the resident.      /73 (Site: Left Upper Arm, Position: Sitting, Cuff Size: Medium Adult)   Pulse (!) 109   Ht 1.524 m (5')   Wt 67.7 kg (149 lb 3.2 oz)   BMI 29.14 kg/m²    BP Readings from Last 3 Encounters:   06/24/24 109/73   05/31/24 104/74   05/06/24 111/76     Wt Readings from Last 3 Encounters:   06/24/24 67.7 kg (149 lb 3.2 oz)   05/31/24 68.6 kg (151 lb 3.2 oz)   05/06/24 73.8 kg (162 lb 9.6 oz)          Diagnosis Orders   1. Class 1 obesity due to excess calories with body mass index (BMI) of 33.0 to 33.9 in adult, unspecified whether serious comorbidity present        2. Gastroesophageal reflux disease, unspecified whether esophagitis present  pantoprazole (PROTONIX) 40 MG tablet              Rhona Jones MD 6/24/2024 4:13 PM      
Visit Information    Have you changed or started any medications since your last visit including any over-the-counter medicines, vitamins, or herbal medicines? no   Have you stopped taking any of your medications? Is so, why? -  no  Are you having any side effects from any of your medications? - no    Have you seen any other physician or provider since your last visit?  no   Have you had any other diagnostic tests since your last visit?  no   Have you been seen in the emergency room and/or had an admission in a hospital since we last saw you?  no   Have you had your routine dental cleaning in the past 6 months?  no     Do you have an active MyChart account? If no, what is the barrier?  Yes    Patient Care Team:  Sierra De Oliveira MD as PCP - General (Family Medicine)  Shaniqua Owens MD as Consulting Physician (Gastroenterology)  Tiana Silver as Financial Navigator    Medical History Review  Past Medical, Family, and Social History reviewed and does contribute to the patient presenting condition    Health Maintenance   Topic Date Due    Hepatitis B vaccine (1 of 3 - 3-dose series) Never done    Colorectal Cancer Screen  05/06/2021    Breast cancer screen  09/30/2022    Annual Wellness Visit (Medicare Advantage)  Never done    Low dose CT lung screening &/or counseling  11/01/2024    A1C test (Diabetic or Prediabetic)  11/19/2024    Lipids  12/17/2024    Depression Monitoring  04/19/2025    DTaP/Tdap/Td vaccine (3 - Td or Tdap) 06/13/2033    Flu vaccine  Completed    Shingles vaccine  Completed    Pneumococcal 0-64 years Vaccine  Completed    COVID-19 Vaccine  Completed    Hepatitis C screen  Completed    HIV screen  Completed    Hepatitis A vaccine  Aged Out    Hib vaccine  Aged Out    Polio vaccine  Aged Out    Meningococcal (ACWY) vaccine  Aged Out    Diabetes screen  Discontinued             
following healthy behaviors: nutrition, exercise and medication adherence    Discussed use,benefit, and side effects of prescribed medications.  Barriers to medication compliance addressed.      All patient questions answered.  Pt voiced understanding.     No follow-ups on file.        Disclaimer: Some orall of this note was transcribed using voice-recognition software.This may cause typographical errors occasionally. Although all effort is made to fix these errors, please do not hesitate to contact our office if there isany concern with the understanding of this note.

## 2024-07-02 ENCOUNTER — TELEPHONE (OUTPATIENT)
Dept: NEUROLOGY | Age: 59
End: 2024-07-02

## 2024-07-02 DIAGNOSIS — N30.90 CYSTITIS: ICD-10-CM

## 2024-07-02 DIAGNOSIS — R30.0 DYSURIA: Primary | ICD-10-CM

## 2024-07-02 NOTE — TELEPHONE ENCOUNTER
Call placed to the patient and this information was given.  Patient stated that she would go to [place] to have this done.  Writer advised that the order was in the system and they would be able to pull it up.  Patient verbally stated her understanding.

## 2024-07-02 NOTE — TELEPHONE ENCOUNTER
Patient called the office stating that she has had bilateral upper and lower extremity weakness with fatigue for the last week.  Writer asked if she was having any symptoms of a UTI.  Patient stated that she didn't think so, however she did have a UTI while in the hospital about 3 weeks ago.  Writer advised that I would forward this information to the physician and call her back.  Please advise.

## 2024-07-05 ENCOUNTER — HOSPITAL ENCOUNTER (OUTPATIENT)
Age: 59
Discharge: HOME | End: 2024-07-05
Payer: MEDICARE

## 2024-07-05 DIAGNOSIS — R30.0 DYSURIA: ICD-10-CM

## 2024-07-05 LAB
BACTERIA URNS QL MICRO: NORMAL
BILIRUB UR QL STRIP: NEGATIVE
CASTS #/AREA URNS LPF: NORMAL /LPF (ref 0–8)
CLARITY UR: CLEAR
COLOR UR: ABNORMAL
EPI CELLS #/AREA URNS HPF: NORMAL /HPF (ref 0–5)
GLUCOSE UR STRIP-MCNC: NEGATIVE MG/DL
HGB UR QL STRIP.AUTO: NEGATIVE
KETONES UR STRIP-MCNC: ABNORMAL MG/DL
LEUKOCYTE ESTERASE UR QL STRIP: ABNORMAL
NITRITE UR QL STRIP: NEGATIVE
PH UR STRIP: 7 [PH] (ref 5–8)
PROT UR STRIP-MCNC: NEGATIVE MG/DL
RBC #/AREA URNS HPF: NORMAL /HPF (ref 0–4)
SP GR UR STRIP: 1.01 (ref 1–1.03)
UROBILINOGEN UR STRIP-ACNC: ABNORMAL EU/DL (ref 0–1)
WBC #/AREA URNS HPF: NORMAL /HPF (ref 0–5)

## 2024-07-05 PROCEDURE — 87088 URINE BACTERIA CULTURE: CPT

## 2024-07-05 PROCEDURE — 87186 SC STD MICRODIL/AGAR DIL: CPT

## 2024-07-05 PROCEDURE — 87086 URINE CULTURE/COLONY COUNT: CPT

## 2024-07-05 PROCEDURE — 86403 PARTICLE AGGLUT ANTBDY SCRN: CPT

## 2024-07-05 PROCEDURE — 81001 URINALYSIS AUTO W/SCOPE: CPT

## 2024-07-06 RX ORDER — NITROFURANTOIN 25; 75 MG/1; MG/1
100 CAPSULE ORAL 2 TIMES DAILY
Qty: 14 CAPSULE | Refills: 0 | Status: SHIPPED | OUTPATIENT
Start: 2024-07-06 | End: 2024-07-13

## 2024-07-07 LAB
MICROORGANISM SPEC CULT: ABNORMAL
MICROORGANISM SPEC CULT: ABNORMAL
SERVICE CMNT-IMP: ABNORMAL
SPECIMEN DESCRIPTION: ABNORMAL

## 2024-07-08 ENCOUNTER — PATIENT MESSAGE (OUTPATIENT)
Dept: NEUROLOGY | Age: 59
End: 2024-07-08

## 2024-07-10 DIAGNOSIS — M79.2 NEUROPATHIC PAIN: ICD-10-CM

## 2024-07-10 RX ORDER — TIZANIDINE 4 MG/1
4 TABLET ORAL 3 TIMES DAILY
Qty: 42 TABLET | Refills: 0 | Status: SHIPPED | OUTPATIENT
Start: 2024-07-10 | End: 2024-07-24

## 2024-07-10 RX ORDER — GABAPENTIN 300 MG/1
600 CAPSULE ORAL 3 TIMES DAILY
Qty: 180 CAPSULE | Refills: 0 | Status: SHIPPED | OUTPATIENT
Start: 2024-07-10 | End: 2024-08-09

## 2024-07-10 NOTE — TELEPHONE ENCOUNTER
Last visit: 06/24/2024  Last Med refill: 06/07/2024-06/14/2024  Does patient have enough medication for 72 hours: No: Called patient and informed her medication request was sent to provider and when we hear something back from provider someone from the office will call her back.     Next Visit Date:  Future Appointments   Date Time Provider Department Center   7/18/2024  9:00 AM [facility] 11 [facility] ST[facility] [place]   8/5/2024  8:20 AM Soraida Stone DO Neuro Spec Neurology -   9/3/2024  2:15 PM Sade Addison MD [facility]       Health Maintenance   Topic Date Due    Hepatitis B vaccine (1 of 3 - 3-dose series) Never done    Colorectal Cancer Screen  05/06/2021    Breast cancer screen  09/30/2022    Annual Wellness Visit (Medicare Advantage)  Never done    Flu vaccine (1) 08/01/2024    Lung Cancer Screening &/or Counseling  11/01/2024    A1C test (Diabetic or Prediabetic)  11/19/2024    Lipids  12/17/2024    Depression Monitoring  04/19/2025    DTaP/Tdap/Td vaccine (3 - Td or Tdap) 06/13/2033    Shingles vaccine  Completed    Pneumococcal 0-64 years Vaccine  Completed    COVID-19 Vaccine  Completed    Hepatitis C screen  Completed    HIV screen  Completed    Hepatitis A vaccine  Aged Out    Hib vaccine  Aged Out    Polio vaccine  Aged Out    Meningococcal (ACWY) vaccine  Aged Out    Diabetes screen  Discontinued       Hemoglobin A1C (%)   Date Value   11/19/2023 5.7   12/29/2020 5.0             ( goal A1C is < 7)   No components found for: \"LABMICR\"  No components found for: \"LDLCHOLESTEROL\", \"LDLCALC\"    (goal LDL is <100)   AST (U/L)   Date Value   01/30/2024 12     ALT (U/L)   Date Value   01/30/2024 9     BUN (mg/dL)   Date Value   01/30/2024 13     BP Readings from Last 3 Encounters:   06/24/24 109/73   05/31/24 104/74   05/06/24 111/76          (goal 120/80)    All Future Testing planned in [facility]  Lab Frequency Next Occurrence   MORIS DIGITAL SCREEN W OR WO CAD BILATERAL Once 04/19/2024   CBC With

## 2024-07-13 ENCOUNTER — APPOINTMENT (OUTPATIENT)
Dept: CT IMAGING | Age: 59
End: 2024-07-13
Payer: MEDICARE

## 2024-07-13 ENCOUNTER — HOSPITAL ENCOUNTER (EMERGENCY)
Age: 59
Discharge: HOME | End: 2024-07-13
Attending: EMERGENCY MEDICINE
Payer: MEDICARE

## 2024-07-13 VITALS
BODY MASS INDEX: 27.48 KG/M2 | DIASTOLIC BLOOD PRESSURE: 80 MMHG | TEMPERATURE: 98.1 F | RESPIRATION RATE: 16 BRPM | SYSTOLIC BLOOD PRESSURE: 117 MMHG | OXYGEN SATURATION: 97 % | HEIGHT: 60 IN | HEART RATE: 96 BPM | WEIGHT: 140 LBS

## 2024-07-13 DIAGNOSIS — R29.898 LEFT LEG WEAKNESS: ICD-10-CM

## 2024-07-13 DIAGNOSIS — N30.01 ACUTE CYSTITIS WITH HEMATURIA: Primary | ICD-10-CM

## 2024-07-13 DIAGNOSIS — R10.9 ACUTE LEFT FLANK PAIN: ICD-10-CM

## 2024-07-13 LAB
AMORPH SED URNS QL MICRO: ABNORMAL
ANION GAP SERPL CALCULATED.3IONS-SCNC: 13 MMOL/L (ref 9–17)
BASOPHILS # BLD: 0.05 K/UL (ref 0–0.2)
BASOPHILS NFR BLD: 1 %
BILIRUB UR QL STRIP: NEGATIVE
BUN SERPL-MCNC: 6 MG/DL (ref 6–20)
BUN/CREAT SERPL: 12 (ref 9–20)
CALCIUM SERPL-MCNC: 9.2 MG/DL (ref 8.6–10.4)
CHLORIDE SERPL-SCNC: 101 MMOL/L (ref 98–107)
CLARITY UR: ABNORMAL
CO2 SERPL-SCNC: 19 MMOL/L (ref 20–31)
COLOR UR: YELLOW
CREAT SERPL-MCNC: 0.5 MG/DL (ref 0.5–0.9)
EOSINOPHIL # BLD: 0.2 K/UL (ref 0–0.4)
EOSINOPHILS RELATIVE PERCENT: 4 % (ref 1–4)
EPI CELLS #/AREA URNS HPF: ABNORMAL /HPF (ref 0–5)
ERYTHROCYTE [DISTWIDTH] IN BLOOD BY AUTOMATED COUNT: 21 % (ref 11.8–14.4)
GFR, ESTIMATED: >90 ML/MIN/1.73M2
GLUCOSE SERPL-MCNC: 79 MG/DL (ref 70–99)
GLUCOSE UR STRIP-MCNC: NEGATIVE MG/DL
HCT VFR BLD AUTO: 38.5 % (ref 36.3–47.1)
HGB BLD-MCNC: 12.5 G/DL (ref 11.9–15.1)
HGB UR QL STRIP.AUTO: NEGATIVE
IMM GRANULOCYTES # BLD AUTO: 0 K/UL (ref 0–0.3)
IMM GRANULOCYTES NFR BLD: 0 %
KETONES UR STRIP-MCNC: ABNORMAL MG/DL
LEUKOCYTE ESTERASE UR QL STRIP: ABNORMAL
LYMPHOCYTES NFR BLD: 0.93 K/UL (ref 1–4.8)
LYMPHOCYTES RELATIVE PERCENT: 19 % (ref 24–44)
MCH RBC QN AUTO: 27.7 PG (ref 25.2–33.5)
MCHC RBC AUTO-ENTMCNC: 32.5 G/DL (ref 28.4–34.8)
MCV RBC AUTO: 85.4 FL (ref 82.6–102.9)
MONOCYTES NFR BLD: 0.44 K/UL (ref 0.2–0.8)
MONOCYTES NFR BLD: 9 % (ref 1–7)
MORPHOLOGY: ABNORMAL
NEUTROPHILS NFR BLD: 67 % (ref 36–66)
NEUTS SEG NFR BLD: 3.28 K/UL (ref 1.8–7.7)
NITRITE UR QL STRIP: NEGATIVE
NRBC BLD-RTO: 0 PER 100 WBC
PH UR STRIP: 7 [PH] (ref 5–8)
PLATELET # BLD AUTO: 322 K/UL (ref 138–453)
PMV BLD AUTO: 9.4 FL (ref 8.1–13.5)
POTASSIUM SERPL-SCNC: 3.7 MMOL/L (ref 3.7–5.3)
PROT UR STRIP-MCNC: NEGATIVE MG/DL
RBC # BLD AUTO: 4.51 M/UL (ref 3.95–5.11)
RBC #/AREA URNS HPF: ABNORMAL /HPF (ref 0–2)
SODIUM SERPL-SCNC: 133 MMOL/L (ref 135–144)
SP GR UR STRIP: 1.01 (ref 1–1.03)
TROPONIN I SERPL HS-MCNC: 17 NG/L (ref 0–14)
TSH SERPL DL<=0.05 MIU/L-ACNC: 3.37 UIU/ML (ref 0.3–5)
UROBILINOGEN UR STRIP-ACNC: NORMAL EU/DL (ref 0–1)
WBC #/AREA URNS HPF: ABNORMAL /HPF (ref 0–5)
WBC OTHER # BLD: 4.9 K/UL (ref 3.5–11.3)

## 2024-07-13 PROCEDURE — 81001 URINALYSIS AUTO W/SCOPE: CPT

## 2024-07-13 PROCEDURE — 70450 CT HEAD/BRAIN W/O DYE: CPT

## 2024-07-13 PROCEDURE — 74177 CT ABD & PELVIS W/CONTRAST: CPT

## 2024-07-13 PROCEDURE — 84484 ASSAY OF TROPONIN QUANT: CPT

## 2024-07-13 PROCEDURE — 80048 BASIC METABOLIC PNL TOTAL CA: CPT

## 2024-07-13 PROCEDURE — 85025 COMPLETE CBC W/AUTO DIFF WBC: CPT

## 2024-07-13 PROCEDURE — 70498 CT ANGIOGRAPHY NECK: CPT

## 2024-07-13 PROCEDURE — 6360000004 HC RX CONTRAST MEDICATION: Performed by: EMERGENCY MEDICINE

## 2024-07-13 PROCEDURE — 6370000000 HC RX 637 (ALT 250 FOR IP): Performed by: EMERGENCY MEDICINE

## 2024-07-13 PROCEDURE — 2580000003 HC RX 258: Performed by: EMERGENCY MEDICINE

## 2024-07-13 PROCEDURE — 99285 EMERGENCY DEPT VISIT HI MDM: CPT

## 2024-07-13 PROCEDURE — 84443 ASSAY THYROID STIM HORMONE: CPT

## 2024-07-13 RX ORDER — 0.9 % SODIUM CHLORIDE 0.9 %
100 INTRAVENOUS SOLUTION INTRAVENOUS ONCE
Status: COMPLETED | OUTPATIENT
Start: 2024-07-13 | End: 2024-07-13

## 2024-07-13 RX ORDER — CEPHALEXIN 500 MG/1
500 CAPSULE ORAL 4 TIMES DAILY
Qty: 20 CAPSULE | Refills: 0 | Status: SHIPPED | OUTPATIENT
Start: 2024-07-13 | End: 2024-07-18

## 2024-07-13 RX ORDER — GABAPENTIN 300 MG/1
600 CAPSULE ORAL ONCE
Status: COMPLETED | OUTPATIENT
Start: 2024-07-13 | End: 2024-07-13

## 2024-07-13 RX ORDER — CEPHALEXIN 500 MG/1
500 CAPSULE ORAL ONCE
Status: COMPLETED | OUTPATIENT
Start: 2024-07-13 | End: 2024-07-13

## 2024-07-13 RX ORDER — SODIUM CHLORIDE 0.9 % (FLUSH) 0.9 %
10 SYRINGE (ML) INJECTION PRN
Status: DISCONTINUED | OUTPATIENT
Start: 2024-07-13 | End: 2024-07-13 | Stop reason: HOSPADM

## 2024-07-13 RX ORDER — 0.9 % SODIUM CHLORIDE 0.9 %
500 INTRAVENOUS SOLUTION INTRAVENOUS ONCE
Status: COMPLETED | OUTPATIENT
Start: 2024-07-13 | End: 2024-07-13

## 2024-07-13 RX ADMIN — CEPHALEXIN 500 MG: 500 CAPSULE ORAL at 17:52

## 2024-07-13 RX ADMIN — GABAPENTIN 600 MG: 300 CAPSULE ORAL at 15:52

## 2024-07-13 RX ADMIN — SODIUM CHLORIDE 500 ML: 9 INJECTION, SOLUTION INTRAVENOUS at 15:56

## 2024-07-13 RX ADMIN — SODIUM CHLORIDE, PRESERVATIVE FREE 10 ML: 5 INJECTION INTRAVENOUS at 15:48

## 2024-07-13 RX ADMIN — SODIUM CHLORIDE 100 ML: 9 INJECTION, SOLUTION INTRAVENOUS at 15:48

## 2024-07-13 RX ADMIN — IOPAMIDOL 75 ML: 755 INJECTION, SOLUTION INTRAVENOUS at 15:48

## 2024-07-13 ASSESSMENT — PAIN - FUNCTIONAL ASSESSMENT: PAIN_FUNCTIONAL_ASSESSMENT: 0-10

## 2024-07-13 ASSESSMENT — PAIN DESCRIPTION - LOCATION: LOCATION: GENERALIZED

## 2024-07-13 ASSESSMENT — PAIN DESCRIPTION - DESCRIPTORS: DESCRIPTORS: ACHING

## 2024-07-13 ASSESSMENT — PAIN DESCRIPTION - FREQUENCY: FREQUENCY: CONTINUOUS

## 2024-07-13 ASSESSMENT — PAIN DESCRIPTION - PAIN TYPE: TYPE: CHRONIC PAIN;ACUTE PAIN

## 2024-07-13 ASSESSMENT — PAIN SCALES - GENERAL: PAINLEVEL_OUTOF10: 6

## 2024-07-13 NOTE — DISCHARGE INSTRUCTIONS
Return to this emergency room immediately if your symptoms persist, worsen or if new ones form.    Make sure you follow-up with  within the next 1-2 business days.

## 2024-07-13 NOTE — ED PROVIDER NOTES
hypertension ([facility]) I27.20    Mild cardiomegaly I51.7    Borderline personality disorder ([facility]) F60.3    Bipolar 1 disorder ([facility]) F31.9    Recurrent major depressive disorder, in remission ([facility]) F33.40    Anxiety disorder F41.9    Transient alteration of awareness R40.4    Symptomatic anemia D64.9    Hyponatremia E87.1    Hypocalcemia E83.51    Hypoalbuminemia E88.09    COVID-19 virus not detected Z20.822    Encephalopathy, metabolic G93.41    Iron deficiency anemia secondary to inadequate dietary iron intake D50.8    Iron malabsorption K90.9    History of psychiatric disorder Z86.59    Chronic bilateral low back pain with left-sided sciatica M54.42, G89.29    H/O opioid abuse ([facility]) F11.11    Polypharmacy Z79.899    Tremor due to multiple drugs G25.1    Serotonin syndrome G25.79    Confusion R41.0    Asthma with acute exacerbation J45.901    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    Migraine without status migrainosus, not intractable G43.909    Change in mental status R41.82    Multiple sclerosis ([facility]) G35    Tibia and fibula open fracture, right, type I or II, initial encounter S82.201B, S82.401B    Tibia/fibula fracture, right, closed, initial encounter S82.201A, S82.401A    Acute respiratory failure with hypoxia and hypercapnia ([facility]) J96.01, J96.02    Alcohol use disorder, severe, dependence ([facility]) F10.20    Anastomotic ulcer S/P gastric bypass K28.9    Bipolar affective disorder, currently depressed, moderate ([facility]) F31.32    Chronic cholecystitis with calculus K80.10    Deep venous thrombosis of peroneal vein ([facility]) I82.459    Displacement of lumbar intervertebral disc without myelopathy M51.26    Generalized osteoarthritis M15.9    GERD (gastroesophageal reflux disease) K21.9     SURGICAL HISTORY       Past Surgical History:   Procedure Laterality Date    CARPAL TUNNEL RELEASE Bilateral      SECTION      x 3    CHOLECYSTECTOMY      GASTRIC BYPASS SURGERY      HYSTERECTOMY (CERVIX STATUS UNKNOWN)      LEG SURGERY Left  discussion with patient/family: Patient aware and agrees with disposition plan.    MIPS:  N/A    Social determinants of health impacting treatment or disposition:  None    Shared Decision Making completed with patient regarding risks and benefits of admission versus discharge.  Patient decides to be discharged home.    Code Status Discussion:  Not discussed    \"ED Course\" Notes From Epic Narrator:     All questions answered.  Given strict return precautions.  No further work-up indicated at this time.      CRITICAL CARE:   N/A    PROCEDURES:  Procedures      DATA FOR LAB AND RADIOLOGY TESTS ORDERED BELOW ARE REVIEWED BY THE ED CLINICIAN:    RADIOLOGY: All x-rays, CT, MRI, and formal ultrasound images (except ED bedside ultrasound) are read by the radiologist, see reports below, unless otherwise noted in MDM or here.  Reports below are reviewed by myself.  CT ABDOMEN PELVIS W IV CONTRAST Additional Contrast? None   Preliminary Result   1. No acute findings in the abdomen or pelvis.  No evidence of obstructive   uropathy.   2. Hepatic steatosis.   3. Favor incomplete distension of the colon, less likely wall thickening.   Correlate clinically to exclude colitis, with outpatient colonoscopy if   clinically indicated.  No loculated fluid collections or significant   inflammatory changes.         CTA HEAD NECK W CONTRAST   Final Result   1. No acute intracranial abnormality.   2. Unremarkable CTA of the head and neck.         CT HEAD WO CONTRAST   Final Result   1. No acute intracranial abnormality.   2. Unremarkable CTA of the head and neck.             LABS: Lab orders shown below, the results are reviewed by myself, and all abnormals are listed below.  Labs Reviewed   URINALYSIS - Abnormal; Notable for the following components:       Result Value    Turbidity UA SLIGHTLY CLOUDY (*)     Ketones, Urine 3+ (*)     Leukocyte Esterase, Urine SMALL (*)     All other components within normal limits   MICROSCOPIC URINALYSIS -

## 2024-07-16 ENCOUNTER — TELEPHONE (OUTPATIENT)
Dept: NEUROLOGY | Age: 59
End: 2024-07-16

## 2024-07-16 NOTE — TELEPHONE ENCOUNTER
Madonna called the office stating that she had to go to the ER for her UTI on 7/13/24.  Patient states that ER physician gave her Keflex.  Patient states that she is still very weak, shaking / shivers, walks with a limp.  Patient states that she doesn't feel well and wants to know what she should do.  Madonna did say that she is scheduled for her Ocrevus infusion this Thursday.  Writer advised that Dr. Stoen was out of the office until tomorrow.  I would forward this message for her to address when she is back in the office.  If she feels she needs to be seen sooner she should go back to the ER.   Desean then got on the phone and stated that Cristina has been\"real bad lately\".  He stated that she was started on Lithium three weeks ago while at [facility] for her bipolar.  Writer advised  what I had said to Cristina.   verbally stated his understanding.

## 2024-07-17 ENCOUNTER — HOSPITAL ENCOUNTER (OUTPATIENT)
Facility: MEDICAL CENTER | Age: 59
End: 2024-07-17

## 2024-07-18 ENCOUNTER — HOSPITAL ENCOUNTER (OUTPATIENT)
Dept: INFUSION THERAPY | Facility: MEDICAL CENTER | Age: 59
Discharge: HOME | End: 2024-07-18

## 2024-07-18 ENCOUNTER — HOSPITAL ENCOUNTER (OUTPATIENT)
Age: 59
Setting detail: SPECIMEN
Discharge: HOME | End: 2024-07-18
Payer: MEDICARE

## 2024-07-18 VITALS
BODY MASS INDEX: 26.62 KG/M2 | RESPIRATION RATE: 16 BRPM | HEART RATE: 95 BPM | WEIGHT: 136.3 LBS | SYSTOLIC BLOOD PRESSURE: 126 MMHG | TEMPERATURE: 98.6 F | OXYGEN SATURATION: 98 % | DIASTOLIC BLOOD PRESSURE: 83 MMHG

## 2024-07-18 DIAGNOSIS — G35 MULTIPLE SCLEROSIS (HCC): ICD-10-CM

## 2024-07-18 LAB
ALBUMIN SERPL-MCNC: 3 G/DL (ref 3.5–5.2)
ALP SERPL-CCNC: 109 U/L (ref 35–104)
ALT SERPL-CCNC: 21 U/L (ref 5–33)
ANION GAP SERPL CALCULATED.3IONS-SCNC: 9 MMOL/L (ref 9–17)
AST SERPL-CCNC: 27 U/L
BASOPHILS # BLD: 0.03 K/UL (ref 0–0.2)
BASOPHILS NFR BLD: 0 % (ref 0–2)
BILIRUB SERPL-MCNC: 0.4 MG/DL (ref 0.3–1.2)
BUN SERPL-MCNC: 8 MG/DL (ref 6–20)
BUN/CREAT SERPL: 16 (ref 9–20)
CALCIUM SERPL-MCNC: 8.9 MG/DL (ref 8.6–10.4)
CHLORIDE SERPL-SCNC: 105 MMOL/L (ref 98–107)
CO2 SERPL-SCNC: 19 MMOL/L (ref 20–31)
CREAT SERPL-MCNC: 0.5 MG/DL (ref 0.5–0.9)
EOSINOPHIL # BLD: 0.24 K/UL (ref 0–0.44)
EOSINOPHILS RELATIVE PERCENT: 3 % (ref 1–4)
ERYTHROCYTE [DISTWIDTH] IN BLOOD BY AUTOMATED COUNT: 21.6 % (ref 11.8–14.4)
GFR, ESTIMATED: >90 ML/MIN/1.73M2
GLUCOSE SERPL-MCNC: 81 MG/DL (ref 70–99)
HCT VFR BLD AUTO: 39.4 % (ref 36.3–47.1)
HGB BLD-MCNC: 12.7 G/DL (ref 11.9–15.1)
IMM GRANULOCYTES # BLD AUTO: 0.02 K/UL (ref 0–0.3)
IMM GRANULOCYTES NFR BLD: 0 %
LYMPHOCYTES NFR BLD: 0.87 K/UL (ref 1.1–3.7)
LYMPHOCYTES RELATIVE PERCENT: 12 % (ref 24–43)
MCH RBC QN AUTO: 27.9 PG (ref 25.2–33.5)
MCHC RBC AUTO-ENTMCNC: 32.2 G/DL (ref 28.4–34.8)
MCV RBC AUTO: 86.6 FL (ref 82.6–102.9)
MONOCYTES NFR BLD: 0.5 K/UL (ref 0.1–1.2)
MONOCYTES NFR BLD: 7 % (ref 3–12)
NEUTROPHILS NFR BLD: 78 % (ref 36–65)
NEUTS SEG NFR BLD: 5.82 K/UL (ref 1.5–8.1)
NRBC BLD-RTO: 0 PER 100 WBC
PLATELET # BLD AUTO: 302 K/UL (ref 138–453)
PMV BLD AUTO: 10.1 FL (ref 8.1–13.5)
POTASSIUM SERPL-SCNC: 4.7 MMOL/L (ref 3.7–5.3)
PROT SERPL-MCNC: 5.5 G/DL (ref 6.4–8.3)
RBC # BLD AUTO: 4.55 M/UL (ref 3.95–5.11)
RBC # BLD: ABNORMAL 10*6/UL
SODIUM SERPL-SCNC: 133 MMOL/L (ref 135–144)
WBC OTHER # BLD: 7.5 K/UL (ref 3.5–11.3)

## 2024-07-18 PROCEDURE — 36415 COLL VENOUS BLD VENIPUNCTURE: CPT

## 2024-07-18 PROCEDURE — 80053 COMPREHEN METABOLIC PANEL: CPT

## 2024-07-18 PROCEDURE — 85025 COMPLETE CBC W/AUTO DIFF WBC: CPT

## 2024-07-18 NOTE — PROGRESS NOTES
Patient arrives per wheelchair for ocrevus infusion.  Patient states she has a UTI and is still on antibiotics until after tomorrow. Denies fever/chills. Does have increasing weakness, states it seems better today. Denies other issues/concerns.  Vitals as charted.  Writer called Dr. Vega office and spoke with Mayda; per Dr. Stone, orders to hold treatment for 1 week.  Patient verbalizes understanding.  Patient discharged with calendar in hand for next Thursday.

## 2024-07-18 NOTE — TELEPHONE ENCOUNTER
[facility]. She asked if Madonna should receive the infusion today as she was there. I did speak with Dr. stone and she did ask that pt. delay the infusion until after completing her antibiotic.  If she continues to have issues with UTI then Dr. Stone may refer her to urology.  I called Natalia and gave her this message.

## 2024-07-19 ENCOUNTER — TELEPHONE (OUTPATIENT)
Dept: NEUROLOGY | Age: 59
End: 2024-07-19

## 2024-07-19 NOTE — TELEPHONE ENCOUNTER
----- Message from Soraida PERES DO sent at 7/18/2024 11:15 AM EDT -----  Labs are abnormal and likely reflect her recent infection.  I want her to make sure she is following up with her PCP.

## 2024-07-19 NOTE — TELEPHONE ENCOUNTER
I contacted the patient and she was notified of her results. She stated she would contact her PCP. Patient is concerned because she is scheduled to have her Ocrevus infusion on 7/25/2024 and she wants to make sure she is still able to have this done. Please advise.

## 2024-07-22 ENCOUNTER — OFFICE VISIT (OUTPATIENT)
Dept: FAMILY MEDICINE CLINIC | Age: 59
End: 2024-07-22
Payer: MEDICARE

## 2024-07-22 VITALS
SYSTOLIC BLOOD PRESSURE: 104 MMHG | BODY MASS INDEX: 27.96 KG/M2 | WEIGHT: 142.4 LBS | HEART RATE: 104 BPM | DIASTOLIC BLOOD PRESSURE: 75 MMHG | HEIGHT: 60 IN

## 2024-07-22 DIAGNOSIS — Z87.440 HISTORY OF UTI: ICD-10-CM

## 2024-07-22 DIAGNOSIS — N30.00 ACUTE CYSTITIS WITHOUT HEMATURIA: Primary | ICD-10-CM

## 2024-07-22 PROCEDURE — 99213 OFFICE O/P EST LOW 20 MIN: CPT

## 2024-07-22 PROCEDURE — G8427 DOCREV CUR MEDS BY ELIG CLIN: HCPCS

## 2024-07-22 PROCEDURE — 3017F COLORECTAL CA SCREEN DOC REV: CPT

## 2024-07-22 PROCEDURE — 4004F PT TOBACCO SCREEN RCVD TLK: CPT

## 2024-07-22 PROCEDURE — G8417 CALC BMI ABV UP PARAM F/U: HCPCS

## 2024-07-22 RX ORDER — FLUCONAZOLE 150 MG/1
150 TABLET ORAL DAILY
Qty: 3 TABLET | Refills: 0 | Status: SHIPPED | OUTPATIENT
Start: 2024-07-22 | End: 2024-07-25

## 2024-07-22 ASSESSMENT — ENCOUNTER SYMPTOMS
STRIDOR: 0
NAUSEA: 0
SHORTNESS OF BREATH: 0
DIARRHEA: 0
ABDOMINAL PAIN: 0
COUGH: 0
WHEEZING: 0
VOMITING: 0

## 2024-07-22 NOTE — PATIENT INSTRUCTIONS
Thank you for letting us take care of you today. We hope all your questions were addressed. If a question was overlooked or something else comes to mind after you return home, please contact a member of your Care Team listed below.      Your Care Team at [facility] is Team #2  Rhona Jones M.D. (Faculty)  Blake Motta M.D. (Resident)  Janice Cooley M.D. (Resident)  Sierra De Oliveira M.D. (Resident)  Lidya Jorge M.D. (Resident)  Ingrid Espinosa M.D. (Resident)  Judd Rachel, [facility]  Oumou Dukes, WINSTON Tena, [facility]  Laila Zhou,  [facility]  Alis Cohn, [facility]  Shakira Park, WINSTON Carrasco, [facility]  Oswaldo (LJ) David WINSTON (Clinical Practice Manager)  Renea Ray [facility] (Clinical Pharmacist)     Office phone number: [phone]    If you need to get in right away due to illness, please be advised we have \"Same Day\" appointments available Monday-Friday. Please call us at [phone] option #3 to schedule your \"Same Day\" appointment.

## 2024-07-22 NOTE — PROGRESS NOTES
[facility] Residency Program - Outpatient Note      Subjective:    Madonna Manrique is a 58 y.o. female with  has a past medical history of Alcoholism ([facility]), Anxiety, Asthma, Asthma, Bipolar 1 disorder ([facility]), Borderline personality disorder ([facility]), Chronic uveitis of both eyes, Depression, Drug abuse, opioid type ([facility]), DVT (deep vein thrombosis) in pregnancy, History of blood transfusion, May-Thurner syndrome, Pneumonia, Pulmonary emboli (HCC), Suicide gesture ([facility]), and Uveitis of both eyes.    Presented to the office today for:  Chief Complaint   Patient presents with    Frequent/Recurrent UTI     Ed follow up, discuss lab work        Frequent/Recurrent UTI      Patient is a 58-year-old female with significant past medical history of multiple sclerosis on ocrelizumab infusions coming to clinic for clearance for for her upcoming infusion appointment.  Patient states few weeks prior she had mild abdominal pain and urinary frequency denied any acute dysuria but showed evidence of possible UTI.  Patient was started on antibiotics she completed the regimen had a repeat head CT which was unremarkable and lab work which overall was unremarkable.  Patient states she had a scheduled infusion prior week but it was held and delayed by her neurologist for repeat blood work.  Repeat blood work shows no evidence of leukopenia or leukocytosis no evidence of anemia patient seems to be at her baseline self.  Patient denies any acute symptoms denies any urinary frequency no abdominal pain no fever.  As per  patient is active and seems to be at her baseline state.        Review of Systems   Constitutional:  Positive for fatigue (Chronic but improving). Negative for appetite change, chills, diaphoresis and fever.   Respiratory:  Negative for cough, shortness of breath, wheezing and stridor.    Cardiovascular:  Negative for chest pain and leg swelling.   Gastrointestinal:  Negative for

## 2024-07-22 NOTE — PROGRESS NOTES
Visit Information    Have you changed or started any medications since your last visit including any over-the-counter medicines, vitamins, or herbal medicines? no   Are you having any side effects from any of your medications? -  no  Have you stopped taking any of your medications? Is so, why? -  no    Have you seen any other physician or provider since your last visit? No  Have you had any other diagnostic tests since your last visit? Yes - Records Obtained  Have you been seen in the emergency room and/or had an admission to a hospital since we last saw you? Yes - Records Obtained  Have you had your routine dental cleaning in the past 6 months? no    Have you activated your [employer] account? If not, what are your barriers? Yes     Patient Care Team:  Sierra De Oliveira MD as PCP - General (Family Medicine)  Shaniqua Owens MD as Consulting Physician (Gastroenterology)  Tiana Silver as Financial Navigator    Medical History Review  Past Medical, Family, and Social History reviewed and does not contribute to the patient presenting condition    Health Maintenance   Topic Date Due    Hepatitis B vaccine (1 of 3 - 3-dose series) Never done    Colorectal Cancer Screen  05/06/2021    Breast cancer screen  09/30/2022    Annual Wellness Visit (Medicare Advantage)  Never done    Flu vaccine (1) 08/01/2024    Lung Cancer Screening &/or Counseling  11/01/2024    A1C test (Diabetic or Prediabetic)  11/19/2024    Lipids  12/17/2024    Depression Monitoring  04/19/2025    DTaP/Tdap/Td vaccine (3 - Td or Tdap) 06/13/2033    Shingles vaccine  Completed    Pneumococcal 0-64 years Vaccine  Completed    COVID-19 Vaccine  Completed    Hepatitis C screen  Completed    HIV screen  Completed    Hepatitis A vaccine  Aged Out    Hib vaccine  Aged Out    Polio vaccine  Aged Out    Meningococcal (ACWY) vaccine  Aged Out    Diabetes screen  Discontinued

## 2024-07-22 NOTE — PROGRESS NOTES
Attending Physician Statement  I  have discussed the care of Madonna Manrique including pertinent history and exam findings with the resident. I agree with the assessment, plan and orders as documented by the resident.      /75 (Site: Right Upper Arm, Position: Sitting, Cuff Size: Medium Adult)   Pulse (!) 104   Ht 1.524 m (5')   Wt 64.6 kg (142 lb 6.4 oz)   BMI 27.81 kg/m²    BP Readings from Last 3 Encounters:   07/22/24 104/75   07/18/24 126/83   07/13/24 117/80     Wt Readings from Last 3 Encounters:   07/22/24 64.6 kg (142 lb 6.4 oz)   07/18/24 61.8 kg (136 lb 4.8 oz)   07/13/24 63.5 kg (140 lb)          Diagnosis Orders   1. Acute cystitis without hematuria        2. History of UTI                Ernesto Ceron DO 7/24/2024 11:39 AM

## 2024-07-24 ENCOUNTER — HOSPITAL ENCOUNTER (OUTPATIENT)
Facility: MEDICAL CENTER | Age: 59
End: 2024-07-24
Payer: MEDICARE

## 2024-07-25 ENCOUNTER — HOSPITAL ENCOUNTER (OUTPATIENT)
Dept: INFUSION THERAPY | Facility: MEDICAL CENTER | Age: 59
Discharge: HOME | End: 2024-07-25
Payer: MEDICARE

## 2024-07-25 VITALS
WEIGHT: 143.6 LBS | SYSTOLIC BLOOD PRESSURE: 78 MMHG | TEMPERATURE: 98.5 F | HEART RATE: 95 BPM | BODY MASS INDEX: 28.04 KG/M2 | RESPIRATION RATE: 16 BRPM | DIASTOLIC BLOOD PRESSURE: 50 MMHG

## 2024-07-25 DIAGNOSIS — G35 MULTIPLE SCLEROSIS (HCC): Primary | ICD-10-CM

## 2024-07-25 PROCEDURE — 6360000002 HC RX W HCPCS: Performed by: PSYCHIATRY & NEUROLOGY

## 2024-07-25 PROCEDURE — 2580000003 HC RX 258: Performed by: PSYCHIATRY & NEUROLOGY

## 2024-07-25 PROCEDURE — 96415 CHEMO IV INFUSION ADDL HR: CPT

## 2024-07-25 PROCEDURE — 6370000000 HC RX 637 (ALT 250 FOR IP): Performed by: PSYCHIATRY & NEUROLOGY

## 2024-07-25 PROCEDURE — 96375 TX/PRO/DX INJ NEW DRUG ADDON: CPT

## 2024-07-25 PROCEDURE — 96413 CHEMO IV INFUSION 1 HR: CPT

## 2024-07-25 RX ORDER — DIPHENHYDRAMINE HYDROCHLORIDE 50 MG/ML
25 INJECTION INTRAMUSCULAR; INTRAVENOUS ONCE
Status: COMPLETED | OUTPATIENT
Start: 2024-07-25 | End: 2024-07-25

## 2024-07-25 RX ORDER — ALBUTEROL SULFATE 90 UG/1
4 AEROSOL, METERED RESPIRATORY (INHALATION) PRN
OUTPATIENT
Start: 2025-01-16

## 2024-07-25 RX ORDER — SODIUM CHLORIDE 0.9 % (FLUSH) 0.9 %
5-40 SYRINGE (ML) INJECTION PRN
OUTPATIENT
Start: 2025-01-16

## 2024-07-25 RX ORDER — SODIUM CHLORIDE 9 MG/ML
5-250 INJECTION, SOLUTION INTRAVENOUS PRN
Status: DISCONTINUED | OUTPATIENT
Start: 2024-07-25 | End: 2024-07-26 | Stop reason: HOSPADM

## 2024-07-25 RX ORDER — DIPHENHYDRAMINE HYDROCHLORIDE 50 MG/ML
50 INJECTION INTRAMUSCULAR; INTRAVENOUS
OUTPATIENT
Start: 2025-01-16

## 2024-07-25 RX ORDER — SODIUM CHLORIDE 9 MG/ML
5-250 INJECTION, SOLUTION INTRAVENOUS PRN
OUTPATIENT
Start: 2025-01-16

## 2024-07-25 RX ORDER — FAMOTIDINE 10 MG/ML
20 INJECTION, SOLUTION INTRAVENOUS
OUTPATIENT
Start: 2025-01-16

## 2024-07-25 RX ORDER — HEPARIN 100 UNIT/ML
500 SYRINGE INTRAVENOUS PRN
OUTPATIENT
Start: 2025-01-16

## 2024-07-25 RX ORDER — ACETAMINOPHEN 325 MG/1
650 TABLET ORAL ONCE
Status: COMPLETED | OUTPATIENT
Start: 2024-07-25 | End: 2024-07-25

## 2024-07-25 RX ORDER — SODIUM CHLORIDE 9 MG/ML
INJECTION, SOLUTION INTRAVENOUS CONTINUOUS
OUTPATIENT
Start: 2025-01-16

## 2024-07-25 RX ORDER — ONDANSETRON 2 MG/ML
8 INJECTION INTRAMUSCULAR; INTRAVENOUS
OUTPATIENT
Start: 2025-01-16

## 2024-07-25 RX ORDER — EPINEPHRINE 1 MG/ML
0.3 INJECTION, SOLUTION INTRAMUSCULAR; SUBCUTANEOUS PRN
OUTPATIENT
Start: 2025-01-16

## 2024-07-25 RX ORDER — ACETAMINOPHEN 325 MG/1
650 TABLET ORAL ONCE
OUTPATIENT
Start: 2025-01-16 | End: 2025-01-16

## 2024-07-25 RX ORDER — ACETAMINOPHEN 325 MG/1
650 TABLET ORAL
OUTPATIENT
Start: 2025-01-16

## 2024-07-25 RX ORDER — DIPHENHYDRAMINE HYDROCHLORIDE 50 MG/ML
25 INJECTION INTRAMUSCULAR; INTRAVENOUS ONCE
OUTPATIENT
Start: 2025-01-16 | End: 2025-01-16

## 2024-07-25 RX ADMIN — ACETAMINOPHEN 650 MG: 325 TABLET ORAL at 10:52

## 2024-07-25 RX ADMIN — OCRELIZUMAB 600 MG: 300 INJECTION INTRAVENOUS at 11:21

## 2024-07-25 RX ADMIN — SODIUM CHLORIDE 100 ML/HR: 9 INJECTION, SOLUTION INTRAVENOUS at 10:35

## 2024-07-25 RX ADMIN — METHYLPREDNISOLONE SODIUM SUCCINATE 125 MG: 125 INJECTION INTRAMUSCULAR; INTRAVENOUS at 10:53

## 2024-07-25 RX ADMIN — DIPHENHYDRAMINE HYDROCHLORIDE 25 MG: 50 INJECTION INTRAMUSCULAR; INTRAVENOUS at 10:53

## 2024-07-25 ASSESSMENT — PAIN SCALES - GENERAL: PAINLEVEL_OUTOF10: 0

## 2024-07-25 NOTE — PLAN OF CARE
Problem: Safety - Adult  Goal: Free from fall injury  7/25/2024 1007 by Eleanor Andrews RN  Outcome: Completed     Problem: Safety - Adult  Goal: Free from fall injury  Outcome: Completed

## 2024-07-30 DIAGNOSIS — J45.909 ASTHMA WITH SEVERITY TO BE DETERMINED: ICD-10-CM

## 2024-07-30 NOTE — TELEPHONE ENCOUNTER
Please address the medication refill and close the encounter.  If I can be of assistance, please route to the applicable pool.      Thank you.      Last visit: 7-22-24  Last Med refill: 10-  Does patient have enough medication for 72 hours: No:     Next Visit Date:  Future Appointments   Date Time Provider Department Center   8/5/2024  8:20 AM Bertha Soraida V, DO Neuro Spec Neurology -   8/30/2024  1:30 PM SCHEDULE, [facility] L[facility]   9/3/2024  2:15 PM Sade Addison MD [facility]   10/28/2024  1:20 PM Sierra De Oliveira MD [facility]       Health Maintenance   Topic Date Due    Hepatitis B vaccine (1 of 3 - 3-dose series) Never done    Colorectal Cancer Screen  05/06/2021    Breast cancer screen  09/30/2022    Annual Wellness Visit (Medicare Advantage)  Never done    Flu vaccine (1) 08/01/2024    Lung Cancer Screening &/or Counseling  11/01/2024    A1C test (Diabetic or Prediabetic)  11/19/2024    Lipids  12/17/2024    Depression Monitoring  07/22/2025    DTaP/Tdap/Td vaccine (3 - Td or Tdap) 06/13/2033    Shingles vaccine  Completed    Pneumococcal 0-64 years Vaccine  Completed    COVID-19 Vaccine  Completed    Hepatitis C screen  Completed    HIV screen  Completed    Hepatitis A vaccine  Aged Out    Hib vaccine  Aged Out    Polio vaccine  Aged Out    Meningococcal (ACWY) vaccine  Aged Out    Diabetes screen  Discontinued       Hemoglobin A1C (%)   Date Value   11/19/2023 5.7   12/29/2020 5.0             ( goal A1C is < 7)   No components found for: \"LABMICR\"  No components found for: \"LDLCHOLESTEROL\", \"LDLCALC\"    (goal LDL is <100)   AST (U/L)   Date Value   07/18/2024 27     ALT (U/L)   Date Value   07/18/2024 21     BUN (mg/dL)   Date Value   07/18/2024 8     BP Readings from Last 3 Encounters:   07/25/24 (!) 78/50   07/22/24 104/75   07/18/24 126/83          (goal 120/80)    All Future Testing planned in [facility]  Lab Frequency Next Occurrence   MORIS DIGITAL SCREEN W OR WO

## 2024-07-31 RX ORDER — TIZANIDINE 4 MG/1
4 TABLET ORAL 3 TIMES DAILY
Qty: 42 TABLET | Refills: 0 | Status: SHIPPED | OUTPATIENT
Start: 2024-07-31 | End: 2024-08-14

## 2024-07-31 RX ORDER — FLUTICASONE FUROATE AND VILANTEROL 100; 25 UG/1; UG/1
1 POWDER RESPIRATORY (INHALATION) DAILY
Qty: 1 EACH | Refills: 2 | Status: SHIPPED | OUTPATIENT
Start: 2024-07-31 | End: 2024-08-30

## 2024-07-31 RX ORDER — ALBUTEROL SULFATE 90 UG/1
2 AEROSOL, METERED RESPIRATORY (INHALATION) EVERY 6 HOURS PRN
Qty: 1 EACH | Refills: 3 | Status: SHIPPED | OUTPATIENT
Start: 2024-07-31

## 2024-08-01 ENCOUNTER — TELEPHONE (OUTPATIENT)
Dept: NEUROLOGY | Age: 59
End: 2024-08-01

## 2024-08-01 ENCOUNTER — OFFICE VISIT (OUTPATIENT)
Dept: FAMILY MEDICINE CLINIC | Age: 59
End: 2024-08-01
Payer: MEDICARE

## 2024-08-01 VITALS
SYSTOLIC BLOOD PRESSURE: 84 MMHG | HEIGHT: 60 IN | DIASTOLIC BLOOD PRESSURE: 61 MMHG | HEART RATE: 104 BPM | BODY MASS INDEX: 27.33 KG/M2 | WEIGHT: 139.2 LBS | OXYGEN SATURATION: 99 %

## 2024-08-01 DIAGNOSIS — I82.5Y2 CHRONIC DEEP VEIN THROMBOSIS (DVT) OF PROXIMAL VEIN OF LEFT LOWER EXTREMITY (HCC): ICD-10-CM

## 2024-08-01 DIAGNOSIS — I95.89 OTHER SPECIFIED HYPOTENSION: ICD-10-CM

## 2024-08-01 DIAGNOSIS — R60.0 LOWER LEG EDEMA: Primary | ICD-10-CM

## 2024-08-01 DIAGNOSIS — R06.02 SOB (SHORTNESS OF BREATH): ICD-10-CM

## 2024-08-01 PROCEDURE — G8427 DOCREV CUR MEDS BY ELIG CLIN: HCPCS | Performed by: STUDENT IN AN ORGANIZED HEALTH CARE EDUCATION/TRAINING PROGRAM

## 2024-08-01 PROCEDURE — 3017F COLORECTAL CA SCREEN DOC REV: CPT | Performed by: STUDENT IN AN ORGANIZED HEALTH CARE EDUCATION/TRAINING PROGRAM

## 2024-08-01 PROCEDURE — G8417 CALC BMI ABV UP PARAM F/U: HCPCS | Performed by: STUDENT IN AN ORGANIZED HEALTH CARE EDUCATION/TRAINING PROGRAM

## 2024-08-01 PROCEDURE — 99214 OFFICE O/P EST MOD 30 MIN: CPT | Performed by: STUDENT IN AN ORGANIZED HEALTH CARE EDUCATION/TRAINING PROGRAM

## 2024-08-01 PROCEDURE — 4004F PT TOBACCO SCREEN RCVD TLK: CPT | Performed by: STUDENT IN AN ORGANIZED HEALTH CARE EDUCATION/TRAINING PROGRAM

## 2024-08-01 RX ORDER — FUROSEMIDE 20 MG/1
20 TABLET ORAL
Qty: 60 TABLET | Refills: 0 | Status: SHIPPED | OUTPATIENT
Start: 2024-08-01

## 2024-08-01 ASSESSMENT — ENCOUNTER SYMPTOMS
SHORTNESS OF BREATH: 1
VOMITING: 0
ABDOMINAL PAIN: 0
DIARRHEA: 0
NAUSEA: 0
WHEEZING: 1

## 2024-08-01 NOTE — TELEPHONE ENCOUNTER
Patient called into office stating that she has had increased swelling in both of her lower legs and feet. She said that it is worse in the left side. She is having lower extremity weakness and pain. She said that she did take a Lasix 20 mg which she got from her PCP a while back but it is not helping. Writer advised that this message would be forwarded to Dr. Stone and that she should also reach out to her PCP today. She voiced understanding.

## 2024-08-01 NOTE — PATIENT INSTRUCTIONS
Thank you for letting us take care of you today. We hope all your questions were addressed. If a question was overlooked or something else comes to mind after you return home, please contact a member of your Care Team listed below.      Your Care Team at [facility] is Team #2  Rhona Jones M.D. (Faculty)  Blake Motta M.D. (Resident)  Janice Cooley M.D. (Resident)  Sierra De Oliveira M.D. (Resident)  Lidya Jorge M.D. (Resident)  Ingrid Espinosa M.D. (Resident)  Judd Rachel, [facility]  Oumou Dukes, WINSTON Tena, [facility]  Laila Zhou,  [facility]  Alis Cohn, [facility]  Shakira Park, WINSTON Carrasco, [facility]  Oswaldo (LJ) David WINSTON (Clinical Practice Manager)  Renea Ray [facility] (Clinical Pharmacist)     Office phone number: [phone]    If you need to get in right away due to illness, please be advised we have \"Same Day\" appointments available Monday-Friday. Please call us at [phone] option #3 to schedule your \"Same Day\" appointment.

## 2024-08-01 NOTE — PROGRESS NOTES
congestive heart failureVisit Information    Have you changed or started any medications since your last visit including any over-the-counter medicines, vitamins, or herbal medicines? no   Have you stopped taking any of your medications? Is so, why? -  no  Are you having any side effects from any of your medications? - no    Have you seen any other physician or provider since your last visit?  yes - Neurology ,    Have you had any other diagnostic tests since your last visit?  yes - labs   Have you been seen in the emergency room and/or had an admission in a hospital since we last saw you?  no   Have you had your routine dental cleaning in the past 6 months?  no     Do you have an active MyChart account? If no, what is the barrier?  Yes    Patient Care Team:  Sierra De Oliveira MD as PCP - General (Family Medicine)  Shaniqua Owens MD as Consulting Physician (Gastroenterology)  Tiana Silver as Financial Navigator    Medical History Review  Past Medical, Family, and Social History reviewed and does not contribute to the patient presenting condition    Health Maintenance   Topic Date Due    Hepatitis B vaccine (1 of 3 - 3-dose series) Never done    Colorectal Cancer Screen  05/06/2021    Breast cancer screen  09/30/2022    Annual Wellness Visit (Medicare Advantage)  Never done    Flu vaccine (1) 08/01/2024    Lung Cancer Screening &/or Counseling  11/01/2024    A1C test (Diabetic or Prediabetic)  11/19/2024    Lipids  12/17/2024    Depression Monitoring  07/22/2025    DTaP/Tdap/Td vaccine (3 - Td or Tdap) 06/13/2033    Shingles vaccine  Completed    Pneumococcal 0-64 years Vaccine  Completed    COVID-19 Vaccine  Completed    Hepatitis C screen  Completed    HIV screen  Completed    Hepatitis A vaccine  Aged Out    Hib vaccine  Aged Out    Polio vaccine  Aged Out    Meningococcal (ACWY) vaccine  Aged Out    Diabetes screen  Discontinued               
arthralgias.   Neurological:  Positive for dizziness. Negative for weakness and headaches.   Psychiatric/Behavioral:  Negative for agitation.                  The patient has a   Family History   Problem Relation Age of Onset    Heart Disease Mother     Neuropathy Father     Migraines Sister     Alzheimer's Disease Neg Hx     Dementia Neg Hx     Cerebral Aneurysm Neg Hx     Epilepsy Neg Hx     Mult Sclerosis Neg Hx     Parkinsonism Neg Hx     Seizures Neg Hx     Stroke Neg Hx        Objective:    BP (!) 84/61 (Site: Left Upper Arm, Position: Sitting, Cuff Size: Medium Adult)   Pulse (!) 104   Ht 1.524 m (5')   Wt 63.1 kg (139 lb 3.2 oz)   SpO2 99%   BMI 27.19 kg/m²    BP Readings from Last 3 Encounters:   08/01/24 (!) 84/61   07/25/24 (!) 78/50   07/22/24 104/75       Physical Exam  Constitutional:       General: She is not in acute distress.     Appearance: She is obese. She is not ill-appearing, toxic-appearing or diaphoretic.   Cardiovascular:      Rate and Rhythm: Regular rhythm. Tachycardia present.      Pulses: Normal pulses.      Heart sounds: No murmur heard.  Pulmonary:      Effort: Pulmonary effort is normal.      Comments: Slight wheezing and prominent crackles in bilateral lower lung fields  Musculoskeletal:         General: Swelling and tenderness present.      Right lower leg: Edema present.      Left lower leg: Edema present.   Skin:     General: Skin is warm.      Findings: No erythema.   Neurological:      Mental Status: She is alert and oriented to person, place, and time.      Motor: No weakness.   Psychiatric:         Behavior: Behavior normal.         Lab Results   Component Value Date    WBC 7.5 07/18/2024    HGB 12.7 07/18/2024    HCT 39.4 07/18/2024     07/18/2024    HDL 70 12/17/2019    ALT 21 07/18/2024    AST 27 07/18/2024     (L) 07/18/2024    K 4.7 07/18/2024     07/18/2024    CREATININE 0.5 07/18/2024    BUN 8 07/18/2024    CO2 19 (L) 07/18/2024    TSH 3.37

## 2024-08-01 NOTE — PROGRESS NOTES
[facility] NEUROLOGY SPECIALIST  [address]  [place] [address]  Dept: [phone]    PATIENT NAME: Madonna Manrique  PATIENT MRN: [id number]  PRIMARY CARE PHYSICIAN: Sierra De Oliveira MD    History     Madonna Manrique is a 58 y.o. female who I initially saw in consultation on 11/13/2023.  Her history is summarized as follows:      Madonna Manrique has a history of substance use disorder in the past, Bipolar disorder, and anxiety and depression, who presents to clinic today for evaluation of multiple sclerosis.  She presented today to clinic alone. She was previously followed at [facility] and presented today to transfer care due to insurance coverage concerns.      I reviewed records from [facility] and her history is summarized as follows:  She was initially seen by outpatient neurology on 12/10/2022. She had presented prior to this in the ER with cognitive concerns and behavioral changes.  She was found to have a UTI. Her neurological exam was abnormal and Mris were done, revealing T2-hyperintense lesions in the brain, cervical, and thoracic spinal cord concerning for multiple sclerosis.  Per reports, there were no enhancing lesions.  An EEG was done given altered mental status, which showed some intermittent generalized slowing.  LP was recommended but the patient left AMA.  CSF studies were later done and did show unpaired oligoclonal bands.  She was diagnosed with multiple sclerosis and started on ocrelizumab.  She received her first set of infusions on 4/5/23 and 4/21/23 and states that she tolerated them well.  She has not received her next dose, which was due in 10/2023. Ms. Manrique also has a history of chronic headache and was started on rimegepant every other day within the past year for preventative therapy.      Ms. Manrique also reports a history of recurrent uveitis involving both eyes.  She has been seen by Dr. Olman Lim  in ophthalmology in the past.  She underwent a work-up for

## 2024-08-01 NOTE — TELEPHONE ENCOUNTER
Writer spoke with patient. She stated that she is currently at her PCP's office and will base her decision to go to ER off of this visit.

## 2024-08-02 ENCOUNTER — HOSPITAL ENCOUNTER (OUTPATIENT)
Age: 59
Discharge: HOME | End: 2024-08-02
Payer: MEDICARE

## 2024-08-02 ENCOUNTER — HOSPITAL ENCOUNTER (OUTPATIENT)
Facility: CLINIC | Age: 59
End: 2024-08-02
Payer: MEDICARE

## 2024-08-02 ENCOUNTER — HOSPITAL ENCOUNTER (OUTPATIENT)
Dept: GENERAL RADIOLOGY | Age: 59
End: 2024-08-02
Payer: MEDICARE

## 2024-08-02 ENCOUNTER — HOSPITAL ENCOUNTER (OUTPATIENT)
Age: 59
End: 2024-08-02
Payer: MEDICARE

## 2024-08-02 DIAGNOSIS — R60.0 LOWER LEG EDEMA: ICD-10-CM

## 2024-08-02 DIAGNOSIS — R06.02 SOB (SHORTNESS OF BREATH): ICD-10-CM

## 2024-08-02 DIAGNOSIS — I95.89 OTHER SPECIFIED HYPOTENSION: ICD-10-CM

## 2024-08-02 LAB
BNP SERPL-MCNC: 117 PG/ML (ref 0–300)
LACTATE BLDV-SCNC: 1.8 MMOL/L (ref 0.5–2.2)

## 2024-08-02 PROCEDURE — 83605 ASSAY OF LACTIC ACID: CPT

## 2024-08-02 PROCEDURE — 83880 ASSAY OF NATRIURETIC PEPTIDE: CPT

## 2024-08-02 PROCEDURE — 36415 COLL VENOUS BLD VENIPUNCTURE: CPT

## 2024-08-02 PROCEDURE — 71046 X-RAY EXAM CHEST 2 VIEWS: CPT

## 2024-08-05 ENCOUNTER — TELEPHONE (OUTPATIENT)
Dept: UROLOGY | Age: 59
End: 2024-08-05

## 2024-08-05 ENCOUNTER — OFFICE VISIT (OUTPATIENT)
Dept: NEUROLOGY | Age: 59
End: 2024-08-05
Payer: MEDICARE

## 2024-08-05 VITALS
BODY MASS INDEX: 27.29 KG/M2 | HEART RATE: 114 BPM | WEIGHT: 139 LBS | DIASTOLIC BLOOD PRESSURE: 68 MMHG | HEIGHT: 60 IN | SYSTOLIC BLOOD PRESSURE: 91 MMHG

## 2024-08-05 DIAGNOSIS — N32.81 OAB (OVERACTIVE BLADDER): ICD-10-CM

## 2024-08-05 DIAGNOSIS — G35 MULTIPLE SCLEROSIS (HCC): Primary | ICD-10-CM

## 2024-08-05 DIAGNOSIS — N30.90 RECURRENT CYSTITIS: ICD-10-CM

## 2024-08-05 DIAGNOSIS — Z79.899 HIGH RISK MEDICATION USE: ICD-10-CM

## 2024-08-05 DIAGNOSIS — G43.019 INTRACTABLE MIGRAINE WITHOUT AURA AND WITHOUT STATUS MIGRAINOSUS: ICD-10-CM

## 2024-08-05 PROCEDURE — 4004F PT TOBACCO SCREEN RCVD TLK: CPT | Performed by: PSYCHIATRY & NEUROLOGY

## 2024-08-05 PROCEDURE — 3017F COLORECTAL CA SCREEN DOC REV: CPT | Performed by: PSYCHIATRY & NEUROLOGY

## 2024-08-05 PROCEDURE — G8428 CUR MEDS NOT DOCUMENT: HCPCS | Performed by: PSYCHIATRY & NEUROLOGY

## 2024-08-05 PROCEDURE — G8417 CALC BMI ABV UP PARAM F/U: HCPCS | Performed by: PSYCHIATRY & NEUROLOGY

## 2024-08-05 PROCEDURE — 99214 OFFICE O/P EST MOD 30 MIN: CPT | Performed by: PSYCHIATRY & NEUROLOGY

## 2024-08-05 RX ORDER — DIAZEPAM 5 MG/1
5 TABLET ORAL
COMMUNITY
Start: 2024-08-02

## 2024-08-05 NOTE — TELEPHONE ENCOUNTER
pt called back & would like to be seen at the [facility] office on [address]. referral closed, sent back to sender, and faxed to [facility] office.

## 2024-08-05 NOTE — PROGRESS NOTES
SW provided pt's  with [facility] crisis line to help when pt is having mental health concerns and after office of her main providers at [facility].

## 2024-08-14 DIAGNOSIS — M79.2 NEUROPATHIC PAIN: ICD-10-CM

## 2024-08-14 RX ORDER — TIZANIDINE 4 MG/1
4 TABLET ORAL 3 TIMES DAILY
Qty: 42 TABLET | Refills: 0 | Status: SHIPPED | OUTPATIENT
Start: 2024-08-14 | End: 2024-08-28

## 2024-08-14 RX ORDER — GABAPENTIN 300 MG/1
600 CAPSULE ORAL 3 TIMES DAILY
Qty: 180 CAPSULE | Refills: 0 | Status: SHIPPED | OUTPATIENT
Start: 2024-08-14 | End: 2024-09-13

## 2024-08-14 NOTE — TELEPHONE ENCOUNTER
Refill Request of Gabapentin and Tizanidine received from patient, Pharmacy confirmed. Medication Pended. Pt last seen on 8/1/24, Next appt is 8/20/24.    Health Maintenance   Topic Date Due    Hepatitis B vaccine (1 of 3 - 19+ 3-dose series) Never done    Colorectal Cancer Screen  05/06/2021    Breast cancer screen  09/30/2022    Annual Wellness Visit (Medicare Advantage)  Never done    Flu vaccine (1) 08/01/2024    Lung Cancer Screening &/or Counseling  11/01/2024    A1C test (Diabetic or Prediabetic)  11/19/2024    Lipids  12/17/2024    Depression Monitoring  07/22/2025    DTaP/Tdap/Td vaccine (3 - Td or Tdap) 06/13/2033    Shingles vaccine  Completed    Pneumococcal 0-64 years Vaccine  Completed    COVID-19 Vaccine  Completed    Hepatitis C screen  Completed    HIV screen  Completed    Hepatitis A vaccine  Aged Out    Hib vaccine  Aged Out    Polio vaccine  Aged Out    Meningococcal (ACWY) vaccine  Aged Out    Diabetes screen  Discontinued       Hemoglobin A1C (%)   Date Value   11/19/2023 5.7   12/29/2020 5.0             ( goal A1C is < 7)   No components found for: \"LABMICR\"  No components found for: \"LDLCHOLESTEROL\", \"LDLCALC\"    (goal LDL is <100)   AST (U/L)   Date Value   07/18/2024 27     ALT (U/L)   Date Value   07/18/2024 21     BUN (mg/dL)   Date Value   07/18/2024 8     BP Readings from Last 3 Encounters:   08/05/24 91/68   08/01/24 (!) 84/61   07/25/24 (!) 78/50          (goal 120/80)    All Future Testing planned in [facility]  Lab Frequency Next Occurrence   [facility] DIGITAL SCREEN W OR WO CAD BILATERAL Once 04/19/2024   CBC With Auto Differential Once 01/16/2025   Comprehensive metabolic panel Once 01/16/2025   Echo (TTE) complete (PRN contrast/bubble/strain/3D) Once 08/01/2024   Vascular duplex lower extremity venous left Once 08/01/2024   Hemoglobin and Hematocrit, Blood, Post Transfusion POST TRANSFUSION    CBC Auto Differential q 3 months    Iron and TIBC q 3 months    Ferritin q 3 months

## 2024-08-19 ENCOUNTER — HOSPITAL ENCOUNTER (EMERGENCY)
Age: 59
Discharge: HOME | End: 2024-08-19
Attending: EMERGENCY MEDICINE
Payer: MEDICARE

## 2024-08-19 ENCOUNTER — APPOINTMENT (OUTPATIENT)
Dept: GENERAL RADIOLOGY | Age: 59
End: 2024-08-19
Payer: MEDICARE

## 2024-08-19 VITALS
TEMPERATURE: 97.9 F | HEART RATE: 98 BPM | BODY MASS INDEX: 26.76 KG/M2 | WEIGHT: 137 LBS | RESPIRATION RATE: 12 BRPM | DIASTOLIC BLOOD PRESSURE: 80 MMHG | OXYGEN SATURATION: 100 % | SYSTOLIC BLOOD PRESSURE: 112 MMHG

## 2024-08-19 DIAGNOSIS — M79.89 LEG SWELLING: Primary | ICD-10-CM

## 2024-08-19 DIAGNOSIS — R60.0 LEG EDEMA: ICD-10-CM

## 2024-08-19 LAB
ANION GAP SERPL CALCULATED.3IONS-SCNC: 12 MMOL/L (ref 9–17)
BASOPHILS # BLD: 0 K/UL (ref 0–0.2)
BASOPHILS NFR BLD: 0 % (ref 0–2)
BNP SERPL-MCNC: 198 PG/ML
BUN SERPL-MCNC: 11 MG/DL (ref 6–20)
BUN/CREAT SERPL: 28 (ref 9–20)
CALCIUM SERPL-MCNC: 9.1 MG/DL (ref 8.6–10.4)
CHLORIDE SERPL-SCNC: 106 MMOL/L (ref 98–107)
CO2 SERPL-SCNC: 22 MMOL/L (ref 20–31)
CREAT SERPL-MCNC: 0.4 MG/DL (ref 0.5–0.9)
D DIMER PPP FEU-MCNC: 0.31 UG/ML FEU (ref 0–0.59)
EOSINOPHIL # BLD: 0.09 K/UL (ref 0–0.44)
EOSINOPHILS RELATIVE PERCENT: 2 % (ref 1–4)
ERYTHROCYTE [DISTWIDTH] IN BLOOD BY AUTOMATED COUNT: 20.4 % (ref 11.8–14.4)
GFR, ESTIMATED: >90 ML/MIN/1.73M2
GLUCOSE SERPL-MCNC: 75 MG/DL (ref 70–99)
HCT VFR BLD AUTO: 40.4 % (ref 36.3–47.1)
HGB BLD-MCNC: 13.6 G/DL (ref 11.9–15.1)
IMM GRANULOCYTES # BLD AUTO: 0 K/UL (ref 0–0.3)
IMM GRANULOCYTES NFR BLD: 0 %
INR PPP: 1.5
LYMPHOCYTES NFR BLD: 0.69 K/UL (ref 1.1–3.7)
LYMPHOCYTES RELATIVE PERCENT: 15 % (ref 24–43)
MAGNESIUM SERPL-MCNC: 2 MG/DL (ref 1.6–2.6)
MCH RBC QN AUTO: 29.7 PG (ref 25.2–33.5)
MCHC RBC AUTO-ENTMCNC: 33.7 G/DL (ref 28.4–34.8)
MCV RBC AUTO: 88.2 FL (ref 82.6–102.9)
MONOCYTES NFR BLD: 0.41 K/UL (ref 0.1–1.2)
MONOCYTES NFR BLD: 9 % (ref 3–12)
MORPHOLOGY: ABNORMAL
NEUTROPHILS NFR BLD: 74 % (ref 36–65)
NEUTS SEG NFR BLD: 3.41 K/UL (ref 1.5–8.1)
NRBC BLD-RTO: 0 PER 100 WBC
PARTIAL THROMBOPLASTIN TIME: 27.5 SEC (ref 23.9–33.8)
PHOSPHATE SERPL-MCNC: 2.6 MG/DL (ref 2.6–4.5)
PLATELET # BLD AUTO: 293 K/UL (ref 138–453)
PMV BLD AUTO: 9.8 FL (ref 8.1–13.5)
POTASSIUM SERPL-SCNC: 3.7 MMOL/L (ref 3.7–5.3)
PROTHROMBIN TIME: 18.5 SEC (ref 11.5–14.2)
RBC # BLD AUTO: 4.58 M/UL (ref 3.95–5.11)
SODIUM SERPL-SCNC: 140 MMOL/L (ref 135–144)
SPECIMEN SOURCE: NORMAL
STREP A, MOLECULAR: NEGATIVE
TROPONIN I SERPL HS-MCNC: 24 NG/L (ref 0–14)
TROPONIN I SERPL HS-MCNC: 27 NG/L (ref 0–14)
WBC OTHER # BLD: 4.6 K/UL (ref 3.5–11.3)

## 2024-08-19 PROCEDURE — 83880 ASSAY OF NATRIURETIC PEPTIDE: CPT

## 2024-08-19 PROCEDURE — 85610 PROTHROMBIN TIME: CPT

## 2024-08-19 PROCEDURE — 80048 BASIC METABOLIC PNL TOTAL CA: CPT

## 2024-08-19 PROCEDURE — 85730 THROMBOPLASTIN TIME PARTIAL: CPT

## 2024-08-19 PROCEDURE — 6370000000 HC RX 637 (ALT 250 FOR IP): Performed by: EMERGENCY MEDICINE

## 2024-08-19 PROCEDURE — 84484 ASSAY OF TROPONIN QUANT: CPT

## 2024-08-19 PROCEDURE — 99285 EMERGENCY DEPT VISIT HI MDM: CPT

## 2024-08-19 PROCEDURE — 83735 ASSAY OF MAGNESIUM: CPT

## 2024-08-19 PROCEDURE — 85025 COMPLETE CBC W/AUTO DIFF WBC: CPT

## 2024-08-19 PROCEDURE — 87651 STREP A DNA AMP PROBE: CPT

## 2024-08-19 PROCEDURE — 85379 FIBRIN DEGRADATION QUANT: CPT

## 2024-08-19 PROCEDURE — 71045 X-RAY EXAM CHEST 1 VIEW: CPT

## 2024-08-19 PROCEDURE — 93005 ELECTROCARDIOGRAM TRACING: CPT | Performed by: EMERGENCY MEDICINE

## 2024-08-19 PROCEDURE — 84100 ASSAY OF PHOSPHORUS: CPT

## 2024-08-19 RX ORDER — ACETAMINOPHEN 325 MG/1
650 TABLET ORAL ONCE
Status: COMPLETED | OUTPATIENT
Start: 2024-08-19 | End: 2024-08-19

## 2024-08-19 RX ADMIN — ACETAMINOPHEN 650 MG: 325 TABLET ORAL at 09:42

## 2024-08-19 ASSESSMENT — PAIN SCALES - GENERAL
PAINLEVEL_OUTOF10: 7
PAINLEVEL_OUTOF10: 7

## 2024-08-19 ASSESSMENT — ENCOUNTER SYMPTOMS
BACK PAIN: 0
ABDOMINAL PAIN: 0
FACIAL SWELLING: 0
VOICE CHANGE: 0
SHORTNESS OF BREATH: 1
COLOR CHANGE: 0
TROUBLE SWALLOWING: 0
NAUSEA: 0
EYE PAIN: 0
CHEST TIGHTNESS: 0
PHOTOPHOBIA: 0
VOMITING: 0

## 2024-08-19 ASSESSMENT — PAIN - FUNCTIONAL ASSESSMENT: PAIN_FUNCTIONAL_ASSESSMENT: 0-10

## 2024-08-19 ASSESSMENT — HEART SCORE: ECG: NORMAL

## 2024-08-19 NOTE — ED NOTES
Pt called out requesting to speak with nurse. Writer went to pts room and pt requesting to have Dr Hill look at her throat d/t pt c/o sore throat. Pt states she is concerned for strep throat. Dr Hill notified. Per Dr Hill swab pt for strep. Pt swabbed for strep and swab sent to lab for testing.

## 2024-08-19 NOTE — ED NOTES
Pt to ed c/o bilateral leg swelling. Pt states she was supposed to have an ultrasound of legs done but the swelling got better for a day so she didn't go. Pt states swelling has since gotten worse. Pt rates leg pain 7/10. Pt states she has clotting disorder where she gets frequent blood clots. Pt states she is on xarelto. Pt a/o x4. Respirations equal and non labored. Call light in reach. Bed locked and in lowest position.

## 2024-08-19 NOTE — ED PROVIDER NOTES
EMERGENCY DEPARTMENT ENCOUNTER    Pt Name: Madonna Manrique  MRN: [id number]  Birthdate 1965  Date of evaluation: 8/19/24  CHIEF COMPLAINT       Chief Complaint   Patient presents with    Leg Swelling     Bilateral leg swelling. Had ultrasound scheduled for last Thursday but did not go because swelling was not that bad.      HISTORY OF PRESENT ILLNESS   58-year female presenting to the ER complaining of bilateral lower extremity swelling for the last month that has worsened over the last couple weeks.  Patient states at 1 point she was having shortness of breath with ambulation.  Patient does have an underlying history of a disease that causes her to be prone to blood clots.  The patient is complaining of left lower extremity pains.  The patient does admit to the use of Xarelto.    The history is provided by the patient.   Illness   The current episode started more than 1 week ago. The onset was gradual. Pertinent negatives include no photophobia, no abdominal pain, no nausea, no vomiting, no headaches, no rash and no eye pain.           REVIEW OF SYSTEMS     Review of Systems   Constitutional:  Negative for activity change, appetite change and fatigue.   HENT:  Negative for facial swelling, trouble swallowing and voice change.    Eyes:  Negative for photophobia and pain.   Respiratory:  Positive for shortness of breath. Negative for chest tightness.    Cardiovascular:  Positive for chest pain and leg swelling (bilateral). Negative for palpitations.   Gastrointestinal:  Negative for abdominal pain, nausea and vomiting.   Genitourinary:  Negative for dysuria and urgency.   Musculoskeletal:  Positive for myalgias (LLE). Negative for arthralgias and back pain.   Skin:  Negative for color change and rash.   Neurological:  Negative for dizziness, syncope and headaches.   Psychiatric/Behavioral:  Negative for behavioral problems and hallucinations.      PASTMEDICAL HISTORY     Past Medical History:   Diagnosis Date

## 2024-08-20 ENCOUNTER — OFFICE VISIT (OUTPATIENT)
Dept: FAMILY MEDICINE CLINIC | Age: 59
End: 2024-08-20
Payer: MEDICARE

## 2024-08-20 VITALS
HEART RATE: 84 BPM | DIASTOLIC BLOOD PRESSURE: 61 MMHG | SYSTOLIC BLOOD PRESSURE: 92 MMHG | HEIGHT: 60 IN | BODY MASS INDEX: 26.93 KG/M2 | OXYGEN SATURATION: 96 % | WEIGHT: 137.2 LBS

## 2024-08-20 DIAGNOSIS — J30.2 SEASONAL ALLERGIC RHINITIS, UNSPECIFIED TRIGGER: ICD-10-CM

## 2024-08-20 DIAGNOSIS — E66.09 CLASS 1 OBESITY DUE TO EXCESS CALORIES WITH BODY MASS INDEX (BMI) OF 33.0 TO 33.9 IN ADULT, UNSPECIFIED WHETHER SERIOUS COMORBIDITY PRESENT: ICD-10-CM

## 2024-08-20 DIAGNOSIS — R60.0 LOWER LEG EDEMA: Primary | ICD-10-CM

## 2024-08-20 DIAGNOSIS — N64.4 BREAST PAIN: ICD-10-CM

## 2024-08-20 LAB
EKG ATRIAL RATE: 86 BPM
EKG P AXIS: 46 DEGREES
EKG P-R INTERVAL: 100 MS
EKG Q-T INTERVAL: 386 MS
EKG QRS DURATION: 74 MS
EKG QTC CALCULATION (BAZETT): 461 MS
EKG R AXIS: 25 DEGREES
EKG T AXIS: 46 DEGREES
EKG VENTRICULAR RATE: 86 BPM

## 2024-08-20 PROCEDURE — 3017F COLORECTAL CA SCREEN DOC REV: CPT

## 2024-08-20 PROCEDURE — G8417 CALC BMI ABV UP PARAM F/U: HCPCS

## 2024-08-20 PROCEDURE — 4004F PT TOBACCO SCREEN RCVD TLK: CPT

## 2024-08-20 PROCEDURE — G8427 DOCREV CUR MEDS BY ELIG CLIN: HCPCS

## 2024-08-20 PROCEDURE — 99213 OFFICE O/P EST LOW 20 MIN: CPT

## 2024-08-20 RX ORDER — LORATADINE 10 MG/1
10 TABLET ORAL DAILY
Qty: 30 TABLET | Refills: 0 | Status: SHIPPED | OUTPATIENT
Start: 2024-08-20 | End: 2024-09-19

## 2024-08-20 ASSESSMENT — ENCOUNTER SYMPTOMS
SHORTNESS OF BREATH: 0
CHEST TIGHTNESS: 0
WHEEZING: 0
CHOKING: 0
DIARRHEA: 0
ABDOMINAL PAIN: 0
COUGH: 0
VOMITING: 0
STRIDOR: 0
BACK PAIN: 0

## 2024-08-20 NOTE — TELEPHONE ENCOUNTER
E-scribe request for mounjaro. Please review and e-scribe if applicable.     Last Visit Date:  8/1/24  Next Visit Date:  8/20/2024    Hemoglobin A1C (%)   Date Value   11/19/2023 5.7   12/29/2020 5.0             ( goal A1C is < 7)   No components found for: \"LABMICR\"  No components found for: \"LDLCHOLESTEROL\", \"LDLCALC\"    (goal LDL is <100)   AST (U/L)   Date Value   07/18/2024 27     ALT (U/L)   Date Value   07/18/2024 21     BUN (mg/dL)   Date Value   08/19/2024 11     BP Readings from Last 3 Encounters:   08/19/24 112/80   08/05/24 91/68   08/01/24 (!) 84/61          (goal 120/80)        Patient Active Problem List:     Overdose of drug/medicinal substance, accidental or unintentional, initial encounter     Chronic anticoagulation     Multifocal pneumonia     Pulmonary hypertension (HCC)     Mild cardiomegaly     Borderline personality disorder (HCC)     Bipolar 1 disorder (HCC)     Recurrent major depressive disorder, in remission (HCC)     Anxiety disorder     Transient alteration of awareness     Symptomatic anemia     Hyponatremia     Hypocalcemia     Hypoalbuminemia     COVID-19 virus not detected     Encephalopathy, metabolic     Iron deficiency anemia secondary to inadequate dietary iron intake     Iron malabsorption     History of psychiatric disorder     Chronic bilateral low back pain with left-sided sciatica     H/O opioid abuse (HCC)     Polypharmacy     Tremor due to multiple drugs     Serotonin syndrome     Confusion     Asthma with acute exacerbation     May-Thurner syndrome     Migraine without status migrainosus, not intractable     Change in mental status     Multiple sclerosis (HCC)     Tibia and fibula open fracture, right, type I or II, initial encounter     Tibia/fibula fracture, right, closed, initial encounter     Acute respiratory failure with hypoxia and hypercapnia (HCC)     Alcohol use disorder, severe, dependence (HCC)     Anastomotic ulcer S/P gastric bypass     Bipolar affective

## 2024-08-20 NOTE — PROGRESS NOTES
Attending Physician Statement  I have discussed the care of Madonna Manrique, 58 y.o. female,including pertinent history and exam findings,  with the resident Sierra Bradford MD.  History:  Chief Complaint   Patient presents with    Leg Swelling     Follow up       I have reviewed the key elements of the encounter with the resident. Examination was done by resident as documented in residents note.    BP Readings from Last 3 Encounters:   08/20/24 92/61   08/19/24 112/80   08/05/24 91/68     BP 92/61 (Site: Right Lower Arm, Position: Sitting, Cuff Size: Medium Adult)   Pulse 84   Ht 1.524 m (5')   Wt 62.2 kg (137 lb 3.2 oz)   SpO2 96%   BMI 26.80 kg/m²   Lab Results   Component Value Date    WBC 4.6 08/19/2024    HGB 13.6 08/19/2024    HCT 40.4 08/19/2024     08/19/2024    HDL 70 12/17/2019    ALT 21 07/18/2024    AST 27 07/18/2024     08/19/2024    K 3.7 08/19/2024     08/19/2024    CREATININE 0.4 (L) 08/19/2024    BUN 11 08/19/2024    CO2 22 08/19/2024    TSH 3.37 07/13/2024    INR 1.5 08/19/2024    LABA1C 5.7 11/19/2023     Lab Results   Component Value Date    CALCIUM 9.1 08/19/2024    PHOS 2.6 08/19/2024     No results found for: \"LDLDIRECT\"  I agree with the assessment, plan and diagnosis of    Diagnosis Orders   1. Lower leg edema  Elastic Bandages & Supports (MEDICAL COMPRESSION STOCKINGS) [facility], [facility]      2. Breast pain  External Referral To Plastic Surgery      3. Seasonal allergic rhinitis, unspecified trigger  loratadine (CLARITIN) 10 MG tablet        I agree with orders as documented by the resident.    Recommendations: Agree with resident assessment and plan.  Resident team to discuss with cardiology team possibly obtaining a stress test prior to patient seen the cardiology service or waiting till seen with the cardiology service for further recommendations in the setting of reported elevated 
HYPERTENSION visit     BP Readings from Last 3 Encounters:   08/19/24 112/80   08/05/24 91/68   08/01/24 (!) 84/61       HDL (mg/dL)   Date Value   12/17/2019 70     BUN (mg/dL)   Date Value   08/19/2024 11     Creatinine (mg/dL)   Date Value   08/19/2024 0.4 (L)     Glucose (mg/dL)   Date Value   08/19/2024 75              Have you changed or started any medications since your last visit including any over-the-counter medicines, vitamins, or herbal medicines? no   Have you stopped taking any of your medications? Is so, why? -  no  Are you having any side effects from any of your medications? - no  How often do you miss doses of your medication? no      Have you seen any other physician or provider since your last visit?  yes - Vascular , Neurology    Have you had any other diagnostic tests since your last visit?  yes - Xray - chest , EKG, labs   Have you been seen in the emergency room and/or had an admission in a hospital since we last saw you?  yes - [facility]   Have you had your routine dental cleaning in the past 6 months?  no     Do you have an active MyChart account? If no, what is the barrier?  Yes    Patient Care Team:  Sierra De Oliveira MD as PCP - General (Family Medicine)  Shaniqua Owens MD as Consulting Physician (Gastroenterology)  Tiana Silver as Financial Navigator    Medical History Review  Past Medical, Family, and Social History reviewed and does not contribute to the patient presenting condition    Health Maintenance   Topic Date Due    Hepatitis B vaccine (1 of 3 - 19+ 3-dose series) Never done    Colorectal Cancer Screen  05/06/2021    Breast cancer screen  09/30/2022    Annual Wellness Visit (Medicare Advantage)  Never done    Flu vaccine (1) 08/01/2024    Lung Cancer Screening &/or Counseling  11/01/2024    A1C test (Diabetic or Prediabetic)  11/19/2024    Lipids  12/17/2024    Depression Monitoring  07/22/2025    DTaP/Tdap/Td vaccine (3 - Td or Tdap) 06/13/2033    Shingles vaccine  
compression stockings due to impaired ambulation and for increased stability secondary to worsening swelling, in order to participate in or complete daily living tasks of: ambulating.  The patient is able to safely use the compression stockings and the functional mobility deficit can be sufficiently resolved.   - Refer to Vascular Surgery   - Would not recommend Lasix as BP already borderline soft   - Will communicate with Cardiology for possible Stress Test     2. Breast Discomfort 2/2 Prosthesis  - Patient describes breast prosthesis discomfort, had it placed > 20 years ago, denies any nipple discharge nor any overt tenderness or redness  - Refer to Plastic Surgery     3 Allergic Rhinitis   - Loratadine         Requested Prescriptions      No prescriptions requested or ordered in this encounter       There are no discontinued medications.    Madonna received counseling on the following healthy behaviors: nutrition, exercise and medication adherence    Discussed use,benefit, and side effects of prescribed medications.  Barriers to medication compliance addressed.      All patient questions answered.  Pt voiced understanding.     No follow-ups on file.        Disclaimer: Some orall of this note was transcribed using voice-recognition software.This may cause typographical errors occasionally. Although all effort is made to fix these errors, please do not hesitate to contact our office if there isany concern with the understanding of this note.

## 2024-08-20 NOTE — PATIENT INSTRUCTIONS
Thank you for letting us take care of you today. We hope all your questions were addressed. If a question was overlooked or something else comes to mind after you return home, please contact a member of your Care Team listed below.      Your Care Team at [facility] is Team #2  Rhona Jones M.D. (Faculty)  Blake Motta M.D. (Resident)  Janice Cooley M.D. (Resident)  Sierra De Oliveira M.D. (Resident)  Lidya Jorge M.D. (Resident)  Ingrid Espinosa M.D. (Resident)  Judd Rachel, [facility]  Oumou Dukes, WINSTON Tena, [facility]  Laila Zhou,  [facility]  Alis Cohn, [facility]  Shakira Park, WINSTON Carrasco, [facility]  Oswaldo (LJ) David WINSTON (Clinical Practice Manager)  Renea Ray formerly [facility] (Clinical Pharmacist)     Office phone number: [phone]    If you need to get in right away due to illness, please be advised we have \"Same Day\" appointments available Monday-Friday. Please call us at [phone] option #3 to schedule your \"Same Day\" appointment.

## 2024-08-24 RX ORDER — TIRZEPATIDE 10 MG/.5ML
INJECTION, SOLUTION SUBCUTANEOUS
Qty: 2 ML | OUTPATIENT
Start: 2024-08-24

## 2024-08-29 ENCOUNTER — HOSPITAL ENCOUNTER (OUTPATIENT)
Age: 59
Discharge: HOME | End: 2024-08-31
Attending: STUDENT IN AN ORGANIZED HEALTH CARE EDUCATION/TRAINING PROGRAM
Payer: MEDICARE

## 2024-08-29 ENCOUNTER — HOSPITAL ENCOUNTER (OUTPATIENT)
Dept: VASCULAR LAB | Age: 59
Discharge: HOME | End: 2024-08-31
Attending: STUDENT IN AN ORGANIZED HEALTH CARE EDUCATION/TRAINING PROGRAM
Payer: MEDICARE

## 2024-08-29 VITALS — BODY MASS INDEX: 26.9 KG/M2 | HEIGHT: 60 IN | WEIGHT: 137 LBS

## 2024-08-29 DIAGNOSIS — I82.5Y2 CHRONIC DEEP VEIN THROMBOSIS (DVT) OF PROXIMAL VEIN OF LEFT LOWER EXTREMITY (HCC): ICD-10-CM

## 2024-08-29 DIAGNOSIS — R06.02 SOB (SHORTNESS OF BREATH): ICD-10-CM

## 2024-08-29 DIAGNOSIS — R60.0 LOWER LEG EDEMA: ICD-10-CM

## 2024-08-29 LAB
ECHO AO ROOT DIAM: 2.9 CM
ECHO AO ROOT INDEX: 1.82 CM/M2
ECHO AV MEAN GRADIENT: 2 MMHG
ECHO AV MEAN VELOCITY: 0.6 M/S
ECHO AV PEAK GRADIENT: 4 MMHG
ECHO AV PEAK VELOCITY: 1 M/S
ECHO AV VELOCITY RATIO: 0.9
ECHO AV VTI: 20.5 CM
ECHO BSA: 1.62 M2
ECHO EST RA PRESSURE: 3 MMHG
ECHO LA AREA 4C: 15.9 CM2
ECHO LA DIAMETER INDEX: 1.95 CM/M2
ECHO LA DIAMETER: 3.1 CM
ECHO LA MAJOR AXIS: 4.6 CM
ECHO LA TO AORTIC ROOT RATIO: 1.07
ECHO LA VOL MOD A4C: 42 ML (ref 22–52)
ECHO LA VOLUME INDEX MOD A4C: 26 ML/M2 (ref 16–34)
ECHO LV E' LATERAL VELOCITY: 12 CM/S
ECHO LV E' SEPTAL VELOCITY: 9 CM/S
ECHO LV EF PHYSICIAN: 55 %
ECHO LV FRACTIONAL SHORTENING: 26 % (ref 28–44)
ECHO LV INTERNAL DIMENSION DIASTOLE INDEX: 2.7 CM/M2
ECHO LV INTERNAL DIMENSION DIASTOLIC: 4.3 CM (ref 3.9–5.3)
ECHO LV INTERNAL DIMENSION SYSTOLIC INDEX: 2.01 CM/M2
ECHO LV INTERNAL DIMENSION SYSTOLIC: 3.2 CM
ECHO LV IVSD: 1 CM (ref 0.6–0.9)
ECHO LV MASS 2D: 142.5 G (ref 67–162)
ECHO LV MASS INDEX 2D: 89.6 G/M2 (ref 43–95)
ECHO LV POSTERIOR WALL DIASTOLIC: 1 CM (ref 0.6–0.9)
ECHO LV RELATIVE WALL THICKNESS RATIO: 0.47
ECHO LVOT PEAK GRADIENT: 3 MMHG
ECHO LVOT PEAK VELOCITY: 0.9 M/S
ECHO MV A VELOCITY: 0.78 M/S
ECHO MV E DECELERATION TIME (DT): 165 MS
ECHO MV E VELOCITY: 0.9 M/S
ECHO MV E/A RATIO: 1.15
ECHO MV E/E' LATERAL: 7.5
ECHO MV E/E' RATIO (AVERAGED): 8.75
ECHO MV E/E' SEPTAL: 10
ECHO RIGHT VENTRICULAR SYSTOLIC PRESSURE (RVSP): 27 MMHG
ECHO TV REGURGITANT MAX VELOCITY: 2.46 M/S
ECHO TV REGURGITANT PEAK GRADIENT: 24 MMHG

## 2024-08-29 PROCEDURE — 93971 EXTREMITY STUDY: CPT

## 2024-08-29 PROCEDURE — 93306 TTE W/DOPPLER COMPLETE: CPT

## 2024-08-30 DIAGNOSIS — M54.50 CHRONIC BILATERAL LOW BACK PAIN WITHOUT SCIATICA: ICD-10-CM

## 2024-08-30 DIAGNOSIS — G89.29 CHRONIC BILATERAL LOW BACK PAIN WITHOUT SCIATICA: ICD-10-CM

## 2024-08-30 LAB — ECHO BSA: 1.62 M2

## 2024-08-30 NOTE — TELEPHONE ENCOUNTER
E-scribe request for tizanidine. Please review and e-scribe if applicable.     Last Visit Date:  8/20/24  Next Visit Date:  8/30/2024    Hemoglobin A1C (%)   Date Value   11/19/2023 5.7   12/29/2020 5.0             ( goal A1C is < 7)   No components found for: \"LABMICR\"  No components found for: \"LDLCHOLESTEROL\", \"LDLCALC\"    (goal LDL is <100)   AST (U/L)   Date Value   07/18/2024 27     ALT (U/L)   Date Value   07/18/2024 21     BUN (mg/dL)   Date Value   08/19/2024 11     BP Readings from Last 3 Encounters:   08/20/24 92/61   08/19/24 112/80   08/05/24 91/68          (goal 120/80)        Patient Active Problem List:     Overdose of drug/medicinal substance, accidental or unintentional, initial encounter     Chronic anticoagulation     Multifocal pneumonia     Pulmonary hypertension (HCC)     Mild cardiomegaly     Borderline personality disorder (HCC)     Bipolar 1 disorder (HCC)     Recurrent major depressive disorder, in remission (HCC)     Anxiety disorder     Transient alteration of awareness     Symptomatic anemia     Hyponatremia     Hypocalcemia     Hypoalbuminemia     COVID-19 virus not detected     Encephalopathy, metabolic     Iron deficiency anemia secondary to inadequate dietary iron intake     Iron malabsorption     History of psychiatric disorder     Chronic bilateral low back pain with left-sided sciatica     H/O opioid abuse (HCC)     Polypharmacy     Tremor due to multiple drugs     Serotonin syndrome     Confusion     Asthma with acute exacerbation     May-Thurner syndrome     Migraine without status migrainosus, not intractable     Change in mental status     Multiple sclerosis (HCC)     Tibia and fibula open fracture, right, type I or II, initial encounter     Tibia/fibula fracture, right, closed, initial encounter     Acute respiratory failure with hypoxia and hypercapnia (HCC)     Alcohol use disorder, severe, dependence (HCC)     Anastomotic ulcer S/P gastric bypass     Bipolar affective  disorder, currently depressed, moderate (HCC)     Chronic cholecystitis with calculus     Deep venous thrombosis of peroneal vein (HCC)     Displacement of lumbar intervertebral disc without myelopathy     Generalized osteoarthritis     GERD (gastroesophageal reflux disease)      ----JF

## 2024-08-30 NOTE — TELEPHONE ENCOUNTER
Patient is also requesting PCP to look over Echo test results, patient is concerned and is wondering if there is a medication that would be sent over. Please advise   
cholecystitis with calculus     Deep venous thrombosis of peroneal vein (HCC)     Displacement of lumbar intervertebral disc without myelopathy     Generalized osteoarthritis     GERD (gastroesophageal reflux disease)

## 2024-09-05 NOTE — TELEPHONE ENCOUNTER
Last visit: 08/20/2024  Last Med refill: 06/24/2024  Does patient have enough medication for 72 hours: No:     Next Visit Date:  Future Appointments   Date Time Provider Department Center   9/9/2024 10:45 AM Seferino Prakash, DO [facility]   9/9/2024  2:00 PM Sierra De Oliveira MD [facility] DEP   10/7/2024  1:40 PM Sierra De Oliveira MD [facility] DEP   10/28/2024  1:20 PM Sierra De Oliveira MD [facility] DEP   11/20/2024  2:20 PM Soraida Stone,  Neuro Spec Neurology -       Health Maintenance   Topic Date Due    Hepatitis B vaccine (1 of 3 - 19+ 3-dose series) Never done    Colorectal Cancer Screen  05/06/2021    Breast cancer screen  09/30/2022    Annual Wellness Visit (Medicare Advantage)  Never done    Flu vaccine (1) 08/01/2024    Lung Cancer Screening &/or Counseling  11/01/2024    A1C test (Diabetic or Prediabetic)  11/19/2024    Lipids  12/17/2024    Depression Monitoring  07/22/2025    DTaP/Tdap/Td vaccine (3 - Td or Tdap) 06/13/2033    Shingles vaccine  Completed    Pneumococcal 0-64 years Vaccine  Completed    COVID-19 Vaccine  Completed    Hepatitis C screen  Completed    HIV screen  Completed    Hepatitis A vaccine  Aged Out    Hib vaccine  Aged Out    Polio vaccine  Aged Out    Meningococcal (ACWY) vaccine  Aged Out    Diabetes screen  Discontinued       Hemoglobin A1C (%)   Date Value   11/19/2023 5.7   12/29/2020 5.0             ( goal A1C is < 7)   No components found for: \"LABMICR\"  No components found for: \"LDLCHOLESTEROL\", \"LDLCALC\"    (goal LDL is <100)   AST (U/L)   Date Value   07/18/2024 27     ALT (U/L)   Date Value   07/18/2024 21     BUN (mg/dL)   Date Value   08/19/2024 11     BP Readings from Last 3 Encounters:   08/20/24 92/61   08/19/24 112/80   08/05/24 91/68          (goal 120/80)    All Future Testing planned in [facility]  Lab Frequency Next Occurrence   MORIS DIGITAL SCREEN W OR WO CAD BILATERAL Once 04/19/2024   CBC With Auto Differential Once 01/12/2025

## 2024-09-09 ENCOUNTER — OFFICE VISIT (OUTPATIENT)
Dept: ORTHOPEDIC SURGERY | Age: 59
End: 2024-09-09
Payer: MEDICARE

## 2024-09-09 VITALS — BODY MASS INDEX: 26.9 KG/M2 | HEIGHT: 60 IN | WEIGHT: 137 LBS | RESPIRATION RATE: 14 BRPM

## 2024-09-09 DIAGNOSIS — S82.141D CLOSED FRACTURE OF RIGHT TIBIAL PLATEAU WITH ROUTINE HEALING, SUBSEQUENT ENCOUNTER: Primary | ICD-10-CM

## 2024-09-09 PROCEDURE — 4004F PT TOBACCO SCREEN RCVD TLK: CPT | Performed by: STUDENT IN AN ORGANIZED HEALTH CARE EDUCATION/TRAINING PROGRAM

## 2024-09-09 PROCEDURE — G8417 CALC BMI ABV UP PARAM F/U: HCPCS | Performed by: STUDENT IN AN ORGANIZED HEALTH CARE EDUCATION/TRAINING PROGRAM

## 2024-09-09 PROCEDURE — 3017F COLORECTAL CA SCREEN DOC REV: CPT | Performed by: STUDENT IN AN ORGANIZED HEALTH CARE EDUCATION/TRAINING PROGRAM

## 2024-09-09 PROCEDURE — 99213 OFFICE O/P EST LOW 20 MIN: CPT | Performed by: STUDENT IN AN ORGANIZED HEALTH CARE EDUCATION/TRAINING PROGRAM

## 2024-09-09 PROCEDURE — G8427 DOCREV CUR MEDS BY ELIG CLIN: HCPCS | Performed by: STUDENT IN AN ORGANIZED HEALTH CARE EDUCATION/TRAINING PROGRAM

## 2024-09-10 DIAGNOSIS — M79.2 NEUROPATHIC PAIN: ICD-10-CM

## 2024-09-10 RX ORDER — GABAPENTIN 300 MG/1
600 CAPSULE ORAL 3 TIMES DAILY
Qty: 180 CAPSULE | Refills: 0 | Status: SHIPPED | OUTPATIENT
Start: 2024-09-10 | End: 2024-10-10

## 2024-09-11 ENCOUNTER — INITIAL CONSULT (OUTPATIENT)
Age: 59
End: 2024-09-11
Payer: MEDICARE

## 2024-09-11 VITALS
WEIGHT: 138 LBS | DIASTOLIC BLOOD PRESSURE: 40 MMHG | SYSTOLIC BLOOD PRESSURE: 80 MMHG | RESPIRATION RATE: 18 BRPM | BODY MASS INDEX: 27.09 KG/M2 | OXYGEN SATURATION: 98 % | HEIGHT: 60 IN | HEART RATE: 82 BPM

## 2024-09-11 DIAGNOSIS — I83.891 VARICOSE VEINS OF RIGHT LEG WITH EDEMA: Primary | ICD-10-CM

## 2024-09-11 PROCEDURE — 99203 OFFICE O/P NEW LOW 30 MIN: CPT | Performed by: SURGERY

## 2024-09-11 PROCEDURE — G8417 CALC BMI ABV UP PARAM F/U: HCPCS | Performed by: SURGERY

## 2024-09-11 PROCEDURE — 3017F COLORECTAL CA SCREEN DOC REV: CPT | Performed by: SURGERY

## 2024-09-11 PROCEDURE — 4004F PT TOBACCO SCREEN RCVD TLK: CPT | Performed by: SURGERY

## 2024-09-11 PROCEDURE — G8427 DOCREV CUR MEDS BY ELIG CLIN: HCPCS | Performed by: SURGERY

## 2024-09-18 ENCOUNTER — TELEPHONE (OUTPATIENT)
Dept: NEUROLOGY | Age: 59
End: 2024-09-18

## 2024-09-18 DIAGNOSIS — G89.29 CHRONIC BILATERAL LOW BACK PAIN WITH LEFT-SIDED SCIATICA: ICD-10-CM

## 2024-09-18 DIAGNOSIS — G35 MULTIPLE SCLEROSIS (HCC): Primary | ICD-10-CM

## 2024-09-18 DIAGNOSIS — M54.42 CHRONIC BILATERAL LOW BACK PAIN WITH LEFT-SIDED SCIATICA: ICD-10-CM

## 2024-09-26 ENCOUNTER — HOSPITAL ENCOUNTER (OUTPATIENT)
Dept: PHYSICAL THERAPY | Facility: CLINIC | Age: 59
Setting detail: THERAPIES SERIES
Discharge: HOME | End: 2024-09-26
Payer: MEDICARE

## 2024-09-26 PROCEDURE — 97116 GAIT TRAINING THERAPY: CPT

## 2024-09-26 PROCEDURE — 97162 PT EVAL MOD COMPLEX 30 MIN: CPT

## 2024-09-27 DIAGNOSIS — R60.0 LOWER LEG EDEMA: ICD-10-CM

## 2024-09-27 RX ORDER — FUROSEMIDE 20 MG
TABLET ORAL
Qty: 60 TABLET | Refills: 0 | Status: SHIPPED | OUTPATIENT
Start: 2024-09-27

## 2024-09-30 ENCOUNTER — HOSPITAL ENCOUNTER (OUTPATIENT)
Dept: PHYSICAL THERAPY | Facility: CLINIC | Age: 59
Setting detail: THERAPIES SERIES
Discharge: HOME | End: 2024-09-30
Payer: MEDICARE

## 2024-09-30 PROCEDURE — 97112 NEUROMUSCULAR REEDUCATION: CPT

## 2024-09-30 PROCEDURE — 97110 THERAPEUTIC EXERCISES: CPT

## 2024-09-30 NOTE — FLOWSHEET NOTE
[] [facility]  Outpatient Rehabilitation &  Therapy  [address]  [phone]  F: [phone] [x] [facility]  Outpatient Rehabilitation &  Therapy  [address]   [address]  P: [phone]  F: [phone] [] [facility]  Outpatient Rehabilitation &  Therapy  [address]  P: [phone]  F: [phone] [] [facility]  Outpatient Rehabilitation &  Therapy  [address]  P: [phone]  F: [phone] [] [facility]  Outpatient Rehabilitation &  Therapy  [address]   P: [phone]  F: [phone]      Physical Therapy Daily Treatment Note    Date:  2024  Patient Name:  Madonna Manrique    :  1965  MRN: [id number]    Physician: Soraida Stone DO                         Insurance: [facility] MEDICARE/[employer] [facility])  Medical Diagnosis:   G35 (ICD-10-CM) - Multiple sclerosis (HCC)   M54.42, G89.29 (ICD-10-CM) - Chronic bilateral low back pain with left-sided sciatica               Rehab Codes: R29.3, R26.81, R26.89, R29.6, R26.2  Next MD appt.: 24 (Dr. Stone)  Date of symptom onset: 24 (date of referral)    Visit# / total visits: ; Progress note for Medicare patient due at visit 10   Frequency: 2x/week    Cancels/No Shows: 0/0    Subjective:    Pain:  [] Yes  [x] No Location:  N/A Pain Rating: (0-10 scale) ---/10  Pain altered Tx:  [x] No  [] Yes  Action:  Comments: Patient arrives with SPC; states she exchanged her SBQC with her SPC at the [facility] after evaluation. Reports she has had one fall since last session; she slide out bed. Reports she didn't sit back far enough; was home alone and had to call some friends to help get her up. Feels like the SPC has helped her balance already.    Patient Goals: To build strength     Objective:  Modalities:   Precautions: RRMS   Exercises: Bolded exercises completed 2024  Exercise Reps/ Time Weight/ Level

## 2024-10-08 ENCOUNTER — HOSPITAL ENCOUNTER (OUTPATIENT)
Dept: PHYSICAL THERAPY | Facility: CLINIC | Age: 59
Setting detail: THERAPIES SERIES
Discharge: HOME | End: 2024-10-08
Payer: MEDICARE

## 2024-10-08 DIAGNOSIS — M79.2 NEUROPATHIC PAIN: ICD-10-CM

## 2024-10-08 RX ORDER — GABAPENTIN 300 MG/1
600 CAPSULE ORAL 3 TIMES DAILY
Qty: 180 CAPSULE | Refills: 0 | Status: SHIPPED | OUTPATIENT
Start: 2024-10-08 | End: 2024-11-07

## 2024-10-08 NOTE — FLOWSHEET NOTE
[] [facility]  Outpatient Rehabilitation &  Therapy  [address]  [phone]  [phone] [x] [facility]  Outpatient Rehabilitation &  Therapy  [address]  P: [phone]  F: [phone] [] [facility]  Outpatient Rehabilitation &  Therapy  [address]  P: [phone]  F: [phone] [] [facility]  Outpatient Rehabilitation &  Therapy  [address]  [phone]  [phone] [] [facility]  Outpatient Rehabilitation &  Therapy  [address]   P: [phone]  F: [phone]  [] [facility]  Outpatient Rehabilitation &  Therapy  [address]  P: [phone]  F: [phone] [] [facility]  Outpatient Rehabilitation &  Therapy  [address]  P: [phone]  F: [phone] [] [facility]  Outpatient Rehabilitation &  Therapy  [address]  P: [phone]  F: [phone] [] [facility]  Outpatient Rehabilitation &  Therapy  [address]  P: [phone]  F: [phone]  [] [facility] Outpatient Rehabilitation &  Therapy  [address]  P: [phone]  F: [phone]     Therapy Cancel/No Show note    Date: 10/8/2024  Patient: Madonna Manrique  : 1965  MRN: [id number]    Cancels/No Shows to date: 0    For today's appointment patient:    [x]  Cancelled    [] Rescheduled appointment    [] No-show     Reason given by patient:    []  Patient ill    []  Conflicting appointment    [] No transportation      [] Conflict with work    [x] No reason given    [] Weather related    [] COVID-19    [] Other:      Comments:        [x] Next appointment was confirmed    Electronically signed by: Amor Nogueira, PTA

## 2024-10-08 NOTE — TELEPHONE ENCOUNTER
Last visit: 08/20/2024  Last Med refill: 09/10/2024  Does patient have enough medication for 72 hours: No:     Next Visit Date:  Future Appointments   Date Time Provider Department Center   10/8/2024  2:00 PM Amor Nogueira, PTA [facility]   10/10/2024  9:00 AM Stacy Vences, PT [facility]   10/11/2024  3:15 PM Fadumo Mujica MD [facility] DEP   10/14/2024  2:00 PM Stacy Vences, PT [facility]   10/17/2024  2:00 PM Stacy Vences, PT [facility]   10/28/2024  1:20 PM Sierra De Oliveira MD [facility] DEP   10/29/2024  2:30 PM [facility]   11/20/2024  2:20 PM Soraida Stone, DO Neuro Spec Neurology -       Health Maintenance   Topic Date Due    Hepatitis B vaccine (1 of 3 - 19+ 3-dose series) Never done    Colorectal Cancer Screen  05/06/2021    Breast cancer screen  09/30/2022    Annual Wellness Visit (Medicare Advantage)  Never done    Lung Cancer Screening &/or Counseling  11/01/2024    A1C test (Diabetic or Prediabetic)  11/19/2024    Lipids  12/17/2024    Depression Monitoring  07/22/2025    DTaP/Tdap/Td vaccine (3 - Td or Tdap) 06/13/2033    Flu vaccine  Completed    Shingles vaccine  Completed    Pneumococcal 0-64 years Vaccine  Completed    COVID-19 Vaccine  Completed    Hepatitis C screen  Completed    HIV screen  Completed    Hepatitis A vaccine  Aged Out    Hib vaccine  Aged Out    Polio vaccine  Aged Out    Meningococcal (ACWY) vaccine  Aged Out    Diabetes screen  Discontinued       Hemoglobin A1C (%)   Date Value   11/19/2023 5.7   12/29/2020 5.0             ( goal A1C is < 7)   No components found for: \"LABMICR\"  No components found for: \"LDLCHOLESTEROL\", \"LDLCALC\"    (goal LDL is <100)   AST (U/L)   Date Value   07/18/2024 27     ALT (U/L)   Date Value   07/18/2024 21     BUN (mg/dL)   Date Value   08/19/2024 11     BP Readings from Last 3 Encounters:   09/11/24 (!) 80/40   08/20/24 92/61   08/19/24 112/80

## 2024-10-10 ENCOUNTER — HOSPITAL ENCOUNTER (OUTPATIENT)
Dept: PHYSICAL THERAPY | Facility: CLINIC | Age: 59
Setting detail: THERAPIES SERIES
Discharge: HOME | End: 2024-10-10
Payer: MEDICARE

## 2024-10-10 PROCEDURE — 97110 THERAPEUTIC EXERCISES: CPT

## 2024-10-10 PROCEDURE — 97112 NEUROMUSCULAR REEDUCATION: CPT

## 2024-10-10 NOTE — FLOWSHEET NOTE
2 sets - 20 reps    Comprehension of Education:  [x] Verbalizes understanding.  [x] Demonstrates understanding.  [] Needs review.  [] Demonstrates/verbalizes HEP/Ed previously given.         Plan: [x] Continue current frequency toward long and short term goals.          Treatment Charges: Mins Units   []  Modalities     [x]  Ther Exercise 15 1   []  Manual Therapy     []  Ther Activities     [x]  Neuro 25 2   [x]  Gait 5 -   []  Other: PPT     Total Treatment time 45 3         Time In: 0900            Time Out: 0945    Electronically signed by:  Stacy Vences PT

## 2024-10-14 ENCOUNTER — HOSPITAL ENCOUNTER (OUTPATIENT)
Dept: PHYSICAL THERAPY | Facility: CLINIC | Age: 59
Setting detail: THERAPIES SERIES
Discharge: HOME | End: 2024-10-14
Payer: MEDICARE

## 2024-10-14 NOTE — FLOWSHEET NOTE
[] [facility]  Outpatient Rehabilitation &  Therapy  [address]  [phone]  [phone] [x] [facility]  Outpatient Rehabilitation &  Therapy  [address]  P: [phone]  F: [phone] [] [facility]  Outpatient Rehabilitation &  Therapy  [address]  P: [phone]  F: [phone] [] [facility]  Outpatient Rehabilitation &  Therapy  [address]  [phone]  [phone] [] [facility]  Outpatient Rehabilitation &  Therapy  [address]   P: [phone]  F: [phone]  [] [facility]  Outpatient Rehabilitation &  Therapy  [address]  P: [phone]  F: [phone] [] [facility]  Outpatient Rehabilitation &  Therapy  [address]  P: [phone]  F: [phone] [] [facility]  Outpatient Rehabilitation &  Therapy  [address]  P: [phone]  F: [phone] [] [facility]  Outpatient Rehabilitation &  Therapy  [address]  P: [phone]  F: [phone]  [] [facility] Outpatient Rehabilitation &  Therapy  [address]  P: [phone]  F: [phone]     Therapy Cancel/No Show note    Date: 10/14/2024  Patient: Madonna Manrique  : 1965  MRN: [id number]    Cancels/No Shows to date: 20    For today's appointment patient:    [x]  Cancelled    [] Rescheduled appointment    [] No-show     Reason given by patient:    []  Patient ill    []  Conflicting appointment    [x] No transportation      [] Conflict with work    [] No reason given    [] Weather related    [] COVID-19    [] Other:      Comments:        [x] Next appointment was confirmed    Electronically signed by: Stacy Vences PT

## 2024-10-17 ENCOUNTER — HOSPITAL ENCOUNTER (OUTPATIENT)
Dept: PHYSICAL THERAPY | Facility: CLINIC | Age: 59
Setting detail: THERAPIES SERIES
Discharge: HOME | End: 2024-10-17
Payer: MEDICARE

## 2024-10-17 PROCEDURE — 97110 THERAPEUTIC EXERCISES: CPT

## 2024-10-17 PROCEDURE — 97112 NEUROMUSCULAR REEDUCATION: CPT

## 2024-10-17 PROCEDURE — 97116 GAIT TRAINING THERAPY: CPT

## 2024-10-17 NOTE — FLOWSHEET NOTE
doing with use of SPC to improve gait endurance [x] Demo  [] Written    Access Code: [email or web address]  URL: [email or web address]  Date: 10/10/2024  Prepared by: Stacy Vences    Exercises  - Seated Long Arc Quad  - 2 sets - 10 reps - 3s hold  - Seated March  - 2 sets - 10 reps  - Seated Ankle Pumps  - 20 reps  - Seated Ankle Circles  - 2 sets - 20 reps  - Seated Hip Adduction Isometrics with Ball  - 2 sets - 10 reps - 3s hold  - Seated Hip Abduction with Resistance  - 2 sets - 20 reps    Comprehension of Education:  [] Verbalizes understanding.  [] Demonstrates understanding.  [] Needs review.  [x] Demonstrates/verbalizes HEP/Ed previously given.         Plan: [x] Continue current frequency toward long and short term goals.          Treatment Charges: Mins Units   []  Modalities     [x]  Ther Exercise 20 1   []  Manual Therapy     []  Ther Activities     [x]  Neuro 10 1   [x]  Gait 20 1   []  Other: PPT     Total Treatment time 50 3         Time In: 1600            Time Out: 1650    Electronically signed by:  Stacy Vences, PT

## 2024-10-19 DIAGNOSIS — K21.9 GASTROESOPHAGEAL REFLUX DISEASE, UNSPECIFIED WHETHER ESOPHAGITIS PRESENT: ICD-10-CM

## 2024-10-21 ENCOUNTER — HOSPITAL ENCOUNTER (OUTPATIENT)
Dept: PHYSICAL THERAPY | Facility: CLINIC | Age: 59
Setting detail: THERAPIES SERIES
Discharge: HOME | End: 2024-10-21
Payer: MEDICARE

## 2024-10-21 PROCEDURE — 97112 NEUROMUSCULAR REEDUCATION: CPT

## 2024-10-21 PROCEDURE — 97110 THERAPEUTIC EXERCISES: CPT

## 2024-10-21 RX ORDER — PANTOPRAZOLE SODIUM 40 MG/1
TABLET, DELAYED RELEASE ORAL
Qty: 30 TABLET | Refills: 3 | Status: SHIPPED | OUTPATIENT
Start: 2024-10-21

## 2024-10-21 NOTE — FLOWSHEET NOTE
[] [facility]  Outpatient Rehabilitation &  Therapy  [address]  [phone]  F: [phone] [x] [facility]  Outpatient Rehabilitation &  Therapy  [address]   [address]  P: [phone]  F: [phone] [] [facility]  Outpatient Rehabilitation &  Therapy  [address]  P: [phone]  F: [phone] [] [facility]  Outpatient Rehabilitation &  Therapy  [address]  P: [phone]  F: [phone] [] [facility]  Outpatient Rehabilitation &  Therapy  [address]   [address]   P: [phone]  F: [phone]      Physical Therapy Daily Treatment Note    Date:  10/21/2024  Patient Name:  Madonna Manrique    :  1965  MRN: [id number]    Physician: Soraida Stone DO                         Insurance: [facility] MEDICARE/[facility])  Medical Diagnosis:   G35 (ICD-10-CM) - Multiple sclerosis (HCC)   M54.42, G89.29 (ICD-10-CM) - Chronic bilateral low back pain with left-sided sciatica               Rehab Codes: R29.3, R26.81, R26.89, R29.6, R26.2  Next MD appt.: 24 (Dr. Stone)  Date of symptom onset: 24 (date of referral)    Visit# / total visits: ; Progress note for Medicare patient due at visit 10   Frequency: 2x/week    Cancels/No Shows: 2/0    Subjective:    Pain:  [] Yes  [x] No Location:  N/A Pain Rating: (0-10 scale) ---/10  Pain altered Tx:  [x] No  [] Yes  Action:  Comments: Patient arrives with [facility]; reports no falls since last session.    Patient Goals: To build strength     Objective:  Modalities:   Precautions: RRMS   Exercises: Bolded exercises completed 10/21/2024  Exercise Reps/ Time Weight/ Level Comments  -CGA/gait belt throughout   Nu-Step 5 min L4 BUE/BLE; completed at beginning of session to prime nervous system for session. Inc 10/17.         Supine                                                Sitting      Knee Extension [id number]     Alternating

## 2024-10-21 NOTE — TELEPHONE ENCOUNTER
Last visit: 08/20/2024  Last Med refill: 06/24/2024  Does patient have enough medication for 72 hours: No:     Next Visit Date:  Future Appointments   Date Time Provider Department Center   10/21/2024  4:00 PM Stacy Vences, PT [facility]   10/23/2024  4:00 PM Stacy Vences, PT [facility]   10/28/2024  1:20 PM Sierra De Oliveira MD [facility] DEP   10/29/2024  2:30 PM Sade Addison MD [facility]   10/30/2024  4:00 PM Amor Nogueira, SIDDHARTH [facility]   11/20/2024  2:20 PM Soraida Stone DO Neuro Spec Neurology -       Health Maintenance   Topic Date Due    Hepatitis B vaccine (1 of 3 - 19+ 3-dose series) Never done    Colorectal Cancer Screen  05/06/2021    Breast cancer screen  09/30/2022    Annual Wellness Visit (Medicare Advantage)  Never done    A1C test (Diabetic or Prediabetic)  11/19/2024    Lung Cancer Screening &/or Counseling  11/01/2024    Lipids  12/17/2024    Depression Monitoring  07/22/2025    DTaP/Tdap/Td vaccine (3 - Td or Tdap) 06/13/2033    Flu vaccine  Completed    Shingles vaccine  Completed    Pneumococcal 0-64 years Vaccine  Completed    COVID-19 Vaccine  Completed    Hepatitis C screen  Completed    HIV screen  Completed    Hepatitis A vaccine  Aged Out    Hib vaccine  Aged Out    Polio vaccine  Aged Out    Meningococcal (ACWY) vaccine  Aged Out    Diabetes screen  Discontinued       Hemoglobin A1C (%)   Date Value   11/19/2023 5.7   12/29/2020 5.0             ( goal A1C is < 7)   No components found for: \"LABMICR\"  No components found for: \"LDLCHOLESTEROL\", \"LDLCALC\"    (goal LDL is <100)   AST (U/L)   Date Value   07/18/2024 27     ALT (U/L)   Date Value   07/18/2024 21     BUN (mg/dL)   Date Value   08/19/2024 11     BP Readings from Last 3 Encounters:   09/11/24 (!) 80/40   08/20/24 92/61   08/19/24 112/80          (goal 120/80)    All Future Testing planned in [facility]  Lab Frequency Next Occurrence   MORIS DIGITAL SCREEN W OR WO CAD

## 2024-10-23 ENCOUNTER — HOSPITAL ENCOUNTER (OUTPATIENT)
Dept: PHYSICAL THERAPY | Facility: CLINIC | Age: 59
Setting detail: THERAPIES SERIES
Discharge: HOME | End: 2024-10-23
Payer: MEDICARE

## 2024-10-23 PROCEDURE — 97112 NEUROMUSCULAR REEDUCATION: CPT

## 2024-10-23 PROCEDURE — 97110 THERAPEUTIC EXERCISES: CPT

## 2024-10-23 PROCEDURE — 97116 GAIT TRAINING THERAPY: CPT

## 2024-10-23 NOTE — FLOWSHEET NOTE
[] [facility]  Outpatient Rehabilitation &  Therapy  [address]  [phone]  F: [phone] [x] [facility]  Outpatient Rehabilitation &  Therapy  [address]   [address]  P: [phone]  F: [phone] [] [facility]  Outpatient Rehabilitation &  Therapy  [address]  P: [phone]  F: [phone] [] [facility]  Outpatient Rehabilitation &  Therapy  [address]  P: [phone]  F: [phone] [] [facility]  Outpatient Rehabilitation &  Therapy  [address]   [address]   P: [phone]  F: [phone]      Physical Therapy Daily Treatment Note    Date:  10/23/2024  Patient Name:  Madonna Manrique    :  1965  MRN: [id number]    Physician: Soraida Stone DO                         Insurance: [facility] MEDICARE/[facility]  Medical Diagnosis:   G35 (ICD-10-CM) - Multiple sclerosis (HCC)   M54.42, G89.29 (ICD-10-CM) - Chronic bilateral low back pain with left-sided sciatica               Rehab Codes: R29.3, R26.81, R26.89, R29.6, R26.2  Next MD appt.: 24 (Dr. Stone)  Date of symptom onset: 24 (date of referral)    Visit# / total visits: ; Progress note for Medicare patient due at visit 10   Frequency: 2x/week    Cancels/No Shows: 2/0    Subjective:    Pain:  [] Yes  [x] No Location:  N/A Pain Rating: (0-10 scale) ---/10  Pain altered Tx:  [x] No  [] Yes  Action:  Comments: Patient arrives with [facility]; reports no falls & no major changes    Patient Goals: To build strength     Objective:  Modalities:   Precautions: RRMS   Exercises: Bolded exercises completed 10/23/2024  Exercise Reps/ Time Weight/ Level Comments  -CGA/gait belt throughout   Nu-Step 5 min L4 BUE/BLE; completed at beginning of session to prime nervous system for session. Inc 10/17.         Supine      Bridges 2x10  Added 10/23 due to complaints of not being able to scoot in bed very easily

## 2024-10-28 ENCOUNTER — OFFICE VISIT (OUTPATIENT)
Dept: FAMILY MEDICINE CLINIC | Age: 59
End: 2024-10-28

## 2024-10-28 VITALS
HEIGHT: 60 IN | HEART RATE: 112 BPM | WEIGHT: 121 LBS | BODY MASS INDEX: 23.75 KG/M2 | OXYGEN SATURATION: 99 % | SYSTOLIC BLOOD PRESSURE: 116 MMHG | DIASTOLIC BLOOD PRESSURE: 84 MMHG

## 2024-10-28 DIAGNOSIS — E44.1 MILD PROTEIN-CALORIE MALNUTRITION (HCC): ICD-10-CM

## 2024-10-28 DIAGNOSIS — M79.2 NEUROPATHIC PAIN: Primary | ICD-10-CM

## 2024-10-28 DIAGNOSIS — I87.1 MAY-THURNER SYNDROME: ICD-10-CM

## 2024-10-28 RX ORDER — GABAPENTIN 300 MG/1
600 CAPSULE ORAL 3 TIMES DAILY
Qty: 180 CAPSULE | Refills: 0 | Status: SHIPPED | OUTPATIENT
Start: 2024-10-28 | End: 2024-11-27

## 2024-10-28 RX ORDER — BUPROPION HYDROCHLORIDE 150 MG/1
150 TABLET ORAL EVERY MORNING
COMMUNITY
Start: 2024-10-15

## 2024-10-28 RX ORDER — ESCITALOPRAM OXALATE 10 MG/1
10 TABLET ORAL DAILY
COMMUNITY
Start: 2024-10-15

## 2024-10-28 RX ORDER — DEXTROAMPHETAMINE SACCHARATE, AMPHETAMINE ASPARTATE, DEXTROAMPHETAMINE SULFATE AND AMPHETAMINE SULFATE 7.5; 7.5; 7.5; 7.5 MG/1; MG/1; MG/1; MG/1
30 TABLET ORAL 2 TIMES DAILY
COMMUNITY
Start: 2024-10-18

## 2024-10-28 RX ORDER — FLUTICASONE FUROATE AND VILANTEROL TRIFENATATE 100; 25 UG/1; UG/1
1 POWDER RESPIRATORY (INHALATION) DAILY
COMMUNITY
Start: 2024-09-27 | End: 2024-11-04

## 2024-10-28 NOTE — PROGRESS NOTES
Visit Information    Have you changed or started any medications since your last visit including any over-the-counter medicines, vitamins, or herbal medicines? no   Have you stopped taking any of your medications? Is so, why? -  no  Are you having any side effects from any of your medications? - no    Have you seen any other physician or provider since your last visit?  yes - PT 10/23/24, [facility] Surg 9/11/24, Ortho 9/9/24  Have you had any other diagnostic tests since your last visit?  yes - Venous Scan 8/29, ECHO 8/29   Have you been seen in the emergency room and/or had an admission in a hospital since we last saw you?  no   Have you had your routine dental cleaning in the past 6 months?  no     Do you have an active MyChart account? If no, what is the barrier?  Yes    Patient Care Team:  Sierra De Oliveira MD as PCP - General (Family Medicine)  Shaniqua Owens MD as Consulting Physician (Gastroenterology)  Tiana Silver as Financial Navigator    Medical History Review  Past Medical, Family, and Social History reviewed and does contribute to the patient presenting condition    Health Maintenance   Topic Date Due    Hepatitis B vaccine (1 of 3 - 19+ 3-dose series) Never done    Colorectal Cancer Screen  05/06/2021    Breast cancer screen  09/30/2022    Annual Wellness Visit (Medicare Advantage)  Never done    A1C test (Diabetic or Prediabetic)  11/19/2024    Lung Cancer Screening &/or Counseling  11/01/2024    Lipids  12/17/2024    Depression Monitoring  07/22/2025    DTaP/Tdap/Td vaccine (3 - Td or Tdap) 06/13/2033    Flu vaccine  Completed    Shingles vaccine  Completed    Pneumococcal 0-64 years Vaccine  Completed    COVID-19 Vaccine  Completed    Hepatitis C screen  Completed    HIV screen  Completed    Hepatitis A vaccine  Aged Out    Hib vaccine  Aged Out    Polio vaccine  Aged Out    Meningococcal (ACWY) vaccine  Aged Out    Diabetes screen  Discontinued

## 2024-10-28 NOTE — PROGRESS NOTES
[facility] Residency Program - Outpatient Note      Subjective:    Madnona Manrique is a 59 y.o. female with  has a past medical history of Alcoholism ([facility]), Anxiety, Asthma, Asthma, Bipolar 1 disorder (HCC), Borderline personality disorder (HCC), Chronic uveitis of both eyes, Depression, Drug abuse, opioid type (HCC), DVT (deep vein thrombosis) in pregnancy, History of blood transfusion, May-Thurner syndrome, Pneumonia, Pulmonary emboli (HCC), Suicide gesture (HCC), and Uveitis of both eyes.    Presented to the office today for:  No chief complaint on file.      HPI  Patient is a 59-year-old female with significant past medical history of multiple sclerosis, May Espinal syndrome, asthma presenting to clinic for follow-up.  Patient was previously seen for chronic bilateral leg swelling.  Patient is follow-up with vascular surgery who states lately's leg swelling is not secondary to lymphedema nor issues with vascular stenting point.  Patient states her swelling has gone down but continues to lose weight.  Patient currently is on a GLP agonist denies any issues with that but states she only eats once a day with intermittent snacks states she does not have overly an appetite.  As per  who is with patient noticing is decreased to meet consumption.  Follow-up with vascular surgery who states that there not convinced her chronic pedal edema secondary to vascular issues but more likely protein malnutrition.  Patient states he does follow-up with neurology for her multiple sclerosis and states her neuropathic pain is at this moment not well-controlled.  Patient is following up with physical therapy and admits she has had a recent fall over the last 2 weeks.        Review of Systems   Constitutional:  Negative for appetite change, chills and diaphoresis.   Respiratory:  Negative for cough and shortness of breath.    Cardiovascular:  Negative for chest pain.   Gastrointestinal:

## 2024-10-28 NOTE — PROGRESS NOTES
Attending Physician Statement  I  have discussed the care of Madonna Manrique including pertinent history and exam findings with the resident. I agree with the assessment, plan and orders as documented by the resident.      /84 (Site: Right Upper Arm, Position: Sitting, Cuff Size: Medium Adult)   Pulse (!) 112   Ht 1.524 m (5')   Wt 54.9 kg (121 lb)   SpO2 99%   BMI 23.63 kg/m²    BP Readings from Last 3 Encounters:   10/28/24 116/84   09/11/24 (!) 80/40   08/20/24 92/61     Wt Readings from Last 3 Encounters:   10/28/24 54.9 kg (121 lb)   09/11/24 62.6 kg (138 lb)   09/09/24 62.1 kg (137 lb)          Diagnosis Orders   1. Neuropathic pain  gabapentin (NEURONTIN) 300 MG capsule      2. May-Thurner syndrome  rivaroxaban (XARELTO) 20 MG TABS tablet      3. Mild protein-calorie malnutrition (HCC)  [facility]    Prealbumin              Ernesto Ceron DO 10/28/2024 4:45 PM

## 2024-10-30 ENCOUNTER — HOSPITAL ENCOUNTER (OUTPATIENT)
Dept: PHYSICAL THERAPY | Facility: CLINIC | Age: 59
Setting detail: THERAPIES SERIES
Discharge: HOME | End: 2024-10-30
Payer: MEDICARE

## 2024-10-30 NOTE — FLOWSHEET NOTE
[] [facility]  Outpatient Rehabilitation &  Therapy  [address]  [phone]  [phone] [x] [facility]  Outpatient Rehabilitation &  Therapy  [address]  P: [phone]  F: [phone] [] [facility]  Outpatient Rehabilitation &  Therapy  [address]  P: [phone]  F: [phone] [] [facility]  Outpatient Rehabilitation &  Therapy  [address]  [phone]  [phone] [] [facility]  Outpatient Rehabilitation &  Therapy  [address]   P: [phone]  F: [phone]  [] [facility]  Outpatient Rehabilitation &  Therapy  [address]  P: [phone]  F: [phone] [] [facility]  Outpatient Rehabilitation &  Therapy  [address]  P: [phone]  F: [phone] [] [facility]  Outpatient Rehabilitation &  Therapy  [address]  P: [phone]  F: [phone] [] [facility]  Outpatient Rehabilitation &  Therapy  [address]  P: [phone]  F: [phone]  [] [facility] Outpatient Rehabilitation &  Therapy  [address]  P: [phone]  F: [phone]     Therapy Cancel/No Show note    Date: 10/30/2024  Patient: Madonna Manrique  : 1965  MRN: [id number]    Cancels/No Shows to date: 30    For today's appointment patient:    [x]  Cancelled    [] Rescheduled appointment    [] No-show     Reason given by patient:    [x]  Patient ill    []  Conflicting appointment    [] No transportation      [] Conflict with work    [] No reason given    [] Weather related    [] COVID-19    [] Other:      Comments:        [x] Next appointment was confirmed    Electronically signed by: Amor Nogueira, PTA

## 2024-11-04 ENCOUNTER — HOSPITAL ENCOUNTER (OUTPATIENT)
Dept: PHYSICAL THERAPY | Facility: CLINIC | Age: 59
Setting detail: THERAPIES SERIES
Discharge: HOME | End: 2024-11-04
Payer: MEDICARE

## 2024-11-04 PROCEDURE — 97110 THERAPEUTIC EXERCISES: CPT

## 2024-11-04 PROCEDURE — 97116 GAIT TRAINING THERAPY: CPT

## 2024-11-04 RX ORDER — FLUTICASONE FUROATE AND VILANTEROL 100; 25 UG/1; UG/1
1 POWDER RESPIRATORY (INHALATION) DAILY
Qty: 1 EACH | Refills: 2 | Status: SHIPPED | OUTPATIENT
Start: 2024-11-04 | End: 2024-12-04

## 2024-11-04 NOTE — TELEPHONE ENCOUNTER
E-scribe request for breo ellipta. Please review and e-scribe if applicable.     Last Visit Date:  10/28/24  Next Visit Date:  1/27/2025    Hemoglobin A1C (%)   Date Value   11/19/2023 5.7   12/29/2020 5.0             ( goal A1C is < 7)   No components found for: \"LABMICR\"  No components found for: \"LDLCHOLESTEROL\", \"LDLCALC\"    (goal LDL is <100)   AST (U/L)   Date Value   07/18/2024 27     ALT (U/L)   Date Value   07/18/2024 21     BUN (mg/dL)   Date Value   08/19/2024 11     BP Readings from Last 3 Encounters:   10/28/24 116/84   09/11/24 (!) 80/40   08/20/24 92/61          (goal 120/80)        Patient Active Problem List:     Overdose of drug/medicinal substance, accidental or unintentional, initial encounter     Chronic anticoagulation     Multifocal pneumonia     Pulmonary hypertension (HCC)     Mild cardiomegaly     Borderline personality disorder (HCC)     Bipolar 1 disorder (HCC)     Recurrent major depressive disorder, in remission (HCC)     Anxiety disorder     Transient alteration of awareness     Symptomatic anemia     Hyponatremia     Hypocalcemia     Hypoalbuminemia     COVID-19 virus not detected     Encephalopathy, metabolic     Iron deficiency anemia secondary to inadequate dietary iron intake     Iron malabsorption     History of psychiatric disorder     Chronic bilateral low back pain with left-sided sciatica     H/O opioid abuse (HCC)     Polypharmacy     Tremor due to multiple drugs     Serotonin syndrome     Confusion     Asthma with acute exacerbation     May-Thurner syndrome     Migraine without status migrainosus, not intractable     Change in mental status     Multiple sclerosis (HCC)     Tibia and fibula open fracture, right, type I or II, initial encounter     Tibia/fibula fracture, right, closed, initial encounter     Acute respiratory failure with hypoxia and hypercapnia     Alcohol use disorder, severe, dependence (HCC)     Anastomotic ulcer S/P gastric bypass     Bipolar affective

## 2024-11-04 NOTE — FLOWSHEET NOTE
to PLOF as possible.     Assessed:   Short Term Goals: Meet in 10 treatments Met Progressing No change Regressing   Safety: Patient will report no falls for 1 month to demonstrate improved safety in the home environment.  []  []  []  []    Stairs: Patient will independently ascend/descend flight of stairs with unilateral handrail to demonstrate improved safety and independence.  []  []  []  []    Functional Outcome Measure: Patient will demonstrate <10% difference in TUG standard and TUG cognitive scoring to demonstrate a decreased risk of falls. []  []  []  []    Functional Outcome Measure: Patient will improve gait speed by .1 m/s to demonstrate a decrease in risk of falls. []  []  []  []    Strength: Patient will demonstrate ability to come to stand without use of UE to demonstrate improved functional LE strength. []  []  []  []    Home Exercise Program: Patient to be independent with home exercise program as demonstrated by performance with correct form without cues. []  []  []  []    Demonstrates knowledge of fall prevention [x]Provided 9/26         Assessed:    Long Term Goals: Meet in 20 treatments Met Progressing No change Regressing   Functional Outcome Measure: Patient will improve FGA by 5 points to demonstrate a decrease in risk of falls.  []  []  []  []    Functional Outcome Measure: Patient will improve gait speed by .1 m/s (from STG assessment) to demonstrate a decrease in risk of falls. []  []  []  []    Functional Outcome Measure: Patient will improve ABC by 11% to demonstrate a decrease in risk of falls. []  []  []  []    Functional Outcome Measure: Patient will improve 6 MWT by 190 feet to demonstrate a decrease in risk of falls and improved gait endurance. []  []  []  []    Strength: Patient will improve 5x STS by 2.2s (from STG assessment) to demonstrate improved functional quad strength and a decrease in risk of falls. []  []  []  []    Function: Patient will demonstrate ability to complete

## 2024-11-06 ENCOUNTER — HOSPITAL ENCOUNTER (OUTPATIENT)
Dept: PHYSICAL THERAPY | Facility: CLINIC | Age: 59
Setting detail: THERAPIES SERIES
Discharge: HOME | End: 2024-11-06
Payer: MEDICARE

## 2024-11-06 NOTE — FLOWSHEET NOTE
[] [facility]  Outpatient Rehabilitation &  Therapy  [address]  [phone]  [phone] [x] [facility]  Outpatient Rehabilitation &  Therapy  [address]  P: [phone]  F: [phone] [] [facility]  Outpatient Rehabilitation &  Therapy  [address]  P: [phone]  F: [phone] [] [facility]  Outpatient Rehabilitation &  Therapy  [address]  [phone]  [phone] [] [facility]  Outpatient Rehabilitation &  Therapy  [address]   P: [phone]  F: [phone]  [] [facility]  Outpatient Rehabilitation &  Therapy  [address]  P: [phone]  F: [phone] [] [facility]  Outpatient Rehabilitation &  Therapy  [address]  P: [phone]  F: [phone] [] [facility]  Outpatient Rehabilitation &  Therapy  [address]  P: [phone]  F: [phone] [] [facility]  Outpatient Rehabilitation &  Therapy  [address]  P: [phone]  F: [phone]  [] [facility] Outpatient Rehabilitation &  Therapy  [address]  P: [phone]  F: [phone]     Therapy Cancel/No Show note    Date: 2024  Patient: Madonna Manrique  : 1965  MRN: [id number]    Cancels/No Shows to date: 0    For today's appointment patient:    [x]  Cancelled    [] Rescheduled appointment    [] No-show     Reason given by patient:    [x]  Patient ill    []  Conflicting appointment    [] No transportation      [] Conflict with work    [] No reason given    [] Weather related    [] COVID-19    [] Other:      Comments:        [x] Next appointment was confirmed, attendance policy reviewed    Electronically signed by: Stacy Vences PT

## 2024-11-13 ENCOUNTER — APPOINTMENT (OUTPATIENT)
Dept: PHYSICAL THERAPY | Facility: CLINIC | Age: 59
End: 2024-11-13
Payer: MEDICARE

## 2024-11-18 ENCOUNTER — APPOINTMENT (OUTPATIENT)
Dept: PHYSICAL THERAPY | Facility: CLINIC | Age: 59
End: 2024-11-18
Payer: MEDICARE

## 2024-11-19 DIAGNOSIS — J45.909 ASTHMA WITH SEVERITY TO BE DETERMINED: ICD-10-CM

## 2024-11-19 RX ORDER — ALBUTEROL SULFATE 90 UG/1
2 INHALANT RESPIRATORY (INHALATION) EVERY 6 HOURS PRN
Qty: 1 EACH | Refills: 3 | Status: SHIPPED | OUTPATIENT
Start: 2024-11-19

## 2024-11-21 ENCOUNTER — APPOINTMENT (OUTPATIENT)
Dept: PHYSICAL THERAPY | Facility: CLINIC | Age: 59
End: 2024-11-21
Payer: MEDICARE

## 2024-11-25 ENCOUNTER — APPOINTMENT (OUTPATIENT)
Dept: PHYSICAL THERAPY | Facility: CLINIC | Age: 59
End: 2024-11-25
Payer: MEDICARE

## 2024-12-05 DIAGNOSIS — M79.2 NEUROPATHIC PAIN: ICD-10-CM

## 2024-12-05 RX ORDER — GABAPENTIN 300 MG/1
600 CAPSULE ORAL 3 TIMES DAILY
Qty: 180 CAPSULE | Refills: 0 | Status: SHIPPED | OUTPATIENT
Start: 2024-12-05 | End: 2025-01-04

## 2024-12-05 NOTE — TELEPHONE ENCOUNTER
Last visit: 10-28-24  Last Med refill:   Does patient have enough medication for 72 hours: No:     Next Visit Date:  Future Appointments   Date Time Provider Department Center   1/7/2025  3:15 PM Sade Addison MD [place]  [facility]   1/27/2025  2:00 PM Sierra De Oliveira MD [facility] ECC DEP   1/28/2025  9:00 AM STV STA CHAIR 01 STVZ STA MED [place]   2/24/2025 11:40 AM BiteSoraida V, DO Neuro Spec Neurology -       Health Maintenance   Topic Date Due    Hepatitis B vaccine (1 of 3 - 19+ 3-dose series) Never done    Colorectal Cancer Screen  05/06/2021    Breast cancer screen  09/30/2022    Annual Wellness Visit (Medicare Advantage)  Never done    Lung Cancer Screening &/or Counseling  11/01/2024    A1C test (Diabetic or Prediabetic)  11/19/2024    Lipids  12/17/2024    Depression Monitoring  07/22/2025    DTaP/Tdap/Td vaccine (3 - Td or Tdap) 06/13/2033    Flu vaccine  Completed    Shingles vaccine  Completed    Pneumococcal 0-64 years Vaccine  Completed    COVID-19 Vaccine  Completed    Hepatitis C screen  Completed    HIV screen  Completed    Hepatitis A vaccine  Aged Out    Hib vaccine  Aged Out    Polio vaccine  Aged Out    Meningococcal (ACWY) vaccine  Aged Out    Diabetes screen  Discontinued       Hemoglobin A1C (%)   Date Value   11/19/2023 5.7   12/29/2020 5.0             ( goal A1C is < 7)   No components found for: \"LABMICR\"  No components found for: \"LDLCHOLESTEROL\", \"LDLCALC\"    (goal LDL is <100)   AST (U/L)   Date Value   07/18/2024 27     ALT (U/L)   Date Value   07/18/2024 21     BUN (mg/dL)   Date Value   08/19/2024 11     BP Readings from Last 3 Encounters:   10/28/24 116/84   09/11/24 (!) 80/40   08/20/24 92/61          (goal 120/80)    All Future Testing planned in [facility]  Lab Frequency Next Occurrence   MORIS DIGITAL SCREEN W OR WO CAD BILATERAL Once 04/19/2024   CBC With Auto Differential Once 01/12/2025   Comprehensive metabolic panel Once 01/12/2025   Prealbumin Once 10/28/2024

## 2024-12-12 ENCOUNTER — HOSPITAL ENCOUNTER (OUTPATIENT)
Age: 59
Discharge: HOME | End: 2024-12-12
Payer: MEDICARE

## 2024-12-12 DIAGNOSIS — E44.1 MILD PROTEIN-CALORIE MALNUTRITION (HCC): ICD-10-CM

## 2024-12-12 LAB — PREALB SERPL-MCNC: 11.3 MG/DL (ref 20–40)

## 2024-12-12 PROCEDURE — 36415 COLL VENOUS BLD VENIPUNCTURE: CPT

## 2024-12-12 PROCEDURE — 84134 ASSAY OF PREALBUMIN: CPT

## 2024-12-18 ENCOUNTER — TELEPHONE (OUTPATIENT)
Dept: FAMILY MEDICINE CLINIC | Age: 59
End: 2024-12-18

## 2024-12-18 NOTE — TELEPHONE ENCOUNTER
Need new order placed for Mounjaro. Can not pend existing order. Patient is also asking for an order for a nebulizer and albuterol medication for it.

## 2024-12-19 NOTE — TELEPHONE ENCOUNTER
Hematocrit, Blood, Post Transfusion POST TRANSFUSION                Patient Active Problem List:     Overdose of drug/medicinal substance, accidental or unintentional, initial encounter     Chronic anticoagulation     Multifocal pneumonia     Pulmonary hypertension (HCC)     Mild cardiomegaly     Borderline personality disorder (HCC)     Bipolar 1 disorder (HCC)     Recurrent major depressive disorder, in remission (HCC)     Anxiety disorder     Transient alteration of awareness     Symptomatic anemia     Hyponatremia     Hypocalcemia     Hypoalbuminemia     COVID-19 virus not detected     Encephalopathy, metabolic     Iron deficiency anemia secondary to inadequate dietary iron intake     Iron malabsorption     History of psychiatric disorder     Chronic bilateral low back pain with left-sided sciatica     H/O opioid abuse ([facility])     Polypharmacy     Tremor due to multiple drugs     Serotonin syndrome     Confusion     Asthma with acute exacerbation     May-Thurner syndrome     Migraine without status migrainosus, not intractable     Change in mental status     Multiple sclerosis ([facility])     Tibia and fibula open fracture, right, type I or II, initial encounter     Tibia/fibula fracture, right, closed, initial encounter     Acute respiratory failure with hypoxia and hypercapnia     Alcohol use disorder, severe, dependence ([facility])     Anastomotic ulcer S/P gastric bypass     Bipolar affective disorder, currently depressed, moderate (HCC)     Chronic cholecystitis with calculus     Deep venous thrombosis of peroneal vein ([facility])     Displacement of lumbar intervertebral disc without myelopathy     Generalized osteoarthritis     GERD (gastroesophageal reflux disease)     Varicose veins of right leg with edema

## 2024-12-20 NOTE — TELEPHONE ENCOUNTER
Patient called office in regards to nebulizer machine and solution. Writer spoke to Oumou and per Oum[facility] will fax over later today and be in touch with patient.

## 2024-12-23 ENCOUNTER — TELEPHONE (OUTPATIENT)
Dept: FAMILY MEDICINE CLINIC | Age: 59
End: 2024-12-23

## 2024-12-23 DIAGNOSIS — R60.0 LOWER LEG EDEMA: ICD-10-CM

## 2024-12-23 RX ORDER — FUROSEMIDE 20 MG/1
20 TABLET ORAL
Qty: 15 TABLET | Refills: 1 | Status: SHIPPED | OUTPATIENT
Start: 2024-12-23 | End: 2025-02-21

## 2024-12-23 NOTE — TELEPHONE ENCOUNTER
Patient called to check status of DME order for nebulizer. Confirmed it was faxed with confirmation to [facility] today 12/23/24. Provided their phone number to patient.

## 2024-12-23 NOTE — TELEPHONE ENCOUNTER
Last visit: 10/28/24  Last Med refill: 9/27/24  Does patient have enough medication for 72 hours: no    Next Visit Date:1/27/24  Future Appointments   Date Time Provider Department Center   1/7/2025  3:15 PM Sade Addison MD [place] GI [facility]   1/27/2025  2:00 PM Sierra De Oliveira MD [facility] ECC DEP   1/28/2025  9:00 AM STV STA CHAIR 01 STVZ STA MED [place]   2/24/2025 11:40 AM Soraida Stone,  Neuro Spec Neurology -       Health Maintenance   Topic Date Due    Hepatitis B vaccine (1 of 3 - 19+ 3-dose series) Never done    Colorectal Cancer Screen  05/06/2021    Breast cancer screen  09/30/2022    Annual Wellness Visit (Medicare Advantage)  Never done    Lung Cancer Screening &/or Counseling  11/01/2024    A1C test (Diabetic or Prediabetic)  11/19/2024    Lipids  12/17/2024    Depression Monitoring  07/22/2025    DTaP/Tdap/Td vaccine (3 - Td or Tdap) 06/13/2033    Flu vaccine  Completed    Shingles vaccine  Completed    Pneumococcal 0-64 years Vaccine  Completed    COVID-19 Vaccine  Completed    Hepatitis C screen  Completed    HIV screen  Completed    Hepatitis A vaccine  Aged Out    Hib vaccine  Aged Out    Polio vaccine  Aged Out    Meningococcal (ACWY) vaccine  Aged Out    Diabetes screen  Discontinued       Hemoglobin A1C (%)   Date Value   11/19/2023 5.7   12/29/2020 5.0             ( goal A1C is < 7)   No components found for: \"LABMICR\"  No components found for: \"LDLCHOLESTEROL\", \"LDLCALC\"    (goal LDL is <100)   AST (U/L)   Date Value   07/18/2024 27     ALT (U/L)   Date Value   07/18/2024 21     BUN (mg/dL)   Date Value   08/19/2024 11     BP Readings from Last 3 Encounters:   10/28/24 116/84   09/11/24 (!) 80/40   08/20/24 92/61          (goal 120/80)    All Future Testing planned in [facility]  Lab Frequency Next Occurrence   MORIS DIGITAL SCREEN W OR WO CAD BILATERAL Once 04/19/2024   CBC With Auto Differential Once 01/12/2025   Comprehensive metabolic panel Once 01/12/2025   Hemoglobin and

## 2024-12-30 ENCOUNTER — TELEPHONE (OUTPATIENT)
Dept: NEUROLOGY | Age: 59
End: 2024-12-30

## 2024-12-30 DIAGNOSIS — G35 MULTIPLE SCLEROSIS (HCC): Primary | ICD-10-CM

## 2024-12-30 NOTE — TELEPHONE ENCOUNTER
Patient called into office asking if she could get a script of leandroe called into [facility] [address]. Please advise

## 2025-01-02 ENCOUNTER — TELEPHONE (OUTPATIENT)
Dept: FAMILY MEDICINE CLINIC | Age: 60
End: 2025-01-02

## 2025-01-02 RX ORDER — PREDNISONE 50 MG/1
1250 TABLET ORAL DAILY
Qty: 25 TABLET | Refills: 0 | Status: SHIPPED | OUTPATIENT
Start: 2025-01-02 | End: 2025-01-03

## 2025-01-02 NOTE — TELEPHONE ENCOUNTER
Patient called office in regards to upcoming appt. Confirmed on 1-27-25. Patient stated she is having trouble with her nebulizer. [facility] stated need office notes signed. Writer informed was sent over. Writer review, the office notes don't support the order. The office notes are from 10/2024 and does not discuss need for the nebulizer. Writer scheduled an appointment on 1-7-2025 for patient to get supporting documentation to get nebulizer. Writer noticed patient has another appt on 1-7-25 patient stated will call the GI and reschedule as would like to discuss this nebulizer.

## 2025-01-02 NOTE — TELEPHONE ENCOUNTER
Spoke with patient, advised Dr. Stone called her a dose of steroids in, she voiced understanding. No questions at this time.

## 2025-01-02 NOTE — TELEPHONE ENCOUNTER
Patient called back into office asking if the steroid was sent to her pharmacy. She stated that for the past week she has been having generalized weakness and headaches. She said that this has happened before when she was almost due for her infusion. Her next Ocrevus infusion is scheduled for 1/28/24.

## 2025-01-07 ENCOUNTER — OFFICE VISIT (OUTPATIENT)
Dept: FAMILY MEDICINE CLINIC | Age: 60
End: 2025-01-07
Payer: MEDICARE

## 2025-01-07 VITALS
DIASTOLIC BLOOD PRESSURE: 62 MMHG | SYSTOLIC BLOOD PRESSURE: 92 MMHG | BODY MASS INDEX: 23.29 KG/M2 | HEART RATE: 97 BPM | HEIGHT: 60 IN | WEIGHT: 118.6 LBS

## 2025-01-07 DIAGNOSIS — M79.2 NEUROPATHIC PAIN: ICD-10-CM

## 2025-01-07 DIAGNOSIS — G35 MULTIPLE SCLEROSIS (HCC): ICD-10-CM

## 2025-01-07 DIAGNOSIS — J44.1 COPD EXACERBATION (HCC): Primary | ICD-10-CM

## 2025-01-07 DIAGNOSIS — J45.909 ASTHMA WITH SEVERITY TO BE DETERMINED: ICD-10-CM

## 2025-01-07 PROCEDURE — G8420 CALC BMI NORM PARAMETERS: HCPCS

## 2025-01-07 PROCEDURE — 99213 OFFICE O/P EST LOW 20 MIN: CPT

## 2025-01-07 PROCEDURE — 4004F PT TOBACCO SCREEN RCVD TLK: CPT

## 2025-01-07 PROCEDURE — 3017F COLORECTAL CA SCREEN DOC REV: CPT

## 2025-01-07 PROCEDURE — 3023F SPIROM DOC REV: CPT

## 2025-01-07 PROCEDURE — G8427 DOCREV CUR MEDS BY ELIG CLIN: HCPCS

## 2025-01-07 RX ORDER — PREDNISONE 50 MG/1
50 TABLET ORAL DAILY
Qty: 5 TABLET | Refills: 0 | Status: SHIPPED | OUTPATIENT
Start: 2025-01-07 | End: 2025-01-12

## 2025-01-07 RX ORDER — PALIPERIDONE 6 MG/1
TABLET, EXTENDED RELEASE ORAL
COMMUNITY
Start: 2024-12-12

## 2025-01-07 RX ORDER — TIOTROPIUM BROMIDE 18 UG/1
18 CAPSULE ORAL; RESPIRATORY (INHALATION) DAILY
Qty: 90 CAPSULE | Refills: 1 | Status: SHIPPED | OUTPATIENT
Start: 2025-01-07 | End: 2025-01-14

## 2025-01-07 RX ORDER — BENZONATATE 200 MG/1
200 CAPSULE ORAL 3 TIMES DAILY PRN
Qty: 15 CAPSULE | Refills: 0 | Status: SHIPPED | OUTPATIENT
Start: 2025-01-07 | End: 2025-01-12

## 2025-01-07 RX ORDER — FLUTICASONE FUROATE AND VILANTEROL TRIFENATATE 100; 25 UG/1; UG/1
POWDER RESPIRATORY (INHALATION)
COMMUNITY
Start: 2025-01-01

## 2025-01-07 RX ORDER — GABAPENTIN 300 MG/1
600 CAPSULE ORAL 3 TIMES DAILY
Qty: 180 CAPSULE | Refills: 0 | Status: SHIPPED | OUTPATIENT
Start: 2025-01-07 | End: 2025-02-06

## 2025-01-07 RX ORDER — ALBUTEROL SULFATE 90 UG/1
2 INHALANT RESPIRATORY (INHALATION) EVERY 6 HOURS PRN
Qty: 1 EACH | Refills: 3 | Status: SHIPPED | OUTPATIENT
Start: 2025-01-07

## 2025-01-07 SDOH — ECONOMIC STABILITY: FOOD INSECURITY: WITHIN THE PAST 12 MONTHS, THE FOOD YOU BOUGHT JUST DIDN'T LAST AND YOU DIDN'T HAVE MONEY TO GET MORE.: NEVER TRUE

## 2025-01-07 SDOH — ECONOMIC STABILITY: FOOD INSECURITY: WITHIN THE PAST 12 MONTHS, YOU WORRIED THAT YOUR FOOD WOULD RUN OUT BEFORE YOU GOT MONEY TO BUY MORE.: NEVER TRUE

## 2025-01-07 SDOH — ECONOMIC STABILITY: INCOME INSECURITY: HOW HARD IS IT FOR YOU TO PAY FOR THE VERY BASICS LIKE FOOD, HOUSING, MEDICAL CARE, AND HEATING?: NOT HARD AT ALL

## 2025-01-07 ASSESSMENT — PATIENT HEALTH QUESTIONNAIRE - PHQ9
3. TROUBLE FALLING OR STAYING ASLEEP: NOT AT ALL
SUM OF ALL RESPONSES TO PHQ QUESTIONS 1-9: 0
SUM OF ALL RESPONSES TO PHQ9 QUESTIONS 1 & 2: 0
9. THOUGHTS THAT YOU WOULD BE BETTER OFF DEAD, OR OF HURTING YOURSELF: NOT AT ALL
2. FEELING DOWN, DEPRESSED OR HOPELESS: NOT AT ALL
DEPRESSION UNABLE TO ASSESS: PT REFUSES
4. FEELING TIRED OR HAVING LITTLE ENERGY: NOT AT ALL
5. POOR APPETITE OR OVEREATING: NOT AT ALL
SUM OF ALL RESPONSES TO PHQ QUESTIONS 1-9: 0
1. LITTLE INTEREST OR PLEASURE IN DOING THINGS: NOT AT ALL
10. IF YOU CHECKED OFF ANY PROBLEMS, HOW DIFFICULT HAVE THESE PROBLEMS MADE IT FOR YOU TO DO YOUR WORK, TAKE CARE OF THINGS AT HOME, OR GET ALONG WITH OTHER PEOPLE: NOT DIFFICULT AT ALL
6. FEELING BAD ABOUT YOURSELF - OR THAT YOU ARE A FAILURE OR HAVE LET YOURSELF OR YOUR FAMILY DOWN: NOT AT ALL
SUM OF ALL RESPONSES TO PHQ QUESTIONS 1-9: 0
SUM OF ALL RESPONSES TO PHQ QUESTIONS 1-9: 0
7. TROUBLE CONCENTRATING ON THINGS, SUCH AS READING THE NEWSPAPER OR WATCHING TELEVISION: NOT AT ALL
8. MOVING OR SPEAKING SO SLOWLY THAT OTHER PEOPLE COULD HAVE NOTICED. OR THE OPPOSITE, BEING SO FIGETY OR RESTLESS THAT YOU HAVE BEEN MOVING AROUND A LOT MORE THAN USUAL: NOT AT ALL

## 2025-01-07 NOTE — PATIENT INSTRUCTIONS
Thank you for letting us take care of you today. We hope all your questions were addressed. If a question was overlooked or something else comes to mind after you return home, please contact a member of your Care Team listed below.      Your Care Team at [facility] is Team #1  Michelle Hernandez M.D. (Faculty)  Fadumo Mujica M.D. (Resident)  Valentine Garcia D.O. (Resident)  Javan Reed M.D. (Resident)  Earl Rayo M.D. (Resident)  Oumou Dukes, [facility]  Star Tena, [facility]  Laila Zhou, [facility]  Alis Cohn, [facility]  Shakira Park, [facility]  Shireen Carrasco, [facility]  Grant Chacon (LJ) David,   Renea Rya [facility] (Clinical Pharmacist)     Office phone number: [phone]    If you need to get in right away due to illness, please be advised we have \"Same Day\" appointments available Monday-Friday. Please call us at [phone] option #3 to schedule your \"Same Day\" appointment.

## 2025-01-07 NOTE — PROGRESS NOTES
Attending Physician Statement  I  have discussed the care of Madonna Manrique including pertinent history and exam findings with the resident. I agree with the assessment, plan and orders as documented by the resident.      BP 92/62 (Site: Right Upper Arm, Position: Sitting, Cuff Size: Medium Adult)   Pulse 97   Ht 1.524 m (5')   Wt 53.8 kg (118 lb 9.6 oz)   BMI 23.16 kg/m²    BP Readings from Last 3 Encounters:   01/07/25 92/62   10/28/24 116/84   09/11/24 (!) 80/40     Wt Readings from Last 3 Encounters:   01/07/25 53.8 kg (118 lb 9.6 oz)   10/28/24 54.9 kg (121 lb)   09/11/24 62.6 kg (138 lb)          Diagnosis Orders   1. Multiple sclerosis (HCC)  tiZANidine (ZANAFLEX) 4 MG tablet      2. Neuropathic pain  gabapentin (NEURONTIN) 300 MG capsule      3. Asthma with severity to be determined  albuterol sulfate HFA (PROVENTIL;VENTOLIN;PROAIR) 108 (90 Base) MCG/ACT inhaler    tiotropium (SPIRIVA HANDIHALER) 18 MCG inhalation capsule    DME Order for Nebulizer as OP      4. COPD exacerbation (HCC)  predniSONE (DELTASONE) 50 MG tablet    benzonatate (TESSALON) 200 MG capsule              Marsha Walter DO 1/7/2025 4:55 PM      
Visit Information    Have you changed or started any medications since your last visit including any over-the-counter medicines, vitamins, or herbal medicines? no   Have you stopped taking any of your medications? Is so, why? -  no  Are you having any side effects from any of your medications? - no    Have you seen any other physician or provider since your last visit?  no   Have you had any other diagnostic tests since your last visit?  yes - Labs   Have you been seen in the emergency room and/or had an admission in a hospital since we last saw you?  no   Have you had your routine dental cleaning in the past 6 months?  no     Do you have an active MyChart account? If no, what is the barrier?  Yes    Patient Care Team:  Sierra De Oliveira MD as PCP - General (Family Medicine)  Shaniqua Owens MD as Consulting Physician (Gastroenterology)  Tiana Silver as Financial Navigator    Medical History Review  Past Medical, Family, and Social History reviewed and does not contribute to the patient presenting condition    Health Maintenance   Topic Date Due    Hepatitis B vaccine (1 of 3 - 19+ 3-dose series) Never done    Colorectal Cancer Screen  05/06/2021    Breast cancer screen  09/30/2022    Lung Cancer Screening &/or Counseling  11/01/2024    A1C test (Diabetic or Prediabetic)  11/19/2024    Annual Wellness Visit (Medicare Advantage)  Never done    Lipids  12/17/2024    Depression Monitoring  07/22/2025    DTaP/Tdap/Td vaccine (3 - Td or Tdap) 06/13/2033    Flu vaccine  Completed    Shingles vaccine  Completed    Pneumococcal 0-64 years Vaccine  Completed    COVID-19 Vaccine  Completed    Hepatitis C screen  Completed    HIV screen  Completed    Hepatitis A vaccine  Aged Out    Hib vaccine  Aged Out    Polio vaccine  Aged Out    Meningococcal (ACWY) vaccine  Aged Out    Diabetes screen  Discontinued             
in the setting of multiple medication prolonging QT  - predniSONE (DELTASONE) 50 MG tablet; Take 1 tablet by mouth daily for 5 days  Dispense: 5 tablet; Refill: 0  - benzonatate (TESSALON) 200 MG capsule; Take 1 capsule by mouth 3 times daily as needed for Cough  Dispense: 15 capsule; Refill: 0    3. Neuropathic pain  - gabapentin (NEURONTIN) 300 MG capsule; Take 2 capsules by mouth 3 times daily for 30 days. Intended supply: 90 days  Dispense: 180 capsule; Refill: 0    4. Multiple sclerosis (HCC)  - tiZANidine (ZANAFLEX) 4 MG tablet; Take 1 tablet by mouth every 8 hours as needed (back pain)  Dispense: 42 tablet; Refill: 0        Requested Prescriptions     Signed Prescriptions Disp Refills    gabapentin (NEURONTIN) 300 MG capsule 180 capsule 0     Sig: Take 2 capsules by mouth 3 times daily for 30 days. Intended supply: 90 days    albuterol sulfate HFA (PROVENTIL;VENTOLIN;PROAIR) 108 (90 Base) MCG/ACT inhaler 1 each 3     Sig: Inhale 2 puffs into the lungs every 6 hours as needed for Wheezing or Shortness of Breath    tiotropium (SPIRIVA HANDIHALER) 18 MCG inhalation capsule 90 capsule 1     Sig: Inhale 1 capsule into the lungs daily    tiZANidine (ZANAFLEX) 4 MG tablet 42 tablet 0     Sig: Take 1 tablet by mouth every 8 hours as needed (back pain)    predniSONE (DELTASONE) 50 MG tablet 5 tablet 0     Sig: Take 1 tablet by mouth daily for 5 days    benzonatate (TESSALON) 200 MG capsule 15 capsule 0     Sig: Take 1 capsule by mouth 3 times daily as needed for Cough       Medications Discontinued During This Encounter   Medication Reason    albuterol sulfate HFA (PROVENTIL;VENTOLIN;PROAIR) 108 (90 Base) MCG/ACT inhaler REORDER    gabapentin (NEURONTIN) 300 MG capsule REORDER    tiZANidine (ZANAFLEX) 4 MG tablet REORDER       Madonna received counseling on the following healthy behaviors: nutrition, exercise and medication adherence.    Discussed use, benefit, and side effects of prescribed medications.  Barriers to

## 2025-01-13 ENCOUNTER — TELEPHONE (OUTPATIENT)
Dept: FAMILY MEDICINE CLINIC | Age: 60
End: 2025-01-13

## 2025-01-14 DIAGNOSIS — J44.9 CHRONIC OBSTRUCTIVE PULMONARY DISEASE, UNSPECIFIED COPD TYPE (HCC): ICD-10-CM

## 2025-01-14 DIAGNOSIS — J45.909 ASTHMA WITH SEVERITY TO BE DETERMINED: Primary | ICD-10-CM

## 2025-01-14 NOTE — PROGRESS NOTES
Spiriva declined by insurance  Will order Incruse Ellipta as suggested alternative and reassess      Fadumo Mujica MD  Family medicine resident, PGY3  1/14/2025 at 2:39 PM

## 2025-01-16 ENCOUNTER — TELEPHONE (OUTPATIENT)
Dept: FAMILY MEDICINE CLINIC | Age: 60
End: 2025-01-16

## 2025-01-16 NOTE — TELEPHONE ENCOUNTER
Patient called in regards to nebulizer. Writer informed was sent to [employer] on 1-9-25.  Writer informed waiting on provider office notes to be completed. Please if able could you sign notes. Writer will then fax office notes to [facility]. Encounter dated 1-7-25.

## 2025-01-20 RX ORDER — BUPROPION HYDROCHLORIDE 150 MG/1
150 TABLET ORAL EVERY MORNING
Qty: 30 TABLET | Refills: 1 | Status: SHIPPED | OUTPATIENT
Start: 2025-01-20 | End: 2025-01-21

## 2025-01-20 NOTE — TELEPHONE ENCOUNTER
Last visit: 1/7/25  Last Med refill: 10/15/24  Does patient have enough medication for 72 hours: no    Next Visit Date:2/10/25  Future Appointments   Date Time Provider Department Center   1/28/2025  9:00 AM STV STA CHAIR 01 [facility] STA MED [facility]   2/10/2025 11:00 AM Sierra De Oliveira MD [facility] ECC DEP   2/24/2025 11:40 AM Soraida Stone DO Neuro Spec Neurology -   3/4/2025  2:15 PM [facility]       Health Maintenance   Topic Date Due    Hepatitis B vaccine (1 of 3 - 19+ 3-dose series) Never done    Colorectal Cancer Screen  05/06/2021    Breast cancer screen  09/30/2022    Lung Cancer Screening &/or Counseling  11/01/2024    A1C test (Diabetic or Prediabetic)  11/19/2024    Lipids  12/17/2024    Annual Wellness Visit (Medicare Advantage)  Never done    Depression Monitoring  01/07/2026    DTaP/Tdap/Td vaccine (3 - Td or Tdap) 06/13/2033    Flu vaccine  Completed    Shingles vaccine  Completed    Pneumococcal 0-64 years Vaccine  Completed    COVID-19 Vaccine  Completed    Hepatitis C screen  Completed    HIV screen  Completed    Hepatitis A vaccine  Aged Out    Hib vaccine  Aged Out    Polio vaccine  Aged Out    Meningococcal (ACWY) vaccine  Aged Out    Diabetes screen  Discontinued       Hemoglobin A1C (%)   Date Value   11/19/2023 5.7   12/29/2020 5.0             ( goal A1C is < 7)   No components found for: \"LABMICR\"  No components found for: \"LDLCHOLESTEROL\", \"LDLCALC\"    (goal LDL is <100)   AST (U/L)   Date Value   07/18/2024 27     ALT (U/L)   Date Value   07/18/2024 21     BUN (mg/dL)   Date Value   08/19/2024 11     BP Readings from Last 3 Encounters:   01/07/25 92/62   10/28/24 116/84   09/11/24 (!) 80/40          (goal 120/80)    All Future Testing planned in [facility]  Lab Frequency Next Occurrence   MORIS DIGITAL SCREEN W OR WO CAD BILATERAL Once 04/19/2024   CBC With Auto Differential Once 01/12/2025   Comprehensive metabolic panel Once 01/12/2025   CBC With Auto

## 2025-01-21 ENCOUNTER — HOSPITAL ENCOUNTER (INPATIENT)
Age: 60
LOS: 15 days | Discharge: NURSING, REHAB OR LONG-TERM CARE | DRG: 193 | End: 2025-02-05
Attending: EMERGENCY MEDICINE | Admitting: FAMILY MEDICINE
Payer: MEDICARE

## 2025-01-21 ENCOUNTER — APPOINTMENT (OUTPATIENT)
Dept: GENERAL RADIOLOGY | Age: 60
DRG: 193 | End: 2025-01-21
Payer: MEDICARE

## 2025-01-21 ENCOUNTER — TELEPHONE (OUTPATIENT)
Dept: FAMILY MEDICINE CLINIC | Age: 60
End: 2025-01-21

## 2025-01-21 DIAGNOSIS — J10.1 INFLUENZA A: Primary | ICD-10-CM

## 2025-01-21 DIAGNOSIS — I87.1 MAY-THURNER SYNDROME: ICD-10-CM

## 2025-01-21 DIAGNOSIS — J18.9 PNEUMONIA DUE TO INFECTIOUS ORGANISM, UNSPECIFIED LATERALITY, UNSPECIFIED PART OF LUNG: ICD-10-CM

## 2025-01-21 DIAGNOSIS — R06.82 TACHYPNEA: ICD-10-CM

## 2025-01-21 PROBLEM — R94.31 LONG QT INTERVAL: Status: ACTIVE | Noted: 2025-01-21

## 2025-01-21 PROBLEM — E86.0 DEHYDRATION WITH HYPONATREMIA: Status: ACTIVE | Noted: 2020-05-08

## 2025-01-21 LAB
ANION GAP SERPL CALCULATED.3IONS-SCNC: 11 MMOL/L (ref 9–16)
BASOPHILS # BLD: 0 K/UL (ref 0–0.2)
BASOPHILS NFR BLD: 0 % (ref 0–2)
BNP SERPL-MCNC: 1090 PG/ML (ref 0–125)
BUN SERPL-MCNC: 11 MG/DL (ref 6–20)
CALCIUM SERPL-MCNC: 8.9 MG/DL (ref 8.6–10.4)
CHLORIDE SERPL-SCNC: 98 MMOL/L (ref 98–107)
CO2 SERPL-SCNC: 21 MMOL/L (ref 20–31)
CREAT SERPL-MCNC: 0.4 MG/DL (ref 0.5–0.9)
EOSINOPHIL # BLD: 0 K/UL (ref 0–0.44)
EOSINOPHILS RELATIVE PERCENT: 0 % (ref 1–4)
ERYTHROCYTE [DISTWIDTH] IN BLOOD BY AUTOMATED COUNT: 15.7 % (ref 11.8–14.4)
FLUAV RNA RESP QL NAA+PROBE: DETECTED
FLUBV RNA RESP QL NAA+PROBE: NOT DETECTED
GFR, ESTIMATED: >90 ML/MIN/1.73M2
GLUCOSE BLD-MCNC: 94 MG/DL (ref 65–105)
GLUCOSE SERPL-MCNC: 71 MG/DL (ref 74–99)
HCO3 VENOUS: 23.9 MMOL/L (ref 22–29)
HCT VFR BLD AUTO: 37.4 % (ref 36.3–47.1)
HGB BLD-MCNC: 12.6 G/DL (ref 11.9–15.1)
IMM GRANULOCYTES # BLD AUTO: 0.06 K/UL (ref 0–0.3)
IMM GRANULOCYTES NFR BLD: 1 %
LACTATE BLDV-SCNC: 1.5 MMOL/L (ref 0.5–2.2)
LYMPHOCYTES NFR BLD: 0.41 K/UL (ref 1.1–3.7)
LYMPHOCYTES RELATIVE PERCENT: 7 % (ref 24–43)
MCH RBC QN AUTO: 28.1 PG (ref 25.2–33.5)
MCHC RBC AUTO-ENTMCNC: 33.7 G/DL (ref 28.4–34.8)
MCV RBC AUTO: 83.3 FL (ref 82.6–102.9)
MONOCYTES NFR BLD: 0.12 K/UL (ref 0.1–1.2)
MONOCYTES NFR BLD: 2 % (ref 3–12)
MYOGLOBIN SERPL-MCNC: 47 NG/ML (ref 25–58)
NEUTROPHILS NFR BLD: 90 % (ref 36–65)
NEUTS SEG NFR BLD: 5.31 K/UL (ref 1.5–8.1)
NRBC BLD-RTO: 0 PER 100 WBC
O2 SAT, VEN: 99.8 % (ref 60–85)
PCO2 VENOUS: 32 MM HG (ref 41–51)
PH VENOUS: 7.48 (ref 7.32–7.43)
PLATELET # BLD AUTO: 209 K/UL (ref 138–453)
PMV BLD AUTO: 10.6 FL (ref 8.1–13.5)
PO2 VENOUS: 200.1 MM HG (ref 30–50)
POSITIVE BASE EXCESS, VEN: 0.9 MMOL/L (ref 0–3)
POTASSIUM SERPL-SCNC: 3.9 MMOL/L (ref 3.7–5.3)
PROCALCITONIN SERPL-MCNC: 0.13 NG/ML (ref 0–0.09)
RBC # BLD AUTO: 4.49 M/UL (ref 3.95–5.11)
SARS-COV-2 RNA RESP QL NAA+PROBE: NOT DETECTED
SODIUM SERPL-SCNC: 129 MMOL/L (ref 136–145)
SOURCE: ABNORMAL
SPECIMEN DESCRIPTION: ABNORMAL
TROPONIN I SERPL HS-MCNC: 8 NG/L (ref 0–14)
WBC OTHER # BLD: 5.9 K/UL (ref 3.5–11.3)

## 2025-01-21 PROCEDURE — 85025 COMPLETE CBC W/AUTO DIFF WBC: CPT

## 2025-01-21 PROCEDURE — 2580000003 HC RX 258: Performed by: NURSE PRACTITIONER

## 2025-01-21 PROCEDURE — 6370000000 HC RX 637 (ALT 250 FOR IP): Performed by: NURSE PRACTITIONER

## 2025-01-21 PROCEDURE — 6360000002 HC RX W HCPCS: Performed by: NURSE PRACTITIONER

## 2025-01-21 PROCEDURE — 82803 BLOOD GASES ANY COMBINATION: CPT

## 2025-01-21 PROCEDURE — 87636 SARSCOV2 & INF A&B AMP PRB: CPT

## 2025-01-21 PROCEDURE — 2060000000 HC ICU INTERMEDIATE R&B

## 2025-01-21 PROCEDURE — 99285 EMERGENCY DEPT VISIT HI MDM: CPT

## 2025-01-21 PROCEDURE — 71045 X-RAY EXAM CHEST 1 VIEW: CPT

## 2025-01-21 PROCEDURE — 83874 ASSAY OF MYOGLOBIN: CPT

## 2025-01-21 PROCEDURE — 84145 PROCALCITONIN (PCT): CPT

## 2025-01-21 PROCEDURE — 83605 ASSAY OF LACTIC ACID: CPT

## 2025-01-21 PROCEDURE — 36415 COLL VENOUS BLD VENIPUNCTURE: CPT

## 2025-01-21 PROCEDURE — 99223 1ST HOSP IP/OBS HIGH 75: CPT | Performed by: NURSE PRACTITIONER

## 2025-01-21 PROCEDURE — 2500000003 HC RX 250 WO HCPCS: Performed by: NURSE PRACTITIONER

## 2025-01-21 PROCEDURE — 83880 ASSAY OF NATRIURETIC PEPTIDE: CPT

## 2025-01-21 PROCEDURE — 94761 N-INVAS EAR/PLS OXIMETRY MLT: CPT

## 2025-01-21 PROCEDURE — 84484 ASSAY OF TROPONIN QUANT: CPT

## 2025-01-21 PROCEDURE — 82947 ASSAY GLUCOSE BLOOD QUANT: CPT

## 2025-01-21 PROCEDURE — 80048 BASIC METABOLIC PNL TOTAL CA: CPT

## 2025-01-21 PROCEDURE — 96375 TX/PRO/DX INJ NEW DRUG ADDON: CPT

## 2025-01-21 PROCEDURE — 87040 BLOOD CULTURE FOR BACTERIA: CPT

## 2025-01-21 PROCEDURE — 94640 AIRWAY INHALATION TREATMENT: CPT

## 2025-01-21 PROCEDURE — 93005 ELECTROCARDIOGRAM TRACING: CPT | Performed by: NURSE PRACTITIONER

## 2025-01-21 PROCEDURE — 96374 THER/PROPH/DIAG INJ IV PUSH: CPT

## 2025-01-21 PROCEDURE — 2700000000 HC OXYGEN THERAPY PER DAY

## 2025-01-21 RX ORDER — FOLIC ACID 1 MG/1
1 TABLET ORAL DAILY
Status: DISCONTINUED | OUTPATIENT
Start: 2025-01-22 | End: 2025-02-05 | Stop reason: HOSPADM

## 2025-01-21 RX ORDER — ONDANSETRON 2 MG/ML
4 INJECTION INTRAMUSCULAR; INTRAVENOUS EVERY 6 HOURS PRN
Status: DISCONTINUED | OUTPATIENT
Start: 2025-01-21 | End: 2025-01-21

## 2025-01-21 RX ORDER — SODIUM CHLORIDE 9 MG/ML
INJECTION, SOLUTION INTRAVENOUS CONTINUOUS
Status: DISCONTINUED | OUTPATIENT
Start: 2025-01-21 | End: 2025-02-05 | Stop reason: HOSPADM

## 2025-01-21 RX ORDER — SODIUM CHLORIDE 9 MG/ML
INJECTION, SOLUTION INTRAVENOUS PRN
Status: DISCONTINUED | OUTPATIENT
Start: 2025-01-21 | End: 2025-02-05 | Stop reason: HOSPADM

## 2025-01-21 RX ORDER — ENOXAPARIN SODIUM 100 MG/ML
40 INJECTION SUBCUTANEOUS DAILY
Status: DISCONTINUED | OUTPATIENT
Start: 2025-01-21 | End: 2025-01-25

## 2025-01-21 RX ORDER — SODIUM CHLORIDE 0.9 % (FLUSH) 0.9 %
5-40 SYRINGE (ML) INJECTION PRN
Status: DISCONTINUED | OUTPATIENT
Start: 2025-01-21 | End: 2025-02-05 | Stop reason: HOSPADM

## 2025-01-21 RX ORDER — GAUZE BANDAGE 2" X 2"
100 BANDAGE TOPICAL DAILY
Status: DISCONTINUED | OUTPATIENT
Start: 2025-01-22 | End: 2025-02-05 | Stop reason: HOSPADM

## 2025-01-21 RX ORDER — ALBUTEROL SULFATE 0.83 MG/ML
2.5 SOLUTION RESPIRATORY (INHALATION) EVERY 4 HOURS
Status: DISCONTINUED | OUTPATIENT
Start: 2025-01-22 | End: 2025-01-23

## 2025-01-21 RX ORDER — AZITHROMYCIN 250 MG/1
500 TABLET, FILM COATED ORAL DAILY
Status: COMPLETED | OUTPATIENT
Start: 2025-01-22 | End: 2025-01-23

## 2025-01-21 RX ORDER — DEXTROAMPHETAMINE SACCHARATE, AMPHETAMINE ASPARTATE, DEXTROAMPHETAMINE SULFATE AND AMPHETAMINE SULFATE 7.5; 7.5; 7.5; 7.5 MG/1; MG/1; MG/1; MG/1
30 TABLET ORAL 2 TIMES DAILY
Status: DISCONTINUED | OUTPATIENT
Start: 2025-01-21 | End: 2025-02-05 | Stop reason: HOSPADM

## 2025-01-21 RX ORDER — BUDESONIDE AND FORMOTEROL FUMARATE DIHYDRATE 80; 4.5 UG/1; UG/1
2 AEROSOL RESPIRATORY (INHALATION)
Status: DISCONTINUED | OUTPATIENT
Start: 2025-01-21 | End: 2025-02-05 | Stop reason: HOSPADM

## 2025-01-21 RX ORDER — BUPROPION HYDROCHLORIDE 150 MG/1
150 TABLET ORAL EVERY MORNING
Status: DISCONTINUED | OUTPATIENT
Start: 2025-01-22 | End: 2025-01-21

## 2025-01-21 RX ORDER — IPRATROPIUM BROMIDE AND ALBUTEROL SULFATE 2.5; .5 MG/3ML; MG/3ML
1 SOLUTION RESPIRATORY (INHALATION)
Status: DISCONTINUED | OUTPATIENT
Start: 2025-01-21 | End: 2025-01-21

## 2025-01-21 RX ORDER — GUAIFENESIN 600 MG/1
600 TABLET, EXTENDED RELEASE ORAL 2 TIMES DAILY
Status: DISCONTINUED | OUTPATIENT
Start: 2025-01-21 | End: 2025-02-05 | Stop reason: HOSPADM

## 2025-01-21 RX ORDER — SODIUM CHLORIDE 0.9 % (FLUSH) 0.9 %
5-40 SYRINGE (ML) INJECTION EVERY 12 HOURS SCHEDULED
Status: DISCONTINUED | OUTPATIENT
Start: 2025-01-21 | End: 2025-02-05 | Stop reason: HOSPADM

## 2025-01-21 RX ORDER — ONDANSETRON 4 MG/1
4 TABLET, ORALLY DISINTEGRATING ORAL EVERY 8 HOURS PRN
Status: DISCONTINUED | OUTPATIENT
Start: 2025-01-21 | End: 2025-01-21

## 2025-01-21 RX ORDER — ACETAMINOPHEN 650 MG/1
650 SUPPOSITORY RECTAL EVERY 6 HOURS PRN
Status: DISCONTINUED | OUTPATIENT
Start: 2025-01-21 | End: 2025-02-05 | Stop reason: HOSPADM

## 2025-01-21 RX ORDER — PREDNISOLONE ACETATE 10 MG/ML
1 SUSPENSION/ DROPS OPHTHALMIC 4 TIMES DAILY
COMMUNITY

## 2025-01-21 RX ORDER — PANTOPRAZOLE SODIUM 40 MG/1
40 TABLET, DELAYED RELEASE ORAL
Status: DISCONTINUED | OUTPATIENT
Start: 2025-01-22 | End: 2025-01-21

## 2025-01-21 RX ORDER — NICOTINE 21 MG/24HR
1 PATCH, TRANSDERMAL 24 HOURS TRANSDERMAL DAILY
Status: DISCONTINUED | OUTPATIENT
Start: 2025-01-21 | End: 2025-02-05 | Stop reason: HOSPADM

## 2025-01-21 RX ORDER — PROMETHAZINE HYDROCHLORIDE 12.5 MG/1
12.5 TABLET ORAL EVERY 6 HOURS PRN
Status: DISCONTINUED | OUTPATIENT
Start: 2025-01-21 | End: 2025-02-05 | Stop reason: HOSPADM

## 2025-01-21 RX ORDER — GABAPENTIN 300 MG/1
600 CAPSULE ORAL 3 TIMES DAILY
Status: DISCONTINUED | OUTPATIENT
Start: 2025-01-21 | End: 2025-02-05 | Stop reason: HOSPADM

## 2025-01-21 RX ORDER — PANTOPRAZOLE SODIUM 40 MG/1
40 TABLET, DELAYED RELEASE ORAL
Status: DISCONTINUED | OUTPATIENT
Start: 2025-01-21 | End: 2025-02-05 | Stop reason: HOSPADM

## 2025-01-21 RX ORDER — POLYETHYLENE GLYCOL 3350 17 G/17G
17 POWDER, FOR SOLUTION ORAL DAILY PRN
Status: DISCONTINUED | OUTPATIENT
Start: 2025-01-21 | End: 2025-02-05 | Stop reason: HOSPADM

## 2025-01-21 RX ORDER — ZOLPIDEM TARTRATE 5 MG/1
10 TABLET ORAL NIGHTLY
Status: DISCONTINUED | OUTPATIENT
Start: 2025-01-21 | End: 2025-02-05 | Stop reason: HOSPADM

## 2025-01-21 RX ORDER — ALBUTEROL SULFATE 0.83 MG/ML
2.5 SOLUTION RESPIRATORY (INHALATION) EVERY 4 HOURS PRN
Status: DISCONTINUED | OUTPATIENT
Start: 2025-01-21 | End: 2025-02-05 | Stop reason: HOSPADM

## 2025-01-21 RX ORDER — DIFLUPREDNATE OPHTHALMIC 0.5 MG/ML
1 EMULSION OPHTHALMIC 4 TIMES DAILY
COMMUNITY

## 2025-01-21 RX ORDER — OSELTAMIVIR PHOSPHATE 75 MG/1
75 CAPSULE ORAL 2 TIMES DAILY
Status: COMPLETED | OUTPATIENT
Start: 2025-01-21 | End: 2025-01-25

## 2025-01-21 RX ORDER — PREDNISONE 20 MG/1
40 TABLET ORAL DAILY
Status: DISCONTINUED | OUTPATIENT
Start: 2025-01-24 | End: 2025-01-24

## 2025-01-21 RX ORDER — ALBUTEROL SULFATE 0.83 MG/ML
2.5 SOLUTION RESPIRATORY (INHALATION)
Status: DISCONTINUED | OUTPATIENT
Start: 2025-01-21 | End: 2025-01-21

## 2025-01-21 RX ORDER — OSELTAMIVIR PHOSPHATE 75 MG/1
75 CAPSULE ORAL ONCE
Status: COMPLETED | OUTPATIENT
Start: 2025-01-21 | End: 2025-01-21

## 2025-01-21 RX ORDER — ENOXAPARIN SODIUM 100 MG/ML
40 INJECTION SUBCUTANEOUS DAILY
Status: DISCONTINUED | OUTPATIENT
Start: 2025-01-21 | End: 2025-01-21

## 2025-01-21 RX ORDER — ACETAMINOPHEN 325 MG/1
650 TABLET ORAL EVERY 6 HOURS PRN
Status: DISCONTINUED | OUTPATIENT
Start: 2025-01-21 | End: 2025-02-05 | Stop reason: HOSPADM

## 2025-01-21 RX ORDER — ZOLPIDEM TARTRATE 10 MG/1
10 TABLET ORAL NIGHTLY
COMMUNITY

## 2025-01-21 RX ORDER — ALBUTEROL SULFATE 0.83 MG/ML
2.5 SOLUTION RESPIRATORY (INHALATION) ONCE
Status: COMPLETED | OUTPATIENT
Start: 2025-01-21 | End: 2025-01-21

## 2025-01-21 RX ORDER — ALBUTEROL SULFATE 0.83 MG/ML
2.5 SOLUTION RESPIRATORY (INHALATION) EVERY 4 HOURS
Status: DISCONTINUED | OUTPATIENT
Start: 2025-01-21 | End: 2025-01-21

## 2025-01-21 RX ORDER — ESCITALOPRAM OXALATE 10 MG/1
10 TABLET ORAL DAILY
Status: DISCONTINUED | OUTPATIENT
Start: 2025-01-21 | End: 2025-01-21

## 2025-01-21 RX ORDER — BENZONATATE 100 MG/1
100 CAPSULE ORAL 3 TIMES DAILY PRN
Status: DISCONTINUED | OUTPATIENT
Start: 2025-01-21 | End: 2025-02-05 | Stop reason: HOSPADM

## 2025-01-21 RX ORDER — ACETAMINOPHEN 500 MG
1000 TABLET ORAL ONCE
Status: COMPLETED | OUTPATIENT
Start: 2025-01-21 | End: 2025-01-21

## 2025-01-21 RX ADMIN — BUDESONIDE AND FORMOTEROL FUMARATE DIHYDRATE 2 PUFF: 80; 4.5 AEROSOL RESPIRATORY (INHALATION) at 17:49

## 2025-01-21 RX ADMIN — WATER 1000 MG: 1 INJECTION INTRAMUSCULAR; INTRAVENOUS; SUBCUTANEOUS at 13:12

## 2025-01-21 RX ADMIN — OSELTAMIVIR PHOSPHATE 75 MG: 75 CAPSULE ORAL at 13:11

## 2025-01-21 RX ADMIN — ALBUTEROL SULFATE 2.5 MG: 2.5 SOLUTION RESPIRATORY (INHALATION) at 22:49

## 2025-01-21 RX ADMIN — ALBUTEROL SULFATE 2.5 MG: 2.5 SOLUTION RESPIRATORY (INHALATION) at 13:34

## 2025-01-21 RX ADMIN — WATER 125 MG: 1 INJECTION INTRAMUSCULAR; INTRAVENOUS; SUBCUTANEOUS at 13:16

## 2025-01-21 RX ADMIN — WATER 40 MG: 1 INJECTION INTRAMUSCULAR; INTRAVENOUS; SUBCUTANEOUS at 17:53

## 2025-01-21 RX ADMIN — WATER 40 MG: 1 INJECTION INTRAMUSCULAR; INTRAVENOUS; SUBCUTANEOUS at 23:58

## 2025-01-21 RX ADMIN — ZOLPIDEM TARTRATE 10 MG: 5 TABLET, FILM COATED ORAL at 21:21

## 2025-01-21 RX ADMIN — ALBUTEROL SULFATE 2.5 MG: 2.5 SOLUTION RESPIRATORY (INHALATION) at 17:49

## 2025-01-21 RX ADMIN — AZITHROMYCIN MONOHYDRATE 500 MG: 500 INJECTION, POWDER, LYOPHILIZED, FOR SOLUTION INTRAVENOUS at 13:21

## 2025-01-21 RX ADMIN — SODIUM CHLORIDE: 9 INJECTION, SOLUTION INTRAVENOUS at 17:52

## 2025-01-21 RX ADMIN — PANTOPRAZOLE SODIUM 40 MG: 40 TABLET, DELAYED RELEASE ORAL at 17:53

## 2025-01-21 RX ADMIN — GUAIFENESIN 600 MG: 600 TABLET ORAL at 21:21

## 2025-01-21 RX ADMIN — OSELTAMIVIR PHOSPHATE 75 MG: 75 CAPSULE ORAL at 21:21

## 2025-01-21 RX ADMIN — THIAMINE HYDROCHLORIDE: 100 INJECTION, SOLUTION INTRAMUSCULAR; INTRAVENOUS at 21:21

## 2025-01-21 RX ADMIN — ENOXAPARIN SODIUM 40 MG: 100 INJECTION SUBCUTANEOUS at 17:53

## 2025-01-21 RX ADMIN — ACETAMINOPHEN 1000 MG: 500 TABLET ORAL at 13:11

## 2025-01-21 ASSESSMENT — PAIN DESCRIPTION - LOCATION: LOCATION: BACK

## 2025-01-21 ASSESSMENT — PAIN - FUNCTIONAL ASSESSMENT: PAIN_FUNCTIONAL_ASSESSMENT: 0-10

## 2025-01-21 ASSESSMENT — PAIN SCALES - GENERAL: PAINLEVEL_OUTOF10: 7

## 2025-01-21 ASSESSMENT — PAIN DESCRIPTION - PAIN TYPE: TYPE: ACUTE PAIN

## 2025-01-21 ASSESSMENT — PAIN DESCRIPTION - DESCRIPTORS: DESCRIPTORS: ACHING;DISCOMFORT

## 2025-01-21 ASSESSMENT — PAIN DESCRIPTION - FREQUENCY: FREQUENCY: CONTINUOUS

## 2025-01-21 NOTE — TELEPHONE ENCOUNTER
Patient's  called stating the the patient is very weak and unable to walk. He also reports patient is losing to much weight with the Mounjaro and is asking for the script to be decreased. He said she has been sick the last few days, but now she can't even get out of bed. Desean said she doesn't know he called, but when she wakes up, if she is still having this issue he will call 911 and have her taken to [facility].

## 2025-01-21 NOTE — H&P
hypoxia  Continue Rocephin IV and azithromycin p.o.  Monitor continuous pulse ox and wean oxygen as able  Blood cultures x 2 pending  Check procalcitonin    Patient was pretty sleepy during my assessment  Plan to hold her gabapentin and anything else that may induce sleepiness    Asthma with acute exacerbation  Scheduled breathing treatments  Resume home respiratory medications  Solu-Medrol IV   Mucinex    Influenza A  Tamiflu twice daily  Tessalon Perles as needed  Tylenol for aches and pains    Dehydration with hyponatremia  Patient was started on gentle IV hydration  Repeat labs in a.m.    Monitor glucose before meals and at bedtime-insulin sliding scale and hypoglycemia protocol    Prolonged Qtc  Avoid Zofran  Phenergan as needed  Limit antiemetics if possible    Alcohol abuse   Encourage continued alcohol cessation  Folic acid and thiamine IV x 1 now then p.o. 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      Consultations:   IP CONSULT TO INTERNAL MEDICINE  IP CONSULT TO SPIRITUAL SERVICES    Patient is admitted as inpatient status because of co-morbidities listed above, severity of signs and symptoms as outlined, requirement for current medical therapies and most importantly because of direct risk to patient if care not provided in a hospital setting.  Expected length of stay > 48 hours.    GLADIS Phan - CNP  1/21/2025  6:44 PM    Copy sent to Sierra Bradford MD

## 2025-01-21 NOTE — ED PROVIDER NOTES
Team [facility] EMERGENCY DEPARTMENT  eMERGENCY dEPARTMENT eNCOUnter      Pt Name: Madonna Manrique  MRN: [id number]  Birthdate 1965  Date of evaluation: 1/21/2025  Provider: GLADIS Queen CNP    CHIEF COMPLAINT       Chief Complaint   Patient presents with    Shortness of Breath         HISTORY OF PRESENT ILLNESS  (Location/Symptom, Timing/Onset, Context/Setting, Quality, Duration, Modifying Factors, Severity.)   Madonna Manrique is a 59 y.o. female who presents to the emergency department. C/o cough, congestion, fatigue, body aches. Onset was a few days ago. Reports hx asthma. She is a smoker. Denies fever, edema, diarrhea. Rates her pain 7/10. Family member states the patient has been getting progressively weak over the past few days. Today she was unable to ambulate. She presented via EMS. She was given albuterol per EMS. She is wearing NC oxygen at 2L; she does not wear oxygen at home. Rates her pain 7/10.     Nursing Notes were reviewed.    ALLERGIES     Aspirin, Nsaids, Succinylcholine chloride, and Tramadol    CURRENT MEDICATIONS       Previous Medications    ALBUTEROL (PROVENTIL) (2.5 MG/3ML) 0.083% NEBULIZER SOLUTION    Take 3 mLs by nebulization 4 times daily as needed for Wheezing    ALBUTEROL SULFATE HFA (PROVENTIL;VENTOLIN;PROAIR) 108 (90 BASE) MCG/ACT INHALER    Inhale 2 puffs into the lungs every 6 hours as needed for Wheezing or Shortness of Breath    AMPHETAMINE-DEXTROAMPHETAMINE (ADDERALL) 30 MG TABLET    Take 1 tablet by mouth 2 times daily.    BREO ELLIPTA 100-25 MCG/ACT INHALER        BUPROPION (WELLBUTRIN XL) 150 MG EXTENDED RELEASE TABLET    Take 1 tablet by mouth every morning    DIAZEPAM (VALIUM) 5 MG TABLET    Take 1 tablet by mouth.    ESCITALOPRAM (LEXAPRO) 10 MG TABLET    Take 1 tablet by mouth daily    FLUOXETINE (PROZAC) 40 MG CAPSULE    Take 1 capsule by mouth daily    FUROSEMIDE (LASIX) 20 MG TABLET    Take 1 tablet by mouth every 48 hours as needed (Leg

## 2025-01-21 NOTE — ED NOTES
ED TO INPATIENT SBAR HANDOFF    Patient Name: Madonna Manrique   :  1965  59 y.o.   MRN:  [id number]  Preferred Name  Cristina  ED Room #:  STA24/24  Family/Caregiver Present no   Restraints no   Sitter no   Sepsis Risk Score      Situation  Code Status: Prior No additional code details.    Allergies: Aspirin, Nsaids, Succinylcholine chloride, and Tramadol  Weight: Patient Vitals for the past 96 hrs (Last 3 readings):   Weight   25 1039 54 kg (119 lb)     Arrived from: home  Chief Complaint:   Chief Complaint   Patient presents with    Shortness of Breath     Hospital Problem/Diagnosis:  Principal Problem:    Community acquired pneumonia of left lower lobe of lung  Resolved Problems:    * No resolved hospital problems. *    Imaging:   XR CHEST PORTABLE   Final Result        Abnormal labs:   Abnormal Labs Reviewed   COVID-19 & INFLUENZA COMBO - Abnormal; Notable for the following components:       Result Value    Influenza A DETECTED (*)     All other components within normal limits   CBC WITH AUTO DIFFERENTIAL - Abnormal; Notable for the following components:    RDW 15.7 (*)     Neutrophils % 90 (*)     Lymphocytes % 7 (*)     Monocytes % 2 (*)     Eosinophils % 0 (*)     Immature Granulocytes % 1 (*)     Lymphocytes Absolute 0.41 (*)     All other components within normal limits   BASIC METABOLIC PANEL - Abnormal; Notable for the following components:    Sodium 129 (*)     Glucose 71 (*)     Creatinine 0.4 (*)     All other components within normal limits   BRAIN NATRIURETIC PEPTIDE - Abnormal; Notable for the following components:    NT Pro-BNP 1,090 (*)     All other components within normal limits     Critical values: yes     Abnormal Assessment Findings: , Cre 0.4, BNP 1090    Background  History:   Past Medical History:   Diagnosis Date    Alcoholism (HCC)     hx of alcoholism; pt sober since 2019; pt currently resides in a sober house rehab center    Anxiety     Asthma     Asthma

## 2025-01-22 PROBLEM — F10.20 ALCOHOL USE DISORDER, SEVERE, DEPENDENCE (HCC): Status: RESOLVED | Noted: 2023-12-21 | Resolved: 2025-01-22

## 2025-01-22 LAB
25(OH)D3 SERPL-MCNC: <6 NG/ML (ref 30–100)
ANION GAP SERPL CALCULATED.3IONS-SCNC: 9 MMOL/L (ref 9–16)
BASOPHILS # BLD: 0 K/UL (ref 0–0.2)
BASOPHILS NFR BLD: 0 %
BUN SERPL-MCNC: 10 MG/DL (ref 6–20)
CALCIUM SERPL-MCNC: 8.7 MG/DL (ref 8.6–10.4)
CHLORIDE SERPL-SCNC: 106 MMOL/L (ref 98–107)
CO2 SERPL-SCNC: 22 MMOL/L (ref 20–31)
CREAT SERPL-MCNC: 0.3 MG/DL (ref 0.5–0.9)
EOSINOPHIL # BLD: 0 K/UL (ref 0–0.4)
EOSINOPHILS RELATIVE PERCENT: 0 % (ref 1–4)
ERYTHROCYTE [DISTWIDTH] IN BLOOD BY AUTOMATED COUNT: 15.7 % (ref 11.8–14.4)
FOLATE SERPL-MCNC: 8.4 NG/ML (ref 4.8–24.2)
GFR, ESTIMATED: >90 ML/MIN/1.73M2
GLUCOSE SERPL-MCNC: 92 MG/DL (ref 74–99)
HCT VFR BLD AUTO: 31.1 % (ref 36.3–47.1)
HGB BLD-MCNC: 10.5 G/DL (ref 11.9–15.1)
IMM GRANULOCYTES # BLD AUTO: 0.05 K/UL (ref 0–0.3)
IMM GRANULOCYTES NFR BLD: 1 %
LYMPHOCYTES NFR BLD: 0.15 K/UL (ref 1–4.8)
LYMPHOCYTES RELATIVE PERCENT: 3 % (ref 24–44)
MCH RBC QN AUTO: 27.9 PG (ref 25.2–33.5)
MCHC RBC AUTO-ENTMCNC: 33.8 G/DL (ref 28.4–34.8)
MCV RBC AUTO: 82.7 FL (ref 82.6–102.9)
MONOCYTES NFR BLD: 0.15 K/UL (ref 0.2–0.8)
MONOCYTES NFR BLD: 3 % (ref 1–7)
NEUTROPHILS NFR BLD: 93 % (ref 36–66)
NEUTS SEG NFR BLD: 4.75 K/UL (ref 1.8–7.7)
NRBC BLD-RTO: 0 PER 100 WBC
PLATELET # BLD AUTO: 190 K/UL (ref 138–453)
PMV BLD AUTO: 9.9 FL (ref 8.1–13.5)
POTASSIUM SERPL-SCNC: 3.9 MMOL/L (ref 3.7–5.3)
RBC # BLD AUTO: 3.76 M/UL (ref 3.95–5.11)
SODIUM SERPL-SCNC: 137 MMOL/L (ref 136–145)
VIT B12 SERPL-MCNC: 249 PG/ML (ref 232–1245)
WBC OTHER # BLD: 5.1 K/UL (ref 3.5–11.3)

## 2025-01-22 PROCEDURE — 2700000000 HC OXYGEN THERAPY PER DAY

## 2025-01-22 PROCEDURE — 94640 AIRWAY INHALATION TREATMENT: CPT

## 2025-01-22 PROCEDURE — 6370000000 HC RX 637 (ALT 250 FOR IP): Performed by: NURSE PRACTITIONER

## 2025-01-22 PROCEDURE — 82306 VITAMIN D 25 HYDROXY: CPT

## 2025-01-22 PROCEDURE — 99232 SBSQ HOSP IP/OBS MODERATE 35: CPT | Performed by: FAMILY MEDICINE

## 2025-01-22 PROCEDURE — 5A0935A ASSISTANCE WITH RESPIRATORY VENTILATION, LESS THAN 24 CONSECUTIVE HOURS, HIGH NASAL FLOW/VELOCITY: ICD-10-PCS | Performed by: FAMILY MEDICINE

## 2025-01-22 PROCEDURE — 97166 OT EVAL MOD COMPLEX 45 MIN: CPT

## 2025-01-22 PROCEDURE — 6370000000 HC RX 637 (ALT 250 FOR IP): Performed by: FAMILY MEDICINE

## 2025-01-22 PROCEDURE — 36415 COLL VENOUS BLD VENIPUNCTURE: CPT

## 2025-01-22 PROCEDURE — 2060000000 HC ICU INTERMEDIATE R&B

## 2025-01-22 PROCEDURE — 97535 SELF CARE MNGMENT TRAINING: CPT

## 2025-01-22 PROCEDURE — 85025 COMPLETE CBC W/AUTO DIFF WBC: CPT

## 2025-01-22 PROCEDURE — 82607 VITAMIN B-12: CPT

## 2025-01-22 PROCEDURE — 6360000002 HC RX W HCPCS: Performed by: NURSE PRACTITIONER

## 2025-01-22 PROCEDURE — 82746 ASSAY OF FOLIC ACID SERUM: CPT

## 2025-01-22 PROCEDURE — 97110 THERAPEUTIC EXERCISES: CPT

## 2025-01-22 PROCEDURE — 94761 N-INVAS EAR/PLS OXIMETRY MLT: CPT

## 2025-01-22 PROCEDURE — 97530 THERAPEUTIC ACTIVITIES: CPT

## 2025-01-22 PROCEDURE — 97162 PT EVAL MOD COMPLEX 30 MIN: CPT

## 2025-01-22 PROCEDURE — 80048 BASIC METABOLIC PNL TOTAL CA: CPT

## 2025-01-22 PROCEDURE — 2500000003 HC RX 250 WO HCPCS: Performed by: NURSE PRACTITIONER

## 2025-01-22 PROCEDURE — 2580000003 HC RX 258: Performed by: NURSE PRACTITIONER

## 2025-01-22 RX ORDER — MIDODRINE HYDROCHLORIDE 5 MG/1
5 TABLET ORAL
Status: DISCONTINUED | OUTPATIENT
Start: 2025-01-22 | End: 2025-01-23

## 2025-01-22 RX ORDER — AZITHROMYCIN 250 MG/1
500 TABLET, FILM COATED ORAL DAILY
Status: DISCONTINUED | OUTPATIENT
Start: 2025-01-22 | End: 2025-01-22

## 2025-01-22 RX ORDER — DIFLUPREDNATE OPHTHALMIC 0.5 MG/ML
1 EMULSION OPHTHALMIC 4 TIMES DAILY
Status: DISCONTINUED | OUTPATIENT
Start: 2025-01-22 | End: 2025-02-05 | Stop reason: HOSPADM

## 2025-01-22 RX ORDER — DIAZEPAM 5 MG/1
10 TABLET ORAL EVERY 12 HOURS PRN
Status: DISCONTINUED | OUTPATIENT
Start: 2025-01-22 | End: 2025-02-05 | Stop reason: HOSPADM

## 2025-01-22 RX ORDER — PALIPERIDONE 3 MG/1
6 TABLET, EXTENDED RELEASE ORAL EVERY MORNING
Status: DISCONTINUED | OUTPATIENT
Start: 2025-01-22 | End: 2025-02-05 | Stop reason: HOSPADM

## 2025-01-22 RX ADMIN — DIAZEPAM 10 MG: 5 TABLET ORAL at 12:31

## 2025-01-22 RX ADMIN — WATER 40 MG: 1 INJECTION INTRAMUSCULAR; INTRAVENOUS; SUBCUTANEOUS at 05:56

## 2025-01-22 RX ADMIN — TIOTROPIUM BROMIDE INHALATION SPRAY 2 PUFF: 3.12 SPRAY, METERED RESPIRATORY (INHALATION) at 06:00

## 2025-01-22 RX ADMIN — THIAMINE HCL TAB 100 MG 100 MG: 100 TAB at 09:22

## 2025-01-22 RX ADMIN — BUDESONIDE AND FORMOTEROL FUMARATE DIHYDRATE 2 PUFF: 80; 4.5 AEROSOL RESPIRATORY (INHALATION) at 19:14

## 2025-01-22 RX ADMIN — WATER 40 MG: 1 INJECTION INTRAMUSCULAR; INTRAVENOUS; SUBCUTANEOUS at 18:02

## 2025-01-22 RX ADMIN — ZOLPIDEM TARTRATE 10 MG: 5 TABLET, FILM COATED ORAL at 19:39

## 2025-01-22 RX ADMIN — GUAIFENESIN 600 MG: 600 TABLET ORAL at 19:39

## 2025-01-22 RX ADMIN — FLUOXETINE HYDROCHLORIDE 40 MG: 20 CAPSULE ORAL at 11:31

## 2025-01-22 RX ADMIN — FLUOXETINE HYDROCHLORIDE 20 MG: 20 CAPSULE ORAL at 11:32

## 2025-01-22 RX ADMIN — OSELTAMIVIR PHOSPHATE 75 MG: 75 CAPSULE ORAL at 09:22

## 2025-01-22 RX ADMIN — ALBUTEROL SULFATE 2.5 MG: 2.5 SOLUTION RESPIRATORY (INHALATION) at 02:39

## 2025-01-22 RX ADMIN — ALBUTEROL SULFATE 2.5 MG: 2.5 SOLUTION RESPIRATORY (INHALATION) at 23:11

## 2025-01-22 RX ADMIN — BUDESONIDE AND FORMOTEROL FUMARATE DIHYDRATE 2 PUFF: 80; 4.5 AEROSOL RESPIRATORY (INHALATION) at 06:00

## 2025-01-22 RX ADMIN — OSELTAMIVIR PHOSPHATE 75 MG: 75 CAPSULE ORAL at 19:39

## 2025-01-22 RX ADMIN — ENOXAPARIN SODIUM 40 MG: 100 INJECTION SUBCUTANEOUS at 09:22

## 2025-01-22 RX ADMIN — WATER 40 MG: 1 INJECTION INTRAMUSCULAR; INTRAVENOUS; SUBCUTANEOUS at 23:50

## 2025-01-22 RX ADMIN — ALBUTEROL SULFATE 2.5 MG: 2.5 SOLUTION RESPIRATORY (INHALATION) at 15:34

## 2025-01-22 RX ADMIN — MIDODRINE HYDROCHLORIDE 5 MG: 5 TABLET ORAL at 16:29

## 2025-01-22 RX ADMIN — SODIUM CHLORIDE: 9 INJECTION, SOLUTION INTRAVENOUS at 13:34

## 2025-01-22 RX ADMIN — PANTOPRAZOLE SODIUM 40 MG: 40 TABLET, DELAYED RELEASE ORAL at 05:56

## 2025-01-22 RX ADMIN — ALBUTEROL SULFATE 2.5 MG: 2.5 SOLUTION RESPIRATORY (INHALATION) at 09:59

## 2025-01-22 RX ADMIN — GUAIFENESIN 600 MG: 600 TABLET ORAL at 09:22

## 2025-01-22 RX ADMIN — PALIPERIDONE 6 MG: 3 TABLET, EXTENDED RELEASE ORAL at 11:31

## 2025-01-22 RX ADMIN — WATER 1000 MG: 1 INJECTION INTRAMUSCULAR; INTRAVENOUS; SUBCUTANEOUS at 13:30

## 2025-01-22 RX ADMIN — AZITHROMYCIN DIHYDRATE 500 MG: 250 TABLET ORAL at 11:31

## 2025-01-22 RX ADMIN — PANTOPRAZOLE SODIUM 40 MG: 40 TABLET, DELAYED RELEASE ORAL at 16:29

## 2025-01-22 RX ADMIN — ALBUTEROL SULFATE 2.5 MG: 2.5 SOLUTION RESPIRATORY (INHALATION) at 19:14

## 2025-01-22 RX ADMIN — ALBUTEROL SULFATE 2.5 MG: 2.5 SOLUTION RESPIRATORY (INHALATION) at 05:59

## 2025-01-22 RX ADMIN — WATER 40 MG: 1 INJECTION INTRAMUSCULAR; INTRAVENOUS; SUBCUTANEOUS at 13:30

## 2025-01-22 RX ADMIN — FOLIC ACID 1 MG: 1 TABLET ORAL at 09:23

## 2025-01-22 NOTE — CARE COORDINATION
Case Management Assessment  Initial Evaluation    Date/Time of Evaluation: 1/22/2025 1:07 PM  Assessment Completed by: MANPREET URRUTIA RN    If patient is discharged prior to next notation, then this note serves as note for discharge by case management.    Patient Name: Madonna Manrique                   YOB: 1965  Diagnosis: Tachypnea [R06.82]  Influenza A [J10.1]  Pneumonia due to infectious organism, unspecified laterality, unspecified part of lung [J18.9]  Community acquired pneumonia of left lower lobe of lung [J18.9]                   Date / Time: 1/21/2025 10:35 AM    Patient Admission Status: Inpatient   Readmission Risk (Low < 19, Mod (19-27), High > 27): Readmission Risk Score: 15.5    Current PCP: Sierra De Oliveira MD  PCP verified by ? Yes    Chart Reviewed: Yes      History Provided by: Patient  Patient Orientation: Alert and Oriented    Patient Cognition: Alert    Hospitalization in the last 30 days (Readmission):  No    If yes, Readmission Assessment in  Navigator will be completed.    Advance Directives:      Code Status: Full Code   Patient's Primary Decision Maker is: Legal Next of Kin    Primary Decision Maker: Brisa Morse - Brother/Sister - [phone]    Discharge Planning:    Patient lives with: Spouse/Significant Other Type of Home: House  Primary Care Giver: Self  Patient Support Systems include: Spouse/Significant Other   Current Financial resources: Medicare, Medicaid  Current community resources: ECF/Home Care  Current services prior to admission: Durable Medical Equipment            Current DME: Cane, Home Aerosol, Walker            Type of Home Care services:  Nursing Services, OT, PT    ADLS  Prior functional level: Assistance with the following:, Housework, Shopping  Current functional level: Assistance with the following:, Cooking, Housework, Shopping, Mobility    PT AM-PAC: 13 /24  OT AM-PAC: 15 /24    Family can provide assistance at DC: Yes  Would you like

## 2025-01-22 NOTE — CARE COORDINATION
Social Work- met with patient. She is requesting a referral to [facility]. Sent referral. Shavonne

## 2025-01-23 ENCOUNTER — HOSPITAL ENCOUNTER (OUTPATIENT)
Facility: MEDICAL CENTER | Age: 60
End: 2025-01-23

## 2025-01-23 LAB
ALBUMIN SERPL-MCNC: 2.5 G/DL (ref 3.5–5.2)
ALBUMIN/GLOB SERPL: 1.2 {RATIO} (ref 1–2.5)
ALP SERPL-CCNC: 98 U/L (ref 35–104)
ALT SERPL-CCNC: 47 U/L (ref 10–35)
ANION GAP SERPL CALCULATED.3IONS-SCNC: 9 MMOL/L (ref 9–16)
AST SERPL-CCNC: 39 U/L (ref 10–35)
BILIRUB SERPL-MCNC: 0.2 MG/DL (ref 0–1.2)
BUN SERPL-MCNC: 9 MG/DL (ref 6–20)
CALCIUM SERPL-MCNC: 8.6 MG/DL (ref 8.6–10.4)
CHLORIDE SERPL-SCNC: 106 MMOL/L (ref 98–107)
CO2 SERPL-SCNC: 21 MMOL/L (ref 20–31)
CREAT SERPL-MCNC: 0.3 MG/DL (ref 0.5–0.9)
GFR, ESTIMATED: >90 ML/MIN/1.73M2
GLUCOSE BLD-MCNC: 130 MG/DL (ref 65–105)
GLUCOSE SERPL-MCNC: 128 MG/DL (ref 74–99)
POTASSIUM SERPL-SCNC: 3.9 MMOL/L (ref 3.7–5.3)
PROT SERPL-MCNC: 4.6 G/DL (ref 6.6–8.7)
SODIUM SERPL-SCNC: 136 MMOL/L (ref 136–145)

## 2025-01-23 PROCEDURE — 6370000000 HC RX 637 (ALT 250 FOR IP): Performed by: FAMILY MEDICINE

## 2025-01-23 PROCEDURE — 6370000000 HC RX 637 (ALT 250 FOR IP): Performed by: NURSE PRACTITIONER

## 2025-01-23 PROCEDURE — 97530 THERAPEUTIC ACTIVITIES: CPT

## 2025-01-23 PROCEDURE — 97110 THERAPEUTIC EXERCISES: CPT

## 2025-01-23 PROCEDURE — 99232 SBSQ HOSP IP/OBS MODERATE 35: CPT | Performed by: FAMILY MEDICINE

## 2025-01-23 PROCEDURE — 97116 GAIT TRAINING THERAPY: CPT

## 2025-01-23 PROCEDURE — 2060000000 HC ICU INTERMEDIATE R&B

## 2025-01-23 PROCEDURE — 6360000002 HC RX W HCPCS: Performed by: NURSE PRACTITIONER

## 2025-01-23 PROCEDURE — 2700000000 HC OXYGEN THERAPY PER DAY

## 2025-01-23 PROCEDURE — 80053 COMPREHEN METABOLIC PANEL: CPT

## 2025-01-23 PROCEDURE — 94640 AIRWAY INHALATION TREATMENT: CPT

## 2025-01-23 PROCEDURE — 2580000003 HC RX 258: Performed by: NURSE PRACTITIONER

## 2025-01-23 PROCEDURE — 82947 ASSAY GLUCOSE BLOOD QUANT: CPT

## 2025-01-23 PROCEDURE — 36415 COLL VENOUS BLD VENIPUNCTURE: CPT

## 2025-01-23 PROCEDURE — 94761 N-INVAS EAR/PLS OXIMETRY MLT: CPT

## 2025-01-23 PROCEDURE — 2500000003 HC RX 250 WO HCPCS: Performed by: NURSE PRACTITIONER

## 2025-01-23 RX ORDER — ERGOCALCIFEROL 1.25 MG/1
50000 CAPSULE, LIQUID FILLED ORAL WEEKLY
Status: DISCONTINUED | OUTPATIENT
Start: 2025-01-23 | End: 2025-02-05 | Stop reason: HOSPADM

## 2025-01-23 RX ORDER — ALBUTEROL SULFATE 0.83 MG/ML
2.5 SOLUTION RESPIRATORY (INHALATION)
Status: DISCONTINUED | OUTPATIENT
Start: 2025-01-24 | End: 2025-01-25

## 2025-01-23 RX ORDER — MIDODRINE HYDROCHLORIDE 2.5 MG/1
2.5 TABLET ORAL
Status: DISCONTINUED | OUTPATIENT
Start: 2025-01-24 | End: 2025-02-05 | Stop reason: HOSPADM

## 2025-01-23 RX ADMIN — WATER 40 MG: 1 INJECTION INTRAMUSCULAR; INTRAVENOUS; SUBCUTANEOUS at 05:55

## 2025-01-23 RX ADMIN — ALBUTEROL SULFATE 2.5 MG: 2.5 SOLUTION RESPIRATORY (INHALATION) at 06:32

## 2025-01-23 RX ADMIN — DIPHENHYDRAMINE HYDROCHLORIDE 5 ML: 12.5 LIQUID ORAL at 22:20

## 2025-01-23 RX ADMIN — SODIUM CHLORIDE: 9 INJECTION, SOLUTION INTRAVENOUS at 02:47

## 2025-01-23 RX ADMIN — DIAZEPAM 10 MG: 5 TABLET ORAL at 08:38

## 2025-01-23 RX ADMIN — PALIPERIDONE 6 MG: 3 TABLET, EXTENDED RELEASE ORAL at 08:16

## 2025-01-23 RX ADMIN — BUDESONIDE AND FORMOTEROL FUMARATE DIHYDRATE 2 PUFF: 80; 4.5 AEROSOL RESPIRATORY (INHALATION) at 20:29

## 2025-01-23 RX ADMIN — OSELTAMIVIR PHOSPHATE 75 MG: 75 CAPSULE ORAL at 19:32

## 2025-01-23 RX ADMIN — MIDODRINE HYDROCHLORIDE 5 MG: 5 TABLET ORAL at 08:16

## 2025-01-23 RX ADMIN — THIAMINE HCL TAB 100 MG 100 MG: 100 TAB at 08:16

## 2025-01-23 RX ADMIN — FLUOXETINE HYDROCHLORIDE 40 MG: 20 CAPSULE ORAL at 08:16

## 2025-01-23 RX ADMIN — GUAIFENESIN 600 MG: 600 TABLET ORAL at 08:16

## 2025-01-23 RX ADMIN — ALBUTEROL SULFATE 2.5 MG: 2.5 SOLUTION RESPIRATORY (INHALATION) at 23:35

## 2025-01-23 RX ADMIN — FOLIC ACID 1 MG: 1 TABLET ORAL at 08:16

## 2025-01-23 RX ADMIN — PANTOPRAZOLE SODIUM 40 MG: 40 TABLET, DELAYED RELEASE ORAL at 15:52

## 2025-01-23 RX ADMIN — WATER 1000 MG: 1 INJECTION INTRAMUSCULAR; INTRAVENOUS; SUBCUTANEOUS at 12:54

## 2025-01-23 RX ADMIN — ENOXAPARIN SODIUM 40 MG: 100 INJECTION SUBCUTANEOUS at 08:17

## 2025-01-23 RX ADMIN — GUAIFENESIN 600 MG: 600 TABLET ORAL at 19:32

## 2025-01-23 RX ADMIN — ALBUTEROL SULFATE 2.5 MG: 2.5 SOLUTION RESPIRATORY (INHALATION) at 10:02

## 2025-01-23 RX ADMIN — ALBUTEROL SULFATE 2.5 MG: 2.5 SOLUTION RESPIRATORY (INHALATION) at 14:42

## 2025-01-23 RX ADMIN — AZITHROMYCIN DIHYDRATE 500 MG: 250 TABLET ORAL at 12:54

## 2025-01-23 RX ADMIN — WATER 40 MG: 1 INJECTION INTRAMUSCULAR; INTRAVENOUS; SUBCUTANEOUS at 12:54

## 2025-01-23 RX ADMIN — ERGOCALCIFEROL 50000 UNITS: 1.25 CAPSULE ORAL at 15:52

## 2025-01-23 RX ADMIN — DIAZEPAM 10 MG: 5 TABLET ORAL at 19:31

## 2025-01-23 RX ADMIN — MIDODRINE HYDROCHLORIDE 5 MG: 5 TABLET ORAL at 12:54

## 2025-01-23 RX ADMIN — ALBUTEROL SULFATE 2.5 MG: 2.5 SOLUTION RESPIRATORY (INHALATION) at 20:29

## 2025-01-23 RX ADMIN — TIOTROPIUM BROMIDE INHALATION SPRAY 2 PUFF: 3.12 SPRAY, METERED RESPIRATORY (INHALATION) at 06:36

## 2025-01-23 RX ADMIN — SODIUM CHLORIDE: 9 INJECTION, SOLUTION INTRAVENOUS at 18:24

## 2025-01-23 RX ADMIN — ALBUTEROL SULFATE 2.5 MG: 2.5 SOLUTION RESPIRATORY (INHALATION) at 02:56

## 2025-01-23 RX ADMIN — BUDESONIDE AND FORMOTEROL FUMARATE DIHYDRATE 2 PUFF: 80; 4.5 AEROSOL RESPIRATORY (INHALATION) at 06:36

## 2025-01-23 RX ADMIN — FLUOXETINE HYDROCHLORIDE 20 MG: 20 CAPSULE ORAL at 08:17

## 2025-01-23 RX ADMIN — OSELTAMIVIR PHOSPHATE 75 MG: 75 CAPSULE ORAL at 08:16

## 2025-01-23 RX ADMIN — PANTOPRAZOLE SODIUM 40 MG: 40 TABLET, DELAYED RELEASE ORAL at 05:55

## 2025-01-23 RX ADMIN — ZOLPIDEM TARTRATE 10 MG: 5 TABLET, FILM COATED ORAL at 22:21

## 2025-01-23 ASSESSMENT — PAIN SCALES - GENERAL: PAINLEVEL_OUTOF10: 0

## 2025-01-23 NOTE — CARE COORDINATION
Discharge planning    Met with patient and updated on [facility] inability to take patient at discharge. She has been accepted at [facility] and states that she would like to go their. Offered to make another home care referral but she declines.

## 2025-01-24 ENCOUNTER — APPOINTMENT (OUTPATIENT)
Dept: GENERAL RADIOLOGY | Age: 60
DRG: 193 | End: 2025-01-24
Payer: MEDICARE

## 2025-01-24 LAB
ALBUMIN SERPL-MCNC: 2.3 G/DL (ref 3.5–5.2)
ALBUMIN/GLOB SERPL: 1.2 {RATIO} (ref 1–2.5)
ALP SERPL-CCNC: 92 U/L (ref 35–104)
ALT SERPL-CCNC: 35 U/L (ref 10–35)
ANION GAP SERPL CALCULATED.3IONS-SCNC: 8 MMOL/L (ref 9–16)
AST SERPL-CCNC: 30 U/L (ref 10–35)
BILIRUB SERPL-MCNC: 0.3 MG/DL (ref 0–1.2)
BUN SERPL-MCNC: 14 MG/DL (ref 6–20)
CALCIUM SERPL-MCNC: 8.3 MG/DL (ref 8.6–10.4)
CHLORIDE SERPL-SCNC: 108 MMOL/L (ref 98–107)
CO2 SERPL-SCNC: 21 MMOL/L (ref 20–31)
CREAT SERPL-MCNC: 0.3 MG/DL (ref 0.5–0.9)
EKG ATRIAL RATE: 112 BPM
EKG P AXIS: 12 DEGREES
EKG P-R INTERVAL: 84 MS
EKG Q-T INTERVAL: 406 MS
EKG QRS DURATION: 70 MS
EKG QTC CALCULATION (BAZETT): 554 MS
EKG R AXIS: 17 DEGREES
EKG T AXIS: 38 DEGREES
EKG VENTRICULAR RATE: 112 BPM
GFR, ESTIMATED: >90 ML/MIN/1.73M2
GLUCOSE BLD-MCNC: 98 MG/DL (ref 65–105)
GLUCOSE SERPL-MCNC: 73 MG/DL (ref 74–99)
POTASSIUM SERPL-SCNC: 3.9 MMOL/L (ref 3.7–5.3)
PROT SERPL-MCNC: 4.2 G/DL (ref 6.6–8.7)
SODIUM SERPL-SCNC: 137 MMOL/L (ref 136–145)

## 2025-01-24 PROCEDURE — 82947 ASSAY GLUCOSE BLOOD QUANT: CPT

## 2025-01-24 PROCEDURE — 6370000000 HC RX 637 (ALT 250 FOR IP): Performed by: NURSE PRACTITIONER

## 2025-01-24 PROCEDURE — 94640 AIRWAY INHALATION TREATMENT: CPT

## 2025-01-24 PROCEDURE — 94660 CPAP INITIATION&MGMT: CPT

## 2025-01-24 PROCEDURE — 2500000003 HC RX 250 WO HCPCS

## 2025-01-24 PROCEDURE — 6360000002 HC RX W HCPCS: Performed by: NURSE PRACTITIONER

## 2025-01-24 PROCEDURE — 6360000002 HC RX W HCPCS: Performed by: FAMILY MEDICINE

## 2025-01-24 PROCEDURE — 6360000002 HC RX W HCPCS

## 2025-01-24 PROCEDURE — 97116 GAIT TRAINING THERAPY: CPT

## 2025-01-24 PROCEDURE — 2500000003 HC RX 250 WO HCPCS: Performed by: FAMILY MEDICINE

## 2025-01-24 PROCEDURE — 2700000000 HC OXYGEN THERAPY PER DAY

## 2025-01-24 PROCEDURE — 97110 THERAPEUTIC EXERCISES: CPT

## 2025-01-24 PROCEDURE — 2060000000 HC ICU INTERMEDIATE R&B

## 2025-01-24 PROCEDURE — 2500000003 HC RX 250 WO HCPCS: Performed by: NURSE PRACTITIONER

## 2025-01-24 PROCEDURE — 94761 N-INVAS EAR/PLS OXIMETRY MLT: CPT

## 2025-01-24 PROCEDURE — 97530 THERAPEUTIC ACTIVITIES: CPT

## 2025-01-24 PROCEDURE — 6370000000 HC RX 637 (ALT 250 FOR IP): Performed by: FAMILY MEDICINE

## 2025-01-24 PROCEDURE — 36415 COLL VENOUS BLD VENIPUNCTURE: CPT

## 2025-01-24 PROCEDURE — 6370000000 HC RX 637 (ALT 250 FOR IP)

## 2025-01-24 PROCEDURE — 99232 SBSQ HOSP IP/OBS MODERATE 35: CPT | Performed by: FAMILY MEDICINE

## 2025-01-24 PROCEDURE — 80053 COMPREHEN METABOLIC PANEL: CPT

## 2025-01-24 PROCEDURE — 5A09357 ASSISTANCE WITH RESPIRATORY VENTILATION, LESS THAN 24 CONSECUTIVE HOURS, CONTINUOUS POSITIVE AIRWAY PRESSURE: ICD-10-PCS | Performed by: FAMILY MEDICINE

## 2025-01-24 PROCEDURE — 97535 SELF CARE MNGMENT TRAINING: CPT

## 2025-01-24 PROCEDURE — 71045 X-RAY EXAM CHEST 1 VIEW: CPT

## 2025-01-24 RX ORDER — MIDODRINE HYDROCHLORIDE 2.5 MG/1
2.5 TABLET ORAL ONCE
Status: COMPLETED | OUTPATIENT
Start: 2025-01-24 | End: 2025-01-24

## 2025-01-24 RX ORDER — MIDODRINE HYDROCHLORIDE 5 MG/1
5 TABLET ORAL ONCE
Status: DISCONTINUED | OUTPATIENT
Start: 2025-01-24 | End: 2025-01-24

## 2025-01-24 RX ORDER — FUROSEMIDE 10 MG/ML
20 INJECTION INTRAMUSCULAR; INTRAVENOUS ONCE
Status: COMPLETED | OUTPATIENT
Start: 2025-01-24 | End: 2025-01-24

## 2025-01-24 RX ADMIN — GUAIFENESIN 600 MG: 600 TABLET ORAL at 20:05

## 2025-01-24 RX ADMIN — OSELTAMIVIR PHOSPHATE 75 MG: 75 CAPSULE ORAL at 20:30

## 2025-01-24 RX ADMIN — ZOLPIDEM TARTRATE 10 MG: 5 TABLET, FILM COATED ORAL at 21:48

## 2025-01-24 RX ADMIN — ALBUTEROL SULFATE 2.5 MG: 2.5 SOLUTION RESPIRATORY (INHALATION) at 09:40

## 2025-01-24 RX ADMIN — SODIUM CHLORIDE, PRESERVATIVE FREE 10 ML: 5 INJECTION INTRAVENOUS at 14:37

## 2025-01-24 RX ADMIN — BUDESONIDE AND FORMOTEROL FUMARATE DIHYDRATE 2 PUFF: 80; 4.5 AEROSOL RESPIRATORY (INHALATION) at 05:21

## 2025-01-24 RX ADMIN — PANTOPRAZOLE SODIUM 40 MG: 40 TABLET, DELAYED RELEASE ORAL at 16:28

## 2025-01-24 RX ADMIN — FOLIC ACID 1 MG: 1 TABLET ORAL at 08:51

## 2025-01-24 RX ADMIN — MIDODRINE HYDROCHLORIDE 2.5 MG: 2.5 TABLET ORAL at 06:13

## 2025-01-24 RX ADMIN — WATER 40 MG: 1 INJECTION INTRAMUSCULAR; INTRAVENOUS; SUBCUTANEOUS at 06:14

## 2025-01-24 RX ADMIN — ALBUTEROL SULFATE 2.5 MG: 2.5 SOLUTION RESPIRATORY (INHALATION) at 05:20

## 2025-01-24 RX ADMIN — SODIUM CHLORIDE, PRESERVATIVE FREE 10 ML: 5 INJECTION INTRAVENOUS at 20:06

## 2025-01-24 RX ADMIN — PREDNISONE 40 MG: 20 TABLET ORAL at 08:51

## 2025-01-24 RX ADMIN — WATER 40 MG: 1 INJECTION INTRAMUSCULAR; INTRAVENOUS; SUBCUTANEOUS at 14:36

## 2025-01-24 RX ADMIN — DIPHENHYDRAMINE HYDROCHLORIDE 5 ML: 12.5 LIQUID ORAL at 16:29

## 2025-01-24 RX ADMIN — GUAIFENESIN 600 MG: 600 TABLET ORAL at 08:52

## 2025-01-24 RX ADMIN — OSELTAMIVIR PHOSPHATE 75 MG: 75 CAPSULE ORAL at 08:52

## 2025-01-24 RX ADMIN — SODIUM CHLORIDE, PRESERVATIVE FREE 10 ML: 5 INJECTION INTRAVENOUS at 10:12

## 2025-01-24 RX ADMIN — WATER 1000 MG: 1 INJECTION INTRAMUSCULAR; INTRAVENOUS; SUBCUTANEOUS at 14:34

## 2025-01-24 RX ADMIN — PANTOPRAZOLE SODIUM 40 MG: 40 TABLET, DELAYED RELEASE ORAL at 04:13

## 2025-01-24 RX ADMIN — ALBUTEROL SULFATE 2.5 MG: 2.5 SOLUTION RESPIRATORY (INHALATION) at 18:07

## 2025-01-24 RX ADMIN — MIDODRINE HYDROCHLORIDE 2.5 MG: 2.5 TABLET ORAL at 04:28

## 2025-01-24 RX ADMIN — DIAZEPAM 10 MG: 5 TABLET ORAL at 10:11

## 2025-01-24 RX ADMIN — WATER 40 MG: 1 INJECTION INTRAMUSCULAR; INTRAVENOUS; SUBCUTANEOUS at 20:05

## 2025-01-24 RX ADMIN — ENOXAPARIN SODIUM 40 MG: 100 INJECTION SUBCUTANEOUS at 08:49

## 2025-01-24 RX ADMIN — MIDODRINE HYDROCHLORIDE 2.5 MG: 2.5 TABLET ORAL at 16:28

## 2025-01-24 RX ADMIN — FUROSEMIDE 20 MG: 10 INJECTION, SOLUTION INTRAMUSCULAR; INTRAVENOUS at 06:14

## 2025-01-24 RX ADMIN — FLUOXETINE HYDROCHLORIDE 20 MG: 20 CAPSULE ORAL at 08:52

## 2025-01-24 RX ADMIN — THIAMINE HCL TAB 100 MG 100 MG: 100 TAB at 08:51

## 2025-01-24 RX ADMIN — TIOTROPIUM BROMIDE INHALATION SPRAY 2 PUFF: 3.12 SPRAY, METERED RESPIRATORY (INHALATION) at 05:21

## 2025-01-24 RX ADMIN — PALIPERIDONE 6 MG: 3 TABLET, EXTENDED RELEASE ORAL at 08:51

## 2025-01-24 RX ADMIN — MIDODRINE HYDROCHLORIDE 2.5 MG: 2.5 TABLET ORAL at 14:11

## 2025-01-24 RX ADMIN — ALBUTEROL SULFATE 2.5 MG: 2.5 SOLUTION RESPIRATORY (INHALATION) at 14:26

## 2025-01-24 RX ADMIN — FLUOXETINE HYDROCHLORIDE 40 MG: 20 CAPSULE ORAL at 08:51

## 2025-01-24 RX ADMIN — BUDESONIDE AND FORMOTEROL FUMARATE DIHYDRATE 2 PUFF: 80; 4.5 AEROSOL RESPIRATORY (INHALATION) at 18:07

## 2025-01-24 NOTE — CARE COORDINATION
[facility] To check on precert, contact Patria at [facility]. Her phone is [phone].

## 2025-01-25 ENCOUNTER — APPOINTMENT (OUTPATIENT)
Dept: CT IMAGING | Age: 60
DRG: 193 | End: 2025-01-25
Payer: MEDICARE

## 2025-01-25 LAB
ALBUMIN SERPL-MCNC: 2.6 G/DL (ref 3.5–5.2)
ALBUMIN/GLOB SERPL: 1.1 {RATIO} (ref 1–2.5)
ALP SERPL-CCNC: 109 U/L (ref 35–104)
ALT SERPL-CCNC: 34 U/L (ref 10–35)
ANION GAP SERPL CALCULATED.3IONS-SCNC: 9 MMOL/L (ref 9–16)
AST SERPL-CCNC: 27 U/L (ref 10–35)
BASOPHILS # BLD: 0 K/UL (ref 0–0.2)
BASOPHILS NFR BLD: 0 % (ref 0–2)
BILIRUB SERPL-MCNC: 0.3 MG/DL (ref 0–1.2)
BUN SERPL-MCNC: 15 MG/DL (ref 6–20)
CALCIUM SERPL-MCNC: 9 MG/DL (ref 8.6–10.4)
CHLORIDE SERPL-SCNC: 105 MMOL/L (ref 98–107)
CO2 SERPL-SCNC: 23 MMOL/L (ref 20–31)
CREAT SERPL-MCNC: 0.3 MG/DL (ref 0.5–0.9)
EOSINOPHIL # BLD: 0 K/UL (ref 0–0.44)
EOSINOPHILS RELATIVE PERCENT: 0 % (ref 1–4)
ERYTHROCYTE [DISTWIDTH] IN BLOOD BY AUTOMATED COUNT: 15.8 % (ref 11.8–14.4)
GFR, ESTIMATED: >90 ML/MIN/1.73M2
GLUCOSE SERPL-MCNC: 93 MG/DL (ref 74–99)
HCT VFR BLD AUTO: 31.1 % (ref 36.3–47.1)
HGB BLD-MCNC: 10.4 G/DL (ref 11.9–15.1)
IMM GRANULOCYTES # BLD AUTO: 0.11 K/UL (ref 0–0.3)
IMM GRANULOCYTES NFR BLD: 1 %
LYMPHOCYTES NFR BLD: 0.21 K/UL (ref 1.1–3.7)
LYMPHOCYTES RELATIVE PERCENT: 2 % (ref 24–43)
MCH RBC QN AUTO: 27.7 PG (ref 25.2–33.5)
MCHC RBC AUTO-ENTMCNC: 33.4 G/DL (ref 28.4–34.8)
MCV RBC AUTO: 82.7 FL (ref 82.6–102.9)
MONOCYTES NFR BLD: 0.54 K/UL (ref 0.1–1.2)
MONOCYTES NFR BLD: 5 % (ref 3–12)
NEUTROPHILS NFR BLD: 92 % (ref 36–65)
NEUTS SEG NFR BLD: 9.84 K/UL (ref 1.5–8.1)
NRBC BLD-RTO: 0 PER 100 WBC
PLATELET # BLD AUTO: 235 K/UL (ref 138–453)
PMV BLD AUTO: 10.5 FL (ref 8.1–13.5)
POTASSIUM SERPL-SCNC: 3.9 MMOL/L (ref 3.7–5.3)
PROT SERPL-MCNC: 4.9 G/DL (ref 6.6–8.7)
RBC # BLD AUTO: 3.76 M/UL (ref 3.95–5.11)
SODIUM SERPL-SCNC: 137 MMOL/L (ref 136–145)
WBC OTHER # BLD: 10.7 K/UL (ref 3.5–11.3)

## 2025-01-25 PROCEDURE — 2580000003 HC RX 258: Performed by: FAMILY MEDICINE

## 2025-01-25 PROCEDURE — 94761 N-INVAS EAR/PLS OXIMETRY MLT: CPT

## 2025-01-25 PROCEDURE — 2060000000 HC ICU INTERMEDIATE R&B

## 2025-01-25 PROCEDURE — 94640 AIRWAY INHALATION TREATMENT: CPT

## 2025-01-25 PROCEDURE — 6360000002 HC RX W HCPCS: Performed by: NURSE PRACTITIONER

## 2025-01-25 PROCEDURE — 97116 GAIT TRAINING THERAPY: CPT

## 2025-01-25 PROCEDURE — 6370000000 HC RX 637 (ALT 250 FOR IP): Performed by: FAMILY MEDICINE

## 2025-01-25 PROCEDURE — 2500000003 HC RX 250 WO HCPCS: Performed by: NURSE PRACTITIONER

## 2025-01-25 PROCEDURE — 2700000000 HC OXYGEN THERAPY PER DAY

## 2025-01-25 PROCEDURE — 36415 COLL VENOUS BLD VENIPUNCTURE: CPT

## 2025-01-25 PROCEDURE — 85025 COMPLETE CBC W/AUTO DIFF WBC: CPT

## 2025-01-25 PROCEDURE — 6360000002 HC RX W HCPCS: Performed by: FAMILY MEDICINE

## 2025-01-25 PROCEDURE — 6370000000 HC RX 637 (ALT 250 FOR IP): Performed by: NURSE PRACTITIONER

## 2025-01-25 PROCEDURE — 99222 1ST HOSP IP/OBS MODERATE 55: CPT | Performed by: PSYCHIATRY & NEUROLOGY

## 2025-01-25 PROCEDURE — 2500000003 HC RX 250 WO HCPCS: Performed by: FAMILY MEDICINE

## 2025-01-25 PROCEDURE — 71260 CT THORAX DX C+: CPT

## 2025-01-25 PROCEDURE — 80053 COMPREHEN METABOLIC PANEL: CPT

## 2025-01-25 PROCEDURE — 97110 THERAPEUTIC EXERCISES: CPT

## 2025-01-25 PROCEDURE — 99232 SBSQ HOSP IP/OBS MODERATE 35: CPT | Performed by: FAMILY MEDICINE

## 2025-01-25 PROCEDURE — 6360000004 HC RX CONTRAST MEDICATION: Performed by: FAMILY MEDICINE

## 2025-01-25 RX ORDER — IOPAMIDOL 755 MG/ML
75 INJECTION, SOLUTION INTRAVASCULAR
Status: COMPLETED | OUTPATIENT
Start: 2025-01-25 | End: 2025-01-25

## 2025-01-25 RX ORDER — ALBUTEROL SULFATE 0.83 MG/ML
2.5 SOLUTION RESPIRATORY (INHALATION) 3 TIMES DAILY
Status: DISCONTINUED | OUTPATIENT
Start: 2025-01-25 | End: 2025-02-01

## 2025-01-25 RX ORDER — 0.9 % SODIUM CHLORIDE 0.9 %
100 INTRAVENOUS SOLUTION INTRAVENOUS ONCE
Status: COMPLETED | OUTPATIENT
Start: 2025-01-25 | End: 2025-01-25

## 2025-01-25 RX ORDER — SODIUM CHLORIDE 0.9 % (FLUSH) 0.9 %
10 SYRINGE (ML) INJECTION PRN
Status: DISCONTINUED | OUTPATIENT
Start: 2025-01-25 | End: 2025-02-05 | Stop reason: HOSPADM

## 2025-01-25 RX ORDER — ENOXAPARIN SODIUM 100 MG/ML
40 INJECTION SUBCUTANEOUS DAILY
Status: DISCONTINUED | OUTPATIENT
Start: 2025-01-25 | End: 2025-01-25

## 2025-01-25 RX ADMIN — WATER 40 MG: 1 INJECTION INTRAMUSCULAR; INTRAVENOUS; SUBCUTANEOUS at 20:06

## 2025-01-25 RX ADMIN — ZOLPIDEM TARTRATE 10 MG: 5 TABLET, FILM COATED ORAL at 21:33

## 2025-01-25 RX ADMIN — GUAIFENESIN 600 MG: 600 TABLET ORAL at 09:52

## 2025-01-25 RX ADMIN — ENOXAPARIN SODIUM 40 MG: 100 INJECTION SUBCUTANEOUS at 09:50

## 2025-01-25 RX ADMIN — IOPAMIDOL 75 ML: 755 INJECTION, SOLUTION INTRAVENOUS at 08:50

## 2025-01-25 RX ADMIN — ALBUTEROL SULFATE 2.5 MG: 2.5 SOLUTION RESPIRATORY (INHALATION) at 14:48

## 2025-01-25 RX ADMIN — TIOTROPIUM BROMIDE INHALATION SPRAY 2 PUFF: 3.12 SPRAY, METERED RESPIRATORY (INHALATION) at 07:07

## 2025-01-25 RX ADMIN — SODIUM CHLORIDE, PRESERVATIVE FREE 10 ML: 5 INJECTION INTRAVENOUS at 17:05

## 2025-01-25 RX ADMIN — MIDODRINE HYDROCHLORIDE 2.5 MG: 2.5 TABLET ORAL at 09:52

## 2025-01-25 RX ADMIN — SODIUM CHLORIDE, PRESERVATIVE FREE 10 ML: 5 INJECTION INTRAVENOUS at 08:50

## 2025-01-25 RX ADMIN — MIDODRINE HYDROCHLORIDE 2.5 MG: 2.5 TABLET ORAL at 12:57

## 2025-01-25 RX ADMIN — MIDODRINE HYDROCHLORIDE 2.5 MG: 2.5 TABLET ORAL at 17:06

## 2025-01-25 RX ADMIN — FLUOXETINE HYDROCHLORIDE 20 MG: 20 CAPSULE ORAL at 09:54

## 2025-01-25 RX ADMIN — BUDESONIDE AND FORMOTEROL FUMARATE DIHYDRATE 2 PUFF: 80; 4.5 AEROSOL RESPIRATORY (INHALATION) at 07:06

## 2025-01-25 RX ADMIN — FLUOXETINE HYDROCHLORIDE 40 MG: 20 CAPSULE ORAL at 09:52

## 2025-01-25 RX ADMIN — ALBUTEROL SULFATE 2.5 MG: 2.5 SOLUTION RESPIRATORY (INHALATION) at 07:06

## 2025-01-25 RX ADMIN — ALBUTEROL SULFATE 2.5 MG: 2.5 SOLUTION RESPIRATORY (INHALATION) at 17:58

## 2025-01-25 RX ADMIN — ALBUTEROL SULFATE 2.5 MG: 2.5 SOLUTION RESPIRATORY (INHALATION) at 10:49

## 2025-01-25 RX ADMIN — BUDESONIDE AND FORMOTEROL FUMARATE DIHYDRATE 2 PUFF: 80; 4.5 AEROSOL RESPIRATORY (INHALATION) at 18:00

## 2025-01-25 RX ADMIN — GUAIFENESIN 600 MG: 600 TABLET ORAL at 20:06

## 2025-01-25 RX ADMIN — WATER 40 MG: 1 INJECTION INTRAMUSCULAR; INTRAVENOUS; SUBCUTANEOUS at 10:04

## 2025-01-25 RX ADMIN — ALBUTEROL SULFATE 2.5 MG: 2.5 SOLUTION RESPIRATORY (INHALATION) at 04:11

## 2025-01-25 RX ADMIN — SODIUM CHLORIDE, PRESERVATIVE FREE 10 ML: 5 INJECTION INTRAVENOUS at 20:11

## 2025-01-25 RX ADMIN — DIAZEPAM 10 MG: 5 TABLET ORAL at 20:07

## 2025-01-25 RX ADMIN — WATER 40 MG: 1 INJECTION INTRAMUSCULAR; INTRAVENOUS; SUBCUTANEOUS at 17:05

## 2025-01-25 RX ADMIN — PANTOPRAZOLE SODIUM 40 MG: 40 TABLET, DELAYED RELEASE ORAL at 17:06

## 2025-01-25 RX ADMIN — PANTOPRAZOLE SODIUM 40 MG: 40 TABLET, DELAYED RELEASE ORAL at 06:02

## 2025-01-25 RX ADMIN — SODIUM CHLORIDE 100 ML: 9 INJECTION, SOLUTION INTRAVENOUS at 08:50

## 2025-01-25 RX ADMIN — WATER 40 MG: 1 INJECTION INTRAMUSCULAR; INTRAVENOUS; SUBCUTANEOUS at 03:24

## 2025-01-25 RX ADMIN — THIAMINE HCL TAB 100 MG 100 MG: 100 TAB at 09:53

## 2025-01-25 RX ADMIN — OSELTAMIVIR PHOSPHATE 75 MG: 75 CAPSULE ORAL at 09:53

## 2025-01-25 RX ADMIN — OSELTAMIVIR PHOSPHATE 75 MG: 75 CAPSULE ORAL at 20:30

## 2025-01-25 RX ADMIN — WATER 1000 MG: 1 INJECTION INTRAMUSCULAR; INTRAVENOUS; SUBCUTANEOUS at 12:57

## 2025-01-25 RX ADMIN — FOLIC ACID 1 MG: 1 TABLET ORAL at 09:53

## 2025-01-25 RX ADMIN — PALIPERIDONE 6 MG: 3 TABLET, EXTENDED RELEASE ORAL at 09:51

## 2025-01-25 NOTE — CONSULTS
[facility] Neurology   IN-PATIENT SERVICE      NEUROLOGY CONSULT  NOTE            Date:   1/25/2025  Patient name:  Madonna Manrique  Date of admission:  1/21/2025  YOB: 1965      Chief Complaint:     Chief Complaint   Patient presents with    Shortness of Breath       Reason for Consult:      Respiratory failure     History of Present Illness:     The patient is a 59 y.o. female who presents with Shortness of Breath  . The patient was seen and examined and the chart was reviewed.       This is a 59 year old female with history of relapsing MS, chronic pain, polysubstance abuse, tobacco abuse.  She is on ocrelizumab for her MS.  She has an underlying psychiatric history including bipolar disorder and anxiety and depression and has been known to be medically non-complaint in the past. She also has a history of chronic headaches. She also has a history of bilateral uveitis.     Per my partners note ( Dr. Stone who she follows with in clinic)  MRI's in 2022 were done, revealing T2-hyperintense lesions in the brain, cervical, and thoracic spinal cord concerning for multiple sclerosis.  Per reports, there were no enhancing lesions.  An EEG was done given altered mental status, which showed some intermittent generalized slowing.  LP was recommended but the patient left AMA.  CSF studies were later done and did show unpaired oligoclonal bands.  She was diagnosed with multiple sclerosis and started on ocrelizumab.        Her symptoms attributed to multiple sclerosis began about 5 years ago.  She reports that she has had intermittent episodes of weakness and numbness involving the left face, arm, and leg in the past lasting > 24 hours.  She reports that she has difficulty going up and down stairs at times and uses a cane intermittently.  She reports that she had some imbalance and dropped objects intermittently for years.  She attributed this to being clumsy.     She presented to the ED with cough, congestion,

## 2025-01-26 LAB
MICROORGANISM SPEC CULT: NORMAL
MICROORGANISM SPEC CULT: NORMAL
SERVICE CMNT-IMP: NORMAL
SERVICE CMNT-IMP: NORMAL
SPECIMEN DESCRIPTION: NORMAL
SPECIMEN DESCRIPTION: NORMAL

## 2025-01-26 PROCEDURE — 6360000002 HC RX W HCPCS: Performed by: FAMILY MEDICINE

## 2025-01-26 PROCEDURE — 2060000000 HC ICU INTERMEDIATE R&B

## 2025-01-26 PROCEDURE — 6370000000 HC RX 637 (ALT 250 FOR IP): Performed by: NURSE PRACTITIONER

## 2025-01-26 PROCEDURE — 99232 SBSQ HOSP IP/OBS MODERATE 35: CPT | Performed by: FAMILY MEDICINE

## 2025-01-26 PROCEDURE — 2700000000 HC OXYGEN THERAPY PER DAY

## 2025-01-26 PROCEDURE — 2500000003 HC RX 250 WO HCPCS: Performed by: NURSE PRACTITIONER

## 2025-01-26 PROCEDURE — 99232 SBSQ HOSP IP/OBS MODERATE 35: CPT | Performed by: PSYCHIATRY & NEUROLOGY

## 2025-01-26 PROCEDURE — 94761 N-INVAS EAR/PLS OXIMETRY MLT: CPT

## 2025-01-26 PROCEDURE — 94640 AIRWAY INHALATION TREATMENT: CPT

## 2025-01-26 PROCEDURE — 2500000003 HC RX 250 WO HCPCS: Performed by: FAMILY MEDICINE

## 2025-01-26 PROCEDURE — 6370000000 HC RX 637 (ALT 250 FOR IP): Performed by: FAMILY MEDICINE

## 2025-01-26 RX ADMIN — ALBUTEROL SULFATE 2.5 MG: 2.5 SOLUTION RESPIRATORY (INHALATION) at 15:01

## 2025-01-26 RX ADMIN — WATER 40 MG: 1 INJECTION INTRAMUSCULAR; INTRAVENOUS; SUBCUTANEOUS at 08:29

## 2025-01-26 RX ADMIN — WATER 40 MG: 1 INJECTION INTRAMUSCULAR; INTRAVENOUS; SUBCUTANEOUS at 20:24

## 2025-01-26 RX ADMIN — ALBUTEROL SULFATE 2.5 MG: 2.5 SOLUTION RESPIRATORY (INHALATION) at 20:38

## 2025-01-26 RX ADMIN — SODIUM CHLORIDE, PRESERVATIVE FREE 10 ML: 5 INJECTION INTRAVENOUS at 20:24

## 2025-01-26 RX ADMIN — BUDESONIDE AND FORMOTEROL FUMARATE DIHYDRATE 2 PUFF: 80; 4.5 AEROSOL RESPIRATORY (INHALATION) at 07:44

## 2025-01-26 RX ADMIN — RIVAROXABAN 20 MG: 20 TABLET, FILM COATED ORAL at 18:39

## 2025-01-26 RX ADMIN — MIDODRINE HYDROCHLORIDE 2.5 MG: 2.5 TABLET ORAL at 13:14

## 2025-01-26 RX ADMIN — PALIPERIDONE 6 MG: 3 TABLET, EXTENDED RELEASE ORAL at 09:31

## 2025-01-26 RX ADMIN — THIAMINE HCL TAB 100 MG 100 MG: 100 TAB at 08:28

## 2025-01-26 RX ADMIN — WATER 40 MG: 1 INJECTION INTRAMUSCULAR; INTRAVENOUS; SUBCUTANEOUS at 03:37

## 2025-01-26 RX ADMIN — TIOTROPIUM BROMIDE INHALATION SPRAY 2 PUFF: 3.12 SPRAY, METERED RESPIRATORY (INHALATION) at 07:43

## 2025-01-26 RX ADMIN — MIDODRINE HYDROCHLORIDE 2.5 MG: 2.5 TABLET ORAL at 15:31

## 2025-01-26 RX ADMIN — GUAIFENESIN 600 MG: 600 TABLET ORAL at 08:28

## 2025-01-26 RX ADMIN — WATER 40 MG: 1 INJECTION INTRAMUSCULAR; INTRAVENOUS; SUBCUTANEOUS at 15:32

## 2025-01-26 RX ADMIN — FLUOXETINE HYDROCHLORIDE 60 MG: 20 CAPSULE ORAL at 08:28

## 2025-01-26 RX ADMIN — MIDODRINE HYDROCHLORIDE 2.5 MG: 2.5 TABLET ORAL at 08:28

## 2025-01-26 RX ADMIN — ACETAMINOPHEN 650 MG: 325 TABLET ORAL at 09:43

## 2025-01-26 RX ADMIN — SODIUM CHLORIDE, PRESERVATIVE FREE 10 ML: 5 INJECTION INTRAVENOUS at 08:29

## 2025-01-26 RX ADMIN — GUAIFENESIN 600 MG: 600 TABLET ORAL at 20:24

## 2025-01-26 RX ADMIN — DIAZEPAM 10 MG: 5 TABLET ORAL at 13:14

## 2025-01-26 RX ADMIN — ALBUTEROL SULFATE 2.5 MG: 2.5 SOLUTION RESPIRATORY (INHALATION) at 07:42

## 2025-01-26 RX ADMIN — PANTOPRAZOLE SODIUM 40 MG: 40 TABLET, DELAYED RELEASE ORAL at 06:38

## 2025-01-26 RX ADMIN — ZOLPIDEM TARTRATE 10 MG: 5 TABLET, FILM COATED ORAL at 21:59

## 2025-01-26 RX ADMIN — SODIUM CHLORIDE, PRESERVATIVE FREE 10 ML: 5 INJECTION INTRAVENOUS at 09:32

## 2025-01-26 RX ADMIN — FOLIC ACID 1 MG: 1 TABLET ORAL at 08:28

## 2025-01-26 RX ADMIN — PANTOPRAZOLE SODIUM 40 MG: 40 TABLET, DELAYED RELEASE ORAL at 15:32

## 2025-01-26 RX ADMIN — SODIUM CHLORIDE, PRESERVATIVE FREE 10 ML: 5 INJECTION INTRAVENOUS at 15:32

## 2025-01-26 RX ADMIN — BUDESONIDE AND FORMOTEROL FUMARATE DIHYDRATE 2 PUFF: 80; 4.5 AEROSOL RESPIRATORY (INHALATION) at 20:39

## 2025-01-26 ASSESSMENT — PAIN DESCRIPTION - ORIENTATION: ORIENTATION: ANTERIOR;POSTERIOR

## 2025-01-26 ASSESSMENT — PAIN DESCRIPTION - FREQUENCY: FREQUENCY: INTERMITTENT

## 2025-01-26 ASSESSMENT — PAIN - FUNCTIONAL ASSESSMENT: PAIN_FUNCTIONAL_ASSESSMENT: ACTIVITIES ARE NOT PREVENTED

## 2025-01-26 ASSESSMENT — PAIN DESCRIPTION - DESCRIPTORS: DESCRIPTORS: ACHING

## 2025-01-26 ASSESSMENT — PAIN DESCRIPTION - ONSET: ONSET: SUDDEN

## 2025-01-26 ASSESSMENT — PAIN SCALES - GENERAL: PAINLEVEL_OUTOF10: 3

## 2025-01-26 ASSESSMENT — PAIN DESCRIPTION - PAIN TYPE: TYPE: ACUTE PAIN

## 2025-01-26 ASSESSMENT — PAIN DESCRIPTION - LOCATION: LOCATION: HEAD

## 2025-01-27 ENCOUNTER — APPOINTMENT (OUTPATIENT)
Dept: GENERAL RADIOLOGY | Age: 60
DRG: 193 | End: 2025-01-27
Payer: MEDICARE

## 2025-01-27 ENCOUNTER — APPOINTMENT (OUTPATIENT)
Dept: MRI IMAGING | Age: 60
DRG: 193 | End: 2025-01-27
Payer: MEDICARE

## 2025-01-27 LAB
ALBUMIN SERPL-MCNC: 2.6 G/DL (ref 3.5–5.2)
ALBUMIN/GLOB SERPL: 1.2 {RATIO} (ref 1–2.5)
ALP SERPL-CCNC: 113 U/L (ref 35–104)
ALT SERPL-CCNC: 31 U/L (ref 10–35)
ANION GAP SERPL CALCULATED.3IONS-SCNC: 8 MMOL/L (ref 9–16)
AST SERPL-CCNC: 25 U/L (ref 10–35)
BILIRUB SERPL-MCNC: <0.2 MG/DL (ref 0–1.2)
BUN SERPL-MCNC: 16 MG/DL (ref 6–20)
CALCIUM SERPL-MCNC: 9.1 MG/DL (ref 8.6–10.4)
CHLORIDE SERPL-SCNC: 108 MMOL/L (ref 98–107)
CO2 SERPL-SCNC: 24 MMOL/L (ref 20–31)
CREAT SERPL-MCNC: 0.2 MG/DL (ref 0.5–0.9)
ERYTHROCYTE [DISTWIDTH] IN BLOOD BY AUTOMATED COUNT: 15.7 % (ref 11.8–14.4)
GFR, ESTIMATED: >90 ML/MIN/1.73M2
GLUCOSE SERPL-MCNC: 95 MG/DL (ref 74–99)
HCT VFR BLD AUTO: 29.7 % (ref 36.3–47.1)
HGB BLD-MCNC: 9.8 G/DL (ref 11.9–15.1)
MCH RBC QN AUTO: 27.5 PG (ref 25.2–33.5)
MCHC RBC AUTO-ENTMCNC: 33 G/DL (ref 28.4–34.8)
MCV RBC AUTO: 83.2 FL (ref 82.6–102.9)
NRBC BLD-RTO: 0 PER 100 WBC
PLATELET # BLD AUTO: 262 K/UL (ref 138–453)
PMV BLD AUTO: 10.5 FL (ref 8.1–13.5)
POTASSIUM SERPL-SCNC: 4.4 MMOL/L (ref 3.7–5.3)
PROT SERPL-MCNC: 4.9 G/DL (ref 6.6–8.7)
RBC # BLD AUTO: 3.57 M/UL (ref 3.95–5.11)
SODIUM SERPL-SCNC: 139 MMOL/L (ref 136–145)
WBC OTHER # BLD: 13.4 K/UL (ref 3.5–11.3)

## 2025-01-27 PROCEDURE — A9579 GAD-BASE MR CONTRAST NOS,1ML: HCPCS | Performed by: PSYCHIATRY & NEUROLOGY

## 2025-01-27 PROCEDURE — 99232 SBSQ HOSP IP/OBS MODERATE 35: CPT | Performed by: PSYCHIATRY & NEUROLOGY

## 2025-01-27 PROCEDURE — 99232 SBSQ HOSP IP/OBS MODERATE 35: CPT | Performed by: FAMILY MEDICINE

## 2025-01-27 PROCEDURE — 2060000000 HC ICU INTERMEDIATE R&B

## 2025-01-27 PROCEDURE — 2500000003 HC RX 250 WO HCPCS: Performed by: NURSE PRACTITIONER

## 2025-01-27 PROCEDURE — 70553 MRI BRAIN STEM W/O & W/DYE: CPT

## 2025-01-27 PROCEDURE — 2500000003 HC RX 250 WO HCPCS: Performed by: FAMILY MEDICINE

## 2025-01-27 PROCEDURE — 6360000002 HC RX W HCPCS: Performed by: FAMILY MEDICINE

## 2025-01-27 PROCEDURE — 6370000000 HC RX 637 (ALT 250 FOR IP): Performed by: NURSE PRACTITIONER

## 2025-01-27 PROCEDURE — 97530 THERAPEUTIC ACTIVITIES: CPT

## 2025-01-27 PROCEDURE — 85027 COMPLETE CBC AUTOMATED: CPT

## 2025-01-27 PROCEDURE — 94640 AIRWAY INHALATION TREATMENT: CPT

## 2025-01-27 PROCEDURE — 80053 COMPREHEN METABOLIC PANEL: CPT

## 2025-01-27 PROCEDURE — 6360000004 HC RX CONTRAST MEDICATION: Performed by: PSYCHIATRY & NEUROLOGY

## 2025-01-27 PROCEDURE — 36415 COLL VENOUS BLD VENIPUNCTURE: CPT

## 2025-01-27 PROCEDURE — 74230 X-RAY XM SWLNG FUNCJ C+: CPT

## 2025-01-27 PROCEDURE — 6360000002 HC RX W HCPCS: Performed by: NURSE PRACTITIONER

## 2025-01-27 PROCEDURE — 6370000000 HC RX 637 (ALT 250 FOR IP): Performed by: FAMILY MEDICINE

## 2025-01-27 PROCEDURE — 2700000000 HC OXYGEN THERAPY PER DAY

## 2025-01-27 PROCEDURE — 97110 THERAPEUTIC EXERCISES: CPT

## 2025-01-27 PROCEDURE — 92611 MOTION FLUOROSCOPY/SWALLOW: CPT

## 2025-01-27 PROCEDURE — 94761 N-INVAS EAR/PLS OXIMETRY MLT: CPT

## 2025-01-27 RX ORDER — PREDNISONE 20 MG/1
40 TABLET ORAL DAILY
Status: COMPLETED | OUTPATIENT
Start: 2025-01-27 | End: 2025-01-29

## 2025-01-27 RX ORDER — LORAZEPAM 2 MG/ML
0.5 INJECTION INTRAMUSCULAR ONCE
Status: COMPLETED | OUTPATIENT
Start: 2025-01-27 | End: 2025-01-27

## 2025-01-27 RX ADMIN — ALBUTEROL SULFATE 2.5 MG: 2.5 SOLUTION RESPIRATORY (INHALATION) at 10:17

## 2025-01-27 RX ADMIN — GUAIFENESIN 600 MG: 600 TABLET ORAL at 08:35

## 2025-01-27 RX ADMIN — SODIUM CHLORIDE, PRESERVATIVE FREE 10 ML: 5 INJECTION INTRAVENOUS at 22:06

## 2025-01-27 RX ADMIN — BUDESONIDE AND FORMOTEROL FUMARATE DIHYDRATE 2 PUFF: 80; 4.5 AEROSOL RESPIRATORY (INHALATION) at 20:39

## 2025-01-27 RX ADMIN — SODIUM CHLORIDE, PRESERVATIVE FREE 10 ML: 5 INJECTION INTRAVENOUS at 08:31

## 2025-01-27 RX ADMIN — FLUOXETINE HYDROCHLORIDE 60 MG: 20 CAPSULE ORAL at 08:35

## 2025-01-27 RX ADMIN — PALIPERIDONE 6 MG: 3 TABLET, EXTENDED RELEASE ORAL at 08:35

## 2025-01-27 RX ADMIN — RIVAROXABAN 20 MG: 20 TABLET, FILM COATED ORAL at 17:48

## 2025-01-27 RX ADMIN — FOLIC ACID 1 MG: 1 TABLET ORAL at 08:35

## 2025-01-27 RX ADMIN — THIAMINE HCL TAB 100 MG 100 MG: 100 TAB at 08:35

## 2025-01-27 RX ADMIN — ALBUTEROL SULFATE 2.5 MG: 2.5 SOLUTION RESPIRATORY (INHALATION) at 20:39

## 2025-01-27 RX ADMIN — MIDODRINE HYDROCHLORIDE 2.5 MG: 2.5 TABLET ORAL at 11:45

## 2025-01-27 RX ADMIN — ALBUTEROL SULFATE 2.5 MG: 2.5 SOLUTION RESPIRATORY (INHALATION) at 14:35

## 2025-01-27 RX ADMIN — GUAIFENESIN 600 MG: 600 TABLET ORAL at 22:05

## 2025-01-27 RX ADMIN — LORAZEPAM 0.5 MG: 2 INJECTION INTRAMUSCULAR; INTRAVENOUS at 12:19

## 2025-01-27 RX ADMIN — WATER 40 MG: 1 INJECTION INTRAMUSCULAR; INTRAVENOUS; SUBCUTANEOUS at 02:57

## 2025-01-27 RX ADMIN — PANTOPRAZOLE SODIUM 40 MG: 40 TABLET, DELAYED RELEASE ORAL at 06:05

## 2025-01-27 RX ADMIN — TIOTROPIUM BROMIDE INHALATION SPRAY 2 PUFF: 3.12 SPRAY, METERED RESPIRATORY (INHALATION) at 10:17

## 2025-01-27 RX ADMIN — ZOLPIDEM TARTRATE 10 MG: 5 TABLET, FILM COATED ORAL at 22:05

## 2025-01-27 RX ADMIN — DIPHENHYDRAMINE HYDROCHLORIDE 5 ML: 12.5 LIQUID ORAL at 15:09

## 2025-01-27 RX ADMIN — ALBUTEROL SULFATE 2.5 MG: 2.5 SOLUTION RESPIRATORY (INHALATION) at 03:07

## 2025-01-27 RX ADMIN — PANTOPRAZOLE SODIUM 40 MG: 40 TABLET, DELAYED RELEASE ORAL at 16:39

## 2025-01-27 RX ADMIN — PREDNISONE 40 MG: 20 TABLET ORAL at 13:32

## 2025-01-27 RX ADMIN — DIPHENHYDRAMINE HYDROCHLORIDE 5 ML: 12.5 LIQUID ORAL at 21:09

## 2025-01-27 RX ADMIN — BUDESONIDE AND FORMOTEROL FUMARATE DIHYDRATE 2 PUFF: 80; 4.5 AEROSOL RESPIRATORY (INHALATION) at 10:17

## 2025-01-27 RX ADMIN — GADOTERIDOL 10 ML: 279.3 INJECTION, SOLUTION INTRAVENOUS at 13:07

## 2025-01-27 RX ADMIN — WATER 40 MG: 1 INJECTION INTRAMUSCULAR; INTRAVENOUS; SUBCUTANEOUS at 08:31

## 2025-01-27 ASSESSMENT — PAIN DESCRIPTION - DESCRIPTORS
DESCRIPTORS: ACHING
DESCRIPTORS: BURNING
DESCRIPTORS: BURNING

## 2025-01-27 ASSESSMENT — PAIN SCALES - GENERAL
PAINLEVEL_OUTOF10: 3
PAINLEVEL_OUTOF10: 0
PAINLEVEL_OUTOF10: 4
PAINLEVEL_OUTOF10: 8

## 2025-01-27 ASSESSMENT — PAIN DESCRIPTION - LOCATION
LOCATION: CHEST;HEAD
LOCATION: THROAT;OTHER (COMMENT)
LOCATION: THROAT;OTHER (COMMENT)

## 2025-01-27 ASSESSMENT — PAIN DESCRIPTION - PAIN TYPE
TYPE: ACUTE PAIN

## 2025-01-27 ASSESSMENT — PAIN DESCRIPTION - ORIENTATION
ORIENTATION: ANTERIOR;POSTERIOR;MID
ORIENTATION: PROXIMAL
ORIENTATION: PROXIMAL

## 2025-01-27 ASSESSMENT — PAIN DESCRIPTION - FREQUENCY
FREQUENCY: INTERMITTENT
FREQUENCY: INTERMITTENT

## 2025-01-27 ASSESSMENT — PAIN DESCRIPTION - ONSET
ONSET: GRADUAL
ONSET: GRADUAL

## 2025-01-27 ASSESSMENT — PAIN - FUNCTIONAL ASSESSMENT
PAIN_FUNCTIONAL_ASSESSMENT: ACTIVITIES ARE NOT PREVENTED
PAIN_FUNCTIONAL_ASSESSMENT: ACTIVITIES ARE NOT PREVENTED

## 2025-01-27 NOTE — PROCEDURES
INSTRUMENTAL SWALLOW REPORT  MODIFIED BARIUM SWALLOW    NAME: Madonna Manrique   : 1965  MRN: [id number]       Date of Eval: 2025       Past Medical History:  has a past medical history of Alcoholism (HCC), Anxiety, Asthma, Bipolar 1 disorder (HCC), Borderline personality disorder (HCC), Chronic uveitis of both eyes, Depression, Drug abuse, opioid type (HCC), DVT (deep vein thrombosis) in pregnancy, History of blood transfusion, Long QT interval, May-Thurner syndrome, Multiple sclerosis (HCC), Osteoporosis, Pulmonary emboli (HCC), Severe depression (HCC), Suicide gesture (HCC), and Uveitis of both eyes.  Past Surgical History:  has a past surgical history that includes Gastric bypass surgery (); Cholecystectomy; Hysterectomy;  section; other surgical history (); Leg Surgery (Left, ); Carpal tunnel release (Bilateral); Tibia fracture surgery (Right, 2023); Leg Surgery (Right, 2023); Tibia fracture surgery (Right, 2023); and Leg Surgery (Right, 2023).     Type of Study: Initial MBS       Recent CXR/CT of Chest: 25 CT CHEST PULMONARY EMBOLISM W CONTRAST   IMPRESSION:  1. No evidence of pulmonary embolism.  2. Interstitial pulmonary edema pattern with trace to small right and small  left pleural effusions.  3. More confluent atelectasis or airspace disease right lower lung with  probable superimposed bronchopneumonia.  Follow-up to resolution at this site.  4. Hepatic steatosis.  5. Hiatal hernia.       Patient Complaints/Reason for Referral:  Madonna Manrique was referred for a MBS to assess the efficiency of his/her swallow function, assess for aspiration, and to make recommendations regarding safe dietary consistencies, effective compensatory strategies, and safe eating environment.       Onset of problem:   Madonna Manrique is a 59 y.o.  /  female who presents with Shortness of Breath and is admitted to the hospital for the management of

## 2025-01-27 NOTE — CARE COORDINATION
Discharge planning      Call from Helen at [facility] and cost of medication is 35. 48 per week with supply cost of 131.82 per week. Total is 167.30 per week.    Updated patient.

## 2025-01-27 NOTE — CARE COORDINATION
Social Work-Received call rom [facility]. Insurance has intent to deny acute rehab. The number for P2P is [phone] option 4. It must be completed by noon tomorrow. Left message on P2P line and will attempt to call again in the morning.  was on hold for 40 minutes with Nataly Marvin

## 2025-01-27 NOTE — CARE COORDINATION
Discharge planning    Chart reviewed. Pre cert pending [facility].     Admitted with relapsing MS and neurology following. MRI to evaluate for any new lesions. On IV solu every 6 hours.     Still on 02 ranging 2-5 Liters. IF home needs home 02 evaluation and sc.

## 2025-01-28 ENCOUNTER — APPOINTMENT (OUTPATIENT)
Dept: GENERAL RADIOLOGY | Age: 60
DRG: 193 | End: 2025-01-28
Payer: MEDICARE

## 2025-01-28 ENCOUNTER — HOSPITAL ENCOUNTER (OUTPATIENT)
Dept: INFUSION THERAPY | Facility: MEDICAL CENTER | Age: 60
End: 2025-01-28

## 2025-01-28 PROCEDURE — 2700000000 HC OXYGEN THERAPY PER DAY

## 2025-01-28 PROCEDURE — 6370000000 HC RX 637 (ALT 250 FOR IP): Performed by: FAMILY MEDICINE

## 2025-01-28 PROCEDURE — 99231 SBSQ HOSP IP/OBS SF/LOW 25: CPT

## 2025-01-28 PROCEDURE — 2500000003 HC RX 250 WO HCPCS: Performed by: NURSE PRACTITIONER

## 2025-01-28 PROCEDURE — 94640 AIRWAY INHALATION TREATMENT: CPT

## 2025-01-28 PROCEDURE — 71045 X-RAY EXAM CHEST 1 VIEW: CPT

## 2025-01-28 PROCEDURE — 94761 N-INVAS EAR/PLS OXIMETRY MLT: CPT

## 2025-01-28 PROCEDURE — 6370000000 HC RX 637 (ALT 250 FOR IP): Performed by: NURSE PRACTITIONER

## 2025-01-28 PROCEDURE — 2060000000 HC ICU INTERMEDIATE R&B

## 2025-01-28 PROCEDURE — 6360000002 HC RX W HCPCS: Performed by: FAMILY MEDICINE

## 2025-01-28 PROCEDURE — 97530 THERAPEUTIC ACTIVITIES: CPT

## 2025-01-28 PROCEDURE — 97110 THERAPEUTIC EXERCISES: CPT

## 2025-01-28 RX ADMIN — TIOTROPIUM BROMIDE INHALATION SPRAY 2 PUFF: 3.12 SPRAY, METERED RESPIRATORY (INHALATION) at 07:56

## 2025-01-28 RX ADMIN — ACETAMINOPHEN 650 MG: 325 TABLET ORAL at 12:16

## 2025-01-28 RX ADMIN — PANTOPRAZOLE SODIUM 40 MG: 40 TABLET, DELAYED RELEASE ORAL at 06:43

## 2025-01-28 RX ADMIN — PREDNISONE 40 MG: 20 TABLET ORAL at 08:58

## 2025-01-28 RX ADMIN — MIDODRINE HYDROCHLORIDE 2.5 MG: 2.5 TABLET ORAL at 12:19

## 2025-01-28 RX ADMIN — RIVAROXABAN 20 MG: 20 TABLET, FILM COATED ORAL at 17:15

## 2025-01-28 RX ADMIN — GUAIFENESIN 600 MG: 600 TABLET ORAL at 21:53

## 2025-01-28 RX ADMIN — ALBUTEROL SULFATE 2.5 MG: 2.5 SOLUTION RESPIRATORY (INHALATION) at 07:54

## 2025-01-28 RX ADMIN — ALBUTEROL SULFATE 2.5 MG: 2.5 SOLUTION RESPIRATORY (INHALATION) at 20:46

## 2025-01-28 RX ADMIN — MIDODRINE HYDROCHLORIDE 2.5 MG: 2.5 TABLET ORAL at 17:15

## 2025-01-28 RX ADMIN — SODIUM CHLORIDE, PRESERVATIVE FREE 10 ML: 5 INJECTION INTRAVENOUS at 09:05

## 2025-01-28 RX ADMIN — ALBUTEROL SULFATE 2.5 MG: 2.5 SOLUTION RESPIRATORY (INHALATION) at 15:02

## 2025-01-28 RX ADMIN — DIAZEPAM 10 MG: 5 TABLET ORAL at 17:15

## 2025-01-28 RX ADMIN — BUDESONIDE AND FORMOTEROL FUMARATE DIHYDRATE 2 PUFF: 80; 4.5 AEROSOL RESPIRATORY (INHALATION) at 07:56

## 2025-01-28 RX ADMIN — THIAMINE HCL TAB 100 MG 100 MG: 100 TAB at 08:58

## 2025-01-28 RX ADMIN — MIDODRINE HYDROCHLORIDE 2.5 MG: 2.5 TABLET ORAL at 08:57

## 2025-01-28 RX ADMIN — BUDESONIDE AND FORMOTEROL FUMARATE DIHYDRATE 2 PUFF: 80; 4.5 AEROSOL RESPIRATORY (INHALATION) at 20:49

## 2025-01-28 RX ADMIN — PALIPERIDONE 6 MG: 3 TABLET, EXTENDED RELEASE ORAL at 08:58

## 2025-01-28 RX ADMIN — GUAIFENESIN 600 MG: 600 TABLET ORAL at 08:58

## 2025-01-28 RX ADMIN — PANTOPRAZOLE SODIUM 40 MG: 40 TABLET, DELAYED RELEASE ORAL at 17:15

## 2025-01-28 RX ADMIN — FOLIC ACID 1 MG: 1 TABLET ORAL at 08:58

## 2025-01-28 RX ADMIN — ZOLPIDEM TARTRATE 10 MG: 5 TABLET, FILM COATED ORAL at 21:53

## 2025-01-28 RX ADMIN — SODIUM CHLORIDE, PRESERVATIVE FREE 10 ML: 5 INJECTION INTRAVENOUS at 21:56

## 2025-01-28 RX ADMIN — FLUOXETINE HYDROCHLORIDE 60 MG: 20 CAPSULE ORAL at 08:58

## 2025-01-28 ASSESSMENT — PAIN SCALES - GENERAL
PAINLEVEL_OUTOF10: 3
PAINLEVEL_OUTOF10: 8
PAINLEVEL_OUTOF10: 0

## 2025-01-28 NOTE — CARE COORDINATION
Discharge planning     Call from Matthew at [facility]. Patient insurance is OON. She also stated they are out of female beds.

## 2025-01-28 NOTE — CARE COORDINATION
Social work: faxed to [facility] and [employer] [place]. Jovani Barroso is out of network. Will need ketan and Rx at discharge. Sandra yoder

## 2025-01-28 NOTE — CARE COORDINATION
Social Work-Phone call to [facility]. They verified that they will do the P2P between 10-12 or 1-3 today. Dr Vasquez is aware of the times. Shavonne

## 2025-01-28 NOTE — CARE COORDINATION
Discharge planning    Notified per SS that patient has been denied per ARU.     Met with patient and spoke with spouse on speaker phone. Will purse SNF. Choices as follows: [facility], [facility] and [facility]. Referrals sent to all three and notified ss.                        Post Acute Facility/Agency List     Provided  patient and spouse  with the following list, the list includes the overall star ratings obtained from CMS per the Medicare Web site (www.Medicare.gov):     [] Long Term Acute Care Facilities  [] Acute Inpatient Rehabilitation Facilities  [x] Skilled Nursing Facilities  [] Hospice Facilities  [] Home Care    Provided verbal instructions on how to utilize the QR Code to obtain additional detailed star ratings from www.Medicare.gov     offered to print and provide the detailed list:    []Accepted   [x]Declined    The following printed detailed lists were provided:     Sent referrals but need updated PT.

## 2025-01-28 NOTE — DISCHARGE INSTR - COC
Continuity of Care Form    Patient Name: Madonna Manrique   :  1965  MRN:  [id number]    Admit date:  2025  Discharge date:  25    Code Status Order: Full Code   Advance Directives:   Advance Care Flowsheet Documentation             Admitting Physician:  No admitting provider for patient encounter.  PCP: Sierra De Oliveira MD    Discharging Nurse: ARCHIE Feliz  Discharging Hospital Unit/Room#: 1014/1014-02  Discharging Unit Phone Number: [phone]    Emergency Contact:   Extended Emergency Contact Information  Primary Emergency Contact: Brisa Morse  Home Phone: [phone]  Mobile Phone: [phone]  Relation: Brother/Sister  Secondary Emergency Contact: Raudel Roberts  Home Phone: [phone]  Mobile Phone: [phone]  Relation: Domestic Partner   needed? No    Past Surgical History:  Past Surgical History:   Procedure Laterality Date    CARPAL TUNNEL RELEASE Bilateral      SECTION      x 3    CHOLECYSTECTOMY      GASTRIC BYPASS SURGERY      weighted 286    HYSTERECTOMY (CERVIX STATUS UNKNOWN)      LEG SURGERY Left 2011    broken leg in 3 places     LEG SURGERY Right 2023    RIGHT EXTERNAL FIXATOR APPLICATION OF RIGHT LEG performed by Seferino Prakash DO at [facility] OR    LEG SURGERY Right 2023    TIBIAL PLATEAU FRACTURE OPEN REDUCTION INTERNAL FIXATION, , EX- FIX REMOVAL ( SYNTHES , C-ARM performed by Seferino Prakash DO at [facility] OR    OTHER SURGICAL HISTORY  2015    left iliac artery stenting, IVC filter placement and retrieval    TIBIA FRACTURE SURGERY Right 2023    TIBIAL PLATEAU KNEE SPANNING EXTERNAL FIXATION, LARGE EX-FIX SET    TIBIA FRACTURE SURGERY Right 2023    TIBIAL PLATEAU FRACTURE OPEN REDUCTION INTERNAL FIXATION, , EX- FIX REMOVAL ( SYNTHES , C-ARM - Right       Immunization History:   Immunization History   Administered Date(s) Administered    COVID-19, MODERNA, (age 12y+), IM, 50mcg/0.5mL 2023    COVID-19, PFIZER PURPLE  daily

## 2025-01-29 PROCEDURE — 6370000000 HC RX 637 (ALT 250 FOR IP): Performed by: NURSE PRACTITIONER

## 2025-01-29 PROCEDURE — 94640 AIRWAY INHALATION TREATMENT: CPT

## 2025-01-29 PROCEDURE — 2500000003 HC RX 250 WO HCPCS: Performed by: NURSE PRACTITIONER

## 2025-01-29 PROCEDURE — 99231 SBSQ HOSP IP/OBS SF/LOW 25: CPT

## 2025-01-29 PROCEDURE — 6370000000 HC RX 637 (ALT 250 FOR IP): Performed by: FAMILY MEDICINE

## 2025-01-29 PROCEDURE — 94761 N-INVAS EAR/PLS OXIMETRY MLT: CPT

## 2025-01-29 PROCEDURE — 97110 THERAPEUTIC EXERCISES: CPT

## 2025-01-29 PROCEDURE — 2060000000 HC ICU INTERMEDIATE R&B

## 2025-01-29 PROCEDURE — 97530 THERAPEUTIC ACTIVITIES: CPT

## 2025-01-29 PROCEDURE — 6360000002 HC RX W HCPCS: Performed by: FAMILY MEDICINE

## 2025-01-29 PROCEDURE — 2700000000 HC OXYGEN THERAPY PER DAY

## 2025-01-29 RX ORDER — NICOTINE 21 MG/24HR
1 PATCH, TRANSDERMAL 24 HOURS TRANSDERMAL DAILY
Qty: 30 PATCH | Refills: 3 | Status: SHIPPED | OUTPATIENT
Start: 2025-01-30

## 2025-01-29 RX ORDER — GUAIFENESIN 600 MG/1
600 TABLET, EXTENDED RELEASE ORAL 2 TIMES DAILY
Qty: 14 TABLET | Refills: 0 | Status: SHIPPED | OUTPATIENT
Start: 2025-01-29

## 2025-01-29 RX ORDER — ERGOCALCIFEROL 1.25 MG/1
50000 CAPSULE, LIQUID FILLED ORAL WEEKLY
Qty: 8 CAPSULE | Refills: 1 | Status: SHIPPED | OUTPATIENT
Start: 2025-01-29

## 2025-01-29 RX ORDER — THIAMINE MONONITRATE (VIT B1) 100 MG
100 TABLET ORAL DAILY
Qty: 30 TABLET | Refills: 0 | Status: SHIPPED | OUTPATIENT
Start: 2025-01-30

## 2025-01-29 RX ORDER — FOLIC ACID 1 MG/1
1 TABLET ORAL DAILY
Qty: 30 TABLET | Refills: 3 | Status: SHIPPED | OUTPATIENT
Start: 2025-01-30

## 2025-01-29 RX ORDER — MIDODRINE HYDROCHLORIDE 2.5 MG/1
2.5 TABLET ORAL
Qty: 90 TABLET | Refills: 3 | Status: SHIPPED | OUTPATIENT
Start: 2025-01-29

## 2025-01-29 RX ADMIN — MIDODRINE HYDROCHLORIDE 2.5 MG: 2.5 TABLET ORAL at 08:26

## 2025-01-29 RX ADMIN — ALBUTEROL SULFATE 2.5 MG: 2.5 SOLUTION RESPIRATORY (INHALATION) at 14:32

## 2025-01-29 RX ADMIN — THIAMINE HCL TAB 100 MG 100 MG: 100 TAB at 08:27

## 2025-01-29 RX ADMIN — ALBUTEROL SULFATE 2.5 MG: 2.5 SOLUTION RESPIRATORY (INHALATION) at 20:43

## 2025-01-29 RX ADMIN — RIVAROXABAN 20 MG: 20 TABLET, FILM COATED ORAL at 17:50

## 2025-01-29 RX ADMIN — ALBUTEROL SULFATE 2.5 MG: 2.5 SOLUTION RESPIRATORY (INHALATION) at 08:42

## 2025-01-29 RX ADMIN — ZOLPIDEM TARTRATE 10 MG: 5 TABLET, FILM COATED ORAL at 21:23

## 2025-01-29 RX ADMIN — FLUOXETINE HYDROCHLORIDE 60 MG: 20 CAPSULE ORAL at 08:26

## 2025-01-29 RX ADMIN — MIDODRINE HYDROCHLORIDE 2.5 MG: 2.5 TABLET ORAL at 16:40

## 2025-01-29 RX ADMIN — BUDESONIDE AND FORMOTEROL FUMARATE DIHYDRATE 2 PUFF: 80; 4.5 AEROSOL RESPIRATORY (INHALATION) at 20:43

## 2025-01-29 RX ADMIN — GUAIFENESIN 600 MG: 600 TABLET ORAL at 21:23

## 2025-01-29 RX ADMIN — PANTOPRAZOLE SODIUM 40 MG: 40 TABLET, DELAYED RELEASE ORAL at 16:39

## 2025-01-29 RX ADMIN — ACETAMINOPHEN 650 MG: 325 TABLET ORAL at 02:19

## 2025-01-29 RX ADMIN — PANTOPRAZOLE SODIUM 40 MG: 40 TABLET, DELAYED RELEASE ORAL at 05:18

## 2025-01-29 RX ADMIN — FOLIC ACID 1 MG: 1 TABLET ORAL at 08:26

## 2025-01-29 RX ADMIN — BUDESONIDE AND FORMOTEROL FUMARATE DIHYDRATE 2 PUFF: 80; 4.5 AEROSOL RESPIRATORY (INHALATION) at 08:44

## 2025-01-29 RX ADMIN — MIDODRINE HYDROCHLORIDE 2.5 MG: 2.5 TABLET ORAL at 11:43

## 2025-01-29 RX ADMIN — TIOTROPIUM BROMIDE INHALATION SPRAY 2 PUFF: 3.12 SPRAY, METERED RESPIRATORY (INHALATION) at 08:44

## 2025-01-29 RX ADMIN — PREDNISONE 40 MG: 20 TABLET ORAL at 08:26

## 2025-01-29 RX ADMIN — GUAIFENESIN 600 MG: 600 TABLET ORAL at 08:26

## 2025-01-29 RX ADMIN — SODIUM CHLORIDE, PRESERVATIVE FREE 10 ML: 5 INJECTION INTRAVENOUS at 08:27

## 2025-01-29 RX ADMIN — PALIPERIDONE 6 MG: 3 TABLET, EXTENDED RELEASE ORAL at 08:26

## 2025-01-29 ASSESSMENT — PAIN SCALES - GENERAL
PAINLEVEL_OUTOF10: 2
PAINLEVEL_OUTOF10: 5
PAINLEVEL_OUTOF10: 0

## 2025-01-29 ASSESSMENT — PAIN DESCRIPTION - LOCATION: LOCATION: HEAD

## 2025-01-29 ASSESSMENT — PAIN DESCRIPTION - DESCRIPTORS: DESCRIPTORS: ACHING

## 2025-01-29 NOTE — CARE COORDINATION
Social Work-Met  with patient. She would like  to reach out to her , Desean. Spoke with Desean. Explained that neither [facility] or [facility] will have bed due to MS medication. Discussed other SNF choices. He would like for patient to stay near home. Sent referral to [facility], [facility], [facility], [facility], [facility], and [facility]. Mervin

## 2025-01-29 NOTE — DISCHARGE SUMMARY
[facility]  Office: [phone]  Bill Frank DO, Enrique Drummond DO, Steve Shaikh DO, Jay Tineo DO, Francoise De Leon MD, Sahara Yu MD, Evelia Owens MD, Elena Olvera MD,  Manuel Rodgers MD, Esteban Negron MD, Nila Nieves MD,  Charles Frausto DO, Lucas Calvo MD, Casper Marrero MD, Jonnie Frank DO, Malaika Ly MD,  Ernesto Ramey DO, Litzy Reed MD, Sarah Craft MD, Tami Bee MD, Crow Cervantes MD,  Hernandez Edge MD, Jesica Obando MD, Felipe Carreon MD, Rohini Wright MD, Enrique Whitaker MD, Madyson Irizarry MD, Ronaldo Aguilar DO, Too Guillen MD, Charles De Jesus MD, Mohsin Reza, MD, Shirley Waterhouse, CNP,  Neli Guadalupe CNP, Ronaldo Stanton, CNP,  Sandra Ray, [facility], Asiya Orr, CNP, Najma Chen, CNP, Nany Mendoza, CNP, Jennifer Resendiz, CNP, Jane Hammer PA-C, MARIO SparksC, Rosette Crain, CNP, Carmen Acevedo, CNP,  Virginia Crawley, CNP, Elmira Alonso, CNP, Zara Atwood, CNP,  Domenica Rene, CNS, Sherita Garza, CNP, Ayse Bess, CNP,   Kaitlin Gonzalez, CNP         [facility]   IN-PATIENT SERVICE   [facility]    Discharge Summary     Patient ID: Madonna Manrique  :  1965   MRN: [id number]     ACCOUNT:  [id number]   Patient's PCP: Sierra De Oliveira MD  Admit Date: 2025   Discharge Date: 2/3/2025  Length of Stay: 13  Code Status:  Full Code  Admitting Physician: No admitting provider for patient encounter.  Discharge Physician: LENO Rodriguez     Active Discharge Diagnoses:     Hospital Problem Lists:  Principal Problem:    Community acquired pneumonia of left lower lobe of lung  Active Problems:    Dehydration with hyponatremia    Persistent asthma with acute exacerbation    Influenza A    Long QT interval  Resolved Problems:    Alcohol use disorder, severe, dependence (HCC)      Admission Condition:  poor     Discharged Condition: stable    Hospital Stay:     Hospital Course:  Madonna Manrique is a

## 2025-01-30 ENCOUNTER — TELEPHONE (OUTPATIENT)
Dept: NEUROLOGY | Age: 60
End: 2025-01-30

## 2025-01-30 PROCEDURE — 6370000000 HC RX 637 (ALT 250 FOR IP): Performed by: NURSE PRACTITIONER

## 2025-01-30 PROCEDURE — 94640 AIRWAY INHALATION TREATMENT: CPT

## 2025-01-30 PROCEDURE — 2500000003 HC RX 250 WO HCPCS: Performed by: NURSE PRACTITIONER

## 2025-01-30 PROCEDURE — 97535 SELF CARE MNGMENT TRAINING: CPT

## 2025-01-30 PROCEDURE — 6360000002 HC RX W HCPCS: Performed by: FAMILY MEDICINE

## 2025-01-30 PROCEDURE — 6370000000 HC RX 637 (ALT 250 FOR IP): Performed by: FAMILY MEDICINE

## 2025-01-30 PROCEDURE — 94761 N-INVAS EAR/PLS OXIMETRY MLT: CPT

## 2025-01-30 PROCEDURE — 2060000000 HC ICU INTERMEDIATE R&B

## 2025-01-30 PROCEDURE — 99231 SBSQ HOSP IP/OBS SF/LOW 25: CPT

## 2025-01-30 PROCEDURE — 2700000000 HC OXYGEN THERAPY PER DAY

## 2025-01-30 RX ADMIN — PANTOPRAZOLE SODIUM 40 MG: 40 TABLET, DELAYED RELEASE ORAL at 15:12

## 2025-01-30 RX ADMIN — BUDESONIDE AND FORMOTEROL FUMARATE DIHYDRATE 2 PUFF: 80; 4.5 AEROSOL RESPIRATORY (INHALATION) at 08:44

## 2025-01-30 RX ADMIN — ALBUTEROL SULFATE 2.5 MG: 2.5 SOLUTION RESPIRATORY (INHALATION) at 14:14

## 2025-01-30 RX ADMIN — TIOTROPIUM BROMIDE INHALATION SPRAY 2 PUFF: 3.12 SPRAY, METERED RESPIRATORY (INHALATION) at 08:44

## 2025-01-30 RX ADMIN — RIVAROXABAN 20 MG: 20 TABLET, FILM COATED ORAL at 17:31

## 2025-01-30 RX ADMIN — PANTOPRAZOLE SODIUM 40 MG: 40 TABLET, DELAYED RELEASE ORAL at 05:24

## 2025-01-30 RX ADMIN — FOLIC ACID 1 MG: 1 TABLET ORAL at 08:37

## 2025-01-30 RX ADMIN — ERGOCALCIFEROL 50000 UNITS: 1.25 CAPSULE ORAL at 08:37

## 2025-01-30 RX ADMIN — DIAZEPAM 10 MG: 5 TABLET ORAL at 15:12

## 2025-01-30 RX ADMIN — MIDODRINE HYDROCHLORIDE 2.5 MG: 2.5 TABLET ORAL at 17:31

## 2025-01-30 RX ADMIN — MIDODRINE HYDROCHLORIDE 2.5 MG: 2.5 TABLET ORAL at 08:37

## 2025-01-30 RX ADMIN — MIDODRINE HYDROCHLORIDE 2.5 MG: 2.5 TABLET ORAL at 12:30

## 2025-01-30 RX ADMIN — FLUOXETINE HYDROCHLORIDE 60 MG: 20 CAPSULE ORAL at 08:37

## 2025-01-30 RX ADMIN — ALBUTEROL SULFATE 2.5 MG: 2.5 SOLUTION RESPIRATORY (INHALATION) at 21:44

## 2025-01-30 RX ADMIN — SODIUM CHLORIDE, PRESERVATIVE FREE 10 ML: 5 INJECTION INTRAVENOUS at 20:55

## 2025-01-30 RX ADMIN — GUAIFENESIN 600 MG: 600 TABLET ORAL at 08:37

## 2025-01-30 RX ADMIN — GUAIFENESIN 600 MG: 600 TABLET ORAL at 20:56

## 2025-01-30 RX ADMIN — ACETAMINOPHEN 650 MG: 325 TABLET ORAL at 17:31

## 2025-01-30 RX ADMIN — PALIPERIDONE 6 MG: 3 TABLET, EXTENDED RELEASE ORAL at 08:37

## 2025-01-30 RX ADMIN — ZOLPIDEM TARTRATE 10 MG: 5 TABLET, FILM COATED ORAL at 23:04

## 2025-01-30 RX ADMIN — DIPHENHYDRAMINE HYDROCHLORIDE 5 ML: 12.5 LIQUID ORAL at 23:07

## 2025-01-30 RX ADMIN — ALBUTEROL SULFATE 2.5 MG: 2.5 SOLUTION RESPIRATORY (INHALATION) at 08:44

## 2025-01-30 RX ADMIN — THIAMINE HCL TAB 100 MG 100 MG: 100 TAB at 08:37

## 2025-01-30 RX ADMIN — BUDESONIDE AND FORMOTEROL FUMARATE DIHYDRATE 2 PUFF: 80; 4.5 AEROSOL RESPIRATORY (INHALATION) at 21:46

## 2025-01-30 RX ADMIN — SODIUM CHLORIDE, PRESERVATIVE FREE 10 ML: 5 INJECTION INTRAVENOUS at 08:37

## 2025-01-30 ASSESSMENT — PAIN DESCRIPTION - DESCRIPTORS: DESCRIPTORS: THROBBING

## 2025-01-30 ASSESSMENT — PAIN DESCRIPTION - ORIENTATION: ORIENTATION: MID;INNER

## 2025-01-30 ASSESSMENT — PAIN SCALES - GENERAL
PAINLEVEL_OUTOF10: 7
PAINLEVEL_OUTOF10: 0

## 2025-01-30 ASSESSMENT — PAIN - FUNCTIONAL ASSESSMENT: PAIN_FUNCTIONAL_ASSESSMENT: ACTIVITIES ARE NOT PREVENTED

## 2025-01-30 ASSESSMENT — PAIN DESCRIPTION - LOCATION: LOCATION: HEAD

## 2025-01-30 NOTE — TELEPHONE ENCOUNTER
I called patients significant other Desean to advise Dr. Stone does NOT want her to get the Ocrevus infusion, he voiced understanding and will cancel appointment. Also reminded him to follow up on 2/24/25 with Dr. Stone he voiced understanding.

## 2025-01-30 NOTE — CARE COORDINATION
Social Work- [facility] and [facility]. [facility] prefers [facility].. WIll begin padmaja. Mervin

## 2025-01-30 NOTE — TELEPHONE ENCOUNTER
Patients significant other Desean called in (patient gave permission to speak with him) wanted to let us know that the patient missed her Ocrevus infusion. She is admitted at [facility] for pneumonia and influenza a. She has developed significant health issues during the course of her hospitalization and as of now she has signed a DNRCC order because she is refusing to follow the thickened liquid diet for a failed swallow study.     Desean was letting us know that she has an Ocrevus infusion scheduled for 2/11/25 but they don't know if she'll be able to make that infusion either given her condition. I advised I would forward this to Dr. Stone to see if she would prefer to have the 2/11/25 rescheduled to allow for more time to recover and they can followup with Dr. Stone in the office on 2/24/25 or what she recommends.     Desean was very upset when he found out patient was a DNRCC and stated that this is unacceptable and that he will discuss this with Cristina and the nurse.

## 2025-01-31 PROCEDURE — 6360000002 HC RX W HCPCS: Performed by: FAMILY MEDICINE

## 2025-01-31 PROCEDURE — 2700000000 HC OXYGEN THERAPY PER DAY

## 2025-01-31 PROCEDURE — 6370000000 HC RX 637 (ALT 250 FOR IP): Performed by: NURSE PRACTITIONER

## 2025-01-31 PROCEDURE — 97116 GAIT TRAINING THERAPY: CPT

## 2025-01-31 PROCEDURE — 94640 AIRWAY INHALATION TREATMENT: CPT

## 2025-01-31 PROCEDURE — 6370000000 HC RX 637 (ALT 250 FOR IP): Performed by: FAMILY MEDICINE

## 2025-01-31 PROCEDURE — 94761 N-INVAS EAR/PLS OXIMETRY MLT: CPT

## 2025-01-31 PROCEDURE — 99231 SBSQ HOSP IP/OBS SF/LOW 25: CPT

## 2025-01-31 PROCEDURE — 2500000003 HC RX 250 WO HCPCS: Performed by: NURSE PRACTITIONER

## 2025-01-31 PROCEDURE — 2060000000 HC ICU INTERMEDIATE R&B

## 2025-01-31 PROCEDURE — 97530 THERAPEUTIC ACTIVITIES: CPT

## 2025-01-31 RX ADMIN — GUAIFENESIN 600 MG: 600 TABLET ORAL at 08:41

## 2025-01-31 RX ADMIN — ZOLPIDEM TARTRATE 10 MG: 5 TABLET, FILM COATED ORAL at 21:04

## 2025-01-31 RX ADMIN — BUDESONIDE AND FORMOTEROL FUMARATE DIHYDRATE 2 PUFF: 80; 4.5 AEROSOL RESPIRATORY (INHALATION) at 20:18

## 2025-01-31 RX ADMIN — PALIPERIDONE 6 MG: 3 TABLET, EXTENDED RELEASE ORAL at 08:41

## 2025-01-31 RX ADMIN — BUDESONIDE AND FORMOTEROL FUMARATE DIHYDRATE 2 PUFF: 80; 4.5 AEROSOL RESPIRATORY (INHALATION) at 08:11

## 2025-01-31 RX ADMIN — THIAMINE HCL TAB 100 MG 100 MG: 100 TAB at 08:41

## 2025-01-31 RX ADMIN — ALBUTEROL SULFATE 2.5 MG: 2.5 SOLUTION RESPIRATORY (INHALATION) at 20:16

## 2025-01-31 RX ADMIN — SODIUM CHLORIDE, PRESERVATIVE FREE 10 ML: 5 INJECTION INTRAVENOUS at 08:42

## 2025-01-31 RX ADMIN — PANTOPRAZOLE SODIUM 40 MG: 40 TABLET, DELAYED RELEASE ORAL at 16:27

## 2025-01-31 RX ADMIN — FOLIC ACID 1 MG: 1 TABLET ORAL at 08:42

## 2025-01-31 RX ADMIN — RIVAROXABAN 20 MG: 20 TABLET, FILM COATED ORAL at 17:46

## 2025-01-31 RX ADMIN — MIDODRINE HYDROCHLORIDE 2.5 MG: 2.5 TABLET ORAL at 16:27

## 2025-01-31 RX ADMIN — PANTOPRAZOLE SODIUM 40 MG: 40 TABLET, DELAYED RELEASE ORAL at 05:03

## 2025-01-31 RX ADMIN — ALBUTEROL SULFATE 2.5 MG: 2.5 SOLUTION RESPIRATORY (INHALATION) at 08:06

## 2025-01-31 RX ADMIN — SODIUM CHLORIDE, PRESERVATIVE FREE 10 ML: 5 INJECTION INTRAVENOUS at 21:04

## 2025-01-31 RX ADMIN — TIOTROPIUM BROMIDE INHALATION SPRAY 2 PUFF: 3.12 SPRAY, METERED RESPIRATORY (INHALATION) at 08:11

## 2025-01-31 RX ADMIN — MIDODRINE HYDROCHLORIDE 2.5 MG: 2.5 TABLET ORAL at 08:42

## 2025-01-31 RX ADMIN — MIDODRINE HYDROCHLORIDE 2.5 MG: 2.5 TABLET ORAL at 12:32

## 2025-01-31 RX ADMIN — ALBUTEROL SULFATE 2.5 MG: 2.5 SOLUTION RESPIRATORY (INHALATION) at 15:20

## 2025-01-31 RX ADMIN — FLUOXETINE HYDROCHLORIDE 60 MG: 20 CAPSULE ORAL at 08:41

## 2025-01-31 RX ADMIN — GUAIFENESIN 600 MG: 600 TABLET ORAL at 21:04

## 2025-01-31 NOTE — CARE COORDINATION
Social Work-Auth is still pending for [facility]. The weekend contact at [facility] is Jes. Her phone is [phone]. The number for report is [phone]. The fax is [phone]. Shavonne

## 2025-02-01 PROCEDURE — 6370000000 HC RX 637 (ALT 250 FOR IP): Performed by: FAMILY MEDICINE

## 2025-02-01 PROCEDURE — 2700000000 HC OXYGEN THERAPY PER DAY

## 2025-02-01 PROCEDURE — 6370000000 HC RX 637 (ALT 250 FOR IP): Performed by: NURSE PRACTITIONER

## 2025-02-01 PROCEDURE — 6360000002 HC RX W HCPCS: Performed by: FAMILY MEDICINE

## 2025-02-01 PROCEDURE — 97530 THERAPEUTIC ACTIVITIES: CPT

## 2025-02-01 PROCEDURE — 2060000000 HC ICU INTERMEDIATE R&B

## 2025-02-01 PROCEDURE — 94640 AIRWAY INHALATION TREATMENT: CPT

## 2025-02-01 PROCEDURE — 99231 SBSQ HOSP IP/OBS SF/LOW 25: CPT

## 2025-02-01 PROCEDURE — 2500000003 HC RX 250 WO HCPCS: Performed by: NURSE PRACTITIONER

## 2025-02-01 PROCEDURE — 94761 N-INVAS EAR/PLS OXIMETRY MLT: CPT

## 2025-02-01 PROCEDURE — 97110 THERAPEUTIC EXERCISES: CPT

## 2025-02-01 RX ORDER — ALBUTEROL SULFATE 0.83 MG/ML
2.5 SOLUTION RESPIRATORY (INHALATION)
Status: DISCONTINUED | OUTPATIENT
Start: 2025-02-02 | End: 2025-02-05 | Stop reason: HOSPADM

## 2025-02-01 RX ADMIN — GUAIFENESIN 600 MG: 600 TABLET ORAL at 22:37

## 2025-02-01 RX ADMIN — PALIPERIDONE 6 MG: 3 TABLET, EXTENDED RELEASE ORAL at 09:23

## 2025-02-01 RX ADMIN — MIDODRINE HYDROCHLORIDE 2.5 MG: 2.5 TABLET ORAL at 09:22

## 2025-02-01 RX ADMIN — ZOLPIDEM TARTRATE 10 MG: 5 TABLET, FILM COATED ORAL at 22:37

## 2025-02-01 RX ADMIN — ALBUTEROL SULFATE 2.5 MG: 2.5 SOLUTION RESPIRATORY (INHALATION) at 15:43

## 2025-02-01 RX ADMIN — ALBUTEROL SULFATE 2.5 MG: 2.5 SOLUTION RESPIRATORY (INHALATION) at 20:39

## 2025-02-01 RX ADMIN — FOLIC ACID 1 MG: 1 TABLET ORAL at 09:23

## 2025-02-01 RX ADMIN — MIDODRINE HYDROCHLORIDE 2.5 MG: 2.5 TABLET ORAL at 17:45

## 2025-02-01 RX ADMIN — SODIUM CHLORIDE, PRESERVATIVE FREE 10 ML: 5 INJECTION INTRAVENOUS at 09:24

## 2025-02-01 RX ADMIN — PANTOPRAZOLE SODIUM 40 MG: 40 TABLET, DELAYED RELEASE ORAL at 17:45

## 2025-02-01 RX ADMIN — FLUOXETINE HYDROCHLORIDE 60 MG: 20 CAPSULE ORAL at 09:22

## 2025-02-01 RX ADMIN — BUDESONIDE AND FORMOTEROL FUMARATE DIHYDRATE 2 PUFF: 80; 4.5 AEROSOL RESPIRATORY (INHALATION) at 07:28

## 2025-02-01 RX ADMIN — MIDODRINE HYDROCHLORIDE 2.5 MG: 2.5 TABLET ORAL at 13:32

## 2025-02-01 RX ADMIN — TIOTROPIUM BROMIDE INHALATION SPRAY 2 PUFF: 3.12 SPRAY, METERED RESPIRATORY (INHALATION) at 07:28

## 2025-02-01 RX ADMIN — ALBUTEROL SULFATE 2.5 MG: 2.5 SOLUTION RESPIRATORY (INHALATION) at 07:28

## 2025-02-01 RX ADMIN — THIAMINE HCL TAB 100 MG 100 MG: 100 TAB at 09:23

## 2025-02-01 RX ADMIN — PANTOPRAZOLE SODIUM 40 MG: 40 TABLET, DELAYED RELEASE ORAL at 06:02

## 2025-02-01 RX ADMIN — GUAIFENESIN 600 MG: 600 TABLET ORAL at 09:22

## 2025-02-01 RX ADMIN — SODIUM CHLORIDE, PRESERVATIVE FREE 10 ML: 5 INJECTION INTRAVENOUS at 22:37

## 2025-02-01 RX ADMIN — BUDESONIDE AND FORMOTEROL FUMARATE DIHYDRATE 2 PUFF: 80; 4.5 AEROSOL RESPIRATORY (INHALATION) at 20:39

## 2025-02-01 RX ADMIN — RIVAROXABAN 20 MG: 20 TABLET, FILM COATED ORAL at 17:45

## 2025-02-01 NOTE — CARE COORDINATION
Social work precert is still pending today. Will check again on Monday.Will need ketan and Rx at discharge. Sandra yoder

## 2025-02-02 PROCEDURE — 99231 SBSQ HOSP IP/OBS SF/LOW 25: CPT

## 2025-02-02 PROCEDURE — 6370000000 HC RX 637 (ALT 250 FOR IP): Performed by: NURSE PRACTITIONER

## 2025-02-02 PROCEDURE — 2500000003 HC RX 250 WO HCPCS: Performed by: NURSE PRACTITIONER

## 2025-02-02 PROCEDURE — 6360000002 HC RX W HCPCS: Performed by: INTERNAL MEDICINE

## 2025-02-02 PROCEDURE — 94640 AIRWAY INHALATION TREATMENT: CPT

## 2025-02-02 PROCEDURE — 6370000000 HC RX 637 (ALT 250 FOR IP): Performed by: FAMILY MEDICINE

## 2025-02-02 PROCEDURE — 2060000000 HC ICU INTERMEDIATE R&B

## 2025-02-02 PROCEDURE — 94761 N-INVAS EAR/PLS OXIMETRY MLT: CPT

## 2025-02-02 PROCEDURE — 2700000000 HC OXYGEN THERAPY PER DAY

## 2025-02-02 RX ADMIN — BUDESONIDE AND FORMOTEROL FUMARATE DIHYDRATE 2 PUFF: 80; 4.5 AEROSOL RESPIRATORY (INHALATION) at 08:04

## 2025-02-02 RX ADMIN — MIDODRINE HYDROCHLORIDE 2.5 MG: 2.5 TABLET ORAL at 09:10

## 2025-02-02 RX ADMIN — BUDESONIDE AND FORMOTEROL FUMARATE DIHYDRATE 2 PUFF: 80; 4.5 AEROSOL RESPIRATORY (INHALATION) at 20:53

## 2025-02-02 RX ADMIN — GUAIFENESIN 600 MG: 600 TABLET ORAL at 20:43

## 2025-02-02 RX ADMIN — TIOTROPIUM BROMIDE INHALATION SPRAY 2 PUFF: 3.12 SPRAY, METERED RESPIRATORY (INHALATION) at 08:04

## 2025-02-02 RX ADMIN — MIDODRINE HYDROCHLORIDE 2.5 MG: 2.5 TABLET ORAL at 13:39

## 2025-02-02 RX ADMIN — SODIUM CHLORIDE, PRESERVATIVE FREE 10 ML: 5 INJECTION INTRAVENOUS at 09:11

## 2025-02-02 RX ADMIN — THIAMINE HCL TAB 100 MG 100 MG: 100 TAB at 09:10

## 2025-02-02 RX ADMIN — ALBUTEROL SULFATE 2.5 MG: 2.5 SOLUTION RESPIRATORY (INHALATION) at 08:02

## 2025-02-02 RX ADMIN — FOLIC ACID 1 MG: 1 TABLET ORAL at 09:10

## 2025-02-02 RX ADMIN — RIVAROXABAN 20 MG: 20 TABLET, FILM COATED ORAL at 16:14

## 2025-02-02 RX ADMIN — MIDODRINE HYDROCHLORIDE 2.5 MG: 2.5 TABLET ORAL at 16:14

## 2025-02-02 RX ADMIN — PANTOPRAZOLE SODIUM 40 MG: 40 TABLET, DELAYED RELEASE ORAL at 16:14

## 2025-02-02 RX ADMIN — DIAZEPAM 10 MG: 5 TABLET ORAL at 14:35

## 2025-02-02 RX ADMIN — ALBUTEROL SULFATE 2.5 MG: 2.5 SOLUTION RESPIRATORY (INHALATION) at 20:52

## 2025-02-02 RX ADMIN — GUAIFENESIN 600 MG: 600 TABLET ORAL at 09:10

## 2025-02-02 RX ADMIN — SODIUM CHLORIDE, PRESERVATIVE FREE 10 ML: 5 INJECTION INTRAVENOUS at 20:45

## 2025-02-02 RX ADMIN — FLUOXETINE HYDROCHLORIDE 60 MG: 20 CAPSULE ORAL at 09:10

## 2025-02-02 RX ADMIN — PALIPERIDONE 6 MG: 3 TABLET, EXTENDED RELEASE ORAL at 09:10

## 2025-02-02 RX ADMIN — PANTOPRAZOLE SODIUM 40 MG: 40 TABLET, DELAYED RELEASE ORAL at 06:41

## 2025-02-03 PROCEDURE — 2700000000 HC OXYGEN THERAPY PER DAY

## 2025-02-03 PROCEDURE — 6370000000 HC RX 637 (ALT 250 FOR IP): Performed by: NURSE PRACTITIONER

## 2025-02-03 PROCEDURE — 2060000000 HC ICU INTERMEDIATE R&B

## 2025-02-03 PROCEDURE — 6360000002 HC RX W HCPCS: Performed by: INTERNAL MEDICINE

## 2025-02-03 PROCEDURE — 97116 GAIT TRAINING THERAPY: CPT

## 2025-02-03 PROCEDURE — 94761 N-INVAS EAR/PLS OXIMETRY MLT: CPT

## 2025-02-03 PROCEDURE — 99231 SBSQ HOSP IP/OBS SF/LOW 25: CPT

## 2025-02-03 PROCEDURE — 97110 THERAPEUTIC EXERCISES: CPT

## 2025-02-03 PROCEDURE — 6370000000 HC RX 637 (ALT 250 FOR IP): Performed by: FAMILY MEDICINE

## 2025-02-03 PROCEDURE — 2500000003 HC RX 250 WO HCPCS: Performed by: NURSE PRACTITIONER

## 2025-02-03 PROCEDURE — 94640 AIRWAY INHALATION TREATMENT: CPT

## 2025-02-03 RX ADMIN — RIVAROXABAN 20 MG: 20 TABLET, FILM COATED ORAL at 17:52

## 2025-02-03 RX ADMIN — ZOLPIDEM TARTRATE 10 MG: 5 TABLET, FILM COATED ORAL at 22:37

## 2025-02-03 RX ADMIN — PANTOPRAZOLE SODIUM 40 MG: 40 TABLET, DELAYED RELEASE ORAL at 06:24

## 2025-02-03 RX ADMIN — MIDODRINE HYDROCHLORIDE 2.5 MG: 2.5 TABLET ORAL at 09:21

## 2025-02-03 RX ADMIN — SODIUM CHLORIDE, PRESERVATIVE FREE 10 ML: 5 INJECTION INTRAVENOUS at 09:22

## 2025-02-03 RX ADMIN — THIAMINE HCL TAB 100 MG 100 MG: 100 TAB at 09:22

## 2025-02-03 RX ADMIN — ALBUTEROL SULFATE 2.5 MG: 2.5 SOLUTION RESPIRATORY (INHALATION) at 20:06

## 2025-02-03 RX ADMIN — DIAZEPAM 10 MG: 5 TABLET ORAL at 09:28

## 2025-02-03 RX ADMIN — BUDESONIDE AND FORMOTEROL FUMARATE DIHYDRATE 2 PUFF: 80; 4.5 AEROSOL RESPIRATORY (INHALATION) at 10:19

## 2025-02-03 RX ADMIN — PALIPERIDONE 6 MG: 3 TABLET, EXTENDED RELEASE ORAL at 09:21

## 2025-02-03 RX ADMIN — TIOTROPIUM BROMIDE INHALATION SPRAY 2 PUFF: 3.12 SPRAY, METERED RESPIRATORY (INHALATION) at 10:19

## 2025-02-03 RX ADMIN — ALBUTEROL SULFATE 2.5 MG: 2.5 SOLUTION RESPIRATORY (INHALATION) at 10:18

## 2025-02-03 RX ADMIN — MIDODRINE HYDROCHLORIDE 2.5 MG: 2.5 TABLET ORAL at 12:20

## 2025-02-03 RX ADMIN — DIPHENHYDRAMINE HYDROCHLORIDE 5 ML: 12.5 LIQUID ORAL at 09:39

## 2025-02-03 RX ADMIN — PANTOPRAZOLE SODIUM 40 MG: 40 TABLET, DELAYED RELEASE ORAL at 16:17

## 2025-02-03 RX ADMIN — MIDODRINE HYDROCHLORIDE 2.5 MG: 2.5 TABLET ORAL at 17:52

## 2025-02-03 RX ADMIN — FLUOXETINE HYDROCHLORIDE 60 MG: 20 CAPSULE ORAL at 09:21

## 2025-02-03 RX ADMIN — SODIUM CHLORIDE, PRESERVATIVE FREE 10 ML: 5 INJECTION INTRAVENOUS at 19:42

## 2025-02-03 RX ADMIN — GUAIFENESIN 600 MG: 600 TABLET ORAL at 09:21

## 2025-02-03 RX ADMIN — GUAIFENESIN 600 MG: 600 TABLET ORAL at 19:40

## 2025-02-03 RX ADMIN — FOLIC ACID 1 MG: 1 TABLET ORAL at 09:29

## 2025-02-03 RX ADMIN — DIPHENHYDRAMINE HYDROCHLORIDE 5 ML: 12.5 LIQUID ORAL at 20:46

## 2025-02-03 RX ADMIN — BUDESONIDE AND FORMOTEROL FUMARATE DIHYDRATE 2 PUFF: 80; 4.5 AEROSOL RESPIRATORY (INHALATION) at 20:08

## 2025-02-03 ASSESSMENT — ENCOUNTER SYMPTOMS
ABDOMINAL PAIN: 0
CONSTIPATION: 0
SHORTNESS OF BREATH: 0
VOMITING: 0
NAUSEA: 0
COUGH: 0
DIARRHEA: 0
WHEEZING: 0

## 2025-02-04 LAB
ANION GAP SERPL CALCULATED.3IONS-SCNC: 10 MMOL/L (ref 9–16)
BACTERIA URNS QL MICRO: ABNORMAL
BILIRUB UR QL STRIP: NEGATIVE
BUN SERPL-MCNC: 15 MG/DL (ref 6–20)
CALCIUM SERPL-MCNC: 8.7 MG/DL (ref 8.6–10.4)
CHLORIDE SERPL-SCNC: 106 MMOL/L (ref 98–107)
CLARITY UR: ABNORMAL
CO2 SERPL-SCNC: 21 MMOL/L (ref 20–31)
COLOR UR: YELLOW
CREAT SERPL-MCNC: 0.3 MG/DL (ref 0.5–0.9)
CRYSTALS URNS MICRO: ABNORMAL /HPF
EPI CELLS #/AREA URNS HPF: ABNORMAL /HPF (ref 0–5)
ERYTHROCYTE [DISTWIDTH] IN BLOOD BY AUTOMATED COUNT: 16.2 % (ref 11.8–14.4)
GFR, ESTIMATED: >90 ML/MIN/1.73M2
GLUCOSE SERPL-MCNC: 126 MG/DL (ref 74–99)
GLUCOSE UR STRIP-MCNC: NEGATIVE MG/DL
HCT VFR BLD AUTO: 29.9 % (ref 36.3–47.1)
HGB BLD-MCNC: 9.3 G/DL (ref 11.9–15.1)
HGB UR QL STRIP.AUTO: NEGATIVE
KETONES UR STRIP-MCNC: ABNORMAL MG/DL
LEUKOCYTE ESTERASE UR QL STRIP: ABNORMAL
MCH RBC QN AUTO: 27.1 PG (ref 25.2–33.5)
MCHC RBC AUTO-ENTMCNC: 31.1 G/DL (ref 28.4–34.8)
MCV RBC AUTO: 87.2 FL (ref 82.6–102.9)
MUCOUS THREADS URNS QL MICRO: ABNORMAL
NITRITE UR QL STRIP: NEGATIVE
NRBC BLD-RTO: 0 PER 100 WBC
PH UR STRIP: 6.5 [PH] (ref 5–8)
PLATELET # BLD AUTO: 286 K/UL (ref 138–453)
PMV BLD AUTO: 10.4 FL (ref 8.1–13.5)
POTASSIUM SERPL-SCNC: 3.6 MMOL/L (ref 3.7–5.3)
PROT UR STRIP-MCNC: NEGATIVE MG/DL
RBC # BLD AUTO: 3.43 M/UL (ref 3.95–5.11)
RBC #/AREA URNS HPF: ABNORMAL /HPF (ref 0–2)
SODIUM SERPL-SCNC: 136 MMOL/L (ref 136–145)
SP GR UR STRIP: 1.02 (ref 1–1.03)
UROBILINOGEN UR STRIP-ACNC: ABNORMAL EU/DL (ref 0–1)
WBC #/AREA URNS HPF: ABNORMAL /HPF (ref 0–5)
WBC OTHER # BLD: 5.9 K/UL (ref 3.5–11.3)

## 2025-02-04 PROCEDURE — 80048 BASIC METABOLIC PNL TOTAL CA: CPT

## 2025-02-04 PROCEDURE — 94761 N-INVAS EAR/PLS OXIMETRY MLT: CPT

## 2025-02-04 PROCEDURE — 6370000000 HC RX 637 (ALT 250 FOR IP): Performed by: NURSE PRACTITIONER

## 2025-02-04 PROCEDURE — 85027 COMPLETE CBC AUTOMATED: CPT

## 2025-02-04 PROCEDURE — 6360000002 HC RX W HCPCS: Performed by: INTERNAL MEDICINE

## 2025-02-04 PROCEDURE — 81001 URINALYSIS AUTO W/SCOPE: CPT

## 2025-02-04 PROCEDURE — 94640 AIRWAY INHALATION TREATMENT: CPT

## 2025-02-04 PROCEDURE — 99231 SBSQ HOSP IP/OBS SF/LOW 25: CPT | Performed by: FAMILY MEDICINE

## 2025-02-04 PROCEDURE — 2700000000 HC OXYGEN THERAPY PER DAY

## 2025-02-04 PROCEDURE — 6370000000 HC RX 637 (ALT 250 FOR IP): Performed by: FAMILY MEDICINE

## 2025-02-04 PROCEDURE — 36415 COLL VENOUS BLD VENIPUNCTURE: CPT

## 2025-02-04 PROCEDURE — 2060000000 HC ICU INTERMEDIATE R&B

## 2025-02-04 PROCEDURE — 2500000003 HC RX 250 WO HCPCS: Performed by: NURSE PRACTITIONER

## 2025-02-04 RX ADMIN — TIOTROPIUM BROMIDE INHALATION SPRAY 2 PUFF: 3.12 SPRAY, METERED RESPIRATORY (INHALATION) at 10:30

## 2025-02-04 RX ADMIN — FLUOXETINE HYDROCHLORIDE 60 MG: 20 CAPSULE ORAL at 08:24

## 2025-02-04 RX ADMIN — BUDESONIDE AND FORMOTEROL FUMARATE DIHYDRATE 2 PUFF: 80; 4.5 AEROSOL RESPIRATORY (INHALATION) at 10:30

## 2025-02-04 RX ADMIN — ALBUTEROL SULFATE 2.5 MG: 2.5 SOLUTION RESPIRATORY (INHALATION) at 21:02

## 2025-02-04 RX ADMIN — SODIUM CHLORIDE, PRESERVATIVE FREE 10 ML: 5 INJECTION INTRAVENOUS at 08:29

## 2025-02-04 RX ADMIN — RIVAROXABAN 20 MG: 20 TABLET, FILM COATED ORAL at 17:16

## 2025-02-04 RX ADMIN — GUAIFENESIN 600 MG: 600 TABLET ORAL at 08:24

## 2025-02-04 RX ADMIN — FOLIC ACID 1 MG: 1 TABLET ORAL at 08:24

## 2025-02-04 RX ADMIN — BUDESONIDE AND FORMOTEROL FUMARATE DIHYDRATE 2 PUFF: 80; 4.5 AEROSOL RESPIRATORY (INHALATION) at 21:02

## 2025-02-04 RX ADMIN — PANTOPRAZOLE SODIUM 40 MG: 40 TABLET, DELAYED RELEASE ORAL at 05:37

## 2025-02-04 RX ADMIN — MIDODRINE HYDROCHLORIDE 2.5 MG: 2.5 TABLET ORAL at 08:24

## 2025-02-04 RX ADMIN — SODIUM CHLORIDE, PRESERVATIVE FREE 10 ML: 5 INJECTION INTRAVENOUS at 19:39

## 2025-02-04 RX ADMIN — GUAIFENESIN 600 MG: 600 TABLET ORAL at 19:39

## 2025-02-04 RX ADMIN — PALIPERIDONE 6 MG: 3 TABLET, EXTENDED RELEASE ORAL at 08:23

## 2025-02-04 RX ADMIN — THIAMINE HCL TAB 100 MG 100 MG: 100 TAB at 08:24

## 2025-02-04 RX ADMIN — MIDODRINE HYDROCHLORIDE 2.5 MG: 2.5 TABLET ORAL at 11:31

## 2025-02-04 RX ADMIN — ZOLPIDEM TARTRATE 10 MG: 5 TABLET, FILM COATED ORAL at 22:47

## 2025-02-04 RX ADMIN — PANTOPRAZOLE SODIUM 40 MG: 40 TABLET, DELAYED RELEASE ORAL at 17:16

## 2025-02-04 RX ADMIN — DIAZEPAM 10 MG: 5 TABLET ORAL at 12:43

## 2025-02-04 RX ADMIN — ALBUTEROL SULFATE 2.5 MG: 2.5 SOLUTION RESPIRATORY (INHALATION) at 10:28

## 2025-02-04 RX ADMIN — DIPHENHYDRAMINE HYDROCHLORIDE 5 ML: 12.5 LIQUID ORAL at 11:36

## 2025-02-04 RX ADMIN — MIDODRINE HYDROCHLORIDE 2.5 MG: 2.5 TABLET ORAL at 17:16

## 2025-02-04 ASSESSMENT — ENCOUNTER SYMPTOMS
VOMITING: 0
CHEST TIGHTNESS: 0
RHINORRHEA: 0
SHORTNESS OF BREATH: 0
NAUSEA: 0
COUGH: 0
BLOOD IN STOOL: 0
WHEEZING: 0
ABDOMINAL PAIN: 0
DIARRHEA: 0
CONSTIPATION: 0

## 2025-02-04 NOTE — CARE COORDINATION
Social work: Check [facility]ail[facility], precert still pending review for admission to [facility].

## 2025-02-05 VITALS
HEIGHT: 61 IN | BODY MASS INDEX: 19.11 KG/M2 | DIASTOLIC BLOOD PRESSURE: 49 MMHG | WEIGHT: 101.2 LBS | HEART RATE: 87 BPM | TEMPERATURE: 98.4 F | RESPIRATION RATE: 16 BRPM | SYSTOLIC BLOOD PRESSURE: 84 MMHG | OXYGEN SATURATION: 91 %

## 2025-02-05 PROCEDURE — 94761 N-INVAS EAR/PLS OXIMETRY MLT: CPT

## 2025-02-05 PROCEDURE — 2500000003 HC RX 250 WO HCPCS: Performed by: NURSE PRACTITIONER

## 2025-02-05 PROCEDURE — 6370000000 HC RX 637 (ALT 250 FOR IP): Performed by: FAMILY MEDICINE

## 2025-02-05 PROCEDURE — 94640 AIRWAY INHALATION TREATMENT: CPT

## 2025-02-05 PROCEDURE — 6370000000 HC RX 637 (ALT 250 FOR IP): Performed by: NURSE PRACTITIONER

## 2025-02-05 PROCEDURE — 6360000002 HC RX W HCPCS: Performed by: INTERNAL MEDICINE

## 2025-02-05 PROCEDURE — 99239 HOSP IP/OBS DSCHRG MGMT >30: CPT | Performed by: FAMILY MEDICINE

## 2025-02-05 RX ORDER — FLUTICASONE FUROATE AND VILANTEROL TRIFENATATE 100; 25 UG/1; UG/1
POWDER RESPIRATORY (INHALATION)
Qty: 28 EACH | Refills: 2 | Status: SHIPPED | OUTPATIENT
Start: 2025-02-05

## 2025-02-05 RX ADMIN — BUDESONIDE AND FORMOTEROL FUMARATE DIHYDRATE 2 PUFF: 80; 4.5 AEROSOL RESPIRATORY (INHALATION) at 08:23

## 2025-02-05 RX ADMIN — GUAIFENESIN 600 MG: 600 TABLET ORAL at 09:10

## 2025-02-05 RX ADMIN — PALIPERIDONE 6 MG: 3 TABLET, EXTENDED RELEASE ORAL at 09:02

## 2025-02-05 RX ADMIN — THIAMINE HCL TAB 100 MG 100 MG: 100 TAB at 09:02

## 2025-02-05 RX ADMIN — SODIUM CHLORIDE, PRESERVATIVE FREE 10 ML: 5 INJECTION INTRAVENOUS at 09:02

## 2025-02-05 RX ADMIN — TIOTROPIUM BROMIDE INHALATION SPRAY 2 PUFF: 3.12 SPRAY, METERED RESPIRATORY (INHALATION) at 08:23

## 2025-02-05 RX ADMIN — ALBUTEROL SULFATE 2.5 MG: 2.5 SOLUTION RESPIRATORY (INHALATION) at 08:22

## 2025-02-05 RX ADMIN — MIDODRINE HYDROCHLORIDE 2.5 MG: 2.5 TABLET ORAL at 09:02

## 2025-02-05 RX ADMIN — FLUOXETINE HYDROCHLORIDE 60 MG: 20 CAPSULE ORAL at 09:02

## 2025-02-05 RX ADMIN — MIDODRINE HYDROCHLORIDE 2.5 MG: 2.5 TABLET ORAL at 11:27

## 2025-02-05 RX ADMIN — PANTOPRAZOLE SODIUM 40 MG: 40 TABLET, DELAYED RELEASE ORAL at 05:01

## 2025-02-05 RX ADMIN — FOLIC ACID 1 MG: 1 TABLET ORAL at 09:02

## 2025-02-05 ASSESSMENT — ENCOUNTER SYMPTOMS
COUGH: 0
TROUBLE SWALLOWING: 1
DIARRHEA: 0
NAUSEA: 0
SHORTNESS OF BREATH: 0
VOMITING: 0
RHINORRHEA: 0
ABDOMINAL PAIN: 0
WHEEZING: 0
CONSTIPATION: 0
CHEST TIGHTNESS: 0
BLOOD IN STOOL: 0

## 2025-02-05 NOTE — RT PROTOCOL NOTE
RT Inhaler-Nebulizer Bronchodilator Protocol Note    There is a bronchodilator order in the chart from a provider indicating to follow the RT Bronchodilator Protocol and there is an “Initiate RT Inhaler-Nebulizer Bronchodilator Protocol” order as well (see protocol at bottom of note).    CXR Findings:  No results found.    The findings from the last RT Protocol Assessment were as follows:   History Pulmonary Disease: Chronic pulmonary disease  Respiratory Pattern: Dyspnea on exertion or RR 21-25 bpm  Breath Sounds: Inspiratory and expiratory or bilateral wheezing and/or rhonchi  Cough: Strong, spontaneous, non-productive  Indication for Bronchodilator Therapy: Decreased or absent breath sounds, Wheezing associated with pulm disorder  Bronchodilator Assessment Score: 10    Aerosolized bronchodilator medication orders have been revised according to the RT Inhaler-Nebulizer Bronchodilator Protocol below.    Respiratory Therapist to perform RT Therapy Protocol Assessment initially then follow the protocol.  Repeat RT Therapy Protocol Assessment PRN for score 0-3 or on second treatment, BID, and PRN for scores above 3.    No Indications - adjust the frequency to every 6 hours PRN wheezing or bronchospasm, if no treatments needed after 48 hours then discontinue using Per Protocol order mode.     If indication present, adjust the RT bronchodilator orders based on the Bronchodilator Assessment Score as indicated below.  Use Inhaler orders unless patient has one or more of the following: on home nebulizer, not able to hold breath for 10 seconds, is not alert and oriented, cannot activate and use MDI correctly, or respiratory rate 25 breaths per minute or more, then use the equivalent nebulizer order(s) with same Frequency and PRN reasons based on the score.  If a patient is on this medication at home then do not decrease Frequency below that used at home.    0-3 - enter or revise RT bronchodilator order(s) to equivalent RT 
RT Inhaler-Nebulizer Bronchodilator Protocol Note    There is a bronchodilator order in the chart from a provider indicating to follow the RT Bronchodilator Protocol and there is an “Initiate RT Inhaler-Nebulizer Bronchodilator Protocol” order as well (see protocol at bottom of note).    CXR Findings:  No results found.    The findings from the last RT Protocol Assessment were as follows:   History Pulmonary Disease: Chronic pulmonary disease  Respiratory Pattern: Dyspnea on exertion or RR 21-25 bpm  Breath Sounds: Inspiratory and expiratory or bilateral wheezing and/or rhonchi  Cough: Strong, spontaneous, non-productive  Indication for Bronchodilator Therapy: On home bronchodilators, Decreased or absent breath sounds  Bronchodilator Assessment Score: 10    Aerosolized bronchodilator medication orders have been revised according to the RT Inhaler-Nebulizer Bronchodilator Protocol below.    Respiratory Therapist to perform RT Therapy Protocol Assessment initially then follow the protocol.  Repeat RT Therapy Protocol Assessment PRN for score 0-3 or on second treatment, BID, and PRN for scores above 3.    No Indications - adjust the frequency to every 6 hours PRN wheezing or bronchospasm, if no treatments needed after 48 hours then discontinue using Per Protocol order mode.     If indication present, adjust the RT bronchodilator orders based on the Bronchodilator Assessment Score as indicated below.  Use Inhaler orders unless patient has one or more of the following: on home nebulizer, not able to hold breath for 10 seconds, is not alert and oriented, cannot activate and use MDI correctly, or respiratory rate 25 breaths per minute or more, then use the equivalent nebulizer order(s) with same Frequency and PRN reasons based on the score.  If a patient is on this medication at home then do not decrease Frequency below that used at home.    0-3 - enter or revise RT bronchodilator order(s) to equivalent RT Bronchodilator 
RT Inhaler-Nebulizer Bronchodilator Protocol Note    There is a bronchodilator order in the chart from a provider indicating to follow the RT Bronchodilator Protocol and there is an “Initiate RT Inhaler-Nebulizer Bronchodilator Protocol” order as well (see protocol at bottom of note).    CXR Findings:  No results found.    The findings from the last RT Protocol Assessment were as follows:   History Pulmonary Disease: Chronic pulmonary disease  Respiratory Pattern: Dyspnea on exertion or RR 21-25 bpm  Breath Sounds: Slightly diminished and/or crackles  Cough: Strong, spontaneous, non-productive  Indication for Bronchodilator Therapy: Decreased or absent breath sounds  Bronchodilator Assessment Score: 6    Aerosolized bronchodilator medication orders have been revised according to the RT Inhaler-Nebulizer Bronchodilator Protocol below.    Respiratory Therapist to perform RT Therapy Protocol Assessment initially then follow the protocol.  Repeat RT Therapy Protocol Assessment PRN for score 0-3 or on second treatment, BID, and PRN for scores above 3.    No Indications - adjust the frequency to every 6 hours PRN wheezing or bronchospasm, if no treatments needed after 48 hours then discontinue using Per Protocol order mode.     If indication present, adjust the RT bronchodilator orders based on the Bronchodilator Assessment Score as indicated below.  Use Inhaler orders unless patient has one or more of the following: on home nebulizer, not able to hold breath for 10 seconds, is not alert and oriented, cannot activate and use MDI correctly, or respiratory rate 25 breaths per minute or more, then use the equivalent nebulizer order(s) with same Frequency and PRN reasons based on the score.  If a patient is on this medication at home then do not decrease Frequency below that used at home.    0-3 - enter or revise RT bronchodilator order(s) to equivalent RT Bronchodilator order with Frequency of every 4 hours PRN for wheezing 
RT Inhaler-Nebulizer Bronchodilator Protocol Note    There is a bronchodilator order in the chart from a provider indicating to follow the RT Bronchodilator Protocol and there is an “Initiate RT Inhaler-Nebulizer Bronchodilator Protocol” order as well (see protocol at bottom of note).    CXR Findings:  No results found.    The findings from the last RT Protocol Assessment were as follows:   History Pulmonary Disease: Chronic pulmonary disease  Respiratory Pattern: Dyspnea on exertion or RR 21-25 bpm  Breath Sounds: Slightly diminished and/or crackles  Cough: Strong, spontaneous, non-productive  Indication for Bronchodilator Therapy: On home bronchodilators  Bronchodilator Assessment Score: 6    Aerosolized bronchodilator medication orders have been revised according to the RT Inhaler-Nebulizer Bronchodilator Protocol below.    Respiratory Therapist to perform RT Therapy Protocol Assessment initially then follow the protocol.  Repeat RT Therapy Protocol Assessment PRN for score 0-3 or on second treatment, BID, and PRN for scores above 3.    No Indications - adjust the frequency to every 6 hours PRN wheezing or bronchospasm, if no treatments needed after 48 hours then discontinue using Per Protocol order mode.     If indication present, adjust the RT bronchodilator orders based on the Bronchodilator Assessment Score as indicated below.  Use Inhaler orders unless patient has one or more of the following: on home nebulizer, not able to hold breath for 10 seconds, is not alert and oriented, cannot activate and use MDI correctly, or respiratory rate 25 breaths per minute or more, then use the equivalent nebulizer order(s) with same Frequency and PRN reasons based on the score.  If a patient is on this medication at home then do not decrease Frequency below that used at home.    0-3 - enter or revise RT bronchodilator order(s) to equivalent RT Bronchodilator order with Frequency of every 4 hours PRN for wheezing or 
RT Inhaler-Nebulizer Bronchodilator Protocol Note    There is a bronchodilator order in the chart from a provider indicating to follow the RT Bronchodilator Protocol and there is an “Initiate RT Inhaler-Nebulizer Bronchodilator Protocol” order as well (see protocol at bottom of note).    CXR Findings:  No results found.    The findings from the last RT Protocol Assessment were as follows:   History Pulmonary Disease: Chronic pulmonary disease  Respiratory Pattern: Mild dyspnea at rest, irregular pattern, or RR 21-25 bpm  Breath Sounds: Inspiratory and expiratory or bilateral wheezing and/or rhonchi  Cough: Strong, spontaneous, non-productive  Indication for Bronchodilator Therapy: Wheezing associated with pulm disorder, On home bronchodilators  Bronchodilator Assessment Score: 12    Aerosolized bronchodilator medication orders have been revised according to the RT Inhaler-Nebulizer Bronchodilator Protocol below.    Respiratory Therapist to perform RT Therapy Protocol Assessment initially then follow the protocol.  Repeat RT Therapy Protocol Assessment PRN for score 0-3 or on second treatment, BID, and PRN for scores above 3.    No Indications - adjust the frequency to every 6 hours PRN wheezing or bronchospasm, if no treatments needed after 48 hours then discontinue using Per Protocol order mode.     If indication present, adjust the RT bronchodilator orders based on the Bronchodilator Assessment Score as indicated below.  Use Inhaler orders unless patient has one or more of the following: on home nebulizer, not able to hold breath for 10 seconds, is not alert and oriented, cannot activate and use MDI correctly, or respiratory rate 25 breaths per minute or more, then use the equivalent nebulizer order(s) with same Frequency and PRN reasons based on the score.  If a patient is on this medication at home then do not decrease Frequency below that used at home.    0-3 - enter or revise RT bronchodilator order(s) to 
RT Inhaler-Nebulizer Bronchodilator Protocol Note    There is a bronchodilator order in the chart from a provider indicating to follow the RT Bronchodilator Protocol and there is an “Initiate RT Inhaler-Nebulizer Bronchodilator Protocol” order as well (see protocol at bottom of note).    CXR Findings:  No results found.    The findings from the last RT Protocol Assessment were as follows:   History Pulmonary Disease: Chronic pulmonary disease  Respiratory Pattern: Mild dyspnea at rest, irregular pattern, or RR 21-25 bpm  Breath Sounds: Inspiratory and expiratory or bilateral wheezing and/or rhonchi  Cough: Weak, productive  Indication for Bronchodilator Therapy: On home bronchodilators  Bronchodilator Assessment Score: 14    Aerosolized bronchodilator medication orders have been revised according to the RT Inhaler-Nebulizer Bronchodilator Protocol below.    Respiratory Therapist to perform RT Therapy Protocol Assessment initially then follow the protocol.  Repeat RT Therapy Protocol Assessment PRN for score 0-3 or on second treatment, BID, and PRN for scores above 3.    No Indications - adjust the frequency to every 6 hours PRN wheezing or bronchospasm, if no treatments needed after 48 hours then discontinue using Per Protocol order mode.     If indication present, adjust the RT bronchodilator orders based on the Bronchodilator Assessment Score as indicated below.  Use Inhaler orders unless patient has one or more of the following: on home nebulizer, not able to hold breath for 10 seconds, is not alert and oriented, cannot activate and use MDI correctly, or respiratory rate 25 breaths per minute or more, then use the equivalent nebulizer order(s) with same Frequency and PRN reasons based on the score.  If a patient is on this medication at home then do not decrease Frequency below that used at home.    0-3 - enter or revise RT bronchodilator order(s) to equivalent RT Bronchodilator order with Frequency of every 4 
RT Inhaler-Nebulizer Bronchodilator Protocol Note    There is a bronchodilator order in the chart from a provider indicating to follow the RT Bronchodilator Protocol and there is an “Initiate RT Inhaler-Nebulizer Bronchodilator Protocol” order as well (see protocol at bottom of note).    CXR Findings:  No results found.    The findings from the last RT Protocol Assessment were as follows:   History Pulmonary Disease: Chronic pulmonary disease  Respiratory Pattern: Mild dyspnea at rest, irregular pattern, or RR 21-25 bpm  Breath Sounds: Inspiratory and expiratory or bilateral wheezing and/or rhonchi  Cough: Weak, productive  Indication for Bronchodilator Therapy: On home bronchodilators  Bronchodilator Assessment Score: 14    Aerosolized bronchodilator medication orders have been revised according to the RT Inhaler-Nebulizer Bronchodilator Protocol below.    Respiratory Therapist to perform RT Therapy Protocol Assessment initially then follow the protocol.  Repeat RT Therapy Protocol Assessment PRN for score 0-3 or on second treatment, BID, and PRN for scores above 3.    No Indications - adjust the frequency to every 6 hours PRN wheezing or bronchospasm, if no treatments needed after 48 hours then discontinue using Per Protocol order mode.     If indication present, adjust the RT bronchodilator orders based on the Bronchodilator Assessment Score as indicated below.  Use Inhaler orders unless patient has one or more of the following: on home nebulizer, not able to hold breath for 10 seconds, is not alert and oriented, cannot activate and use MDI correctly, or respiratory rate 25 breaths per minute or more, then use the equivalent nebulizer order(s) with same Frequency and PRN reasons based on the score.  If a patient is on this medication at home then do not decrease Frequency below that used at home.    0-3 - enter or revise RT bronchodilator order(s) to equivalent RT Bronchodilator order with Frequency of every 4 
RT Inhaler-Nebulizer Bronchodilator Protocol Note    There is a bronchodilator order in the chart from a provider indicating to follow the RT Bronchodilator Protocol and there is an “Initiate RT Inhaler-Nebulizer Bronchodilator Protocol” order as well (see protocol at bottom of note).    CXR Findings:  No results found.    The findings from the last RT Protocol Assessment were as follows:   History Pulmonary Disease: Chronic pulmonary disease  Respiratory Pattern: Mild dyspnea at rest, irregular pattern, or RR 21-25 bpm  Breath Sounds: Severe inspiratory and expiratory wheezing or severely diminished  Cough: Strong, spontaneous, non-productive  Indication for Bronchodilator Therapy: Decreased or absent breath sounds  Bronchodilator Assessment Score: 14    Aerosolized bronchodilator medication orders have been revised according to the RT Inhaler-Nebulizer Bronchodilator Protocol below.    Respiratory Therapist to perform RT Therapy Protocol Assessment initially then follow the protocol.  Repeat RT Therapy Protocol Assessment PRN for score 0-3 or on second treatment, BID, and PRN for scores above 3.    No Indications - adjust the frequency to every 6 hours PRN wheezing or bronchospasm, if no treatments needed after 48 hours then discontinue using Per Protocol order mode.     If indication present, adjust the RT bronchodilator orders based on the Bronchodilator Assessment Score as indicated below.  Use Inhaler orders unless patient has one or more of the following: on home nebulizer, not able to hold breath for 10 seconds, is not alert and oriented, cannot activate and use MDI correctly, or respiratory rate 25 breaths per minute or more, then use the equivalent nebulizer order(s) with same Frequency and PRN reasons based on the score.  If a patient is on this medication at home then do not decrease Frequency below that used at home.    0-3 - enter or revise RT bronchodilator order(s) to equivalent RT Bronchodilator 
RT Inhaler-Nebulizer Bronchodilator Protocol Note    There is a bronchodilator order in the chart from a provider indicating to follow the RT Bronchodilator Protocol and there is an “Initiate RT Inhaler-Nebulizer Bronchodilator Protocol” order as well (see protocol at bottom of note).    CXR Findings:  No results found.    The findings from the last RT Protocol Assessment were as follows:   History Pulmonary Disease: Chronic pulmonary disease  Respiratory Pattern: Mild dyspnea at rest, irregular pattern, or RR 21-25 bpm  Breath Sounds: Slightly diminished and/or crackles  Cough: Strong, productive  Indication for Bronchodilator Therapy: On home bronchodilators, Decreased or absent breath sounds  Bronchodilator Assessment Score: 9    Aerosolized bronchodilator medication orders have been revised according to the RT Inhaler-Nebulizer Bronchodilator Protocol below.    Respiratory Therapist to perform RT Therapy Protocol Assessment initially then follow the protocol.  Repeat RT Therapy Protocol Assessment PRN for score 0-3 or on second treatment, BID, and PRN for scores above 3.    No Indications - adjust the frequency to every 6 hours PRN wheezing or bronchospasm, if no treatments needed after 48 hours then discontinue using Per Protocol order mode.     If indication present, adjust the RT bronchodilator orders based on the Bronchodilator Assessment Score as indicated below.  Use Inhaler orders unless patient has one or more of the following: on home nebulizer, not able to hold breath for 10 seconds, is not alert and oriented, cannot activate and use MDI correctly, or respiratory rate 25 breaths per minute or more, then use the equivalent nebulizer order(s) with same Frequency and PRN reasons based on the score.  If a patient is on this medication at home then do not decrease Frequency below that used at home.    0-3 - enter or revise RT bronchodilator order(s) to equivalent RT Bronchodilator order with Frequency of 
RT Inhaler-Nebulizer Bronchodilator Protocol Note    There is a bronchodilator order in the chart from a provider indicating to follow the RT Bronchodilator Protocol and there is an “Initiate RT Inhaler-Nebulizer Bronchodilator Protocol” order as well (see protocol at bottom of note).    CXR Findings:  XR CHEST PORTABLE    Result Date: 1/24/2025  Nonspecific bilateral lower lobe airspace opacities.       The findings from the last RT Protocol Assessment were as follows:   History Pulmonary Disease: Chronic pulmonary disease  Respiratory Pattern: Mild dyspnea at rest, irregular pattern, or RR 21-25 bpm  Breath Sounds: Slightly diminished and/or crackles  Cough: Weak, productive  Indication for Bronchodilator Therapy: Decreased or absent breath sounds, On home bronchodilators  Bronchodilator Assessment Score: 10    Aerosolized bronchodilator medication orders have been revised according to the RT Inhaler-Nebulizer Bronchodilator Protocol below.    Respiratory Therapist to perform RT Therapy Protocol Assessment initially then follow the protocol.  Repeat RT Therapy Protocol Assessment PRN for score 0-3 or on second treatment, BID, and PRN for scores above 3.    No Indications - adjust the frequency to every 6 hours PRN wheezing or bronchospasm, if no treatments needed after 48 hours then discontinue using Per Protocol order mode.     If indication present, adjust the RT bronchodilator orders based on the Bronchodilator Assessment Score as indicated below.  Use Inhaler orders unless patient has one or more of the following: on home nebulizer, not able to hold breath for 10 seconds, is not alert and oriented, cannot activate and use MDI correctly, or respiratory rate 25 breaths per minute or more, then use the equivalent nebulizer order(s) with same Frequency and PRN reasons based on the score.  If a patient is on this medication at home then do not decrease Frequency below that used at home.    0-3 - enter or revise RT 
RT Inhaler-Nebulizer Bronchodilator Protocol Note    There is a bronchodilator order in the chart from a provider indicating to follow the RT Bronchodilator Protocol and there is an “Initiate RT Inhaler-Nebulizer Bronchodilator Protocol” order as well (see protocol at bottom of note).    CXR Findings:  XR CHEST PORTABLE    Result Date: 1/28/2025  Extensive multifocal pneumonia unchanged since prior.       The findings from the last RT Protocol Assessment were as follows:   History Pulmonary Disease: Chronic pulmonary disease  Respiratory Pattern: Mild dyspnea at rest, irregular pattern, or RR 21-25 bpm  Breath Sounds: Slightly diminished and/or crackles  Cough: Strong, productive  Indication for Bronchodilator Therapy: Decreased or absent breath sounds  Bronchodilator Assessment Score: 9    Aerosolized bronchodilator medication orders have been revised according to the RT Inhaler-Nebulizer Bronchodilator Protocol below.    Respiratory Therapist to perform RT Therapy Protocol Assessment initially then follow the protocol.  Repeat RT Therapy Protocol Assessment PRN for score 0-3 or on second treatment, BID, and PRN for scores above 3.    No Indications - adjust the frequency to every 6 hours PRN wheezing or bronchospasm, if no treatments needed after 48 hours then discontinue using Per Protocol order mode.     If indication present, adjust the RT bronchodilator orders based on the Bronchodilator Assessment Score as indicated below.  Use Inhaler orders unless patient has one or more of the following: on home nebulizer, not able to hold breath for 10 seconds, is not alert and oriented, cannot activate and use MDI correctly, or respiratory rate 25 breaths per minute or more, then use the equivalent nebulizer order(s) with same Frequency and PRN reasons based on the score.  If a patient is on this medication at home then do not decrease Frequency below that used at home.    0-3 - enter or revise RT bronchodilator order(s) 
RT Nebulizer Bronchodilator Protocol Note    There is a bronchodilator order in the chart from a provider indicating to follow the RT Bronchodilator Protocol and there is an “Initiate RT Bronchodilator Protocol” order as well (see protocol at bottom of note).    CXR Findings:  No results found.    The findings from the last RT Protocol Assessment were as follows:  Smoking: Chronic pulmonary disease  Respiratory Pattern: Dyspnea on exertion or RR 21-25 bpm  Breath Sounds: Slightly diminished and/or crackles  Cough: Strong, spontaneous, non-productive  Indication for Bronchodilator Therapy: Decreased or absent breath sounds  Bronchodilator Assessment Score: 6    Aerosolized bronchodilator medication orders have been revised according to the RT Nebulizer Bronchodilator Protocol below.    Respiratory Therapist to perform RT Therapy Protocol Assessment initially then follow the protocol.  Repeat RT Therapy Protocol Assessment PRN for score 0-3 or on second treatment, BID, and PRN for scores above 3.    No Indications - adjust the frequency to every 6 hours PRN wheezing or bronchospasm, if no treatments needed after 48 hours then discontinue using Per Protocol order mode.     If indication present, adjust the RT bronchodilator orders based on the Bronchodilator Assessment Score as indicated below.  If a patient is on this medication at home then do not decrease Frequency below that used at home.    0-3 - enter or revise RT bronchodilator order(s) to equivalent RT Bronchodilator order with Frequency of every 4 hours PRN for wheezing or increased work of breathing using Per Protocol order mode.       4-6 - enter or revise RT Bronchodilator order(s) to two equivalent RT bronchodilator orders with one order with BID Frequency and one order with Frequency of every 4 hours PRN wheezing or increased work of breathing using Per Protocol order mode.         7-10 - enter or revise RT Bronchodilator order(s) to two equivalent RT 
RT Nebulizer Bronchodilator Protocol Note    There is a bronchodilator order in the chart from a provider indicating to follow the RT Bronchodilator Protocol and there is an “Initiate RT Bronchodilator Protocol” order as well (see protocol at bottom of note).    CXR Findings:  No results found.    The findings from the last RT Protocol Assessment were as follows:  Smoking: Chronic pulmonary disease  Respiratory Pattern: Mild dyspnea at rest, irregular pattern, or RR 21-25 bpm  Breath Sounds: Slightly diminished and/or crackles  Cough: Weak, productive  Indication for Bronchodilator Therapy: Decreased or absent breath sounds  Bronchodilator Assessment Score: 10    Aerosolized bronchodilator medication orders have been revised according to the RT Nebulizer Bronchodilator Protocol below.    Respiratory Therapist to perform RT Therapy Protocol Assessment initially then follow the protocol.  Repeat RT Therapy Protocol Assessment PRN for score 0-3 or on second treatment, BID, and PRN for scores above 3.    No Indications - adjust the frequency to every 6 hours PRN wheezing or bronchospasm, if no treatments needed after 48 hours then discontinue using Per Protocol order mode.     If indication present, adjust the RT bronchodilator orders based on the Bronchodilator Assessment Score as indicated below.  If a patient is on this medication at home then do not decrease Frequency below that used at home.    0-3 - enter or revise RT bronchodilator order(s) to equivalent RT Bronchodilator order with Frequency of every 4 hours PRN for wheezing or increased work of breathing using Per Protocol order mode.       4-6 - enter or revise RT Bronchodilator order(s) to two equivalent RT bronchodilator orders with one order with BID Frequency and one order with Frequency of every 4 hours PRN wheezing or increased work of breathing using Per Protocol order mode.         7-10 - enter or revise RT Bronchodilator order(s) to two equivalent RT 
RT Nebulizer Bronchodilator Protocol Note    There is a bronchodilator order in the chart from a provider indicating to follow the RT Bronchodilator Protocol and there is an “Initiate RT Bronchodilator Protocol” order as well (see protocol at bottom of note).    CXR Findings:  No results found.    The findings from the last RT Protocol Assessment were as follows:  Smoking: Chronic pulmonary disease  Respiratory Pattern: Mild dyspnea at rest, irregular pattern, or RR 21-25 bpm  Breath Sounds: Slightly diminished and/or crackles  Cough: Weak, productive  Indication for Bronchodilator Therapy: Decreased or absent breath sounds, On home bronchodilators  Bronchodilator Assessment Score: 10    Aerosolized bronchodilator medication orders have been revised according to the RT Nebulizer Bronchodilator Protocol below.    Respiratory Therapist to perform RT Therapy Protocol Assessment initially then follow the protocol.  Repeat RT Therapy Protocol Assessment PRN for score 0-3 or on second treatment, BID, and PRN for scores above 3.    No Indications - adjust the frequency to every 6 hours PRN wheezing or bronchospasm, if no treatments needed after 48 hours then discontinue using Per Protocol order mode.     If indication present, adjust the RT bronchodilator orders based on the Bronchodilator Assessment Score as indicated below.  If a patient is on this medication at home then do not decrease Frequency below that used at home.    0-3 - enter or revise RT bronchodilator order(s) to equivalent RT Bronchodilator order with Frequency of every 4 hours PRN for wheezing or increased work of breathing using Per Protocol order mode.       4-6 - enter or revise RT Bronchodilator order(s) to two equivalent RT bronchodilator orders with one order with BID Frequency and one order with Frequency of every 4 hours PRN wheezing or increased work of breathing using Per Protocol order mode.         7-10 - enter or revise RT Bronchodilator 
Bronchodilator order with Frequency of every 4 hours PRN for wheezing or increased work of breathing using Per Protocol order mode.        4-6 - enter or revise RT Bronchodilator order(s) to two equivalent RT bronchodilator orders with one order with BID Frequency and one order with Frequency of every 4 hours PRN wheezing or increased work of breathing using Per Protocol order mode.        7-10 - enter or revise RT Bronchodilator order(s) to two equivalent RT bronchodilator orders with one order with TID Frequency and one order with Frequency of every 4 hours PRN wheezing or increased work of breathing using Per Protocol order mode.       11-13 - enter or revise RT Bronchodilator order(s) to one equivalent RT bronchodilator order with QID Frequency and an Albuterol order with Frequency of every 4 hours PRN wheezing or increased work of breathing using Per Protocol order mode.      Greater than 13 - enter or revise RT Bronchodilator order(s) to one equivalent RT bronchodilator order with every 4 hours Frequency and an Albuterol order with Frequency of every 2 hours PRN wheezing or increased work of breathing using Per Protocol order mode.     RT to enter RT Home Evaluation for COPD & MDI Assessment order using Per Protocol order mode.    Electronically signed by chava maldonado RCP on 1/27/2025 at 10:23 AM  
order(s) to equivalent RT Bronchodilator order with Frequency of every 4 hours PRN for wheezing or increased work of breathing using Per Protocol order mode.        4-6 - enter or revise RT Bronchodilator order(s) to two equivalent RT bronchodilator orders with one order with BID Frequency and one order with Frequency of every 4 hours PRN wheezing or increased work of breathing using Per Protocol order mode.        7-10 - enter or revise RT Bronchodilator order(s) to two equivalent RT bronchodilator orders with one order with TID Frequency and one order with Frequency of every 4 hours PRN wheezing or increased work of breathing using Per Protocol order mode.       11-13 - enter or revise RT Bronchodilator order(s) to one equivalent RT bronchodilator order with QID Frequency and an Albuterol order with Frequency of every 4 hours PRN wheezing or increased work of breathing using Per Protocol order mode.      Greater than 13 - enter or revise RT Bronchodilator order(s) to one equivalent RT bronchodilator order with every 4 hours Frequency and an Albuterol order with Frequency of every 2 hours PRN wheezing or increased work of breathing using Per Protocol order mode.     RT to enter RT Home Evaluation for COPD & MDI Assessment order using Per Protocol order mode.    Electronically signed by Joshua Calzada RN on 1/22/2025 at 6:31 AM  
wheezing or increased work of breathing using Per Protocol order mode.        4-6 - enter or revise RT Bronchodilator order(s) to two equivalent RT bronchodilator orders with one order with BID Frequency and one order with Frequency of every 4 hours PRN wheezing or increased work of breathing using Per Protocol order mode.        7-10 - enter or revise RT Bronchodilator order(s) to two equivalent RT bronchodilator orders with one order with TID Frequency and one order with Frequency of every 4 hours PRN wheezing or increased work of breathing using Per Protocol order mode.       11-13 - enter or revise RT Bronchodilator order(s) to one equivalent RT bronchodilator order with QID Frequency and an Albuterol order with Frequency of every 4 hours PRN wheezing or increased work of breathing using Per Protocol order mode.      Greater than 13 - enter or revise RT Bronchodilator order(s) to one equivalent RT bronchodilator order with every 4 hours Frequency and an Albuterol order with Frequency of every 2 hours PRN wheezing or increased work of breathing using Per Protocol order mode.     RT to enter RT Home Evaluation for COPD & MDI Assessment order using Per Protocol order mode.    Electronically signed by FRANCISCO JOHN RCP on 1/28/2025 at 8:00 AM

## 2025-02-05 NOTE — TELEPHONE ENCOUNTER
Last visit: 1-7-25  Last Med refill:   Does patient have enough medication for 72 hours: No:     Next Visit Date:  Future Appointments   Date Time Provider Department Center   2/10/2025 11:00 AM Sierra De Oliveira MD [facility]   2/24/2025 11:40 AM Soraida Stone DO [facility] -   2/25/2025  9:00 AM [facility] 01 [facility] [place]   3/4/2025  2:15 PM [facility]       Health Maintenance   Topic Date Due    Hepatitis B vaccine (1 of 3 - 19+ 3-dose series) Never done    Colorectal Cancer Screen  05/06/2021    Breast cancer screen  09/30/2022    Lung Cancer Screening &/or Counseling  11/01/2024    A1C test (Diabetic or Prediabetic)  11/19/2024    Lipids  12/17/2024    Annual Wellness Visit (Medicare Advantage)  Never done    Depression Monitoring  01/07/2026    DTaP/Tdap/Td vaccine (3 - Td or Tdap) 06/13/2033    Flu vaccine  Completed    Shingles vaccine  Completed    Pneumococcal 0-64 years Vaccine  Completed    COVID-19 Vaccine  Completed    Hepatitis C screen  Completed    HIV screen  Completed    Hepatitis A vaccine  Aged Out    Hib vaccine  Aged Out    Polio vaccine  Aged Out    Meningococcal (ACWY) vaccine  Aged Out    Diabetes screen  Discontinued       Hemoglobin A1C (%)   Date Value   11/19/2023 5.7   12/29/2020 5.0             ( goal A1C is < 7)   No components found for: \"LABMICR\"  No components found for: \"LDLCHOLESTEROL\", \"LDLCALC\"    (goal LDL is <100)   AST (U/L)   Date Value   01/27/2025 25     ALT (U/L)   Date Value   01/27/2025 31     BUN (mg/dL)   Date Value   02/04/2025 15     BP Readings from Last 3 Encounters:   02/05/25 (!) 83/58   01/07/25 92/62   10/28/24 116/84          (goal 120/80)    All Future Testing planned in [facility]  Lab Frequency Next Occurrence   MORIS DIGITAL SCREEN W OR WO CAD BILATERAL Once 04/19/2024   CBC With Auto Differential Once 07/13/2025   Comprehensive metabolic panel Once 07/13/2025   Hemoglobin and Hematocrit, Blood,

## 2025-02-05 NOTE — CARE COORDINATION
Discharge planning    Accepted by [facility] as long as no iv atb or wounds. Updated on possible snf first.

## 2025-02-05 NOTE — PROGRESS NOTES
[facility] Neurology   IN-PATIENT SERVICE      NEUROLOGY PROGRESS  NOTE            Date:   1/27/2025  Patient name:  Madonna Manrique  Date of admission:  1/21/2025  YOB: 1965      Interval History:     Resting in bed comfortably.  Nasal cannula in place.  She has an occasional cough.  Awaiting MRI of the brain.    History of Present Illness:     History obtained from partner:     This is a 59 year old female with history of relapsing MS, chronic pain, polysubstance abuse, tobacco abuse.  She is on ocrelizumab for her MS.  She has an underlying psychiatric history including bipolar disorder and anxiety and depression and has been known to be medically non-complaint in the past. She also has a history of chronic headaches. She also has a history of bilateral uveitis.      Per my partners note ( Dr. Stone who she follows with in clinic)  MRI's in 2022 were done, revealing T2-hyperintense lesions in the brain, cervical, and thoracic spinal cord concerning for multiple sclerosis.  Per reports, there were no enhancing lesions.  An EEG was done given altered mental status, which showed some intermittent generalized slowing.  LP was recommended but the patient left AMA.  CSF studies were later done and did show unpaired oligoclonal bands.  She was diagnosed with multiple sclerosis and started on ocrelizumab.        Her symptoms attributed to multiple sclerosis began about 5 years ago.  She reports that she has had intermittent episodes of weakness and numbness involving the left face, arm, and leg in the past lasting > 24 hours.  She reports that she has difficulty going up and down stairs at times and uses a cane intermittently.  She reports that she had some imbalance and dropped objects intermittently for years.  She attributed this to being clumsy.      She presented to the ED with cough, congestion, fatigue, generalized bodyaches and pains for several days. She is on Ozempiq and dropped a significant 
  [facility] Neurology Specialist  [address]  [place] [address]  PH:  [phone] or [phone]  FAX:  [phone]            Brief history: Madonna Manrique is a 59 y.o. old female admitted on 2025 with  SOB.      Subjective: No new neurological events overnight. Patient denies any new weakness, numbness, tingling or headache. MRI Brain pending. CT chest negative for PE.      Objective: /73   Pulse 87   Temp 98.1 °F (36.7 °C) (Oral)   Resp 18   Ht 1.549 m (5' 1\")   Wt 53.2 kg (117 lb 4.6 oz)   SpO2 93%   BMI 22.16 kg/m²       Medications:    rivaroxaban  20 mg Oral Daily    FLUoxetine  60 mg Oral Daily    albuterol  2.5 mg Nebulization TID    methylPREDNISolone  40 mg IntraVENous Q6H    vitamin D  50,000 Units Oral Weekly    midodrine  2.5 mg Oral TID WC    difluprednate  1 drop Both Eyes 4x Daily    paliperidone  6 mg Oral QAM    amphetamine-dextroamphetamine  30 mg Oral BID    budesonide-formoterol  2 puff Inhalation BID RT    [Held by provider] gabapentin  600 mg Oral TID    tiotropium  2 puff Inhalation Daily RT    sodium chloride flush  5-40 mL IntraVENous 2 times per day    guaiFENesin  600 mg Oral BID    nicotine  1 patch TransDERmal Daily    pantoprazole  40 mg Oral BID AC    folic acid  1 mg Oral Daily    thiamine mononitrate  100 mg Oral Daily    zolpidem  10 mg Oral Nightly          Physical Exam:   /73   Pulse 87   Temp 98.1 °F (36.7 °C) (Oral)   Resp 18   Ht 1.549 m (5' 1\")   Wt 53.2 kg (117 lb 4.6 oz)   SpO2 93%   BMI 22.16 kg/m²   Temp (24hrs), Av °F (36.7 °C), Min:97.7 °F (36.5 °C), Max:98.2 °F (36.8 °C)        General examination:       General Appearance:  alert, ill appearing, frail, and in no acute distress  HEENT: Normocephalic, atraumatic, dry mucus membranes  Neck: supple, no carotid bruits, (-) nuchal rigidity  Lungs:  +wheezing, SOB. On NC  Cardiovascular: normal rate, regular rhythm  Abdomen: Soft, nontender, nondistended, 
Admitted from ER  to room 1014 .   Pt alert and oriented.   Pt oriented to call light system and agreeable to call for assistance.    Family at bed side  Vital signs recorded    Telemetry monitor applied.   Admission documentation completed  Home Meds      
[facility]  Office: [phone]  Bill Frank DO, Enriqeu Drummond DO, Steve Shaikh DO, Jay Tineo DO, Francoise De Leon MD, Sahara Yu MD, Evelia Owens MD, Elena Olvera MD,  Manuel Rodgesr MD, Esteban Negron MD, Nila Nieves MD,  Charles Frausto DO, Lucas Calvo MD, Casper Marrero MD, Jonnie Frank DO, Malaika Ly MD,  Ernesto Ramey DO, Litzy Reed MD, Sarah Craft MD, Tami Bee MD, Crow Cervantes MD,  Hernandez Edge MD, Jesica Obando MD, Felipe Carreon MD, Rohini Wright MD, Enrique Whitaker MD, Madyson Irizarry MD, Ronaldo Aguilar DO, Too Guillen MD, Charles De Jesus MD, Mohsin Reza, MD, Shirley Waterhouse, CNP,  Neli Guadalupe CNP, Ronaldo Stanton, CNP,  Sandra Ray, DNP, Asiya Orr, CNP, Najma Chen, CNP, Nany Mendoza, CNP, Jennifer Resendiz, CNP, Jane Hammer, PA-C, Flakita Sanchez PA-C, Rosette Crain, CNP, Carmen Acevedo, CNP,  Virginia Crawley, CNP, Elmira Alonso, CNP, Zara Atwood, CNP,  Domenica Rene, CNS, Sherita Garza, CNP, Ayse Bess, CNP,   Kaitlin Gonzalez, CNP       [facility]      Daily Progress Note     Admit Date: 1/21/2025  Bed/Room No.  1014/1014-02  Admitting Physician : Evelia Owens MD  Code Status :Full Code  Hospital Day:  LOS: 6 days   Chief Complaint:     Chief Complaint   Patient presents with    Shortness of Breath     Principal Problem:    Community acquired pneumonia of left lower lobe of lung  Active Problems:    Dehydration with hyponatremia    Persistent asthma with acute exacerbation    Influenza A    Long QT interval  Resolved Problems:    Alcohol use disorder, severe, dependence (HCC)    Subjective :        Interval History / Significant events :  01/27/25    Patient was seen earlier today, patient's  is at bedside.  She remains on 4 - 5 L of oxygen by nasal cannula.  Patient was observed to have cough while drinking nutritional supplement drinks.  She has no improvement in her generalized 
[facility]  Speech Language Pathology    Date: 1/28/2025  Patient Name: Madonna Manrique  YOB: 1965   AGE: 59 y.o.  MRN: [id number]        Patient Not Available for Speech Therapy     Due to:  [] Testing  [] Hemodialysis  [] Cancelled by RN  [] Surgery   [] Intubation/Sedation/Pain Medication  [] Medical instability  [x] Other: Pt declined ST at this time. Spoke with ARCHIE Zuñiga and LENO Lara. Pt changing code status to DNR-CC as pt refusing Moderately Thick (Honey) (IDDSI Level 3) liquids and dysphagia treatment. Significant education completed on MBSS results and recommendations, aspiration and potential aspiration PNA from multiple staff members. Pt also was shown images of aspiration event s/p swallow study on 1/27. Pt continues to refuse use of thickener and reports she does not believe she will die as a result and reports her cough is due to her having the flu. No further skilled ST services warranted in the area of feeding and swallowing due to declination of services. ST to sign off.       Completed by: Dafne Wade M.A., CCC-SLP    
[facility]  Office: [phone]  Bill Frank DO, Enrique Drummond DO, Steve Shaikh DO, Jay Tineo DO, Francoise De Leon MD, Sahara Yu MD, Evelia Owens MD, Elena Olvera MD,  Manuel Rodgers MD, Esteban Negron MD, Nila Nieves MD,  Charles Frausto DO, Lucas Calvo MD, Casper Marrero MD, Jonnie Frank DO, Malaika Ly MD,  Ernesto Ramey DO, Litzy Reed MD, Sarah Craft MD, Tami Bee MD, Crow Cervantes MD,  Hernandez Edge MD, Jesica Obando MD, Felipe Carreon MD, Rohini Wright MD, Enrique Whitaker MD, Madyson Irizarry MD, Ronaldo Aguilar DO, Too Guillen MD, Charles De Jesus MD, Mohsin Reza, MD, Shirley Waterhouse, CNP,  Neli Guadalupe CNP, Ronaldo Stanton, CNP,  Sandra Ray, DNP, Asiya Orr, CNP, Najma Chen, CNP, Nany Mendoza, CNP, Jennifer Resendiz, CNP, Jane Hammer, PA-C, Flakita Sanchez PA-C, Rosette Crain, CNP, Carmen Acevedo, CNP,  Virginia Crawley, CNP, Elmira Alonso, CNP, Zara Atwood, CNP,  Domenica Rene, CNS, Sherita Garza, CNP, Ayse Bess, CNP,   Kaitlin Gonzalez, CNP       [facility]      Daily Progress Note     Admit Date: 1/21/2025  Bed/Room No.  1014/1014-02  Admitting Physician : Evelia Owens MD  Code Status :Full Code  Hospital Day:  LOS: 5 days   Chief Complaint:     Chief Complaint   Patient presents with    Shortness of Breath     Principal Problem:    Community acquired pneumonia of left lower lobe of lung  Active Problems:    Dehydration with hyponatremia    Persistent asthma with acute exacerbation    Influenza A    Long QT interval  Resolved Problems:    Alcohol use disorder, severe, dependence (HCC)    Subjective :        Interval History / Significant events :  01/26/25    Patient continues to be oxygen dependent.  She has poor appetite, decreased oral intake.  Patient's  at bedside reports that she is eating like a bird.  He concurs that she has lost a significant amount of weight in short amount of time.  
[facility]  Office: [phone]  Bill Frank DO, Enrique Drummond DO, Steve Shaikh DO, Jay Tineo DO, Francoise De Leon MD, Sahara Yu MD, Evelia Owens MD, Elena Olvera MD,  Manuel Rodgers MD, Esteban Negron MD, Nila Nieves MD,  Charles Frausto DO, Lucas Calvo MD, Casper Marrero MD, Jonnie Frank DO, Malaika Ly MD,  Ernesto Ramey DO, Litzy Reed MD, Sarah Craft MD, Tami eBe MD, Crow Cervantes MD,  Hernandez Edge MD, Jesica Obando MD, Felipe Carreon MD, Rohini Wright MD, Enrique Whitaker MD, Madyson Irizarry MD, Ronaldo Aguilar DO, Too Guillen MD, Charles De Jesus MD, Mohsin Reza, MD, Shirley Waterhouse, CNP,  Neli Guadalupe CNP, Ronaldo Stanton, CNP,  Sandra Ray, DNP, Asiya Orr, CNP, Najma Chen, CNP, Nany Mendoza, CNP, Jennifer Resendiz, CNP, Jane Hammer, PA-C, Flakita Sanchez, PA-C, Rosette Crain, CNP, Carmen Acevedo, CNP,  Virginia Crawley, CNP, Elmira Alonso, CNP, Zara Atwood, CNP,  Domenica Rene, CNS, Sherita Garza, CNP, Ayse Bess, CNP,   Kaitlin Gonzalez, CNP         [facility]   IN-PATIENT SERVICE   [facility]    Progress Note    1/28/2025    10:59 AM    Name:   Madonna Manrique  MRN:     [id number]     Acct:      [id number]   [facility]   [facility]/1014-02   Day:  7  Admit Date:  1/21/2025 10:35 AM    PCP:   Sierra De Oliveira MD  Code Status:  Full Code    Subjective:     C/C:   Chief Complaint   Patient presents with    Shortness of Breath     Interval History Status: not changed.     Patient was seen and evaluated at bedside.  She states she is feeling a little better, however is still having shortness of breath and cough.  I reviewed MRI results to the patient.  She expresses frustration with thickened liquids.  I explained to the patient that based on her swallow study, it was not safe to have thin liquids.  She is very frustrated by this and is unfortunately not understanding the indication for thickened liquids despite 
Nutrition Assessment     Type and Reason for Visit: Positive nutrition screen (Unplanned weight and decresed appetite)    Nutrition Recommendations/Plan:   ADULT DIET; Dysphagia - Soft and Bite Sized; Moderately Thick (Honey); No Drinking Straws  Moderately thick Glucerna BID  Monitor p.o intakes, diet tolerance and labs     Malnutrition Assessment:  Malnutrition Status: At risk for malnutrition (weight los is related to Mounjaro)    Nutrition Assessment:  Patient is status post modified barium swallow study to day and diet was modified to Dysphagia Soft and Bite Sized; Moderately Thick (honey); No drinking straws (1/27). Patient is tolerating the diet very well and ate % at lunch today.  Will continue Glucerna TID (moderately thick liquids). Monitor p.o intakes, diet tolerance and labs    Estimated Daily Nutrient Needs:  Energy (kcal):  6322-8667 kcal (30-33 kcal/kg) Weight Used for Energy Requirements: Current     Protein (g):  68-73 gm of protein (1.3-1.4 gm/kg) Weight Used for Protein Requirements: Current        Fluid (ml/day):  0021-1143 mL Method Used for Fluid Requirements: 1 ml/kcal    Nutrition Related Findings:   No edema. Active bowel sounds. Diarriah related to Monunjaro. S/P MBSS (1/27) Wound Type: None    Current Nutrition Therapies:    ADULT ORAL NUTRITION SUPPLEMENT; Breakfast, Lunch, Dinner; Diabetic Oral Supplement  ADULT DIET; Dysphagia - Soft and Bite Sized; Moderately Thick (Honey); No Drinking Straws    Anthropometric Measures:  Height: 154.9 cm (5' 1\")  Current Body Wt: 52.1 kg (114 lb 13.8 oz)   BMI: 21.7        Nutrition Diagnosis:   Inadequate protein intake related to inadequate protein-energy intake as evidenced by intake 26-50%, intake 0-25%    Nutrition Interventions:   Food and/or Nutrient Delivery: Continue Current Diet, Continue Oral Nutrition Supplement  Nutrition Education/Counseling: Education/Counseling not indicated  Coordination of Nutrition Care: Continue to monitor 
Nutrition Assessment     Type and Reason for Visit: Reassess    Nutrition Recommendations/Plan:   ADULT DIET; Dysphagia - Soft and Bite Sized; Moderately Thick (Honey); No Drinking Straws  Continue moderately thick Glucerna 3x/day  Monitor p.o intakes, weights and labs     Malnutrition Assessment:  Malnutrition Status: At risk for malnutrition (weight los is related to Mounjaro)    Nutrition Assessment:  Patient reports a poor appetite and does not like the food texture or the menu options on the Dysphagia Soft and Bite Sized; Moderately Thick; No Straw. Patient also has not been consuming thickened Glucerna but patient did not know that the Glucerna was in a cup. Patient was encouraged to consume Glucerna. Patient states she does not want anymore mashed potatoes or chicken. Writer spoke to diet clerk and offered some options to recommend to patient. Weight records are variable and have been trending down overall. Monitor p.o intakes, weights and labs.    Estimated Daily Nutrient Needs:  Energy (kcal):  9856-9518 kcal (30-35 kcal/kg) Weight Used for Energy Requirements: Current     Protein (g):  60-65 gm of protein (1.3-1.4 gm/kg) Weight Used for Protein Requirements: Current        Fluid (ml/day):  5870-1989 mL Method Used for Fluid Requirements: 1 ml/kcal    Nutrition Related Findings:   Edema: trace BLE. Active bowel sounds. Diarrhea related to Monujaro. S/P MBSS (1/27) Wound Type: None    Current Nutrition Therapies:    ADULT ORAL NUTRITION SUPPLEMENT; Breakfast, Lunch, Dinner; Diabetic Oral Supplement  ADULT DIET; Dysphagia - Soft and Bite Sized; Moderately Thick (Honey); No Drinking Straws    Anthropometric Measures:  Height: 154.9 cm (5' 1\")  Current Body Wt: 46.2 kg (101 lb 13.6 oz)   BMI: 19.3        Nutrition Diagnosis:   Inadequate protein intake related to inadequate protein-energy intake as evidenced by intake 26-50%, intake 0-25%    Nutrition Interventions:   Food and/or Nutrient Delivery: Continue 
Nutrition Assessment     Type and Reason for Visit: Reassess    Nutrition Recommendations/Plan:   ADULT DIET; Dysphagia - Soft and Bite Sized; Moderately Thick (Honey); No Drinking Straws  Moderately thick Glucerna 2x/day  Monitor p.o intakes, diet tolerance and labs     Malnutrition Assessment:  Malnutrition Status: At risk for malnutrition (weight los is related to Mounjaro)    Nutrition Assessment:  Patient remains on Dysphagia soft and bite sized; moderately thick; no drinking straws. At time of visit this morning patient had two bags of [employer]  and a container of conrad hair pasta with meat sauce on bedside table. Patient claims she is cutting up hashbrowns and will cut up speghetti into small pieces. During visit patient was coughing. It does not appear patient is following dysphagia diet. Patient is consuming moderately thick Glucerna supplement. Monitor p.o intakes, diet tolerance and labs.    Estimated Daily Nutrient Needs:  Energy (kcal):  1377 (30 kcal/kg) Weight Used for Energy Requirements: Current     Protein (g):  60-65 gm of protein (1.3-1.4 gm/kg) Weight Used for Protein Requirements: Current        Fluid (ml/day):  5451-7019 mL Method Used for Fluid Requirements: 1 ml/kcal    Nutrition Related Findings:   Edema: trace BLE. Active bowel sounds. Diarrhea/ Constipation. MBSS (1/27) Wound Type: None    Current Nutrition Therapies:    ADULT ORAL NUTRITION SUPPLEMENT; Breakfast, Lunch, Dinner; Diabetic Oral Supplement  ADULT DIET; Dysphagia - Soft and Bite Sized; Moderately Thick (Honey); No Drinking Straws    Anthropometric Measures:  Height: 154.9 cm (5' 1\")  Current Body Wt: 45.9 kg (101 lb 3.1 oz)   BMI: 19.1        Nutrition Diagnosis:   Inadequate protein intake related to inadequate protein-energy intake as evidenced by intake 26-50%, intake 0-25%    Nutrition Interventions:   Food and/or Nutrient Delivery: Continue Current Diet, Continue Oral Nutrition Supplement  Nutrition 
Occupational Therapy  Facility/Department: [facility] PROGRESSIVE CARE  Daily Treatment Note  NAME: Madonna Manrique  : 1965  MRN: [id number]    Date of Service: 2025    Discharge Recommendations:  Patient would benefit from continued therapy after discharge  OT Equipment Recommendations  Equipment Needed:  (CTA)    Pt currently functioning below baseline.  Recommend daily inpatient skilled therapy at time of discharge to maximize long term outcomes and prevent re-admission. Please refer to AM-PAC score for current level of function.        Patient Diagnosis(es): The primary encounter diagnosis was Influenza A. Diagnoses of Pneumonia due to infectious organism, unspecified laterality, unspecified part of lung, Tachypnea, and May-Thurner syndrome were also pertinent to this visit.     Assessment   Assessment: Pt appears to be lethargic this date as pt had difficulty staying awake during self-care tasks. Pt presents with FMC impairments during oral care as pt had inc difficulty opening/closing toothpaste and managing toothbrush. Pt educated on SNF placement and what to expect as pt reported she has only been to [facility] in the past. Pt with Fair understanding. Pt declined all OOB activities this date d/t fatigue. Session was limited d/t respirtory therapist entering pt's room for a breathing tx. Skilled OT is indicated to increase overall IND and safety with self-care and functional tasks to return to PLOF.  Activity Tolerance: Patient limited by endurance;Patient limited by fatigue  Discharge Recommendations: Patient would benefit from continued therapy after discharge  Equipment Needed:  (CTA)     Plan  Occupational Therapy Plan  Times Per Week: 4-5x/week  Current Treatment Recommendations: Strengthening;Balance training;Functional mobility training;Safety education & training;Self-Care / ADL;Endurance training;Patient/Caregiver education & training;Equipment evaluation, education, & 
Occupational Therapy  Facility/Department: [facility] PROGRESSIVE CARE  Rehabilitation Occupational Therapy Daily Treatment Note    Date: 25  Patient Name: Madonna Manrique       Room: 1014/1014-02  MRN: [id number]  Account: [id number]   : 1965  (59 y.o.) Gender: female      ARCHIE Feliz reports patient is medically stable for therapy treatment this date. Chart reviewed prior to treatment and patient is agreeable for therapy.  All lines intact and patient positioned comfortably at end of treatment.  All patient needs addressed prior to ending therapy session.      Pt currently functioning below baseline.  Recommend daily inpatient skilled therapy at time of discharge to maximize long term outcomes and prevent re-admission. Please refer to AM-PAC score for current level of function.               Past Medical History:  has a past medical history of Alcoholism ([facility]), Anxiety, Asthma, Bipolar 1 disorder ([facility]), Borderline personality disorder (HCC), Chronic uveitis of both eyes, Depression, Drug abuse, opioid type ([facility]), DVT (deep vein thrombosis) in pregnancy, History of blood transfusion, Long QT interval, May-Thurner syndrome, Multiple sclerosis ([facility]), Osteoporosis, Pulmonary emboli ([facility]), Severe depression ([facility]), Suicide gesture ([facility]), and Uveitis of both eyes.  Past Surgical History:   has a past surgical history that includes Gastric bypass surgery (); Cholecystectomy; Hysterectomy;  section; other surgical history (); Leg Surgery (Left, ); Carpal tunnel release (Bilateral); Tibia fracture surgery (Right, 2023); Leg Surgery (Right, 2023); Tibia fracture surgery (Right, 2023); and Leg Surgery (Right, 2023).    Restrictions  Restrictions/Precautions: Fall Risk, General Precautions, Isolation, Seizure  Other Position/Activity Restrictions: IV L UE,  O2 via NC 5L, telemetry, droplet precautions for influenza    Subjective  Subjective: Pt sitting in recliner upon arrival 
Occupational Therapy  Facility/Department: [facility] PROGRESSIVE CARE  Rehabilitation Occupational Therapy Daily Treatment Note    Date: 25  Patient Name: Madonna Manrique       Room: 1014/1014-02  MRN: [id number]  Account: [id number]   : 1965  (59 y.o.) Gender: female      ARCHIE Castillo reports patient is medically stable for therapy treatment this date. Chart reviewed prior to treatment and patient is agreeable for therapy.  All lines intact and patient positioned comfortably at end of treatment.  All patient needs addressed prior to ending therapy session.      Pt currently functioning below baseline.  Recommend daily inpatient skilled therapy at time of discharge to maximize long term outcomes and prevent re-admission. Please refer to AM-PAC score for current level of function.               Past Medical History:  has a past medical history of Alcoholism ([facility]), Anxiety, Asthma, Asthma, Bipolar 1 disorder ([facility]), Borderline personality disorder ([facility]), Chronic uveitis of both eyes, Depression, Drug abuse, opioid type ([facility]), DVT (deep vein thrombosis) in pregnancy, History of blood transfusion, Long QT interval, May-Thurner syndrome, Osteoporosis, Pneumonia, Pulmonary emboli ([facility]), Severe depression ([facility]), Suicide gesture ([facility]), and Uveitis of both eyes.  Past Surgical History:   has a past surgical history that includes Gastric bypass surgery (); Cholecystectomy; Hysterectomy;  section; other surgical history (); Leg Surgery (Left, ); Carpal tunnel release (Bilateral); Tibia fracture surgery (Right, 2023); Leg Surgery (Right, 2023); Tibia fracture surgery (Right, 2023); and Leg Surgery (Right, 2023).    Restrictions  Restrictions/Precautions: Fall Risk, General Precautions, Isolation, Seizure  Other Position/Activity Restrictions: IV L UE, purewick external catheter, O2 via NC, telemetry, droplet precautions for influenza    Subjective  Subjective: Pt in bed upon 
Occupational Therapy  Facility/Department: [facility] PROGRESSIVE CARE  Rehabilitation Occupational Therapy Daily Treatment Note    Date: 25  Patient Name: Madonna Manrique       Room: 1014/1014-02  MRN: [id number]  Account: [id number]   : 1965  (59 y.o.) Gender: female      ARCHIE Zuñiga reports patient is medically stable for therapy treatment this date. Chart reviewed prior to treatment and patient is agreeable for therapy.  All lines intact and patient positioned comfortably at end of treatment.  All patient needs addressed prior to ending therapy session.      Pt currently functioning below baseline.  Recommend daily inpatient skilled therapy at time of discharge to maximize long term outcomes and prevent re-admission. Please refer to AM-PAC score for current level of function.               Past Medical History:  has a past medical history of Alcoholism ([facility]), Anxiety, Asthma, Bipolar 1 disorder ([facility]), Borderline personality disorder ([facility]), Chronic uveitis of both eyes, Depression, Drug abuse, opioid type (Formerly [facility]), DVT (deep vein thrombosis) in pregnancy, History of blood transfusion, Long QT interval, May-Thurner syndrome, Multiple sclerosis (Formerly [facility]), Osteoporosis, Pulmonary emboli (Formerly [facility]), Severe depression (Formerly [facility]), Suicide gesture (Formerly [facility]), and Uveitis of both eyes.  Past Surgical History:   has a past surgical history that includes Gastric bypass surgery (); Cholecystectomy; Hysterectomy;  section; other surgical history (); Leg Surgery (Left, ); Carpal tunnel release (Bilateral); Tibia fracture surgery (Right, 2023); Leg Surgery (Right, 2023); Tibia fracture surgery (Right, 2023); and Leg Surgery (Right, 2023).    Restrictions  Restrictions/Precautions: Fall Risk, General Precautions, Isolation, Seizure  Other Position/Activity Restrictions: IV L UE,  O2 via NC 2L, telemetry, droplet precautions for influenza    Subjective  Subjective: Pt in bed upon arrival agreeable to 
Occupational Therapy  [facility]  Occupational Therapy Not Seen    DATE: 2/3/2025    NAME: Madonna Manrique  MRN: [id number]   : 1965    Patient not seen this date for Occupational Therapy due to:      [] Cancel by RN or physician due to:    [] Hemodialysis    [] Critical Lab Value Level     [] Blood transfusion in progress    [] Acute or unstable cardiovascular status   _MAP < 55 or more than >115  _HR < 40 or > 130    [] Acute or unstable pulmonary status   -FiO2 > 60%   _RR < 5 or >40    _O2 sats < 85%    [] Strict Bedrest    [] Off Unit for surgery or procedure    [] Off Unit for testing       [] Pending imaging to R/O fracture    [x] Refusal by Patient: Pt refused OT stating she just got back into bed and worked with P.T. earlier. Will cont to follow.      [] Other      [] OT being discontinued at this time. Patient independent. No further needs.     [] OT being discontinued at this time as the patient has been transferred to hospice care. No further needs.      Maren Navarro JHONY    
Occupational Therapy Daily Treatment Note  Facility/Department: [facility] CARE   Patient Name: Madonna Manrique        MRN: [id number]    : 1965    Date of Service: 2025    ARCHIE Feliz reports patient is medically stable for therapy treatment this date. Chart reviewed prior to treatment and patient is agreeable for therapy.  All lines intact and patient positioned comfortably at end of treatment.  All patient needs addressed prior to ending therapy session.       Discharge Recommendations  Discharge Recommendations: Patient would benefit from continued therapy after discharge  OT Equipment Recommendations  Other: Pt currently functioning below baseline.  Recommend daily inpatient skilled therapy at time of discharge to maximize long term outcomes and prevent re-admission. Please refer to AM-PAC score for current level of function.    Chief Complaint   Patient presents with    Shortness of Breath     Past Medical History:  has a past medical history of Alcoholism (Formerly [facility]), Anxiety, Asthma, Asthma, Bipolar 1 disorder (Formerly [facility]), Borderline personality disorder ([facility]ly [facility]), Chronic uveitis of both eyes, Depression, Drug abuse, opioid type ([facility]ly [facility]), DVT (deep vein thrombosis) in pregnancy, History of blood transfusion, Long QT interval, May-Thurner syndrome, Osteoporosis, Pneumonia, Pulmonary emboli ([facility]ly [facility]), Severe depression (Formerly [facility]), Suicide gesture ([facility]ly [facility]), and Uveitis of both eyes.  Past Surgical History:  has a past surgical history that includes Gastric bypass surgery (); Cholecystectomy; Hysterectomy;  section; other surgical history (); Leg Surgery (Left, ); Carpal tunnel release (Bilateral); Tibia fracture surgery (Right, 2023); Leg Surgery (Right, 2023); Tibia fracture surgery (Right, 2023); and Leg Surgery (Right, 2023).    Assessment  Performance deficits / Impairments: Decreased functional mobility ;Decreased ADL status;Decreased strength;Decreased endurance;Decreased safe 
[facility]  Office: [phone]  Bill Frank DO, Enrique Drummond DO, Steve Shaikh DO, Jay Tineo DO, Francoise De Leon MD, Sahara Yu MD, Evelia Owens MD, Elena Olvera MD,  Manuel Rodgers MD, Esteban Negron MD, Nila Nieves MD,  Charles Frausto DO, Lucas Calvo MD, Casper Marrero MD, Jonnie Frank DO, Malaika Ly MD,  Ernesto Ramey DO, Litzy Reed MD, Sarah Craft MD, Tami Bee MD, Crow Cervantes MD,  Hernandez Edge MD, Jesica Obando MD, Felipe Carreon MD, Rohini Wright MD, Enrique Whitaker MD, Madyson Irizarry MD, Ronaldo Aguilar DO, Too Guillen MD, Charles De Jesus MD, Mohsin Reza, MD, Shirley Waterhouse, CNP,  Neli Guadalupe, CNP, Ronaldo Stanton, CNP,  Sandra Ray, DNP, Asiya Orr, CNP, Najma Chen, CNP, Nany Mendoza, CNP, Jennifer Resendiz, CNP, Jane Hammer, PA-C, Flakita Sanchez, PA-C, Rosette Crain, CNP, Carmen Acevedo, CNP,  Virginia Crawley, CNP, Elmira Alonso, CNP, Zara Atwood, CNP,  Domenica Rene, CNS, Sherita Garza, CNP, Ayse Bess, CNP,   Kaitlin Gonzalez, CNP         [facility]   IN-PATIENT SERVICE   [facility]    Progress Note    2/2/2025    9:29 AM    Name:   Madonna Manrique  MRN:     [id number]     Acct:      [id number]   Room:   1014/1014-02   Day:  12  Admit Date:  1/21/2025 10:35 AM    PCP:   Sierra De Oliveira MD  Code Status:  Full Code    Subjective:     C/C:   Chief Complaint   Patient presents with    Shortness of Breath     Interval History Status: improved.     Patient was seen and evaluated.  States that she continues to have shortness of breath with exertion, however has improved to only requiring 1 L/min supplemental oxygen.  She denies any fevers or chills overnight.  States her cough has improved.  Encouraged the patient to increase activity today to improve her respiratory function.  She is agreeable to this.  Denies any other acute complaints.    Brief History:     Per my partner:     Patient is a 
Patient desatting on RA. O2 applied without improvement. RT at bedside to administer breathing tx and stat CXR ordered. In-house NP to assess patient. See MAR for meds ordered. RT placed pt on bipap. Care ongoing.  
Patient in droplet precautions, for positive Flu A on 1/21/25.  Able to wean down from 5L/NC to 4L/NC, SaO2 in mid-90s.  Patient c/o thrush, treated with magic mouthwash.  Refused scheduled Adderall.  Alert & oriented x4. Side rails x2 raised & bed alarm activated for patient safety.  Plan for discharge to [facility].  
Patient refuses to ambulate to bathroom to void, asking for bedpan instead.  BP = 90s/60s.  Mepilex on coccyx.  SaO2 in mid-90s% on 2L/NC.  SOB with increased activity.  Adderall & eye drops not given d/t meds not being available.  Very lethargic, sleeping for long periods of time.  Possible discharge to [facility], precert pending, and need JUANCHO before discharge.  Maintains dysphagia, soft bite-sized, moderately think (honey) diet, no straws.  Bed alarm activated & side rails x2 raised for patient safety.  
Patient refusing Bipap.  
Pharmacy Medication Review    The patient's list of current home medications has been reviewed.     PHYSICIANS AND NURSE PRACTITIONERS: please note there is no Transitions of Care/Med Rec Pharmacist available to address any discrepancies on inpatient orders. It is the responsibility of the attending provider to review the updates made to the home med list and adjust inpatient orders as appropriate.        Source(s) of information:patient, Surescripts refill report and [facility] pharmacy        Based on information provided by the above source(s), I have updated the patient's home med list as described below.     Removed   buPROPion (WELLBUTRIN XL) 150 MG extended release tablet  INVEGA SUSTENNA 156 MG/ML OMAR IM injection  nystatin (MYCOSTATIN) 813196 UNIT/ML suspension   senna-docusate (SENOKOT S) 8.6-50 MG per tablet   vitamin D (ERGOCALCIFEROL) 1.25 MG (17243LD) CA   rivaroxaban (XARELTO) 20 MG TABS tablet  escitalopram (LEXAPRO) 10 MG tablet     Added FLUoxetine (PROZAC) 20 MG capsule   difluprednate (DUREZOL) 0.05 % EMUL   prednisoLONE acetate (PRED FORTE) 1% ophth susp   zolpidem (AMBIEN) 10 MG tablet      Adjusted   diazePAM (VALIUM) 5 MG tablet  paliperidone (INVEGA) 6 MG extended release tablet  BREO ELLIPTA 100-25 MCG/ACT inhaler     Other notes:   None    Are any of the medications noted above considered a 'high alert' medication? YES      Is the patient on warfarin at home? No          Please feel free to call me with any questions about this encounter. Thank you.      Marilynn Londono, pharmacy technician  [facility]  Phone:  [phone]      Electronically signed by Marilynn Londono on 1/21/2025 at 2:26 PM   Note: co-signature by the pharmacist only acknowledges that I have performed a medication review and does not attest to an evaluation of this medication review.      Prior to Admission medications    Medication Sig   FLUoxetine (PROZAC) 20 MG capsule Take 1 capsule by mouth daily With 40 
Physical Therapy  Facility/Department: [facility] PROGRESSIVE CARE  Physical Therapy Initial Assessment    Name: Madonna Manrique  : 1965  MRN: [id number]  Date of Service: 2025    Discharge Recommendations:Pt currently functioning below baseline.  Recommend daily inpatient skilled therapy at time of discharge to maximize long term outcomes and prevent re-admission. Please refer to AM-PAC score for current level of function.        Madonna Manrique is a 59 y.o. female who presents to the emergency department. C/o cough, congestion, fatigue, body aches. Onset was a few days ago. Reports hx asthma. She is a smoker. Denies fever, edema, diarrhea. Rates her pain 7/10. Family member states the patient has been getting progressively weak over the past few days. Today she was unable to ambulate. She presented via EMS. She was given albuterol per EMS. She is wearing NC oxygen at 2L; she does not wear oxygen at home. Rates her pain 7/10.       Patient Diagnosis(es): The primary encounter diagnosis was Influenza A. Diagnoses of Pneumonia due to infectious organism, unspecified laterality, unspecified part of lung and Tachypnea were also pertinent to this visit.  Past Medical History:  has a past medical history of Alcoholism ([facility]), Anxiety, Asthma, Asthma, Bipolar 1 disorder ([facility]), Borderline personality disorder ([facility]), Chronic uveitis of both eyes, Depression, Drug abuse, opioid type ([facility]), DVT (deep vein thrombosis) in pregnancy, History of blood transfusion, Long QT interval, May-Thurner syndrome, Pneumonia, Pulmonary emboli ([facility]), Severe depression ([facility]), Suicide gesture ([facility]), and Uveitis of both eyes.  Past Surgical History:  has a past surgical history that includes Gastric bypass surgery (); Cholecystectomy; Hysterectomy;  section; other surgical history (); Leg Surgery (Left, ); Carpal tunnel release (Bilateral); Tibia fracture surgery (Right, 2023); Leg Surgery (Right, 2023); 
Physical Therapy  Facility/Department: [facility] PROGRESSIVE CARE   Physical Therapy Treatment    Patient Name: Madonna Manrique        MRN: [id number]    : 1965    Date of Service: 2025    Discharge Recommendations  Discharge Recommendations: Patient would benefit from continued therapy after discharge. Pt currently functioning below baseline.  Recommend daily inpatient skilled therapy at time of discharge to maximize long term outcomes and prevent re-admission. Please refer to AM-PAC score for current level of function.      Assessment  Body Structures, Functions, Activity Limitations Requiring Skilled Therapeutic Intervention: Decreased functional mobility , Decreased strength, Decreased endurance, Decreased balance, Decreased posture  Assessment: Patient with deficits in gait due to decreased balance and decreased endurance. Patient requires CGA to amb 20 feet with RW. Patient will benefit from skilled PT to improve gait, transfers, balance, and functional activity tolerance.  Therapy Prognosis: Good  Decision Making: Medium Complexity  Requires PT Follow-Up: Yes  Activity Tolerance  Activity Tolerance: Patient limited by endurance, Patient tolerated treatment well  Safety Devices  Type of Devices: Call light within reach, Gait belt, Chair alarm in place, Nurse notified, Left in chair, All fall risk precautions in place  Restraints  Restraints Initially in Place: No    AM-PAC  AM-PAC Basic Mobility - Inpatient   How much help is needed turning from your back to your side while in a flat bed without using bedrails?: A Little  How much help is needed moving from lying on your back to sitting on the side of a flat bed without using bedrails?: A Little  How much help is needed moving to and from a bed to a chair?: A Little  How much help is needed standing up from a chair using your arms?: A Little  How much help is needed walking in hospital room?: A Little  How much help is needed climbing 3-5 steps with a 
Physical Therapy  Facility/Department: [facility] CARE   Physical Therapy Daily Treatment Note    Patient Name: Madonna Manrique        MRN: [id number]    : 1965    Date of Service: 2025    Chief Complaint   Patient presents with    Shortness of Breath     Past Medical History:  has a past medical history of Alcoholism (HCC), Anxiety, Asthma, Bipolar 1 disorder (HCC), Borderline personality disorder (HCC), Chronic uveitis of both eyes, Depression, Drug abuse, opioid type (HCC), DVT (deep vein thrombosis) in pregnancy, History of blood transfusion, Long QT interval, May-Thurner syndrome, Multiple sclerosis (HCC), Osteoporosis, Pulmonary emboli (HCC), Severe depression (HCC), Suicide gesture ([facility]), and Uveitis of both eyes.  Past Surgical History:  has a past surgical history that includes Gastric bypass surgery (); Cholecystectomy; Hysterectomy;  section; other surgical history (); Leg Surgery (Left, ); Carpal tunnel release (Bilateral); Tibia fracture surgery (Right, 2023); Leg Surgery (Right, 2023); Tibia fracture surgery (Right, 2023); and Leg Surgery (Right, 2023).    Discharge Recommendations  Discharge Recommendations: Patient would benefit from continued therapy after discharge       Assessment  Body Structures, Functions, Activity Limitations Requiring Skilled Therapeutic Intervention: Decreased functional mobility ;Decreased strength;Decreased endurance;Decreased balance;Decreased posture    Assessment: Pt now on hospitalization day 8 and patient appears exhausted; weak unproductive cough throughout treatment. Pt states she just doesn't like thickened liquids and that she will continue to refuse despite her  being very worried about her. Pt able to perform some light exercises but w/ exertion scale of 8/10.  Pt requiring breaks for O2 sats to recover > 88%.  Will continue to progress while hospitalized. RN informed patient is up in 
Physical Therapy  Facility/Department: [facility] CARE   Physical Therapy Daily Treatment Note    Patient Name: Madonna Manrique        MRN: [id number]    : 1965    Date of Service: 2025    Chief Complaint   Patient presents with    Shortness of Breath     Past Medical History:  has a past medical history of Alcoholism ([facility]), Anxiety, Asthma, Bipolar 1 disorder (HCC), Borderline personality disorder (HCC), Chronic uveitis of both eyes, Depression, Drug abuse, opioid type ([facility]), DVT (deep vein thrombosis) in pregnancy, History of blood transfusion, Long QT interval, May-Thurner syndrome, Multiple sclerosis (HCC), Osteoporosis, Pulmonary emboli (HCC), Severe depression ([facility]), Suicide gesture ([facility]), and Uveitis of both eyes.  Past Surgical History:  has a past surgical history that includes Gastric bypass surgery (); Cholecystectomy; Hysterectomy;  section; other surgical history (); Leg Surgery (Left, ); Carpal tunnel release (Bilateral); Tibia fracture surgery (Right, 2023); Leg Surgery (Right, 2023); Tibia fracture surgery (Right, 2023); and Leg Surgery (Right, 2023).    Discharge Recommendations  Discharge Recommendations: Patient would benefit from continued therapy after discharge   Pt currently functioning below baseline.  Recommend daily inpatient skilled therapy at time of discharge to maximize long term outcomes and prevent re-admission. Please refer to AM-PAC score for current level of function.      Assessment  Body Structures, Functions, Activity Limitations Requiring Skilled Therapeutic Intervention: Decreased functional mobility , Decreased strength, Decreased endurance, Decreased balance, Decreased posture    Assessment: Patient with decreased endurance and fatigues very quickly, only able to walk 15 feet and SpO2 dropped to 88% and needed 3-4 minutes to recover. Patient very deconditioned and would benefit from further therapy following 
Physical Therapy  Facility/Department: [facility] PROGRESSIVE CARE   Physical Therapy Daily Treatment Note    Patient Name: Madonna Manrique        MRN: [id number]    : 1965    Date of Service: 2025    Chief Complaint   Patient presents with    Shortness of Breath     Past Medical History:  has a past medical history of Alcoholism ([facility]), Anxiety, Asthma, Asthma, Bipolar 1 disorder (HCC), Borderline personality disorder (HCC), Chronic uveitis of both eyes, Depression, Drug abuse, opioid type ([facility]), DVT (deep vein thrombosis) in pregnancy, History of blood transfusion, Long QT interval, May-Thurner syndrome, Osteoporosis, Pneumonia, Pulmonary emboli (HCC), Severe depression (HCC), Suicide gesture ([facility]), and Uveitis of both eyes.  Past Surgical History:  has a past surgical history that includes Gastric bypass surgery (); Cholecystectomy; Hysterectomy;  section; other surgical history (); Leg Surgery (Left, ); Carpal tunnel release (Bilateral); Tibia fracture surgery (Right, 2023); Leg Surgery (Right, 2023); Tibia fracture surgery (Right, 2023); and Leg Surgery (Right, 2023).    Discharge Recommendations  Discharge Recommendations: Therapy recommended at discharge   Pt will benefit from continued skilled PT to return to prior level of function.  Pt demonstrates decreased endurance, strength, and functional mobility.    Assessment     Assessment: Pt demonstrates increased weakness from immobility and will continue to benefit from skilled PT to address decrease in strength, endurance, and functional mobility to return to PLOF.  Activity Tolerance  Activity Tolerance: Patient limited by fatigue;Patient limited by endurance  Safety Devices  Type of Devices: All fall risk precautions in place;Call light within reach;Gait belt;Left in chair;Nurse notified;Chair alarm in place    AM-PAC  AM-PAC Basic Mobility - Inpatient   How much help is needed turning from your back to your 
Physical Therapy  Facility/Department: [facility]      NAME: Madonna Manrique  : 1965 (59 y.o.)  MRN: [id number]  CODE STATUS: Full Code    Date of Service: 25      Past Medical History:   Diagnosis Date    Alcoholism (HCC)     sober since     Anxiety     Asthma     Asthma     Bipolar 1 disorder (HCC)     Borderline personality disorder (HCC)     Chronic uveitis of both eyes     Depression     Drug abuse, opioid type ([facility])     DVT (deep vein thrombosis) in pregnancy left    History of blood transfusion     Long QT interval 2025    May-Thurner syndrome     Osteoporosis     Pneumonia     Pulmonary emboli (HCC)     Severe depression (HCC)     Suicide gesture ([facility]) 2019, 10/2019    DRUG OVERDOSE    Uveitis of both eyes      Past Surgical History:   Procedure Laterality Date    CARPAL TUNNEL RELEASE Bilateral      SECTION      x 3    CHOLECYSTECTOMY      GASTRIC BYPASS SURGERY      weighted 286    HYSTERECTOMY (CERVIX STATUS UNKNOWN)      LEG SURGERY Left     broken leg in 3 places     LEG SURGERY Right 2023    RIGHT EXTERNAL FIXATOR APPLICATION OF RIGHT LEG performed by Seferino Prakash DO at [facility] OR    LEG SURGERY Right 2023    TIBIAL PLATEAU FRACTURE OPEN REDUCTION INTERNAL FIXATION, , EX- FIX REMOVAL ( SYNTHES , C-ARM performed by Seferino Prakash DO at [facility] OR    OTHER SURGICAL HISTORY  2015    left iliac artery stenting, IVC filter placement and retrieval    TIBIA FRACTURE SURGERY Right 2023    TIBIAL PLATEAU KNEE SPANNING EXTERNAL FIXATION, LARGE EX-FIX SET    TIBIA FRACTURE SURGERY Right 2023    TIBIAL PLATEAU FRACTURE OPEN REDUCTION INTERNAL FIXATION, , EX- FIX REMOVAL ( SYNTHES , C-ARM - Right       Chart Reviewed: Yes  Patient assessed for rehabilitation services?: Yes  Family/Caregiver Present: Yes ( end of session)  General  General Comments: pt with flat affect; pt on 5L O2 via 
Physical Therapy  Facility/Department: [facility] CARE  Rehabilitation Physical Therapy Treatment Note    NAME: Madonna Manrique  : 1965 (59 y.o.)  MRN: [id number]  CODE STATUS: Full Code    Date of Service: 2/3/25       Restrictions:  Restrictions/Precautions: Fall Risk, General Precautions, Isolation, Seizure  Position Activity Restriction  Other Position/Activity Restrictions: IV L UE,  O2 via NC 2L, telemetry, droplet precautions for influenza     SUBJECTIVE  Subjective: pt in bed agreeable to PT       OBJECTIVE  Cognition  Overall Cognitive Status: Exceptions  Arousal/Alertness: Delayed responses to stimuli  Following Commands: Follows one step commands with repetition;Follows one step commands with increased time  Attention Span: Appears intact  Memory: Decreased recall of recent events  Safety Judgement: Decreased awareness of need for safety;Decreased awareness of need for assistance  Problem Solving: Assistance required to identify errors made;Assistance required to correct errors made;Assistance required to implement solutions  Insights: Decreased awareness of deficits  Initiation: Requires cues for some  Sequencing: Requires cues for some  Orientation  Overall Orientation Status: Within Functional Limits  Orientation Level: Oriented X4    Functional Mobility  Bed Mobility  Overall Assistance Level: Minimal Assistance;Moderate Assistance  Roll Left  Assistance Level: Minimal assistance;Moderate assistance  Sit to Supine  Assistance Level: Minimal assistance;Moderate assistance  Supine to Sit  Assistance Level: Minimal assistance;Moderate assistance  Scooting  Assistance Level: Minimal assistance  Transfers  Surface: From bed;To chair with arms  Additional Factors: Verbal cues;Hand placement cues  Device: Walker  Sit to Stand  Assistance Level: Moderate assistance  Stand to Sit  Assistance Level: Moderate assistance  Bed To/From Chair  Technique: Stand step;Stand pivot  Assistance Level: Moderate 
[facility]  Office: [phone]  Bill Frank DO, Enrique Drummond DO, Steve Shaikh DO, Jay Tineo DO, Francoise De Leon MD, Sahara Yu MD, Evelia Owens MD, Elena Olvera MD,  Manuel Rodgers MD, Esteban Negron MD, Nila Nieves MD,  Charles Frausto DO, Lucas Calvo MD, Casper Marrero MD, Jonnie Frank DO, Malaika Ly MD,  Ernesto Ramey DO, Litzy Reed MD, Sarah Craft MD, Tami Bee MD, Crow Cervantes MD,  Hernandez Edge MD, Jesica Obando MD, Felipe Carreon MD, Rohini Wright MD, Enrique Whitaker MD, Madyson Irizarry MD, Ronaldo Aguilar DO, Too Guillen MD, Charles De Jesus MD, Mohsin Reza, MD, Shirley Waterhouse, CNP,  Neli Guadalupe CNP, Ronaldo Stanton, CNP,  Sandra Ray, DNP, Asiya Orr, CNP, Najma Chen, CNP, Nany Mendoza, CNP, Jennifer Resendiz, CNP, Jane Hammer, PA-C, Flakita Sanchez PA-C, Rosette Crain, CNP, Carmen Acevedo, CNP,  Virginia Crawley, CNP, Elmira Alonso, CNP, Zara Atwood, CNP,  Domenica Rene, CNS, Sherita Garza, CNP, Ayse Bess, CNP,   Kaitlin Gonzalez, CNP       [facility]      Daily Progress Note     Admit Date: 1/21/2025  Bed/Room No.  1014/1014-02  Admitting Physician : Evelia Owens MD  Code Status :Full Code  Hospital Day:  LOS: 15 days   Chief Complaint:     Chief Complaint   Patient presents with    Shortness of Breath     Principal Problem:    Community acquired pneumonia of left lower lobe of lung  Active Problems:    Dehydration with hyponatremia    Persistent asthma with acute exacerbation    Influenza A    Long QT interval  Resolved Problems:    Alcohol use disorder, severe, dependence (HCC)    Subjective :        Interval History / Significant events :  02/05/25    Patient is attended by her  at bedside.  She has been tolerating dysphagia diet.  She would like to take regular diet.  Patient still needs assistance in ADL.  I recommended her to continue with dysphagia diet for now and have repeat 
[facility]  Office: [phone]  Bill Frank DO, Enrique Drummond DO, Steve Shaikh DO, Jay Tineo, DO, Francoise De Leon MD, Sahara Yu MD, Evelia Owens MD, Elena Olvera MD,  Manuel Rodgers MD, Esteban Negron MD, Nila Nieves MD,  Charles Frausto DO, Lucas Calvo MD, Casper Marrero MD, Jonnie Frank DO, Malaika Ly MD,  Ernesto Ramey DO, Litzy Reed MD, Sarah Craft MD, Tami Bee MD, Crow Cervantes MD,  Hernandez Edge MD, Jesica Obando MD, Felipe Carreon MD, Rohini Wright MD, Enrique Whitaker MD, Madyson Irizarry MD, Ronaldo Aguilar DO, Too Guillen MD, Charles De Jesus MD, Mohsin Reza, MD, Shirley Waterhouse, CNP,  Neli Guadalupe CNP, Ronaldo Stanton, CNP,  Sandra Ray, DNP, Asiya Orr, CNP, Najma Chen, CNP, Nany Mendoza, CNP, Jennifer Resendiz, CNP, Jane Hammer, PA-C, Flakita Sanchez PA-C, Rosette Crain, CNP, Carmen Acevedo, CNP,  Virginia Crawley, CNP, Elmira Alonso, CNP, Zara Atwood, CNP,  Domenica Rene, CNS, Sherita Garza, CNP, Ayse Bess, CNP,   Kaitlin Gonzalez, CNP       [facility]      Daily Progress Note     Admit Date: 1/21/2025  Bed/Room No.  1014/1014-02  Admitting Physician : Evelia Owens MD  Code Status :Full Code  Hospital Day:  LOS: 3 days   Chief Complaint:     Chief Complaint   Patient presents with    Shortness of Breath     Principal Problem:    Community acquired pneumonia of left lower lobe of lung  Active Problems:    Dehydration with hyponatremia    Persistent asthma with acute exacerbation    Influenza A    Long QT interval  Resolved Problems:    Alcohol use disorder, severe, dependence (HCC)    Subjective :        Interval History / Significant events :  01/24/25    Patient continues to complain of generalized weakness, fatigue, breathing difficulty.  She remains on supplemental oxygen and is currently at 5 LPM.  Patient has been hypotensive, tachycardic.  Vitals - afebrile, Tmax 98.6 °F.  Hypoxia 84% on 
Pt remained calm and cooperative tonight, but lethargic. Pt remained on 2L NC. Purewick in place and draining. Will continue with care plan.  
Pt remained lethargic tonight. Pt remained on 2L NC tonight. Purewick draining. Pt had a BM at start of shift. Will continue with care plan.  
[facility]  Office: [phone]  Bill Frank DO, Enrique Drummond DO, Steve Shaikh DO, Jay Tineo, DO, Francoise De Leon MD, Sahara Yu MD, Evelia Owens MD, Elena Olvera MD,  Manuel Rodgers MD, Esteban Negron MD, Nila Nieves MD,  Charles Frausto DO, Lucas Calvo MD, Casper Marrero MD, Jonnie Frank DO, Malaika Ly MD,  Ernesto Ramey DO, Litzy Reed MD, Sarah Craft MD, Tami Bee MD, Crow Cervantes MD,  Hernandez Edge MD, Jesica Obando MD, Felipe Carreon MD, Rohini Wright MD, Enrique Whitaker MD, Madyson Irizarry MD, Ronaldo Aguilar DO, Too Guillen MD, Charles De Jesus MD, Mohsin Reza, MD, Shirley Waterhouse, CNP,  Neli Guadalupe CNP, Ronaldo Stanton, CNP,  Sandra Ray, DNP, Asiya Orr, CNP, Najma Chen, CNP, Nany Mendoza, CNP, Jennifer Resendiz, CNP, Jane Hammer, PA-C, Flakita Sanchez PA-C, Rosette Crain, CNP, Carmen Acevedo, CNP,  Virginia Crawley, CNP, Elmira Alonso, CNP, Zara Atwood, CNP,  Domenica Rene, CNS, Sherita Garza, CNP, Ayse Bess, CNP,   Kaitlin Gonzalez, CNP       [facility]      Daily Progress Note     Admit Date: 1/21/2025  Bed/Room No.  1014/1014-02  Admitting Physician : Evelia Owens MD  Code Status :Full Code  Hospital Day:  LOS: 4 days   Chief Complaint:     Chief Complaint   Patient presents with    Shortness of Breath     Principal Problem:    Community acquired pneumonia of left lower lobe of lung  Active Problems:    Dehydration with hyponatremia    Persistent asthma with acute exacerbation    Influenza A    Long QT interval  Resolved Problems:    Alcohol use disorder, severe, dependence (HCC)    Subjective :        Interval History / Significant events :  01/25/25    Patient continues to remain on 5 L of oxygen by nasal cannula.  She denies any chest pain, expectoration, fever, chills.  Patient has generalized weakness.  Vitals - afebrile, Tmax 98.6 °F.    Labs / test results -normal electrolytes, creatinine 
[facility]  Office: [phone]  Bill Frank DO, Enrique Drummond DO, Steve Shaikh DO, Jay Tineo DO, Francoise De Leon MD, Sahara Yu MD, Evelia Owens MD, Elena Olvera MD,  Manuel Rodgers MD, Esteban Negron MD, Nila Nieves MD,  Charles Frausto DO, Lucas Calvo MD, Casper Marrero MD, Jonnie Frank DO, Malaika Ly MD,  Ernesto Ramey DO, Litzy Reed MD, Sarah Craft MD, Tami Bee MD, Crow Cervantes MD,  Hernandez Edge MD, Jesica Obando MD, Felipe Carreon MD, Rohini Wright MD, Enrique Whitaker MD, Madyson Irizarry MD, Ronaldo Aguilar DO, Too Guillen MD, Charles De Jesus MD, Mohsin Reza, MD, Shirley Waterhouse, CNP,  Neli Guadalupe CNP, Ronaldo Stanton, CNP,  Sandra Ray, DNP, Asiya Orr, CNP, Najma Chen, CNP, Nany Mendoza, CNP, Jennifer Resendiz, CNP, Jane Hammer, PA-C, Flakita Sanchez, PA-C, Rosette Crain, CNP, Carmen Acevedo, CNP,  Virginia Crawley, CNP, Elmira Alonso, CNP, Zara Atwood, CNP,  Domenica Rene, CNS, Sherita Garza, CNP, Ayse Bess, CNP,   Kaitlin Gonzalez, CNP         [facility]   IN-PATIENT SERVICE   [facility]    Progress Note    1/31/2025    10:51 AM    Name:   Madonna Manrique  MRN:     [id number]     Acct:      [id number]   Room:   [facility]4/1014-02   Day:  10  Admit Date:  1/21/2025 10:35 AM    PCP:   Sierra De Oliveira MD  Code Status:  Full Code    Subjective:     C/C:   Chief Complaint   Patient presents with    Shortness of Breath     Interval History Status: improved.     Patient was seen and evaluated at bedside.  Patient states her breathing has improved and her cough continues to improve.  She denies any fevers or chills overnight.  She states she did not sleep well, and is consequently tired and feels generally weak.  She did work with therapy this morning which she states went well.  Denies any fevers, chills, chest pain, abdominal pain overnight.  She is complying with her thickened liquids.  No other acute 
Spiritual Health History and Assessment/Progress Note  [facility]    (P) Loneliness/Social Isolation,  ,  ,      Name: Madonna Manrique MRN: [id number]    Age: 59 y.o.     Sex: female   Language: English   Yazdanism: None   Community acquired pneumonia of left lower lobe of lung     Date: 1/21/2025            Total Time Calculated: (P) 19 min              Spiritual Assessment began in [facility] PROGRESSIVE CARE        Referral/Consult From: (P) Nurse   Encounter Overview/Reason: (P) Loneliness/Social Isolation  Service Provided For: (P) Patient    Amparo, Belief, Meaning:   Patient is connected with a amparo tradition or spiritual practice and has beliefs or practices that help with coping during difficult times  Family/Friends No family/friends present      Importance and Influence:  Patient has spiritual/personal beliefs that influence decisions regarding their health  Family/Friends No family/friends present    Community:  Patient feels well-supported. Support system includes: Spouse/Partner  Family/Friends No family/friends present    Assessment and Plan of Care:     Patient Interventions include: Facilitated expression of thoughts and feelings and Affirmed coping skills/support systems  Family/Friends Interventions include: No family/friends present    Pt exhibited as very tired and answered questions with minimal engagement. Pt shared that she is from [place], [place] and moved to [place] three years ago after meeting her now spouse in a treatment facility. Pt shared that she is three years sober and \"working the program without meetings.\" Pt stated that she was \"working the steps,\" and could not provide examples as to how or in what way \"24 Hours a Day\" was an encouragement to her, having agreed that she knows/likes the book. Pt shared that she and her spouse, describing their relationship as \"very good,\" live in an \"over 55 community.\" Pt asked  to check on her dinner and request for valium.  
[facility]  Office: [phone]  Bill Frank DO, Enrique Drummond DO, Steve Shaikh DO, Jay Tineo DO, Francoise De Leon MD, Sahara Yu MD, Evelia Owens MD, Elena Olvera MD,  Manuel Rodgers MD, Esteban Negron MD, Nila Nieves MD,  Charles Frausto DO, Lucas Calvo MD, Casper Marrero MD, Jonnie Frank DO, Malaika Ly MD,  Ernesto Ramey DO, Litzy Reed MD, Sarah Craft MD, Tami Bee MD, Crow Cervantes MD,  Hernandez Edge MD, Jesica Obando MD, Felipe Carreon MD, Rohini Wright MD, Enrique Whitaker MD, Madyson Irizarry MD, Ronaldo Aguilar DO, Too Guillen MD, Charles De Jesus MD, Mohsin Reza, MD, Shirley Waterhouse, CNP,  Neli Guadalupe CNP, Ronaldo Stanton, CNP,  Sandra Ray, DNP, Asiya Orr, CNP, Najma Chen, CNP, Nany Mendoza, CNP, Jennifer Resendiz, CNP, Jane Hammer, PA-C, Flakita Sanchez PA-C, Rosette Crain, CNP, Carmen Acevedo, CNP,  Virginia Crawley, CNP, Elmira Alonso, CNP, Zara Atwood, CNP,  Domenica Rene, CNS, Sherita Garza, CNP, Ayse Bess, CNP,   Kaitlin Gonzalez, CNP         [facility]   IN-PATIENT SERVICE   [facility]    Second Visit Note  For more detailed information please refer to the progress note of the day      1/30/2025    12:09 PM    Name:   Madonna Manrique  MRN:     [id number]     Acct:      [id number]   Room:   1014/1014-02  IP Day:  9  Admit Date:  1/21/2025 10:35 AM    PCP:   Sierra De Oliveira MD  Code Status:  Full Code      Pt vitals were reviewed   New labs were reviewed   Patient was seen    Family at bedside had questions regarding code status and thin liquids.  After long discussion, family and patient want to change patient's code status back to FULL.  Patient is agreeable at this time to drinking only thickened liquids.  Long explanation regarding possible etiologies of patient's dysphagia including malnourishment secondary to GLP1 abuse versus MS.  Multiple questions answered.      Updated plan : 
[facility]  Office: [phone]  Bill Frank DO, Enrique Drummond DO, Steve Shaikh DO, Jay Tineo DO, Francoise De Leon MD, Sahara Yu MD, Evelia Owens MD, Elena Olvera MD,  Manuel Rodgers MD, Esteban Negron MD, Nila Nieves MD,  Charles Frausto DO, Lucas Calvo MD, Casper Marrero MD, Jonnie Frank DO, Malaika Ly MD,  Ernesto Ramey DO, Litzy Reed MD, Sarah Craft MD, Tami Bee MD, Crow Cervantes MD,  Hernandez Edge MD, Jesica Obando MD, Felipe Carreon MD, Rohini Wright MD, Enrique Whitaker MD, Madyson Irizarry MD, Ronaldo Aguilar DO, Too Guillen MD, Charles De Jesus MD, Mohsin Reza, MD, Shirley Waterhouse, CNP,  Neli Guadalupe, CNP, Ronaldo Stanton, CNP,  Sandra Ray, DNP, Asiya Orr, CNP, Najma Chen, CNP, Nany Mendoza, CNP, Jennifer Resendiz, CNP, Jane Hammer, PA-C, Flakita Sanchez, PA-C, Rosette Crain, CNP, Carmen Acevedo, CNP,  Virginia Crawley, CNP, Elmira Alonso, CNP, Zara Atwood, CNP,  Domenica Rene, CNS, Sherita Garza, CNP, Ayse Bses, CNP,   Kaitlin Gonzalez, CNP         [facility]   IN-PATIENT SERVICE   [facility]    Progress Note    1/30/2025    9:08 AM    Name:   Madonna Manrique  MRN:     [id number]     Acct:      [id number]   Room:   1014/1014-02   Day:  9  Admit Date:  1/21/2025 10:35 AM    PCP:   Sierra De Oliveira MD  Code Status:  DNR-CC    Subjective:     C/C:   Chief Complaint   Patient presents with    Shortness of Breath     Interval History Status: improved.     Patient was seen and eval at bedside.  Patient states she is feeling improved since yesterday, states breathing treatments are helping.  She is still having a productive cough, however states that this is improving.  She states her shortness of breath has improved.  Denies any fevers, chills, chest pain, abdominal pain overnight.  I reiterated to her the importance of compliance with thickened liquids.  Patient verbalized understanding.  No other acute 
[facility]  Office: [phone]  Bill Frank DO, Enrique Drummond DO, Steve Shaikh DO, Jay Tineo, DO, Francoise De Leon MD, Sahara Yu MD, Evelia Owens MD, Elena Olvera MD,  Manuel Rodgers MD, Esteban Negron MD, Nila Nieves MD,  Charles Frausto DO, Lucas Calvo MD, Casper Marrero MD, Jonnie Frank DO, Malaika Ly MD,  Ernesto Ramey DO, Litzy Reed MD, Sarah Craft MD, Tami Bee MD, Crow Cervantes MD,  Hernandez Edge MD, Jesica Obando MD, Felipe Carreon MD, Rohini Wright MD, Enrique Whitaker MD, Madyson Irizarry MD, Ronaldo Aguilar DO, Too Guillen MD, Charles De Jesus MD, Mohsin Reza, MD, Shirley Waterhouse, CNP,  Neli Guadalupe CNP, Ronaldo Stanton, CNP,  Sandra Ray, DNP, Asiya Orr, CNP, Najma Chen, CNP, Nany Mendoza, CNP, Jennifer Resendiz, CNP, Jane Hammer, PA-C, Flakita Sanchez, PA-C, Rosette Crain, CNP, Carmen Acevedo, CNP,  Virginia Crawley, CNP, Elmira Alonso, CNP, Zara Atwood, CNP,  Domenica Rene, CNS, Sherita Garza, CNP, Ayse Bess, CNP,   Kaitlin Gonzalez, CNP       [facility]      Daily Progress Note     Admit Date: 1/21/2025  Bed/Room No.  1014/1014-02  Admitting Physician : Evelia Owens MD  Code Status :Full Code  Hospital Day:  LOS: 1 day   Chief Complaint:     Chief Complaint   Patient presents with    Shortness of Breath     Principal Problem:    Community acquired pneumonia of left lower lobe of lung  Active Problems:    Dehydration with hyponatremia    Persistent asthma with acute exacerbation    Alcohol use disorder, severe, dependence (HCC)    Influenza A    Long QT interval  Resolved Problems:    * No resolved hospital problems. *    Subjective :        Interval History/Significant events :  01/22/25    Patient denies any chest pain, palpitations, leg swelling.  Patient continues to have cough, expectoration, wheezing.  She has poor appetite, and decreased energy.  Vitals -afebrile, Tmax 98.2 °F.  Heart rate is 
[facility]  Office: [phone]  Bill Frank DO, Enrique Drummond DO, Steve Shaikh DO, Jay Tineo DO, Francoise De Leon MD, Sahara Yu MD, Evelia Owens MD, Elena Olvera MD,  Manuel Rodgers MD, Esteban Negron MD, Nila Nieves MD,  Charles Frausto DO, Lucas Calvo MD, Casper Marrero MD, Jonnie Frank DO, Malaika Ly MD,  Ernesto Ramey DO, Litzy Reed MD, Sarah Craft MD, Tami Bee MD, Crow Cervantes MD,  Hernandez Edge MD, Jesica Obando MD, Felipe Carreon MD, Rohini Wright MD, Enrique Whitaker MD, Madyson Irizarry MD, Ronaldo Aguilar DO, Too Guillen MD, Charles De Jesus MD, Mohsin Reza, MD, Shirley Waterhouse, CNP,  Neli Guadalupe, CNP, Ronaldo Stanton, CNP,  Sandra Ray, DNP, Asiya Orr, CNP, Najma Chen, CNP, Nany Mendoza, CNP, Jennifer Resendiz, CNP, Jane Hammer, PA-C, Flakita Sanchez, PA-C, Rosette Crain, CNP, Carmen Acevedo, CNP,  Virginia Crawley, CNP, Elmira Alonso, CNP, Zara Atwood, CNP,  Domenica Rene, CNS, Sherita Garza, CNP, Ayse Bess, CNP,   Kaitlin Gonzalez, CNP         [facility]   IN-PATIENT SERVICE   [facility]    Progress Note    2/3/2025    12:19 PM    Name:   Madonna Manrique  MRN:     [id number]     Acct:      [id number]   Room:   1014/1014-02   Day:  13  Admit Date:  1/21/2025 10:35 AM    PCP:   Sierra De Oliveira MD  Code Status:  Full Code    Subjective:     C/C:   Chief Complaint   Patient presents with    Shortness of Breath     Interval History Status: not changed.     Patient was seen and evaluated.  She denies any acute changes or events overnight.  Continues to feel as though her breathing and cough have improved.  Patient is reluctant to get out of bed, explained that this is important for her respiratory function and her strength.  Encouraged further activity.  No other acute complaints.  Patient remained stable for discharge to skilled nursing facility.    Brief History:     Patient is a 59-year-old 
Writer summoned to room,  at bedside, pt tearful, Raudel () requesting to speak with Dr Owens, re: pt stating that the physician voiced a desire to place her on a ventilator, writer reviewed plan of care with patient and , continue to request to speak with physician, Dr Owens made aware  
\"\" Raudel in to visit patient, updated at bedside on plan of care, requests to speak with physician for more in depth update and to \"meet\" him, Dr Owens notified via [employer]  
Requirements: Current  Protein (g/day): 67-72 gm of protein (1.3-1.4 gm/kg)  Method Used for Fluid Requirements: 1 ml/kcal  Fluid (ml/day): 5017-4035 mL    Nutrition Diagnosis:   Inadequate protein intake related to inadequate protein-energy intake as evidenced by intake 26-50%, intake 0-25%    Nutrition Interventions:   Food and/or Nutrient Delivery: Continue Current Diet, Modify Oral Nutrition Supplement  Nutrition Education/Counseling: Education/Counseling not indicated  Coordination of Nutrition Care: Continue to monitor while inpatient       Goals:  Goals: PO intake 75% or greater  Type of Goal: New goal       Nutrition Monitoring and Evaluation:      Food/Nutrient Intake Outcomes: Food and Nutrient Intake, Supplement Intake  Physical Signs/Symptoms Outcomes: Biochemical Data, Fluid Status or Edema, Skin, Weight, GI Status    Discharge Planning:    Continue current diet, Continue Oral Nutrition Supplement         Sherita BUENON, RDN, LDN  Lead Clinical Dietitian  RD Office Phone [phone]      
cues for pursed lip breathing  Safety Devices  Type of Devices: Call light within reach;Nurse notified;All fall risk precautions in place;Left in bed;Bed alarm in place    AM-PAC  AM-[facility] Basic Mobility - Inpatient   How much help is needed turning from your back to your side while in a flat bed without using bedrails?: A Little  How much help is needed moving from lying on your back to sitting on the side of a flat bed without using bedrails?: A Little  How much help is needed moving to and from a bed to a chair?: A Little  How much help is needed standing up from a chair using your arms?: A Little  How much help is needed walking in hospital room?: A Little  How much help is needed climbing 3-5 steps with a railing?: A Lot  AM-PAC Inpatient Mobility Raw Score : 17  AM-PAC Inpatient T-Scale Score : 42.13  Mobility Inpatient CMS 0-100% Score: 50.57  Mobility Inpatient CMS G-Code Modifier : CK    Restrictions/Precautions  Restrictions/Precautions  Restrictions/Precautions: Fall Risk;General Precautions;Isolation;Seizure  Activity Level: Up as Tolerated  Position Activity Restriction  Other Position/Activity Restrictions: IV L UE,  O2 via NC 2L, telemetry, droplet precautions for influenza       Subjective  General  Chart Reviewed: Yes  Additional Pertinent Hx: MS  Response To Previous Treatment: Patient with no complaints from previous session.  Family/Caregiver Present: No  Diagnosis: INfluenza A, pneumonia, asthma exacerbation  General  General Comments: RN states patient appropriate for therapy  Subjective  Subjective: pt initally declined treatment, after encouragement and education pt agreed to a bedside treatment       Mobility   Bed mobility  Rolling to Right: Contact guard assistance  Supine to Sit: Contact guard assistance  Sit to Supine: Contact guard assistance  Scooting: Contact guard assistance;Minimal assistance  Bed Mobility Comments: pt was able to bridge 25% to assit with repositioning in bed. all 
liquids available to her.    Brief History:     Per my partner:     Patient is a 59-year-old female who was admitted to the hospital on 1/21/2025 for treatment of community-acquired pneumonia of left lower lobe of the lung.  She initially presented to the emergency room with cough, congestion, fatigue, generalized bodyaches and pains for several days.  She has underlying history of MS, COPD, bipolar disorder, May Thurner syndrome, thromboembolism with DVT/PE, alcohol abuse, prolonged QT.  At the time of admission, patient reported that her anticoagulation was discontinued by her neurologist after she complained of ecchymosis.  She has history of gastric bypass surgery approximately 20 years ago when she weighed 286 pounds, has been on Ozempic for the last 9 months and is lost an additional 80 pounds.  Initial evaluation in the ED demonstrated tachycardia, tachypnea with accessory muscle use.  She was placed on supplemental oxygen at that time.  Chest x-ray demonstrated left lower lobe consolidation, patient also tested positive for influenza A.  She was given antibiotics, steroids, Tamiflu and admitted for further treatment of acute respiratory failure with pneumonia.       She did undergo MRI on 1/27 which demonstrated Stable high-signal lesions in the periventricular white matter consistent with demyelination in a patient with known multiple sclerosis.  No abnormal enhancement to suggest active demyelination at this time.  No acute brain parenchymal abnormality.     Review of Systems:     Review of Systems   Constitutional:  Negative for chills and fever.   Respiratory:  Positive for cough and shortness of breath. Negative for wheezing.    Cardiovascular:  Negative for chest pain and palpitations.   Gastrointestinal:  Negative for abdominal pain, constipation, diarrhea, nausea and vomiting.   Genitourinary:  Negative for dysuria, frequency and urgency.   Neurological:  Negative for dizziness, weakness and 
are negative.      Medications:     Allergies:    Allergies   Allergen Reactions    Aspirin      Other reaction(s): Pt reports history of stomach ulcers, pt reports taking ASA daily.    Nsaids      Other reaction(s): GI Intolerance    Succinylcholine Chloride Other (See Comments)     Pt states she is totally allergic to it         Tramadol Other (See Comments)     Seizure         Current Meds:   Scheduled Meds:    rivaroxaban  20 mg Oral Daily    FLUoxetine  60 mg Oral Daily    albuterol  2.5 mg Nebulization TID    vitamin D  50,000 Units Oral Weekly    midodrine  2.5 mg Oral TID WC    difluprednate  1 drop Both Eyes 4x Daily    paliperidone  6 mg Oral QAM    amphetamine-dextroamphetamine  30 mg Oral BID    budesonide-formoterol  2 puff Inhalation BID RT    [Held by provider] gabapentin  600 mg Oral TID    tiotropium  2 puff Inhalation Daily RT    sodium chloride flush  5-40 mL IntraVENous 2 times per day    guaiFENesin  600 mg Oral BID    nicotine  1 patch TransDERmal Daily    pantoprazole  40 mg Oral BID AC    folic acid  1 mg Oral Daily    thiamine mononitrate  100 mg Oral Daily    zolpidem  10 mg Oral Nightly     Continuous Infusions:    sodium chloride      [Held by provider] sodium chloride Stopped (01/24/25 0611)     PRN Meds: sodium chloride flush, magic (miracle) mouthwash, diazePAM, rimegepant sulfate, sodium chloride flush, sodium chloride, polyethylene glycol, acetaminophen **OR** acetaminophen, benzonatate, albuterol, promethazine    Data:     Past Medical History:   has a past medical history of Alcoholism ([facility]), Anxiety, Asthma, Bipolar 1 disorder ([facility]), Borderline personality disorder ([facility]), Chronic uveitis of both eyes, Depression, Drug abuse, opioid type ([facility]), DVT (deep vein thrombosis) in pregnancy, History of blood transfusion, Long QT interval, May-Thurner syndrome, Multiple sclerosis ([facility]), Osteoporosis, Pulmonary emboli ([facility]), Severe depression ([facility]), Suicide gesture ([facility]), and Uveitis of 
  Cardiovascular:  Negative for chest pain and palpitations.   Gastrointestinal:  Negative for abdominal pain, blood in stool, constipation, diarrhea, nausea and vomiting.   Genitourinary:  Negative for dysuria, enuresis, frequency and hematuria.   Musculoskeletal:  Negative for arthralgias and myalgias.   Skin:  Negative for rash.   Neurological:  Negative for dizziness, weakness, light-headedness and headaches.   Hematological:  Does not bruise/bleed easily.   Psychiatric/Behavioral:  Negative for dysphoric mood and sleep disturbance.      Objective :      Current Vitals : Temp: 97.9 °F (36.6 °C),  Pulse: 92, Respirations: 24, BP: (!) 83/59, SpO2: 96 %  Last 24 Hrs Vitals   Patient Vitals for the past 24 hrs:   BP Temp Temp src Pulse Resp SpO2 Weight   02/04/25 0857 -- -- -- -- -- 96 % --   02/04/25 0759 (!) 83/59 97.9 °F (36.6 °C) Oral 92 24 98 % --   02/04/25 0554 -- -- -- -- -- -- 45.9 kg (101 lb 3.2 oz)   02/04/25 0501 100/66 98.1 °F (36.7 °C) -- 94 18 99 % --   02/04/25 0343 -- -- -- 99 18 95 % --   02/04/25 0017 93/60 97.9 °F (36.6 °C) Oral 96 18 94 % --   02/03/25 1926 (!) 90/54 98.1 °F (36.7 °C) Oral (!) 105 20 97 % --   02/03/25 1115 (!) 98/58 -- -- -- -- -- --   02/03/25 1110 (!) 79/55 96.8 °F (36 °C) Oral (!) 105 20 -- --   02/03/25 1018 -- -- -- -- -- 98 % --     Intake / output   02/02 1901 - 02/04 0700  In: 845 [P.O.:840; I.V.:5]  Out: 200 [Urine:200]  Physical Exam:  Physical Exam  Vitals and nursing note reviewed.   Constitutional:       General: She is not in acute distress.     Appearance: She is not ill-appearing or diaphoretic.   HENT:      Head: Normocephalic and atraumatic.      Nose:      Right Sinus: No maxillary sinus tenderness or frontal sinus tenderness.      Left Sinus: No maxillary sinus tenderness or frontal sinus tenderness.      Mouth/Throat:      Pharynx: No oropharyngeal exudate.   Eyes:      General: No scleral icterus.     Conjunctiva/sclera: Conjunctivae normal.      Pupils: 
ADL transfers with RW/approp DME and good safety  Short Term Goal 3: demo SBA with functional mob in room distances with RW and good safety/pacing for ADL tasks  Short Term Goal 4: demo SBA with UB ADLs and min A with LB ADLs with AE/DME as needed with good safety, pacing  Short Term Goal 5: demo min A with toileting routine with DME as needed with good safety    Plan  Occupational Therapy Plan  Times Per Week: 4-5x/week  Current Treatment Recommendations: Strengthening, Balance training, Functional mobility training, Safety education & training, Self-Care / ADL, Endurance training, Patient/Caregiver education & training, Equipment evaluation, education, & procurement, Positioning, Cognitive/Perceptual training    AM-[facility] Daily Activities Inpatient  AM-[facility] Daily Activity - Inpatient   How much help is needed for putting on and taking off regular lower body clothing?: A Lot  How much help is needed for bathing (which includes washing, rinsing, drying)?: A Lot  How much help is needed for toileting (which includes using toilet, bedpan, or urinal)?: A Lot  How much help is needed for putting on and taking off regular upper body clothing?: A Lot  How much help is needed for taking care of personal grooming?: A Little  How much help for eating meals?: None  AM-[facility] Inpatient Daily Activity Raw Score: 15  AM-[facility] Inpatient ADL T-Scale Score : 34.69  ADL Inpatient CMS 0-100% Score: 56.46  ADL Inpatient CMS G-Code Modifier : CK    Minutes  OT Individual Minutes  Time In: 1104  Time Out: 1152  Minutes: 48  Treatment min: 38  Co-treatment with PT warranted secondary to decreased safety and independence requiring 2 skilled therapy professionals to address individual discipline's goals. OT addressing preparation for ADL transfer, sitting balance for increased ADL performance, sitting/activity tolerance, functional reaching, environmental safety/scanning, fall prevention, functional mobility for ADL transfers, ability to sequence 
SMALL (A) NEGATIVE Final       Imaging / Clinical Data :-   XR CHEST PORTABLE   Final Result         Echocardiogram from 8/29/2024  LVEF 55%, RVSP 27 mmHg.      Clinical Course : gradually improving  Assessment and Plan  :        Acute respiratory failure with hypoxia secondary to community-acquired left lower lobe pneumonia likely superadded bacterial infection.  Continue Rocephin, azithromycin.  Influenza A -continue Tamiflu.  Chronic asthma with acute exacerbation -Solu-Medrol, bronchodilators.  Hyponatremia secondary to dehydration -IV fluids.  Prolonged QT -avoid QT prolonging medications.  H/o Alcohol abuse -sober since 2021.  May-Thurner syndrome  H/o drug abuse-reports sobriety.  H/o thromboembolism on long-term Xarelto.  Prior H/O morbid obesity s/p gastric bypass and currently on Ozempic.  Osteoporosis has multiple fractures in long leg bones before.  Not on medication.  Recommend avoid significant weight loss.  Bipolar disorder -on Prozac, Wellbutrin, Invega    Encourage incentive spirometry   And encourage activity  PT OT evaluation      Continue to monitor vitals , Intake / output ,  Cell count , HGB , Kidney function, oxygenation  as indicated .   Plan and updates discussed with patient ,  answers  explained to satisfaction.   Plan discussed with Staff  RN     (This note is created with the assistance of a speech recognition program. While intending to generate a document that actually reflects the content of the visit, the document can still have some errors including those of syntax and sound a like substitutions which may escape proof reading. In such instances, actual meaning can be extrapolated by contextual diversion.)      Evelia Owens MD  1/23/2025

## 2025-02-05 NOTE — CARE COORDINATION
Social work:  authorization obtained for admission to [facility], valid 2/5-2/11 auth # [id number].    Patient to dc to [facility] via [facility]  at 3:00PM.  # for RN report: [phone]. Completed JUANCHO faxed to [phone].  Informed RN, pt, and facility of dc time, agreeable to plan.   HENS completed.

## 2025-02-05 NOTE — DISCHARGE INSTR - DIET

## 2025-02-05 NOTE — PLAN OF CARE
Problem: Discharge Planning  Goal: Discharge to home or other facility with appropriate resources  1/22/2025 2227 by Radha Olivera RN  Outcome: Progressing  Flowsheets (Taken 1/22/2025 2000)  Discharge to home or other facility with appropriate resources:   Identify barriers to discharge with patient and caregiver   Arrange for needed discharge resources and transportation as appropriate   Identify discharge learning needs (meds, wound care, etc)     Problem: Pain  Goal: Verbalizes/displays adequate comfort level or baseline comfort level  1/22/2025 2227 by Radha Olivera RN  Outcome: Progressing     Problem: Safety - Adult  Goal: Free from fall injury  1/22/2025 2227 by Radha Olivera RN  Outcome: Progressing     Problem: Respiratory - Adult  Goal: Achieves optimal ventilation and oxygenation  1/22/2025 2227 by Radha Olivera RN  Outcome: Progressing  Flowsheets (Taken 1/22/2025 2000)  Achieves optimal ventilation and oxygenation:   Assess for changes in respiratory status   Assess for changes in mentation and behavior   Position to facilitate oxygenation and minimize respiratory effort   Oxygen supplementation based on oxygen saturation or arterial blood gases   Respiratory therapy support as indicated     Problem: Skin/Tissue Integrity  Goal: Absence of new skin breakdown  Description: 1.  Monitor for areas of redness and/or skin breakdown  2.  Assess vascular access sites hourly  3.  Every 4-6 hours minimum:  Change oxygen saturation probe site  4.  Every 4-6 hours:  If on nasal continuous positive airway pressure, respiratory therapy assess nares and determine need for appliance change or resting period.  1/22/2025 2227 by Radha Olivera RN  Outcome: Progressing     Problem: Nutrition Deficit:  Goal: Optimize nutritional status  1/22/2025 2227 by Radha Olivera RN  Outcome: Progressing     
  Problem: Discharge Planning  Goal: Discharge to home or other facility with appropriate resources  1/27/2025 2318 by Robinson Kyle RN  Outcome: Progressing  Flowsheets (Taken 1/27/2025 2000)  Discharge to home or other facility with appropriate resources:   Identify barriers to discharge with patient and caregiver   Arrange for needed discharge resources and transportation as appropriate   Identify discharge learning needs (meds, wound care, etc)   Arrange for interpreters to assist at discharge as needed   Refer to discharge planning if patient needs post-hospital services based on physician order or complex needs related to functional status, cognitive ability or social support system  Note: Discharge teaching and instructions for diagnosis/procedure explained with patient using teachback method. Patient voiced understanding regarding prescriptions, follow up appointments, and care of self at home.   1/27/2025 0954 by Trini Webb RN  Outcome: Progressing     Problem: Pain  Goal: Verbalizes/displays adequate comfort level or baseline comfort level  1/27/2025 2318 by Robinson Kyle RN  Outcome: Progressing  Note: Patient having pain on their tongue & mouth and rates it a 8. Pain interventions includemedication. Patients goal for pain relief is 4. The need for pain and symptom management will be considered in the discharge planning process to ensure patients comfort.   1/27/2025 0954 by Trini Webb RN  Outcome: Progressing     Problem: Safety - Adult  Goal: Free from fall injury  1/27/2025 2318 by Robinson Kyle RN  Outcome: Progressing  Note: Pt fall risk, fall band present, falling star, safety alarm activated and in use as needed. Hourly rounding performed. Pt encouraged to use call light. See [place] fall risk assessment.  1/27/2025 0954 by Trini Webb RN  Outcome: Progressing     Problem: Respiratory - Adult  Goal: Achieves optimal ventilation and oxygenation  1/27/2025 2318 by 
  Problem: Discharge Planning  Goal: Discharge to home or other facility with appropriate resources  Outcome: Progressing  Discharge to home or other facility with appropriate resources:   Identify barriers to discharge with patient and caregiver   Arrange for needed discharge resources and transportation as appropriate   Identify discharge learning needs (meds, wound care, etc)   Refer to discharge planning if patient needs post-hospital services based on physician order or complex needs related to functional status, cognitive ability or social support system      Problem: Pain  Goal: Verbalizes/displays adequate comfort level or baseline comfort level  Outcome: Progressing  Verbalizes/displays adequate comfort level or baseline comfort level:   Encourage patient to monitor pain and request assistance   Assess pain using appropriate pain scale   Administer analgesics based on type and severity of pain and evaluate response      Problem: Safety - Adult  Goal: Free from fall injury  Outcome: Progressing  Free From Fall Injury:   Instruct family/caregiver on patient safety   Based on caregiver fall risk screen, instruct family/caregiver to ask for assistance with transferring infant if caregiver noted to have fall risk factors           
  Problem: Discharge Planning  Goal: Discharge to home or other facility with appropriate resources  Outcome: Progressing  Flowsheets (Taken 1/21/2025 2000)  Discharge to home or other facility with appropriate resources:   Identify barriers to discharge with patient and caregiver   Arrange for needed discharge resources and transportation as appropriate   Identify discharge learning needs (meds, wound care, etc)     Problem: Pain  Goal: Verbalizes/displays adequate comfort level or baseline comfort level  Outcome: Progressing     Problem: Safety - Adult  Goal: Free from fall injury  Outcome: Progressing     Problem: Respiratory - Adult  Goal: Achieves optimal ventilation and oxygenation  1/21/2025 2323 by Radha Olivera RN  Outcome: Progressing     Problem: Skin/Tissue Integrity  Goal: Absence of new skin breakdown  Description: 1.  Monitor for areas of redness and/or skin breakdown  2.  Assess vascular access sites hourly  3.  Every 4-6 hours minimum:  Change oxygen saturation probe site  4.  Every 4-6 hours:  If on nasal continuous positive airway pressure, respiratory therapy assess nares and determine need for appliance change or resting period.  Outcome: Progressing     
  Problem: Respiratory - Adult  Goal: Achieves optimal ventilation and oxygenation  1/21/2025 1753 by Eliz Gonzalez RCP  Outcome: Progressing       Problem: Respiratory - Adult  Goal: Clear lung sounds  1/21/2025 2253 by Shireen Vanegas RCP  Outcome: Progressing     
  Problem: Respiratory - Adult  Goal: Achieves optimal ventilation and oxygenation  1/22/2025 1916 by Emiliana Olivera RCP  Outcome: Progressing     Problem: Respiratory - Adult  Goal: Clear lung sounds  1/22/2025 1916 by Emiliana Olivera RCP  Outcome: Progressing     
  Problem: Respiratory - Adult  Goal: Achieves optimal ventilation and oxygenation  1/23/2025 2030 by Neftaly Khan RCP  Outcome: Progressing  1/23/2025 1207 by Trini Can RN  Outcome: Progressing     Problem: Respiratory - Adult  Goal: Achieves optimal ventilation and oxygenation  1/23/2025 2030 by Neftaly Khan RCP  Outcome: Progressing     Problem: Respiratory - Adult  Goal: Clear lung sounds  Outcome: Progressing     
  Problem: Respiratory - Adult  Goal: Achieves optimal ventilation and oxygenation  1/25/2025 1800 by Eliz Gonzalez RCP  Outcome: Progressing     Problem: Respiratory - Adult  Goal: Clear lung sounds  1/25/2025 1800 by Eliz Gonzalez RCP  Outcome: Progressing     
  Problem: Respiratory - Adult  Goal: Achieves optimal ventilation and oxygenation  1/26/2025 0744 by Eliz Gonzalez RCP  Outcome: Progressing     Problem: Respiratory - Adult  Goal: Clear lung sounds  1/26/2025 0744 by Eliz Gonzalez RCP  Outcome: Progressing     
  Problem: Respiratory - Adult  Goal: Achieves optimal ventilation and oxygenation  1/26/2025 2040 by Darrin Gaines RCP  Outcome: Progressing     Problem: Respiratory - Adult  Goal: Clear lung sounds  1/26/2025 2040 by Darrin Gaines, FREDERIC  Outcome: Progressing     
  Problem: Respiratory - Adult  Goal: Achieves optimal ventilation and oxygenation  1/27/2025 1022 by Hanh Dang RCP  Outcome: Progressing  1/27/2025 0954 by Trini Webb RN  Outcome: Progressing  1/26/2025 2302 by Miley Atwood RN  Outcome: Progressing  1/26/2025 2040 by Darrin Gaines RCP  Outcome: Progressing  Goal: Clear lung sounds  1/27/2025 1022 by Hanh Dang RCP  Outcome: Progressing  1/26/2025 2040 by Darrin Gaines RCP  Outcome: Progressing     
  Problem: Respiratory - Adult  Goal: Achieves optimal ventilation and oxygenation  1/27/2025 2041 by Neftaly Khan RCP  Outcome: Progressing     Problem: Respiratory - Adult  Goal: Clear lung sounds  1/27/2025 2041 by Neftaly Khan RCP  Outcome: Progressing     
  Problem: Respiratory - Adult  Goal: Achieves optimal ventilation and oxygenation  1/28/2025 0759 by Eliz Basurto RCP  Outcome: Progressing  1/27/2025 2318 by Robinson Kyle, RN  Outcome: Progressing  Note: Assess breath sounds every shift and as needed.  Assess oxygenation level & respiration rate.  Encourage coughing & deep breathing.  Encourage use of incentive spirometer.  Assess cough & sputum.  Administer oxygen as needed.  1/27/2025 2041 by Neftaly Khan RCP  Outcome: Progressing  Flowsheets (Taken 1/27/2025 2000 by Robinson Kyle, RN)  Achieves optimal ventilation and oxygenation:   Assess for changes in respiratory status   Assess for changes in mentation and behavior   Position to facilitate oxygenation and minimize respiratory effort   Oxygen supplementation based on oxygen saturation or arterial blood gases   Assess and instruct to report shortness of breath or any respiratory difficulty   Respiratory therapy support as indicated  Goal: Clear lung sounds  1/28/2025 0759 by Eliz Basurto RCP  Outcome: Progressing  1/27/2025 2041 by Neftaly Khan RCP  Outcome: Progressing     
  Problem: Respiratory - Adult  Goal: Achieves optimal ventilation and oxygenation  1/28/2025 2056 by Emiliana Olivera RCP  Outcome: Progressing     Problem: Respiratory - Adult  Goal: Clear lung sounds  1/28/2025 2056 by Emiliana Olivera, FREDERIC  Outcome: Progressing     
  Problem: Respiratory - Adult  Goal: Achieves optimal ventilation and oxygenation  1/29/2025 0847 by Eliz Basurto RCP  Outcome: Progressing  1/29/2025 0207 by Dayan Meeks RN  Outcome: Progressing  Flowsheets (Taken 1/28/2025 2200)  Achieves optimal ventilation and oxygenation:   Assess for changes in respiratory status   Assess for changes in mentation and behavior   Position to facilitate oxygenation and minimize respiratory effort   Oxygen supplementation based on oxygen saturation or arterial blood gases  1/28/2025 2056 by Emiliana Olivera RCP  Outcome: Progressing  Goal: Clear lung sounds  1/29/2025 0847 by Eliz Basurto RCP  Outcome: Progressing  1/28/2025 2056 by Emiliana Olivera RCP  Outcome: Progressing     
  Problem: Respiratory - Adult  Goal: Achieves optimal ventilation and oxygenation  1/29/2025 2046 by Emiliana Olivera RCP  Outcome: Progressing     Problem: Respiratory - Adult  Goal: Clear lung sounds  1/29/2025 2046 by Emiliana Olivera RCP  Outcome: Progressing     
  Problem: Respiratory - Adult  Goal: Achieves optimal ventilation and oxygenation  1/30/2025 0847 by Eliz Ragland RCP  Outcome: Progressing     
  Problem: Respiratory - Adult  Goal: Achieves optimal ventilation and oxygenation  1/30/2025 2119 by Shireen Vanegas RCP  Outcome: Progressing     Problem: Respiratory - Adult  Goal: Clear lung sounds  Outcome: Progressing     
  Problem: Respiratory - Adult  Goal: Achieves optimal ventilation and oxygenation  1/31/2025 0833 by Cristina Taylor, FREDERIC  Outcome: Progressing     Problem: Respiratory - Adult  Goal: Clear lung sounds  1/31/2025 0833 by Cristina Taylor, DELFINAP  Outcome: Progressing     
  Problem: Respiratory - Adult  Goal: Achieves optimal ventilation and oxygenation  1/31/2025 1924 by Shireen Vanegas RCP  Outcome: Progressing     Problem: Respiratory - Adult  Goal: Clear lung sounds  1/31/2025 1924 by Shireen Vanegas RCP  Outcome: Progressing     
  Problem: Respiratory - Adult  Goal: Achieves optimal ventilation and oxygenation  2/1/2025 2043 by Emiliana Olivera, RCRICHARD  Outcome: Progressing     Problem: Respiratory - Adult  Goal: Clear lung sounds  Outcome: Progressing     
  Problem: Respiratory - Adult  Goal: Achieves optimal ventilation and oxygenation  2/2/2025 0806 by Eliz Basurto RCP  Outcome: Progressing  2/2/2025 0655 by Erica Khan RN  Outcome: Progressing  2/1/2025 2043 by Emiliana Olivera RCP  Outcome: Progressing  Goal: Clear lung sounds  2/2/2025 0806 by Eliz Basurto RCP  Outcome: Progressing  2/1/2025 2043 by Emiliana Olivera RCP  Outcome: Progressing     
  Problem: Respiratory - Adult  Goal: Achieves optimal ventilation and oxygenation  2/2/2025 2055 by Emiliana Olivera RCP  Outcome: Progressing     Problem: Respiratory - Adult  Goal: Clear lung sounds  2/2/2025 2055 by Emiliana Olivera, FREDERCI  Outcome: Progressing     
  Problem: Respiratory - Adult  Goal: Achieves optimal ventilation and oxygenation  2/3/2025 0818 by Velia Zhou RCP  Outcome: Progressing  2/2/2025 2055 by Emiliana Olivera RCP  Outcome: Progressing  2/2/2025 1952 by Robinson Kyle, RN  Outcome: Progressing  Note: Assess breath sounds every shift and as needed.  Assess oxygenation level & respiration rate.  Encourage coughing & deep breathing.  Encourage use of incentive spirometer.  Assess cough & sputum.  Administer oxygen as needed.  2/2/2025 1945 by Robinson Kyle, RN  Outcome: Progressing  Flowsheets (Taken 2/2/2025 1935)  Achieves optimal ventilation and oxygenation:   Assess for changes in respiratory status   Assess for changes in mentation and behavior   Position to facilitate oxygenation and minimize respiratory effort   Oxygen supplementation based on oxygen saturation or arterial blood gases   Encourage broncho-pulmonary hygiene including cough, deep breathe, incentive spirometry   Assess the need for suctioning and aspirate as needed   Assess and instruct to report shortness of breath or any respiratory difficulty   Respiratory therapy support as indicated  Goal: Clear lung sounds  2/3/2025 0818 by Velia Zhou RCP  Outcome: Progressing  2/2/2025 2055 by Emiliana Olivera RCP  Outcome: Progressing     
  Problem: Respiratory - Adult  Goal: Achieves optimal ventilation and oxygenation  2/3/2025 2010 by Darrin Gaines RCP  Outcome: Progressing     Problem: Respiratory - Adult  Goal: Clear lung sounds  2/3/2025 2010 by Darrin Gaines, FREDERIC  Outcome: Progressing     
  Problem: Respiratory - Adult  Goal: Achieves optimal ventilation and oxygenation  2/4/2025 1031 by Eliz Gonzalez RCP  Outcome: Progressing     Problem: Respiratory - Adult  Goal: Clear lung sounds  Outcome: Progressing     
  Problem: Respiratory - Adult  Goal: Achieves optimal ventilation and oxygenation  2/4/2025 2104 by Neftaly Khan RCP  Outcome: Progressing     Problem: Respiratory - Adult  Goal: Clear lung sounds  2/4/2025 2104 by Neftaly Khan RCP  Outcome: Progressing     
  Problem: Respiratory - Adult  Goal: Achieves optimal ventilation and oxygenation  2/5/2025 0827 by Brenda Raygoza RCP  Outcome: Progressing     Problem: Respiratory - Adult  Goal: Clear lung sounds  2/5/2025 0827 by Brenda Raygoza RCP  Outcome: Progressing     
  Problem: Respiratory - Adult  Goal: Achieves optimal ventilation and oxygenation  Outcome: Progressing     Problem: Respiratory - Adult  Goal: Clear lung sounds  Outcome: Progressing     
  Problem: Respiratory - Adult  Goal: Achieves optimal ventilation and oxygenation  Outcome: Progressing     Problem: Respiratory - Adult  Goal: Clear lung sounds  Outcome: Progressing     
  Problem: Respiratory - Adult  Goal: Clear lung sounds  Outcome: Progressing     Problem: Respiratory - Adult  Goal: Achieves optimal ventilation and oxygenation  1/25/2025 0709 by Ruth Smith RCP  Outcome: Progressing     
Alert/oriented, shift uneventful, patient refuses turns/ repositioning and offers to get in chair for meals,     Problem: Discharge Planning  Goal: Discharge to home or other facility with appropriate resources  1/24/2025 1921 by Jonathan Villafuerte RN  Outcome: Progressing     Problem: Pain  Goal: Verbalizes/displays adequate comfort level or baseline comfort level  1/24/2025 1921 by Jonathan Villafuerte, RN  Outcome: Progressing  .Patient having pain on their generalized body areas and rates it a  being achy without number . Pain interventions includecorrect body alignment/body mechanics, relaxation techniques, medication, pillow support/positioning, and encouragement for freq change in body position . Patients goal for pain relief is  0 . The need for pain and symptom management will be considered in the discharge planning process to ensure patients comfort.      Problem: Safety - Adult  Goal: Free from fall injury  1/24/2025 1921 by Jonathan Villafuerte, RN  Outcome: Progressing   Alert oriented w/forgetfulness, makes no attempts to get OOB unassisted, call light at reach and utilized sparingly, freq rounding implemented and safety maintained    Problem: Nutrition Deficit:  Goal: Optimize nutritional status  1/24/2025 1921 by Jonathan Villafuerte, RN  Outcome: Progressing  Dietary intake poor, states she just does not feel like eating, encouragement and supplements offered with little to no change in intake   
Attempted to wean pt to RA at beginning of shift and tolerated for few hours but then desatted to 87% and put back on 2L NC. No other acute events overnight. Droplet for influenza A, completed tamiflu. [facility] accepted and started authorization yesterday. Vitals stable and safety maintained.     Problem: Safety - Adult  Goal: Free from fall injury  1/31/2025 0608 by Erica Khan, RN  Outcome: Progressing     Problem: Nutrition Deficit:  Goal: Optimize nutritional status  Outcome: Progressing     Problem: Discharge Planning  Goal: Discharge to home or other facility with appropriate resources  1/31/2025 0608 by Erica Khan, RN  Outcome: Progressing    
Called bedside by RN due to patient's concern regarding drinking thin liquids and changing CODE STATUS.  Patient is adamant that she does not want thickened liquids.  I thoroughly explained the results of her swallow study from yesterday and explained that she may aspirate if she drinks thin liquids.  She states she \"doesn't think that will happen.\"  Patient also verbalizes that she does not feel as though she is aspirating.  She wishes to change her CODE STATUS to DNR CC so she may have thin liquids as a measure of comfort.  I explained that DNR CC code status would prevent us from doing any resuscitative measures, including chest compressions, intubation, shocks, or utilization of certain medications for HR or BP support.  I thoroughly explained the risks associated with this decision, and patient verbalized understanding.  I explained that the patient may change her code status back to FULL CODE at any time if she desires.  She still wishes to be made a DNR CC at this time.  Discussed case with RN, SLP.      Electronically signed by LENO Rodriguez on 1/28/2025 at 1:46 PM   
MRI on Monday 01/27/25  Droplet precaution remains in place    Problem: Nutrition Deficit:  Goal: Optimize nutritional status  Outcome: Progressing  Dietary intake improving     Problem: Respiratory - Adult  Goal: Achieves optimal ventilation and oxygenation  1/26/2025 1751 by Jonathan Villafuerte, RN  Outcome: Progressing  Shortness of breath w/min activity, strong cough and able to clear airway of secretions and position self for comfort, educated on pacing activities, recognizing limitations and when to rest, supplemental oxygen utilized @ 3Lpm via NC w/continuous pulse ox, Spo2 maintained @ >92%     Problem: Safety - Adult  Goal: Free from fall injury  Outcome: Progressing   Alert/oriented, high fall risk per falls risk assessment. Remains free from falls and injuries, call light at reach and utilized appropriately. Bed in lowest position with wheels locked and alarm armed. Personal items that are used frequently are within reach. Up to chair with use of walker and assist, no attempts to get out of bed unassisted noted or reported, freq rounding implemented, will continue to monitor and educate as needed    Problem: Pain  Goal: Verbalizes/displays adequate comfort level or baseline comfort level  Outcome: Progressing   .Patient having pain on their head and rates it a 3. Pain interventions includefrequent position changes, medication, diversional activities, and regular rest periods. Patients goal for pain relief is  0 . The need for pain and symptom management will be considered in the discharge planning process to ensure patients comfort.      Problem: Discharge Planning  Goal: Discharge to home or other facility with appropriate resources  Outcome: Progressing  Possible placement @ [facility] when medically stable, other needs to be determined  
No acute events overnight  BP slightly low, patient asymptomatic  Urine sample sent this shift  Q2H turns continued  Barrier cream applied for protection/tear on coccyx  Precert to [facility] pending  Care ongoing      Problem: Discharge Planning  Goal: Discharge to home or other facility with appropriate resources  Outcome: Progressing     Problem: Pain  Goal: Verbalizes/displays adequate comfort level or baseline comfort level  Outcome: Progressing     Problem: Safety - Adult  Goal: Free from fall injury  Outcome: Progressing     Problem: Respiratory - Adult  Goal: Achieves optimal ventilation and oxygenation  2/5/2025 0136 by Salvador Alfonso RN  Outcome: Progressing     Problem: Skin/Tissue Integrity  Goal: Absence of new skin breakdown  Description: 1.  Monitor for areas of redness and/or skin breakdown  Outcome: Progressing     Problem: Nutrition Deficit:  Goal: Optimize nutritional status  Outcome: Progressing  Flowsheets (Taken 2/4/2025 1720 by Sherita Laird, RD, LD)  Nutrient intake appropriate for improving, restoring, or maintaining nutritional needs:   Monitor oral intake, labs, and treatment plans   Assess nutritional status and recommend course of action     Problem: ABCDS Injury Assessment  Goal: Absence of physical injury  Outcome: Progressing     
Patient A & O x4 and is 1x with walker but is incontinent tonight  Q2H turns tonight  BP low but this is patients baseline and patient is asymptomatic  [facility]ert pending to [facility] ongoing        Problem: Discharge Planning  Goal: Discharge to home or other facility with appropriate resources  Outcome: Progressing     Problem: Pain  Goal: Verbalizes/displays adequate comfort level or baseline comfort level  Outcome: Progressing     Problem: Safety - Adult  Goal: Free from fall injury  Outcome: Progressing     Problem: Respiratory - Adult  Goal: Achieves optimal ventilation and oxygenation  2/4/2025 0217 by Salvador Alfonso RN  Outcome: Progressing     Problem: Skin/Tissue Integrity  Goal: Absence of new skin breakdown  Description: 1.  Monitor for areas of redness and/or skin breakdown  Outcome: Progressing     Problem: Nutrition Deficit:  Goal: Optimize nutritional status  Outcome: Progressing     Problem: ABCDS Injury Assessment  Goal: Absence of physical injury  Outcome: Progressing     
Patient Is A/Ox4, on 1L and ambulates 1 assist with walker.   PRN tylenol given x1.   PRN valium given x1.   Code status changed to Full code.   Worked with PT/OT today and tolerated well.   Plan of care reviewed, questions answered, bed alarm is on, bed in lowest position, call light within reach.  Problem: Discharge Planning  Goal: Discharge to home or other facility with appropriate resources  Outcome: Progressing     Problem: Pain  Goal: Verbalizes/displays adequate comfort level or baseline comfort level  Outcome: Progressing  Patient having pain on their head and rates it a 7. Pain interventions includefrequent position changes and medication. Patients goal for pain relief is 2. The need for pain and symptom management will be considered in the discharge planning process to ensure patients comfort.        Problem: Safety - Adult  Goal: Free from fall injury  Outcome: Progressing     Problem: Respiratory - Adult  Goal: Achieves optimal ventilation and oxygenation  1/30/2025 1851 by Trini Can RN  Outcome: Progressing     Problem: Skin/Tissue Integrity  Goal: Absence of new skin breakdown  Description: 1.  Monitor for areas of redness and/or skin breakdown  2.  Assess vascular access sites hourly  3.  Every 4-6 hours minimum:  Change oxygen saturation probe site  4.  Every 4-6 hours:  If on nasal continuous positive airway pressure, respiratory therapy assess nares and determine need for appliance change or resting period.  Outcome: Progressing     Problem: Nutrition Deficit:  Goal: Optimize nutritional status  Outcome: Progressing     
Patient alert and oriented; flat. NSR on tele. Soft BP's; one time dose midodrine administered. Afebrile. Intermittent desats. No c/o pain. Pt receiving abx, steroids, tamiflu. Purewick in place and patent. Repeat CXR this am. Plan for [facility] at d/c. Bed alarm on, call light within reach. Care ongoing.      Problem: Discharge Planning  Goal: Discharge to home or other facility with appropriate resources  Outcome: Progressing     Problem: Pain  Goal: Verbalizes/displays adequate comfort level or baseline comfort level  Outcome: Progressing     Problem: Safety - Adult  Goal: Free from fall injury  Outcome: Progressing     Problem: Respiratory - Adult  Goal: Achieves optimal ventilation and oxygenation  1/24/2025 0531 by Ruth Hester RN  Outcome: Progressing  1/23/2025 2030 by Neftaly Khan RCP  Outcome: Progressing  Goal: Clear lung sounds  1/23/2025 2030 by Neftaly Khan RCP  Outcome: Progressing     Problem: Skin/Tissue Integrity  Goal: Absence of new skin breakdown  Description: 1.  Monitor for areas of redness and/or skin breakdown  2.  Assess vascular access sites hourly  3.  Every 4-6 hours minimum:  Change oxygen saturation probe site  4.  Every 4-6 hours:  If on nasal continuous positive airway pressure, respiratory therapy assess nares and determine need for appliance change or resting period.  Outcome: Progressing     Problem: Nutrition Deficit:  Goal: Optimize nutritional status  Outcome: Progressing     
Patient is A/Ox4, on 1L and ambulates 1 assist with walker.   IV solumedrol and rocephin.   PRN valium given x1.   Purewick in place.   Vitals remained stable, denies pain.   Worked with PT/OT today and tolerated well.   Plan of care reviewed, questions answered, bed alarm is on, bed in lowest position, call light within reach.   Problem: Discharge Planning  Goal: Discharge to home or other facility with appropriate resources  1/23/2025 1207 by Trini Can RN  Outcome: Progressing     Problem: Pain  Goal: Verbalizes/displays adequate comfort level or baseline comfort level  1/23/2025 1207 by Trini Can RN  Outcome: Progressing     Problem: Safety - Adult  Goal: Free from fall injury  1/23/2025 1207 by Trini Can RN  Outcome: Progressing     Problem: Respiratory - Adult  Goal: Achieves optimal ventilation and oxygenation  1/23/2025 1207 by Trini Can RN  Outcome: Progressing     Problem: Skin/Tissue Integrity  Goal: Absence of new skin breakdown  Description: 1.  Monitor for areas of redness and/or skin breakdown  2.  Assess vascular access sites hourly  3.  Every 4-6 hours minimum:  Change oxygen saturation probe site  4.  Every 4-6 hours:  If on nasal continuous positive airway pressure, respiratory therapy assess nares and determine need for appliance change or resting period.  1/23/2025 1207 by Trini Can RN  Outcome: Progressing     Problem: Nutrition Deficit:  Goal: Optimize nutritional status  1/23/2025 1207 by Trini Can RN  Outcome: Progressing     
Patient is A/Ox4, on 2L nasal canula and ambulates 1 assist with walker.   Vitals remained stable, denies pain.   Patient wanting purewick in place for overnight.   Orders for honey thick liquids, patient is refusing, code status was changed to DNR-CC for comfort measures patient has been drinking thin liquids.   Referrals sent to several different SNF options.   Worked with PT and tolerated well.   Plan of care reviewed, questions answered, bed alarm is on, bed in lowest position, call light within reach.   Problem: Discharge Planning  Goal: Discharge to home or other facility with appropriate resources  1/29/2025 0925 by Trini Can RN  Outcome: Progressing     Problem: Pain  Goal: Verbalizes/displays adequate comfort level or baseline comfort level  1/29/2025 0925 by Trini Can RN  Outcome: Progressing     Problem: Safety - Adult  Goal: Free from fall injury  1/29/2025 0925 by rTini Can RN  Outcome: Progressing     Problem: Respiratory - Adult  Goal: Achieves optimal ventilation and oxygenation  1/29/2025 0925 by Trini Can RN  Outcome: Progressing     Problem: Skin/Tissue Integrity  Goal: Absence of new skin breakdown  Description: 1.  Monitor for areas of redness and/or skin breakdown  2.  Assess vascular access sites hourly  3.  Every 4-6 hours minimum:  Change oxygen saturation probe site  4.  Every 4-6 hours:  If on nasal continuous positive airway pressure, respiratory therapy assess nares and determine need for appliance change or resting period.  1/29/2025 0925 by Trini Can RN  Outcome: Progressing     Problem: Nutrition Deficit:  Goal: Optimize nutritional status  1/29/2025 0925 by Trini Can RN  Outcome: Progressing     Problem: ABCDS Injury Assessment  Goal: Absence of physical injury  1/29/2025 0925 by Trini Can RN  Outcome: Progressing     
Patient is A/Ox4, on RA and ambulates 1 assist with walker.   Incontinent at times  BP soft throughout shift, attending is aware, denies pain.  PRN valium given x1.  Precert pending for [facility].   Plan of care reviewed, questions answered, bed alarm is on, bed in lowest position, call light within reach   Problem: Discharge Planning  Goal: Discharge to home or other facility with appropriate resources  2/4/2025 0951 by Trini Can RN  Outcome: Progressing     Problem: Pain  Goal: Verbalizes/displays adequate comfort level or baseline comfort level  2/4/2025 0951 by Trini Can RN  Outcome: Progressing     Problem: Safety - Adult  Goal: Free from fall injury  2/4/2025 0951 by Trini Can RN  Outcome: Progressing     Problem: Respiratory - Adult  Goal: Achieves optimal ventilation and oxygenation  2/4/2025 0951 by Trini Can RN  Outcome: Progressing     Problem: Skin/Tissue Integrity  Goal: Absence of new skin breakdown  Description: 1.  Monitor for areas of redness and/or skin breakdown  2.  Assess vascular access sites hourly  3.  Every 4-6 hours minimum:  Change oxygen saturation probe site  4.  Every 4-6 hours:  If on nasal continuous positive airway pressure, respiratory therapy assess nares and determine need for appliance change or resting period.  2/4/2025 0951 by Trini Can RN  Outcome: Progressing     Problem: Nutrition Deficit:  Goal: Optimize nutritional status  2/4/2025 0951 by Trini Can RN  Outcome: Progressing     Problem: ABCDS Injury Assessment  Goal: Absence of physical injury  2/4/2025 0951 by Trini Can RN  Outcome: Progressing     
Patient is A/Ox4, on RA and ambulates 1 assist with walker.   Vitals remained stable, denies pain.   Peripheral IV removed.  Discharge to [facility], report called, questions answered.   Belongings gathered and sent with patient.   Problem: Discharge Planning  Goal: Discharge to home or other facility with appropriate resources  2/5/2025 1209 by Trini Can, RN  Outcome: Completed     
Patient resting in bed on 4L via nasal cannula, continuous pulse ox maintained, oxygen saturation within defined limits. Writer attempted to wean O2, however while sleeping patient desaturated to 87% and oxygen had to be increased. External cath in place. Up 1 assist with a walker. Droplet precautions in place for influenza A.   Patient requested PRN breathing treatment, see MAR.   MRI scheduled tomorrow, screening form completed.   2 Ambien were found on the floor of the patient's room inside of the finger of a torn glove. Writer sent to pharmacy to confirm medication. Pharmacy confirmed they were Ambien but believed they may be a home dose as the hospital's Ambien is a different shape and color. Medication destroyed by pharmacy and patient's purse was moved away.   Patient denied having pain.  Patient safety maintained.     Problem: Discharge Planning  Goal: Discharge to home or other facility with appropriate resources  1/26/2025 2302 by Miley Atwood RN  Outcome: Progressing     Problem: Pain  Goal: Verbalizes/displays adequate comfort level or baseline comfort level  1/26/2025 2302 by Miley Atwood RN  Outcome: Progressing  Patient denied having pain. The need for pain and symptom management will be considered in the discharge planning process to ensure patients comfort.     Problem: Safety - Adult  Goal: Free from fall injury  1/26/2025 2302 by Miley Atwood RN  Outcome: Progressing     Problem: Respiratory - Adult  Goal: Achieves optimal ventilation and oxygenation  1/26/2025 2302 by Miley Atwood RN  Outcome: Progressing     Problem: Skin/Tissue Integrity  Goal: Absence of new skin breakdown  Description: 1.  Monitor for areas of redness and/or skin breakdown  Outcome: Progressing     Problem: Nutrition Deficit:  Goal: Optimize nutritional status  1/26/2025 2302 by Miley Atwood RN  Outcome: Progressing     Problem: ABCDS Injury Assessment  Goal: Absence of physical injury  Outcome: Progressing   
Pt had no acute events overnight. Awaiting precert for [facility]. Vitals stable and safety maintained.     Problem: Respiratory - Adult  Goal: Achieves optimal ventilation and oxygenation  2/1/2025 0638 by Erica Khan RN  Outcome: Progressing  Goal: Clear lung sounds  Outcome: Progressing    Problem: Safety - Adult  Goal: Free from fall injury  Outcome: Progressing     Problem: Discharge Planning  Goal: Discharge to home or other facility with appropriate resources  Outcome: Progressing     
Pt had no acute events overnight. Pt had 3 Bm overnight. Purewick removed due to multiple BM and skin breakdown of L labia, cleansed with powder applied. Pt encouraged to get up to BR throughout night after purewick out and with having BM. Weaned to 1L NC and tolerating. Turned and repositioned throughout shift as pt tolerated.     Problem: Skin/Tissue Integrity  Goal: Absence of new skin breakdown  Description: 1.  Monitor for areas of redness and/or skin breakdown  2.  Assess vascular access sites hourly  3.  Every 4-6 hours minimum:  Change oxygen saturation probe site  4.  Every 4-6 hours:  If on nasal continuous positive airway pressure, respiratory therapy assess nares and determine need for appliance change or resting period.  Outcome: Progressing     Problem: Nutrition Deficit:  Goal: Optimize nutritional status  Outcome: Progressing     Problem: Discharge Planning  Goal: Discharge to home or other facility with appropriate resources  2/2/2025 0655 by Erica Khan, RN  Outcome: Progressing  
Pt has been admitted for pneumonia of left lower lobe.   Currently on 3.5L NC, increased to 4L d/t pt de-satting while sleeping. Pt refusing BiPAP.  Currently A&OX4. 1x with walker, tolerating well.  Pt having multiple episodes of incontenence, purewick placed and philip care completed.  All scheduled meds given with exception of adderal and durezol. Medications not available. PRN valium given per pt request.       Standard safety measures in place. Call light within reach. Bed in locked and lowest position.           Problem: Discharge Planning  Goal: Discharge to home or other facility with appropriate resources  1/25/2025 2022 by Maco Meehan RN  Outcome: Progressing     Problem: Pain  Goal: Verbalizes/displays adequate comfort level or baseline comfort level  1/25/2025 2022 by Maco Meehan RN  Outcome: Progressing     Problem: Safety - Adult  Goal: Free from fall injury  1/25/2025 2022 by Maco Meehan RN  Outcome: Progressing     Problem: Respiratory - Adult  Goal: Achieves optimal ventilation and oxygenation  1/25/2025 2022 by Maco Meehan RN  Outcome: Progressing     Problem: Skin/Tissue Integrity  Goal: Absence of new skin breakdown  Description: 1.  Monitor for areas of redness and/or skin breakdown  2.  Assess vascular access sites hourly  3.  Every 4-6 hours minimum:  Change oxygen saturation probe site  4.  Every 4-6 hours:  If on nasal continuous positive airway pressure, respiratory therapy assess nares and determine need for appliance change or resting period.  Outcome: Progressing     Problem: Nutrition Deficit:  Goal: Optimize nutritional status  1/25/2025 2022 by Maco Meehan RN  Outcome: Progressing     Problem: ABCDS Injury Assessment  Goal: Absence of physical injury  Outcome: Progressing     
Pt has been admitted for pneumonia.  Pt currently resting in bed A&OX4. No complaints of pain.   All scheduled meds given, see MAR.  Pt remains on 5L NC, presents with fine crackles. Pt refused BiPAP tonight.  Incontinence care completed.     Standard safety precautions in place. Call light within reach. Bed in locked and lowest position.     Problem: Discharge Planning  Goal: Discharge to home or other facility with appropriate resources  1/24/2025 2023 by Maco Meehan RN  Outcome: Progressing     Problem: Pain  Goal: Verbalizes/displays adequate comfort level or baseline comfort level  1/24/2025 2023 by Maco Meehan RN  Outcome: Progressing     Problem: Safety - Adult  Goal: Free from fall injury  1/24/2025 2023 by Maco Meehan RN  Outcome: Progressing     Problem: Respiratory - Adult  Goal: Achieves optimal ventilation and oxygenation  Outcome: Progressing     Problem: Skin/Tissue Integrity  Goal: Absence of new skin breakdown  Description: 1.  Monitor for areas of redness and/or skin breakdown  2.  Assess vascular access sites hourly  3.  Every 4-6 hours minimum:  Change oxygen saturation probe site  4.  Every 4-6 hours:  If on nasal continuous positive airway pressure, respiratory therapy assess nares and determine need for appliance change or resting period.  Outcome: Progressing     Problem: Nutrition Deficit:  Goal: Optimize nutritional status  1/24/2025 2023 by Maco Meehan RN  Outcome: Progressing     Problem: ABCDS Injury Assessment  Goal: Absence of physical injury  Outcome: Progressing     
Pt is A&Ox4 and gets up 1 assist with a walker. Pt is on 5L NC. Purewick in place, esperanza urine present. MRI done today. Swallow study also completed, pt is to be on a soft and bite sized diet with honey thickened liquids and no straws. IV solu-medrol switched to oral prednisone. Pt has not c/o any pain. All questions and concerns addressed at this time. Safety maintained. Bed is locked and in the lowest position with the call light in reach.     Problem: Discharge Planning  Goal: Discharge to home or other facility with appropriate resources  1/27/2025 0954 by Trini Webb RN  Outcome: Progressing     Problem: Pain  Goal: Verbalizes/displays adequate comfort level or baseline comfort level  1/27/2025 0954 by Trini Webb RN  Outcome: Progressing     Problem: Safety - Adult  Goal: Free from fall injury  1/27/2025 0954 by Trini Webb RN  Outcome: Progressing     Problem: Respiratory - Adult  Goal: Achieves optimal ventilation and oxygenation  1/27/2025 0954 by Trini Webb RN  Outcome: Progressing     Problem: Skin/Tissue Integrity  Goal: Absence of new skin breakdown  Description: 1.  Monitor for areas of redness and/or skin breakdown  2.  Assess vascular access sites hourly  3.  Every 4-6 hours minimum:  Change oxygen saturation probe site  4.  Every 4-6 hours:  If on nasal continuous positive airway pressure, respiratory therapy assess nares and determine need for appliance change or resting period.  1/27/2025 0954 by Trini Webb RN  Outcome: Progressing    Problem: Nutrition Deficit:  Goal: Optimize nutritional status  1/27/2025 0954 by Trini Webb RN  Outcome: Progressing     Problem: ABCDS Injury Assessment  Goal: Absence of physical injury  1/27/2025 0954 by Trini Webb RN  Outcome: Progressing     
Pt on 2L nc at 95%. Pt safety maintained. Pt up with therapy today. Pt voiding. Pt safety maintained. Call light within reach. Pt had complaints of anxiety treated with prn valium. Pt sbp was in the 80s around dinner time pt asymptomatic. attending, notified Proamatine with meals ordered and given. Recheck was 94/69.   Pt had no complaints of pain wcm. Patient having not pain. The need for pain and symptom management will be considered in the discharge planning process to ensure patients comfort.    Problem: Discharge Planning  Goal: Discharge to home or other facility with appropriate resources  Outcome: Progressing     Problem: Pain  Goal: Verbalizes/displays adequate comfort level or baseline comfort level  Outcome: Progressing     Problem: Safety - Adult  Goal: Free from fall injury  Outcome: Progressing     Problem: Respiratory - Adult  Goal: Achieves optimal ventilation and oxygenation  1/22/2025 1840 by Rikki Hardin RN  Outcome: Progressing     Problem: Skin/Tissue Integrity  Goal: Absence of new skin breakdown  Description: 1.  Monitor for areas of redness and/or skin breakdown  2.  Assess vascular access sites hourly  3.  Every 4-6 hours minimum:  Change oxygen saturation probe site  4.  Every 4-6 hours:  If on nasal continuous positive airway pressure, respiratory therapy assess nares and determine need for appliance change or resting period.  Outcome: Progressing     Problem: Nutrition Deficit:  Goal: Optimize nutritional status  Outcome: Progressing     
Pt on 2L via NC. Pt does not wear oxygen at baseline. Purewick in place, draining appropriately. Pt calls with needs. Bed alarm on for safety. Awaiting placement to SNF.   Problem: Discharge Planning  Goal: Discharge to home or other facility with appropriate resources  1/29/2025 0207 by Dayan Meeks, RN  Outcome: Progressing  Flowsheets (Taken 1/28/2025 2200)  Discharge to home or other facility with appropriate resources:   Identify barriers to discharge with patient and caregiver   Arrange for needed discharge resources and transportation as appropriate   Identify discharge learning needs (meds, wound care, etc)   Refer to discharge planning if patient needs post-hospital services based on physician order or complex needs related to functional status, cognitive ability or social support system     Problem: Pain  Goal: Verbalizes/displays adequate comfort level or baseline comfort level  1/29/2025 0207 by Dayan Meeks, RN  Outcome: Progressing     Problem: Safety - Adult  Goal: Free from fall injury  1/29/2025 0207 by Dayan Meeks RN  Outcome: Progressing  Flowsheets (Taken 1/29/2025 0207)  Free From Fall Injury:   Instruct family/caregiver on patient safety   Based on caregiver fall risk screen, instruct family/caregiver to ask for assistance with transferring infant if caregiver noted to have fall risk factors     Problem: Respiratory - Adult  Goal: Achieves optimal ventilation and oxygenation  1/29/2025 0207 by Dayan Meeks, RN  Outcome: Progressing  Flowsheets (Taken 1/28/2025 2200)  Achieves optimal ventilation and oxygenation:   Assess for changes in respiratory status   Assess for changes in mentation and behavior   Position to facilitate oxygenation and minimize respiratory effort   Oxygen supplementation based on oxygen saturation or arterial blood gases     Problem: Skin/Tissue Integrity  Goal: Absence of new skin breakdown  Description: 1.  Monitor for areas of redness and/or skin 
Pt remains safe and free from falls thus far in shift  Sinus rhythm/Sinus tachy on cardiac monitor  Purwick and brief in place, pt turns self   Pt on 2 L O2 new for patient, SpO2 remains above 91%  Worked with therapy but is very weak and O2 drops on exertion  Discharge planning in progress, precert pending for [facility]   Call light in reach  Bed locked and lowest position  Bed alarm on for safety  Care ongoing        Problem: Discharge Planning  Goal: Discharge to home or other facility with appropriate resources  1/31/2025 1049 by Karla Hagen RN  Outcome: Progressing     Problem: Pain  Goal: Verbalizes/displays adequate comfort level or baseline comfort level  1/31/2025 1049 by Karla Hagen RN  Outcome: Progressing     Problem: Safety - Adult  Goal: Free from fall injury  1/31/2025 1049 by Karla Hagen RN  Outcome: Progressing     Problem: Respiratory - Adult  Goal: Achieves optimal ventilation and oxygenation  1/31/2025 1049 by Karla Hagen RN  Outcome: Progressing     Problem: Respiratory - Adult  Goal: Clear lung sounds  1/31/2025 0833 by Cristina Taylor, [facility]  Outcome: Progressing     Problem: Skin/Tissue Integrity  Goal: Absence of new skin breakdown  Description: 1.  Monitor for areas of redness and/or skin breakdown  2.  Assess vascular access sites hourly  3.  Every 4-6 hours minimum:  Change oxygen saturation probe site  4.  Every 4-6 hours:  If on nasal continuous positive airway pressure, respiratory therapy assess nares and determine need for appliance change or resting period.  Outcome: Progressing     Problem: Nutrition Deficit:  Goal: Optimize nutritional status  Outcome: Progressing     Problem: ABCDS Injury Assessment  Goal: Absence of physical injury  Outcome: Progressing     
Shift uneventful, alert/oriented, call light utilized appropriately, up with SBA and use of walker, Chest CT scan completed today, (-) for PE, Lovenox discontinued and pt placed on xarelto, IV steroids cont, Neurology consult with brain MRI on Monday (01/27/2025) no c/o pain voiced, appetite improving, plans for discharge to determined     Problem: Nutrition Deficit:  Goal: Optimize nutritional status  Outcome: Progressing     Problem: Respiratory - Adult  Goal: Achieves optimal ventilation and oxygenation  1/25/2025 1800 by Jonathan Villafuerte, RN  Outcome: Progressing     Problem: Safety - Adult  Goal: Free from fall injury  Outcome: Progressing     Problem: Pain  Goal: Verbalizes/displays adequate comfort level or baseline comfort level  Outcome: Progressing     Problem: Discharge Planning  Goal: Discharge to home or other facility with appropriate resources  Outcome: Progressing     
Shift uneventful, alert/oriented, denies pain, declines being assisted to chair for meals and repositioning at times, shortness of breath noted with min activity, supplemental oxygen @ 2Lpm via NC,     Problem: Pain  Goal: Verbalizes/displays adequate comfort level or baseline comfort level  2/1/2025 1715 by Jonathan Villafuerte, RN  Outcome: Progressing     Problem: Safety - Adult  Goal: Free from fall injury  2/1/2025 1715 by Jonathan Villafuerte RN  Outcome: Progressing     Problem: Respiratory - Adult  Goal: Achieves optimal ventilation and oxygenation  2/1/2025 1715 by Jonathan Villafuerte RN  Outcome: Progressing     Problem: Skin/Tissue Integrity  Goal: Absence of new skin breakdown  Description: 1.  Monitor for areas of redness and/or skin breakdown  2.  Assess vascular access sites hourly  3.  Every 4-6 hours minimum:  Change oxygen saturation probe site  4.  Every 4-6 hours:  If on nasal continuous positive airway pressure, respiratory therapy assess nares and determine need for appliance change or resting period.  Outcome: Progressing     Problem: Nutrition Deficit:  Goal: Optimize nutritional status  Outcome: Progressing     Problem: Discharge Planning  Goal: Discharge to home or other facility with appropriate resources  2/1/2025 1715 by Jonathan Villafuerte, RN  Outcome: Progressing     
oxygenation  1/28/2025 1227 by Zara Garcia, RN  Outcome: Progressing     Problem: Skin/Tissue Integrity  Goal: Absence of new skin breakdown  Description: 1.  Monitor for areas of redness and/or skin breakdown  2.  Assess vascular access sites hourly  3.  Every 4-6 hours minimum:  Change oxygen saturation probe site  4.  Every 4-6 hours:  If on nasal continuous positive airway pressure, respiratory therapy assess nares and determine need for appliance change or resting period.  1/28/2025 1227 by Zara Garcia, RN  Outcome: Progressing     Problem: Nutrition Deficit:  Goal: Optimize nutritional status  1/28/2025 1227 by Zara Garcia, RN  Outcome: Progressing     Problem: ABCDS Injury Assessment  Goal: Absence of physical injury  1/28/2025 1227 by Zara Garcia, RN  Outcome: Progressing     
hrs, and prn basis. Hygiene care being completed independently per patient; assistance provided when deemed necessary.  2/2/2025 1945 by Robinson Kyle, RN  Outcome: Progressing     Problem: Nutrition Deficit:  Goal: Optimize nutritional status  2/2/2025 1952 by Robinson Kyle, RN  Outcome: Progressing  Note: Review diet daily and as needed with pt.  Provide supplement nutrition as ordered by physician & educate pt.  Review nutritional guidelines with pt.  2/2/2025 1945 by Robinson Kyle, RN  Outcome: Progressing     Problem: ABCDS Injury Assessment  Goal: Absence of physical injury  2/2/2025 1952 by Robinson Kyle, RN  Outcome: Progressing  Note: Non-skid socks in place, up with assistance, bed in lowest position, bed exit & alarm as needed, provide toileting every 2 hours an d as needed.  2/2/2025 1945 by Robinson Kyle, RN  Outcome: Progressing

## 2025-02-06 ENCOUNTER — TELEPHONE (OUTPATIENT)
Dept: FAMILY MEDICINE CLINIC | Age: 60
End: 2025-02-06

## 2025-02-06 NOTE — TELEPHONE ENCOUNTER
Called patient to confirm her appt with PCP on 02/10/25. Writer spoke with patient's  Desean, and he states patient was just released from the hospital after 15 days and that she is going to rehab now. Patient's  said \"he believes the Mounjaro is the cause of all of this, and that he does not want her taking it anymore and to notify the PCP\".Writer informed  that a message would be sent to her PCP. Appt on 02/10/25 was cancelled per .

## 2025-02-12 DIAGNOSIS — M79.2 NEUROPATHIC PAIN: ICD-10-CM

## 2025-02-12 DIAGNOSIS — G35 MULTIPLE SCLEROSIS (HCC): ICD-10-CM

## 2025-02-12 RX ORDER — GABAPENTIN 300 MG/1
600 CAPSULE ORAL 3 TIMES DAILY
Qty: 180 CAPSULE | Refills: 0 | Status: SHIPPED | OUTPATIENT
Start: 2025-02-12 | End: 2025-03-14

## 2025-02-12 NOTE — TELEPHONE ENCOUNTER
Last visit: 01/07/25  Last Med refill: 01/07/25  Does patient have enough medication for 72 hours: No:     Next Visit Date:  Future Appointments   Date Time Provider Department Center   2/24/2025 11:40 AM Bertha Soraidageorgina PERES DO Neuro Spec Neurology -   2/25/2025  9:00 AM STV STA CHAIR 01 STVZ STA MED [place]   3/3/2025 10:00 AM Sierra De Oliveira MD [facility] DEP   3/4/2025  2:15 [facility]       Health Maintenance   Topic Date Due    Hepatitis B vaccine (1 of 3 - 19+ 3-dose series) Never done    Colorectal Cancer Screen  05/06/2021    Breast cancer screen  09/30/2022    Lung Cancer Screening &/or Counseling  11/01/2024    A1C test (Diabetic or Prediabetic)  11/19/2024    Lipids  12/17/2024    Annual Wellness Visit (Medicare Advantage)  Never done    Depression Monitoring  01/07/2026    DTaP/Tdap/Td vaccine (3 - Td or Tdap) 06/13/2033    Flu vaccine  Completed    Shingles vaccine  Completed    Pneumococcal 50+ years Vaccine  Completed    COVID-19 Vaccine  Completed    Hepatitis C screen  Completed    HIV screen  Completed    Hepatitis A vaccine  Aged Out    Hib vaccine  Aged Out    Polio vaccine  Aged Out    Meningococcal (ACWY) vaccine  Aged Out    Pneumococcal 0-49 years Vaccine  Discontinued    Diabetes screen  Discontinued       Hemoglobin A1C (%)   Date Value   11/19/2023 5.7   12/29/2020 5.0             ( goal A1C is < 7)   No components found for: \"LABMICR\"  No components found for: \"LDLCHOLESTEROL\", \"LDLCALC\"    (goal LDL is <100)   AST (U/L)   Date Value   01/27/2025 25     ALT (U/L)   Date Value   01/27/2025 31     BUN (mg/dL)   Date Value   02/04/2025 15     BP Readings from Last 3 Encounters:   02/05/25 (!) 84/49   01/07/25 92/62   10/28/24 116/84          (goal 120/80)    All Future Testing planned in [facility]  Lab Frequency Next Occurrence   MORIS DIGITAL SCREEN W OR WO CAD BILATERAL Once 04/19/2024   CBC With Auto Differential Once 07/13/2025   Comprehensive metabolic

## 2025-02-15 ENCOUNTER — HOSPITAL ENCOUNTER (OUTPATIENT)
Facility: MEDICAL CENTER | Age: 60
End: 2025-02-15
Payer: MEDICARE

## 2025-02-17 ENCOUNTER — TELEPHONE (OUTPATIENT)
Dept: FAMILY MEDICINE CLINIC | Age: 60
End: 2025-02-17

## 2025-02-17 ENCOUNTER — TELEPHONE (OUTPATIENT)
Dept: NEUROLOGY | Age: 60
End: 2025-02-17

## 2025-02-17 NOTE — TELEPHONE ENCOUNTER
Care Transitions Initial Follow Up Call    Outreach made within 2 business days of discharge: Yes    Patient: Madonna Manrique Patient : 1965   MRN: [id number]  Reason for Admission: Community acquired pneumonia of left lower lobe of lung   Discharge Date: 25       Spoke with: Patient    Discharge department/facility: [facility] Interactive Patient Contact:  Was patient able to fill all prescriptions: Yes  Was patient instructed to bring all medications to the follow-up visit: Yes  Is patient taking all medications as directed in the discharge summary? Yes  Does patient understand their discharge instructions: Yes  Does patient have questions or concerns that need addressed prior to 7-14 day follow up office visit: no    Additional needs identified to be addressed with provider  No needs identified             Scheduled appointment with PCP within 7-14 days    Follow Up  Future Appointments   Date Time Provider Department Center   2025 11:40 AM Soraida Stone DO Neuro Spec Neurology -   2025  9:00 AM STV STA CHAIR 01 STVZ STA MED [place]   3/3/2025 10:00 AM Sierra De Oliveira MD [facility]   3/4/2025  2:15 PM Sade Addison MD [facility]       [facility]

## 2025-02-17 NOTE — TELEPHONE ENCOUNTER
Madonna called the office this morning asking if she was supposed to order / schedule her Ocrevus before she see's Dr. Stone.  Writer reviewed 1/30/25 note in the chart and then confirmed with nurse supervisor Salvador YOST RN that Dr. Stone wanted her to see her before rescheduling her Ocrevus infusion.  Patient is due 2/24/25.  This information was given to the patient.  Madonna verbally stated her understanding.

## 2025-02-20 ENCOUNTER — TELEPHONE (OUTPATIENT)
Dept: FAMILY MEDICINE CLINIC | Age: 60
End: 2025-02-20

## 2025-02-20 PROBLEM — J10.1 INFLUENZA A: Status: RESOLVED | Noted: 2025-01-21 | Resolved: 2025-02-20

## 2025-02-20 NOTE — TELEPHONE ENCOUNTER
[facility] called regarding verbal orders for patient, regarding PT, OT, and speech. Writer informed not able to give verbal over the phone. Writer informed would need to fax over the request. Writer provider fax number [phone].

## 2025-02-24 ENCOUNTER — OFFICE VISIT (OUTPATIENT)
Dept: NEUROLOGY | Age: 60
End: 2025-02-24
Payer: MEDICARE

## 2025-02-24 VITALS
HEIGHT: 61 IN | HEART RATE: 95 BPM | SYSTOLIC BLOOD PRESSURE: 92 MMHG | WEIGHT: 118 LBS | BODY MASS INDEX: 22.28 KG/M2 | DIASTOLIC BLOOD PRESSURE: 64 MMHG

## 2025-02-24 DIAGNOSIS — G35 MULTIPLE SCLEROSIS (HCC): Primary | ICD-10-CM

## 2025-02-24 DIAGNOSIS — M79.2 NEUROPATHIC PAIN: ICD-10-CM

## 2025-02-24 DIAGNOSIS — G43.019 INTRACTABLE MIGRAINE WITHOUT AURA AND WITHOUT STATUS MIGRAINOSUS: ICD-10-CM

## 2025-02-24 PROCEDURE — 1111F DSCHRG MED/CURRENT MED MERGE: CPT | Performed by: PSYCHIATRY & NEUROLOGY

## 2025-02-24 PROCEDURE — G8427 DOCREV CUR MEDS BY ELIG CLIN: HCPCS | Performed by: PSYCHIATRY & NEUROLOGY

## 2025-02-24 PROCEDURE — 99214 OFFICE O/P EST MOD 30 MIN: CPT | Performed by: PSYCHIATRY & NEUROLOGY

## 2025-02-24 PROCEDURE — 3017F COLORECTAL CA SCREEN DOC REV: CPT | Performed by: PSYCHIATRY & NEUROLOGY

## 2025-02-24 PROCEDURE — 4004F PT TOBACCO SCREEN RCVD TLK: CPT | Performed by: PSYCHIATRY & NEUROLOGY

## 2025-02-24 PROCEDURE — G8420 CALC BMI NORM PARAMETERS: HCPCS | Performed by: PSYCHIATRY & NEUROLOGY

## 2025-02-24 NOTE — PROGRESS NOTES
of Multiple Sclerosis &Neuroimmunology  [facility] [place]         This note was produced using voice recognition technology, and some typographical errors may be present despite our best efforts with proofreading.

## 2025-02-24 NOTE — PATIENT INSTRUCTIONS
Mavenclad (cladribine):     Mavenclad is an oral medication used to treat relapsing forms of multiple sclerosis.  Unlike other disease modifying therapies, Mavenclad is given discontinuously.  Mavenclad works by something called immune reconstitution.  Mavenclad temporarily reduces some of the immune cells (specifically the B and T cells), including cells responsible for inflammation and damage to the brain and spinal cord in multiple sclerosis.  After taking Mavenclad, the immune cells gradually grow back and return to normal levels over time.  When the immune cells grow back, they are thought to be less likely to be auto-reactive.  Mavenclad dosing depends on your weight.  Most people take Mavenclad daily for 5 days, wait one month, and then take the drug for another 5 days.  After completing the first two months of Mavenclad, no additional medication is given for a one year period.  After one year, Mavenclad is again given for 5 days, wait one month, and then 5 additional days. Even though the medication is out of your system within about 1 week of stopping it, the effects on the immune system continue and you are protected from multiple sclerosis over time          Mavenclad Efficacy:  Mavenclad was studied in a clinical trial called CLARITY taking place over 96 weeks (about 2 years).  In this clinical trial, 433 people took Mavenclad and 437 people took placebo (a sugar pill).  Mavenclad reduced the annual relapse rate by 58% compared to placebo and 81% of people on Mavenclad were free from relapses during the two-year trial.  Mavenclad reduced the number of active, inflamed lesions by 86% and reduced the number of new lesions by 73%.  Importantly, Mavenclad reduced the risk of disability progression by 33% compared to placebo.      Long-term Mavenclad Efficacy:  A phase 4 trial called CLASSIC-MS, was done following 227 people who were in the original treatment group for CARIN (were treated with Mavenclad).

## 2025-02-25 ENCOUNTER — HOSPITAL ENCOUNTER (OUTPATIENT)
Facility: MEDICAL CENTER | Age: 60
Discharge: HOME | End: 2025-02-25
Payer: MEDICARE

## 2025-02-25 ENCOUNTER — HOSPITAL ENCOUNTER (OUTPATIENT)
Dept: INFUSION THERAPY | Facility: MEDICAL CENTER | Age: 60
Discharge: HOME | End: 2025-02-25
Payer: MEDICARE

## 2025-02-25 VITALS
RESPIRATION RATE: 16 BRPM | DIASTOLIC BLOOD PRESSURE: 68 MMHG | TEMPERATURE: 98.4 F | SYSTOLIC BLOOD PRESSURE: 103 MMHG | HEART RATE: 68 BPM

## 2025-02-25 DIAGNOSIS — G35 MULTIPLE SCLEROSIS (HCC): Primary | ICD-10-CM

## 2025-02-25 LAB
ALBUMIN SERPL-MCNC: 3.1 G/DL (ref 3.5–5.2)
ALBUMIN/GLOB SERPL: 1.5 {RATIO} (ref 1–2.5)
ALP SERPL-CCNC: 86 U/L (ref 35–104)
ALT SERPL-CCNC: 17 U/L (ref 10–35)
ANION GAP SERPL CALCULATED.3IONS-SCNC: 7 MMOL/L (ref 9–16)
AST SERPL-CCNC: 29 U/L (ref 10–35)
BASOPHILS # BLD: 0 K/UL (ref 0–0.2)
BASOPHILS NFR BLD: 0 % (ref 0–2)
BILIRUB SERPL-MCNC: <0.2 MG/DL (ref 0–1.2)
BUN SERPL-MCNC: 10 MG/DL (ref 6–20)
CALCIUM SERPL-MCNC: 9.2 MG/DL (ref 8.6–10.4)
CHLORIDE SERPL-SCNC: 108 MMOL/L (ref 98–107)
CO2 SERPL-SCNC: 26 MMOL/L (ref 20–31)
CREAT SERPL-MCNC: 0.4 MG/DL (ref 0.5–0.9)
EOSINOPHIL # BLD: 0.14 K/UL (ref 0–0.44)
EOSINOPHILS RELATIVE PERCENT: 3 % (ref 1–4)
ERYTHROCYTE [DISTWIDTH] IN BLOOD BY AUTOMATED COUNT: 22.5 % (ref 11.8–14.4)
GFR, ESTIMATED: >90 ML/MIN/1.73M2
GLUCOSE SERPL-MCNC: 55 MG/DL (ref 74–99)
HCT VFR BLD AUTO: 34.6 % (ref 36.3–47.1)
HGB BLD-MCNC: 10.8 G/DL (ref 11.9–15.1)
IMM GRANULOCYTES # BLD AUTO: 0.05 K/UL (ref 0–0.3)
IMM GRANULOCYTES NFR BLD: 1 %
LYMPHOCYTES NFR BLD: 0.83 K/UL (ref 1.1–3.7)
LYMPHOCYTES RELATIVE PERCENT: 18 % (ref 24–43)
MCH RBC QN AUTO: 29.6 PG (ref 25.2–33.5)
MCHC RBC AUTO-ENTMCNC: 31.2 G/DL (ref 28.4–34.8)
MCV RBC AUTO: 94.8 FL (ref 82.6–102.9)
MONOCYTES NFR BLD: 0.37 K/UL (ref 0.1–1.2)
MONOCYTES NFR BLD: 8 % (ref 3–12)
MORPHOLOGY: ABNORMAL
NEUTROPHILS NFR BLD: 70 % (ref 36–65)
NEUTS SEG NFR BLD: 3.21 K/UL (ref 1.5–8.1)
NRBC BLD-RTO: 0 PER 100 WBC
PLATELET # BLD AUTO: 222 K/UL (ref 138–453)
PMV BLD AUTO: 10.5 FL (ref 8.1–13.5)
POTASSIUM SERPL-SCNC: 4 MMOL/L (ref 3.7–5.3)
PROT SERPL-MCNC: 5.1 G/DL (ref 6.6–8.7)
RBC # BLD AUTO: 3.65 M/UL (ref 3.95–5.11)
SODIUM SERPL-SCNC: 142 MMOL/L (ref 136–145)
WBC OTHER # BLD: 4.6 K/UL (ref 3.5–11.3)

## 2025-02-25 PROCEDURE — 96366 THER/PROPH/DIAG IV INF ADDON: CPT

## 2025-02-25 PROCEDURE — 96365 THER/PROPH/DIAG IV INF INIT: CPT

## 2025-02-25 PROCEDURE — 85025 COMPLETE CBC W/AUTO DIFF WBC: CPT

## 2025-02-25 PROCEDURE — 36415 COLL VENOUS BLD VENIPUNCTURE: CPT

## 2025-02-25 PROCEDURE — 6370000000 HC RX 637 (ALT 250 FOR IP): Performed by: PSYCHIATRY & NEUROLOGY

## 2025-02-25 PROCEDURE — 2580000003 HC RX 258: Performed by: PSYCHIATRY & NEUROLOGY

## 2025-02-25 PROCEDURE — 96415 CHEMO IV INFUSION ADDL HR: CPT

## 2025-02-25 PROCEDURE — 96413 CHEMO IV INFUSION 1 HR: CPT

## 2025-02-25 PROCEDURE — 6360000002 HC RX W HCPCS: Performed by: PSYCHIATRY & NEUROLOGY

## 2025-02-25 PROCEDURE — 80053 COMPREHEN METABOLIC PANEL: CPT

## 2025-02-25 PROCEDURE — 2500000003 HC RX 250 WO HCPCS: Performed by: PSYCHIATRY & NEUROLOGY

## 2025-02-25 RX ORDER — DIPHENHYDRAMINE HYDROCHLORIDE 50 MG/ML
25 INJECTION INTRAMUSCULAR; INTRAVENOUS ONCE
Status: COMPLETED | OUTPATIENT
Start: 2025-02-25 | End: 2025-02-25

## 2025-02-25 RX ORDER — ACETAMINOPHEN 325 MG/1
650 TABLET ORAL ONCE
Status: COMPLETED | OUTPATIENT
Start: 2025-02-25 | End: 2025-02-25

## 2025-02-25 RX ORDER — SODIUM CHLORIDE 9 MG/ML
5-250 INJECTION, SOLUTION INTRAVENOUS PRN
OUTPATIENT
Start: 2025-07-13

## 2025-02-25 RX ORDER — ALBUTEROL SULFATE 90 UG/1
4 INHALANT RESPIRATORY (INHALATION) PRN
OUTPATIENT
Start: 2025-07-13

## 2025-02-25 RX ORDER — HYDROCORTISONE SODIUM SUCCINATE 100 MG/2ML
100 INJECTION INTRAMUSCULAR; INTRAVENOUS
OUTPATIENT
Start: 2025-07-13

## 2025-02-25 RX ORDER — HEPARIN 100 UNIT/ML
500 SYRINGE INTRAVENOUS PRN
OUTPATIENT
Start: 2025-07-13

## 2025-02-25 RX ORDER — FAMOTIDINE 10 MG/ML
20 INJECTION, SOLUTION INTRAVENOUS
OUTPATIENT
Start: 2025-07-13

## 2025-02-25 RX ORDER — ACETAMINOPHEN 325 MG/1
650 TABLET ORAL
OUTPATIENT
Start: 2025-07-13

## 2025-02-25 RX ORDER — DIPHENHYDRAMINE HYDROCHLORIDE 50 MG/ML
25 INJECTION INTRAMUSCULAR; INTRAVENOUS ONCE
OUTPATIENT
Start: 2025-07-13 | End: 2025-07-13

## 2025-02-25 RX ORDER — EPINEPHRINE 1 MG/ML
0.3 INJECTION, SOLUTION INTRAMUSCULAR; SUBCUTANEOUS PRN
OUTPATIENT
Start: 2025-07-13

## 2025-02-25 RX ORDER — SODIUM CHLORIDE 0.9 % (FLUSH) 0.9 %
5-40 SYRINGE (ML) INJECTION PRN
OUTPATIENT
Start: 2025-07-13

## 2025-02-25 RX ORDER — SODIUM CHLORIDE 9 MG/ML
5-250 INJECTION, SOLUTION INTRAVENOUS PRN
Status: DISCONTINUED | OUTPATIENT
Start: 2025-02-25 | End: 2025-02-26 | Stop reason: HOSPADM

## 2025-02-25 RX ORDER — SODIUM CHLORIDE 9 MG/ML
INJECTION, SOLUTION INTRAVENOUS CONTINUOUS
OUTPATIENT
Start: 2025-07-13

## 2025-02-25 RX ORDER — DIPHENHYDRAMINE HYDROCHLORIDE 50 MG/ML
50 INJECTION INTRAMUSCULAR; INTRAVENOUS
OUTPATIENT
Start: 2025-07-13

## 2025-02-25 RX ORDER — ONDANSETRON 2 MG/ML
8 INJECTION INTRAMUSCULAR; INTRAVENOUS
OUTPATIENT
Start: 2025-07-13

## 2025-02-25 RX ORDER — ACETAMINOPHEN 325 MG/1
650 TABLET ORAL ONCE
OUTPATIENT
Start: 2025-07-13 | End: 2025-07-13

## 2025-02-25 RX ADMIN — METHYLPREDNISOLONE SODIUM SUCCINATE 125 MG: 125 INJECTION INTRAMUSCULAR; INTRAVENOUS at 10:20

## 2025-02-25 RX ADMIN — SODIUM CHLORIDE 100 ML/HR: 0.9 INJECTION, SOLUTION INTRAVENOUS at 10:08

## 2025-02-25 RX ADMIN — DIPHENHYDRAMINE HYDROCHLORIDE 25 MG: 50 INJECTION INTRAMUSCULAR; INTRAVENOUS at 10:20

## 2025-02-25 RX ADMIN — ACETAMINOPHEN 650 MG: 325 TABLET ORAL at 10:20

## 2025-02-25 RX ADMIN — OCRELIZUMAB 600 MG: 300 INJECTION INTRAVENOUS at 11:00

## 2025-02-25 ASSESSMENT — PAIN SCALES - GENERAL: PAINLEVEL_OUTOF10: 5

## 2025-02-25 ASSESSMENT — PAIN DESCRIPTION - LOCATION: LOCATION: LEG

## 2025-02-25 NOTE — PROGRESS NOTES
Patient arrived ambulatory per self for Ocrevus infusion.  Patient denies complaints or concerns.  Labs reviewed, glucose 55. Patient states she feels fine, food and drink provided.   Peripheral IV established.   Patient premedicated.  Ocrevus initiated at 40 ml/hr for 30 minutes with no sign adverse reaction.  Ocrevus increased to 80 ml/hr for 30 minutes  with no sign adverse reaction.  Ocrevus increased to 120 ml/hr for 30 minutes with no sign adverse reaction.  Ocrevus increased to 160 ml/hr for 30 minutes with no sign adverse reaction.  Ocrevus increased to 200 ml/hr for remainder of infusion with no sign adverse reaction;line flushed.  Patient agreeable to be observed for 15 minutes post infusion with running IV fluids. No sign adverse reaction.  Intact IV catheter removed with pressure dressing applied.  Patient ambulated off unit per self at discharge.

## 2025-03-03 ENCOUNTER — OFFICE VISIT (OUTPATIENT)
Dept: FAMILY MEDICINE CLINIC | Age: 60
End: 2025-03-03
Payer: MEDICARE

## 2025-03-03 VITALS
DIASTOLIC BLOOD PRESSURE: 64 MMHG | HEIGHT: 61 IN | SYSTOLIC BLOOD PRESSURE: 98 MMHG | WEIGHT: 120.2 LBS | BODY MASS INDEX: 22.69 KG/M2

## 2025-03-03 DIAGNOSIS — K21.9 GASTROESOPHAGEAL REFLUX DISEASE, UNSPECIFIED WHETHER ESOPHAGITIS PRESENT: ICD-10-CM

## 2025-03-03 DIAGNOSIS — I87.1 MAY-THURNER SYNDROME: ICD-10-CM

## 2025-03-03 DIAGNOSIS — M79.2 NEUROPATHIC PAIN: ICD-10-CM

## 2025-03-03 DIAGNOSIS — G35 MULTIPLE SCLEROSIS (HCC): Primary | ICD-10-CM

## 2025-03-03 DIAGNOSIS — J45.909 ASTHMA WITH SEVERITY TO BE DETERMINED: ICD-10-CM

## 2025-03-03 DIAGNOSIS — J44.9 CHRONIC OBSTRUCTIVE PULMONARY DISEASE, UNSPECIFIED COPD TYPE (HCC): ICD-10-CM

## 2025-03-03 PROCEDURE — 99213 OFFICE O/P EST LOW 20 MIN: CPT

## 2025-03-03 PROCEDURE — 3023F SPIROM DOC REV: CPT

## 2025-03-03 PROCEDURE — 1111F DSCHRG MED/CURRENT MED MERGE: CPT

## 2025-03-03 PROCEDURE — 3017F COLORECTAL CA SCREEN DOC REV: CPT

## 2025-03-03 PROCEDURE — 4004F PT TOBACCO SCREEN RCVD TLK: CPT

## 2025-03-03 PROCEDURE — G8420 CALC BMI NORM PARAMETERS: HCPCS

## 2025-03-03 PROCEDURE — G8427 DOCREV CUR MEDS BY ELIG CLIN: HCPCS

## 2025-03-03 RX ORDER — GABAPENTIN 300 MG/1
600 CAPSULE ORAL 3 TIMES DAILY
Qty: 180 CAPSULE | Refills: 0 | Status: SHIPPED | OUTPATIENT
Start: 2025-03-03 | End: 2025-04-02

## 2025-03-03 RX ORDER — FLUTICASONE FUROATE AND VILANTEROL 100; 25 UG/1; UG/1
1 POWDER RESPIRATORY (INHALATION) DAILY
Qty: 28 EACH | Refills: 1 | Status: SHIPPED | OUTPATIENT
Start: 2025-03-03 | End: 2025-04-02

## 2025-03-03 RX ORDER — PANTOPRAZOLE SODIUM 40 MG/1
TABLET, DELAYED RELEASE ORAL
Qty: 30 TABLET | Refills: 3 | Status: SHIPPED | OUTPATIENT
Start: 2025-03-03

## 2025-03-03 RX ORDER — GUAIFENESIN 600 MG/1
600 TABLET, EXTENDED RELEASE ORAL 2 TIMES DAILY
Qty: 14 TABLET | Refills: 0 | Status: SHIPPED | OUTPATIENT
Start: 2025-03-03

## 2025-03-03 RX ORDER — FOLIC ACID 1 MG/1
1 TABLET ORAL DAILY
Qty: 30 TABLET | Refills: 3 | Status: SHIPPED | OUTPATIENT
Start: 2025-03-03

## 2025-03-03 RX ORDER — THIAMINE MONONITRATE (VIT B1) 100 MG
100 TABLET ORAL DAILY
Qty: 30 TABLET | Refills: 0 | Status: SHIPPED | OUTPATIENT
Start: 2025-03-03

## 2025-03-03 RX ORDER — ALBUTEROL SULFATE 90 UG/1
2 INHALANT RESPIRATORY (INHALATION) EVERY 6 HOURS PRN
Qty: 1 EACH | Refills: 3 | Status: SHIPPED | OUTPATIENT
Start: 2025-03-03

## 2025-03-03 RX ORDER — MIDODRINE HYDROCHLORIDE 5 MG/1
5 TABLET ORAL 3 TIMES DAILY
Qty: 90 TABLET | Refills: 2 | Status: SHIPPED | OUTPATIENT
Start: 2025-03-03 | End: 2025-06-01

## 2025-03-03 ASSESSMENT — ENCOUNTER SYMPTOMS
STRIDOR: 0
COUGH: 0
ABDOMINAL PAIN: 0
SHORTNESS OF BREATH: 0

## 2025-03-03 NOTE — PROGRESS NOTES
Attending Physician Statement  I  have discussed the care of Madonna Manrique including pertinent history and exam findings with the resident. I agree with the assessment, plan and orders as documented by the resident.      BP 98/64 (Site: Right Upper Arm, Position: Sitting, Cuff Size: Medium Adult)   Ht 1.549 m (5' 1\")   Wt 54.5 kg (120 lb 3.2 oz)   BMI 22.71 kg/m²    BP Readings from Last 3 Encounters:   03/03/25 98/64   02/25/25 103/68   02/24/25 92/64     Wt Readings from Last 3 Encounters:   03/03/25 54.5 kg (120 lb 3.2 oz)   02/24/25 53.5 kg (118 lb)   02/05/25 45.9 kg (101 lb 3.2 oz)          Diagnosis Orders   1. Multiple sclerosis (HCC)  FL ESOPHAGRAM      2. Asthma with severity to be determined  albuterol sulfate HFA (PROVENTIL;VENTOLIN;PROAIR) 108 (90 Base) MCG/ACT inhaler      3. Neuropathic pain  gabapentin (NEURONTIN) 300 MG capsule      4. 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 Chronic obstructive pulmonary disease, unspecified COPD type (HCC)  umeclidinium bromide (INCRUSE ELLIPTA) 62.5 MCG/ACT inhaler              Marsha Walter DO 3/3/2025 4:52 PM      
Visit Information    Have you changed or started any medications since your last visit including any over-the-counter medicines, vitamins, or herbal medicines? no   Have you stopped taking any of your medications? Is so, why? -  no  Are you having any side effects from any of your medications? - no    Have you seen any other physician or provider since your last visit?  yes - Neurology   Have you had any other diagnostic tests since your last visit?  yes - Labs, CT, Fl, XR, MRI   Have you been seen in the emergency room and/or had an admission in a hospital since we last saw you?  yes - [facility], [facility]   Have you had your routine dental cleaning in the past 6 months?  no     Do you have an active MyChart account? If no, what is the barrier?  Yes    Patient Care Team:  Sierra De Oliveira MD as PCP - General (Family Medicine)  Shaniqua Owens MD as Consulting Physician (Gastroenterology)  Tiana Silver as Financial Navigator    Medical History Review  Past Medical, Family, and Social History reviewed and does contribute to the patient presenting condition    Health Maintenance   Topic Date Due    Hepatitis B vaccine (1 of 3 - 19+ 3-dose series) Never done    Colorectal Cancer Screen  05/06/2021    Breast cancer screen  09/30/2022    Lung Cancer Screening &/or Counseling  11/01/2024    A1C test (Diabetic or Prediabetic)  11/19/2024    Lipids  12/17/2024    Annual Wellness Visit (Medicare Advantage)  Never done    Depression Monitoring  01/07/2026    DTaP/Tdap/Td vaccine (3 - Td or Tdap) 06/13/2033    Flu vaccine  Completed    Shingles vaccine  Completed    Pneumococcal 50+ years Vaccine  Completed    COVID-19 Vaccine  Completed    Hepatitis C screen  Completed    HIV screen  Completed    Hepatitis A vaccine  Aged Out    Hib vaccine  Aged Out    Polio vaccine  Aged Out    Meningococcal (ACWY) vaccine  Aged Out    Pneumococcal 0-49 years Vaccine  Discontinued    Diabetes screen  Discontinued             
    Lab Results   Component Value Date    CALCIUM 9.2 02/25/2025    PHOS 2.6 08/19/2024     No results found for: \"LDLDIRECT\"    Assessment and Plan:    History of multiple sclerosis  -Recent hospital admission for CAP more than likely induced an MS flareup  -Patient has biannual infusions for her chronic MS  -Recommend following up with physical therapy and neurology  -Seems to be at her baseline at this moment  -Strongly encouraged the patient to continue PT    2.  Hypotension  -Likely secondary to recent infection and an autonomic dysfunction due to MS  -Refill midodrine 5 mg 3 times daily  -Probably has underlying adrenal insufficiency  -Patient requesting refills of her Mounjaro but I do not agree with that sentiment patient currently is normal BMI but on discharge from the hospital patient was malnourished.  Gaining weight would help with her blood pressure and her recent hemoglobin A1c from a year ago was 5.7  -Will follow-up in 3 months keep her off Mounjaro or any antihyperglycemic's encourage weight increase and follow-up        Requested Prescriptions      No prescriptions requested or ordered in this encounter       There are no discontinued medications.    Madonna received counseling on the following healthy behaviors: nutrition, exercise and medication adherence    Discussed use,benefit, and side effects of prescribed medications.  Barriers to medication compliance addressed.      All patient questions answered.  Pt voiced understanding.     No follow-ups on file.        Disclaimer: Some orall of this note was transcribed using voice-recognition software.This may cause typographical errors occasionally. Although all effort is made to fix these errors, please do not hesitate to contact our office if there isany concern with the understanding of this note.

## 2025-03-10 ENCOUNTER — HOSPITAL ENCOUNTER (OUTPATIENT)
Dept: GENERAL RADIOLOGY | Age: 60
Discharge: HOME | End: 2025-03-12
Payer: MEDICARE

## 2025-03-10 DIAGNOSIS — R60.0 LOWER LEG EDEMA: ICD-10-CM

## 2025-03-10 DIAGNOSIS — G35 MULTIPLE SCLEROSIS (HCC): ICD-10-CM

## 2025-03-10 PROCEDURE — 74220 X-RAY XM ESOPHAGUS 1CNTRST: CPT

## 2025-03-10 PROCEDURE — 2500000003 HC RX 250 WO HCPCS

## 2025-03-10 RX ORDER — FUROSEMIDE 20 MG/1
20 TABLET ORAL
Qty: 15 TABLET | Refills: 1 | Status: SHIPPED | OUTPATIENT
Start: 2025-03-10 | End: 2025-05-09

## 2025-03-10 RX ADMIN — BARIUM SULFATE 40 ML: 960 POWDER, FOR SUSPENSION ORAL at 10:31

## 2025-03-10 RX ADMIN — BARIUM SULFATE 120 ML: 400 SUSPENSION ORAL at 10:32

## 2025-03-10 NOTE — TELEPHONE ENCOUNTER
Last visit: 03/03/2025  Last Med refill: 12/23/2024  Does patient have enough medication for 72 hours: No:     Next Visit Date:  Future Appointments   Date Time Provider Department Center   5/19/2025 10:20 AM Soraida Stone DO Neuro Spec Neurology -   9/1/2025 10:00 AM STV STA CHAIR 01 STVZ STA MED [place]       Health Maintenance   Topic Date Due    Hepatitis B vaccine (1 of 3 - 19+ 3-dose series) Never done    Colorectal Cancer Screen  05/06/2021    Breast cancer screen  09/30/2022    Lung Cancer Screening &/or Counseling  11/01/2024    A1C test (Diabetic or Prediabetic)  11/19/2024    Lipids  12/17/2024    Annual Wellness Visit (Medicare Advantage)  Never done    Depression Monitoring  01/07/2026    DTaP/Tdap/Td vaccine (3 - Td or Tdap) 06/13/2033    Flu vaccine  Completed    Shingles vaccine  Completed    Pneumococcal 50+ years Vaccine  Completed    COVID-19 Vaccine  Completed    Hepatitis C screen  Completed    HIV screen  Completed    Hepatitis A vaccine  Aged Out    Hib vaccine  Aged Out    Polio vaccine  Aged Out    Meningococcal (ACWY) vaccine  Aged Out    Meningococcal B vaccine  Aged Out    Pneumococcal 0-49 years Vaccine  Discontinued    Diabetes screen  Discontinued       Hemoglobin A1C (%)   Date Value   11/19/2023 5.7   12/29/2020 5.0             ( goal A1C is < 7)   No components found for: \"LABMICR\"  No components found for: \"LDLCHOLESTEROL\", \"LDLCALC\"    (goal LDL is <100)   AST (U/L)   Date Value   02/25/2025 29     ALT (U/L)   Date Value   02/25/2025 17     BUN (mg/dL)   Date Value   02/25/2025 10     BP Readings from Last 3 Encounters:   03/03/25 98/64   02/25/25 103/68   02/24/25 92/64          (goal 120/80)    All Future Testing planned in [facility]  Lab Frequency Next Occurrence   MORIS DIGITAL SCREEN W OR WO CAD BILATERAL Once 04/19/2024   CBC With Auto Differential Once 07/15/2025   Comprehensive metabolic panel Once 07/15/2025   Hemoglobin and Hematocrit, Blood, Post Transfusion POST

## 2025-03-21 DIAGNOSIS — G35 MULTIPLE SCLEROSIS (HCC): ICD-10-CM

## 2025-03-21 NOTE — TELEPHONE ENCOUNTER
TRANSFUSION                Patient Active Problem List:     Overdose of drug/medicinal substance, accidental or unintentional, initial encounter     Chronic anticoagulation     Multifocal pneumonia     Pulmonary hypertension (HCC)     Mild cardiomegaly     Borderline personality disorder (HCC)     Bipolar 1 disorder (HCC)     Recurrent major depressive disorder, in remission     Anxiety disorder     Transient alteration of awareness     Symptomatic anemia     Dehydration with hyponatremia     Hypocalcemia     Hypoalbuminemia     COVID-19 virus not detected     Encephalopathy, metabolic     Iron deficiency anemia secondary to inadequate dietary iron intake     Iron malabsorption     History of psychiatric disorder     Chronic bilateral low back pain with left-sided sciatica     H/O opioid abuse (HCC)     Polypharmacy     Tremor due to multiple drugs     Serotonin syndrome     Confusion     Persistent asthma with acute exacerbation     May-Thurner syndrome     Migraine without status migrainosus, not intractable     Change in mental status     Multiple sclerosis (HCC)     Tibia and fibula open fracture, right, type I or II, initial encounter     Tibia/fibula fracture, right, closed, initial encounter     Acute respiratory failure with hypoxia and hypercapnia (HCC)     Anastomotic ulcer S/P gastric bypass     Bipolar affective disorder, currently depressed, moderate (HCC)     Chronic cholecystitis with calculus     Deep venous thrombosis of peroneal vein (HCC)     Displacement of lumbar intervertebral disc without myelopathy     Generalized osteoarthritis     GERD (gastroesophageal reflux disease)     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

## 2025-03-28 ENCOUNTER — TELEPHONE (OUTPATIENT)
Dept: FAMILY MEDICINE CLINIC | Age: 60
End: 2025-03-28

## 2025-03-28 NOTE — TELEPHONE ENCOUNTER
Mary from [facility] called asking for a new modified Barium swallow study. [facility] would like someone to call when the order has been placed .  Please advise and thank you

## 2025-03-31 DIAGNOSIS — G35 MULTIPLE SCLEROSIS (HCC): Primary | ICD-10-CM

## 2025-03-31 NOTE — TELEPHONE ENCOUNTER
Mary from [facility] will contact patient to inform of new order.  I informed her also that I would mail; out order to patient for her records.  Call ended

## 2025-03-31 NOTE — TELEPHONE ENCOUNTER
Called Mary from [facility] and informed her of new order and also mailed out the order.  Patient will schedule with [facility]

## 2025-03-31 NOTE — PROGRESS NOTES
Patient 59-year-old female significant past medical history of multiple sclerosis and long-term history of dysphagia was evaluated in clinic.  Patient had recently prolonged admission in the hospital due to pneumonia.  Patient when seen states she still has episodes of feeling mastication and issues swallowing likely recovering from her multiple sclerosis episode.  Patient had a recent swallow study earlier this year which showed silent aspiration patient states seems to still have symptoms and progressively worsening and having concerns over swallowing.  Patient has follow-up with [facility] visiting nurse/health care options at home and they requested repeat modified barium swallow which was ordered

## 2025-04-01 NOTE — TELEPHONE ENCOUNTER
----- Message from Madonna De Santiago sent at 4/19/2024 11:30 AM EDT -----  Subject: Refill Request    QUESTIONS  Name of Medication? tiZANidine (ZANAFLEX) 4 MG tablet  Patient-reported dosage and instructions? 4 MG TID ASKING FOR 30 DAY   SUPPLY  How many days do you have left? 0  Preferred Pharmacy? [facility] [id number]  Pharmacy phone number (if available)? [phone]  ---------------------------------------------------------------------------  --------------  CALL BACK INFO  What is the best way for the office to contact you? OK to leave message on   voicemail  Preferred Call Back Phone Number? [phone]  ---------------------------------------------------------------------------  --------------  SCRIPT ANSWERS  Relationship to Patient? Self   CALORIE COUNT      Approximate Oral Intake for:    3/31/25  Calories: 302 kcal  Protein: 15 grams       Intake from TF/PN:       Grecia Estrella Renal @ goal of 65 ml x 12 hours  (from 1800 to 0600 daily) will provide: 780 ml formula, 1404 kcals, 62 g PRO, 134 g CHO, 16 g fiber, and 515 ml free H2O daily.     + ProSource TF20 once daily (80 kcal and 20 g protein)   Total (TF + ProSource TF20)= 1484 kcal (22 kcal/kg), 82 g protein (1.2 g/kg),   - This meets 89% energy and 100% PRO needs     Did note that patient only got half of this regimen last night as she was made NPO at [facility] for [facility]       Estimated Needs:    Calories: 3933-7636 kcals/day (25 - 35 kcals/kg)  Justification: Overweight, HD  Protein:  grams protein/day (1.2 - 1.8 grams of pro/kg)  Justification: HD (with DM), hospitalized      Miguelina Feliciano, RD, LD, CNSC   Clinical Dietitian - [facility]

## 2025-04-08 DIAGNOSIS — M79.2 NEUROPATHIC PAIN: ICD-10-CM

## 2025-04-08 DIAGNOSIS — G35 MULTIPLE SCLEROSIS (HCC): ICD-10-CM

## 2025-04-08 DIAGNOSIS — E11.9 TYPE 2 DIABETES MELLITUS WITHOUT COMPLICATION, WITHOUT LONG-TERM CURRENT USE OF INSULIN (HCC): ICD-10-CM

## 2025-04-08 RX ORDER — GABAPENTIN 300 MG/1
600 CAPSULE ORAL 3 TIMES DAILY
Qty: 180 CAPSULE | Refills: 0 | Status: SHIPPED | OUTPATIENT
Start: 2025-04-08 | End: 2025-04-29 | Stop reason: SDUPTHER

## 2025-04-08 NOTE — TELEPHONE ENCOUNTER
Last visit: 03/03/2025  Last Med refill: 12/18/2024-03/03/2025  Does patient have enough medication for 72 hours: No:     Next Visit Date:  Future Appointments   Date Time Provider Department Center   4/9/2025 10:00 AM STA XR FL ROOM 2 STAZ RAD STA Radiolog   5/19/2025 10:20 AM Bertha, Soraida V, DO Neuro Spec Neurology -   9/1/2025 10:00 AM STV STA CHAIR 01 STVZ STA MED [place]       Health Maintenance   Topic Date Due    Hepatitis B vaccine (1 of 3 - 19+ 3-dose series) Never done    Colorectal Cancer Screen  05/06/2021    Breast cancer screen  09/30/2022    Lung Cancer Screening &/or Counseling  11/01/2024    A1C test (Diabetic or Prediabetic)  11/19/2024    Lipids  12/17/2024    Annual Wellness Visit (Medicare Advantage)  Never done    Depression Monitoring  01/07/2026    DTaP/Tdap/Td vaccine (3 - Td or Tdap) 06/13/2033    Flu vaccine  Completed    Shingles vaccine  Completed    Pneumococcal 50+ years Vaccine  Completed    COVID-19 Vaccine  Completed    Hepatitis C screen  Completed    HIV screen  Completed    Hepatitis A vaccine  Aged Out    Hib vaccine  Aged Out    Polio vaccine  Aged Out    Meningococcal (ACWY) vaccine  Aged Out    Meningococcal B vaccine  Aged Out    Pneumococcal 0-49 years Vaccine  Discontinued    Diabetes screen  Discontinued       Hemoglobin A1C (%)   Date Value   11/19/2023 5.7   12/29/2020 5.0             ( goal A1C is < 7)   No components found for: \"LABMICR\"  No components found for: \"LDLCHOLESTEROL\", \"LDLCALC\"    (goal LDL is <100)   AST (U/L)   Date Value   02/25/2025 29     ALT (U/L)   Date Value   02/25/2025 17     BUN (mg/dL)   Date Value   02/25/2025 10     BP Readings from Last 3 Encounters:   03/03/25 98/64   02/25/25 103/68   02/24/25 92/64          (goal 120/80)    All Future Testing planned in [facility]  Lab Frequency Next Occurrence   MORIS DIGITAL SCREEN W OR WO CAD BILATERAL Once 04/19/2024   CBC With Auto Differential Once 07/13/2025   Comprehensive metabolic panel Once

## 2025-04-16 ENCOUNTER — HOSPITAL ENCOUNTER (OUTPATIENT)
Dept: GENERAL RADIOLOGY | Age: 60
Discharge: HOME | End: 2025-04-18
Payer: MEDICARE

## 2025-04-16 DIAGNOSIS — G35 MULTIPLE SCLEROSIS (HCC): ICD-10-CM

## 2025-04-16 PROCEDURE — 74230 X-RAY XM SWLNG FUNCJ C+: CPT

## 2025-04-16 PROCEDURE — 92611 MOTION FLUOROSCOPY/SWALLOW: CPT

## 2025-04-29 DIAGNOSIS — M79.2 NEUROPATHIC PAIN: ICD-10-CM

## 2025-04-29 DIAGNOSIS — G35 MULTIPLE SCLEROSIS (HCC): ICD-10-CM

## 2025-04-29 RX ORDER — GABAPENTIN 300 MG/1
600 CAPSULE ORAL 3 TIMES DAILY
Qty: 180 CAPSULE | Refills: 0 | Status: SHIPPED | OUTPATIENT
Start: 2025-04-29 | End: 2025-05-29

## 2025-04-29 NOTE — TELEPHONE ENCOUNTER
Last visit: 03/03/2025  Last Med refill: 04/08/2025  Does patient have enough medication for 72 hours: No:     Next Visit Date:  Future Appointments   Date Time Provider Department Center   5/19/2025 10:20 AM Soraida Stone DO [facility] -   9/1/2025 10:00 AM [facility] 01 [facility] [place]       Health Maintenance   Topic Date Due    Diabetic Alb to Cr ratio (uACR) test  Never done    Hepatitis B vaccine (1 of 3 - 19+ 3-dose series) Never done    Colorectal Cancer Screen  05/06/2021    Breast cancer screen  09/30/2022    Lung Cancer Screening &/or Counseling  11/01/2024    A1C test (Diabetic or Prediabetic)  11/19/2024    Lipids  12/17/2024    Annual Wellness Visit (Medicare Advantage)  Never done    Depression Monitoring  01/07/2026    GFR test (Diabetes, CKD 3-4, OR last GFR 15-59)  02/25/2026    DTaP/Tdap/Td vaccine (3 - Td or Tdap) 06/13/2033    Flu vaccine  Completed    Shingles vaccine  Completed    Pneumococcal 50+ years Vaccine  Completed    COVID-19 Vaccine  Completed    Hepatitis C screen  Completed    HIV screen  Completed    Hepatitis A vaccine  Aged Out    Hib vaccine  Aged Out    Polio vaccine  Aged Out    Meningococcal (ACWY) vaccine  Aged Out    Meningococcal B vaccine  Aged Out    Pneumococcal 0-49 years Vaccine  Discontinued    Diabetes screen  Discontinued       Hemoglobin A1C (%)   Date Value   11/19/2023 5.7   12/29/2020 5.0             ( goal A1C is < 7)   No components found for: \"LABMICR\"  No components found for: \"LDLCHOLESTEROL\", \"LDLCALC\"    (goal LDL is <100)   AST (U/L)   Date Value   02/25/2025 29     ALT (U/L)   Date Value   02/25/2025 17     BUN (mg/dL)   Date Value   02/25/2025 10     BP Readings from Last 3 Encounters:   03/03/25 98/64   02/25/25 103/68   02/24/25 92/64          (goal 120/80)    All Future Testing planned in [facility]  Lab Frequency Next Occurrence   MORIS DIGITAL SCREEN W OR WO CAD BILATERAL Once 04/19/2024   CBC With Auto Differential Once

## 2025-05-02 RX ORDER — FLUTICASONE FUROATE AND VILANTEROL TRIFENATATE 100; 25 UG/1; UG/1
POWDER RESPIRATORY (INHALATION)
Qty: 28 EACH | Refills: 3 | Status: SHIPPED | OUTPATIENT
Start: 2025-05-02

## 2025-05-11 DIAGNOSIS — R60.0 LOWER LEG EDEMA: ICD-10-CM

## 2025-05-12 ENCOUNTER — OFFICE VISIT (OUTPATIENT)
Dept: ORTHOPEDIC SURGERY | Age: 60
End: 2025-05-12

## 2025-05-12 VITALS — BODY MASS INDEX: 22.66 KG/M2 | WEIGHT: 120 LBS | RESPIRATION RATE: 14 BRPM | HEIGHT: 61 IN

## 2025-05-12 DIAGNOSIS — M25.572 LEFT ANKLE PAIN, UNSPECIFIED CHRONICITY: Primary | ICD-10-CM

## 2025-05-12 DIAGNOSIS — R52 PAIN: ICD-10-CM

## 2025-05-12 DIAGNOSIS — G35 MULTIPLE SCLEROSIS (HCC): ICD-10-CM

## 2025-05-12 PROCEDURE — 4004F PT TOBACCO SCREEN RCVD TLK: CPT | Performed by: STUDENT IN AN ORGANIZED HEALTH CARE EDUCATION/TRAINING PROGRAM

## 2025-05-12 PROCEDURE — 3017F COLORECTAL CA SCREEN DOC REV: CPT | Performed by: STUDENT IN AN ORGANIZED HEALTH CARE EDUCATION/TRAINING PROGRAM

## 2025-05-12 PROCEDURE — G8427 DOCREV CUR MEDS BY ELIG CLIN: HCPCS | Performed by: STUDENT IN AN ORGANIZED HEALTH CARE EDUCATION/TRAINING PROGRAM

## 2025-05-12 PROCEDURE — G8420 CALC BMI NORM PARAMETERS: HCPCS | Performed by: STUDENT IN AN ORGANIZED HEALTH CARE EDUCATION/TRAINING PROGRAM

## 2025-05-12 RX ORDER — DICLOFENAC SODIUM 75 MG/1
75 TABLET, DELAYED RELEASE ORAL 2 TIMES DAILY
Qty: 60 TABLET | Refills: 2 | Status: CANCELLED | OUTPATIENT
Start: 2025-05-12

## 2025-05-12 RX ORDER — FUROSEMIDE 20 MG/1
20 TABLET ORAL
Qty: 15 TABLET | Refills: 1 | Status: SHIPPED | OUTPATIENT
Start: 2025-05-12 | End: 2025-07-11

## 2025-05-12 NOTE — PROGRESS NOTES
[facility]  [address]  [place] [address]  Dept Phone: [phone]  Dept Fax: [phone]      Orthopaedic Trauma Clinic Follow Up      Subjective:   Date of Surgery: 11/21/2023, ORIF right bicondylar tibial plateau fracture    Madonna Manrique is a 59 y.o. year old female who presents to the clinic today for follow up regarding a new issue from her left lower extremity hardware irritation.  Patient had a tibial shaft and pilon fracture on the left side treated by [facility] in 2021.  Since that time her fracture has healed and she has been able to ambulate without any assistance.  She was recently hospitalized for sepsis.  Around that time she did have 2 falls and noticed pain in her left lower extremity.  She localizes the pain over the medial aspect of her ankle as well as anterior.  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  She denies any numbness or tingling in the extremity.  At today's visit denies fever, chills, chest pain, shortness of breath or new numbness or tingling.    Review of Systems  Gen: no fever, chills, malaise  CV: no chest pain or palpitations  Resp: no cough or shortness of breath  GI: no nausea, vomiting, diarrhea, or constipation  Neuro: no seizures, vertigo, or headache  Msk: Left ankle hardware irritation  10 remaining systems reviewed and negative    Objective :     Vitals:    05/12/25 1006   Resp: 14   Body mass index is 22.69 kg/m².  General: No acute distress, resting comfortably in the clinic  Neuro: alert. oriented  Eyes: Extra-ocular muscles intact  Pulm: Respirations unlabored and regular.  Skin: warm, well perfused  Psych:   Patient has good fund of knowledge and displays understanding of exam, diagnosis, and plan.    LLE: Tenderness palpation over the 2 medial screws entering through the intramedullary nail.  She also has tenderness with the AP anterior

## 2025-05-12 NOTE — TELEPHONE ENCOUNTER
Last visit: 3/3/25  Last Med refill: 3/10/25  Does patient have enough medication for 72 hours: No:     Next Visit Date:  Future Appointments   Date Time Provider Department Center   5/12/2025 10:15 AM Seferino Prakash, DO [facility]   5/19/2025 10:20 AM Soraida Stone DO Neuro Spec Neurology -   9/1/2025 10:00 AM [facility] STA CHAIR 01 [facility] STA [facility] [place]       Health Maintenance   Topic Date Due    Diabetic Alb to Cr ratio (uACR) test  Never done    Hepatitis B vaccine (1 of 3 - 19+ 3-dose series) Never done    Colorectal Cancer Screen  05/06/2021    Breast cancer screen  09/30/2022    Lung Cancer Screening &/or Counseling  11/01/2024    A1C test (Diabetic or Prediabetic)  11/19/2024    Lipids  12/17/2024    Annual Wellness Visit (Medicare Advantage)  Never done    Depression Monitoring  01/07/2026    GFR test (Diabetes, CKD 3-4, OR last GFR 15-59)  02/25/2026    DTaP/Tdap/Td vaccine (3 - Td or Tdap) 06/13/2033    Flu vaccine  Completed    Shingles vaccine  Completed    Pneumococcal 50+ years Vaccine  Completed    COVID-19 Vaccine  Completed    Hepatitis C screen  Completed    HIV screen  Completed    Hepatitis A vaccine  Aged Out    Hib vaccine  Aged Out    Polio vaccine  Aged Out    Meningococcal (ACWY) vaccine  Aged Out    Meningococcal B vaccine  Aged Out    Pneumococcal 0-49 years Vaccine  Discontinued    Diabetes screen  Discontinued       Hemoglobin A1C (%)   Date Value   11/19/2023 5.7   12/29/2020 5.0             ( goal A1C is < 7)   No components found for: \"LABMICR\"  No components found for: \"LDLCHOLESTEROL\", \"LDLCALC\"    (goal LDL is <100)   AST (U/L)   Date Value   02/25/2025 29     ALT (U/L)   Date Value   02/25/2025 17     BUN (mg/dL)   Date Value   02/25/2025 10     BP Readings from Last 3 Encounters:   03/03/25 98/64   02/25/25 103/68   02/24/25 92/64          (goal 120/80)    All Future Testing planned in [facility]  Lab Frequency Next Occurrence   MORIS DIGITAL SCREEN W OR WO CAD

## 2025-05-13 ENCOUNTER — PREP FOR PROCEDURE (OUTPATIENT)
Dept: ORTHOPEDIC SURGERY | Age: 60
End: 2025-05-13

## 2025-05-13 DIAGNOSIS — Z96.9 RETAINED ORTHOPEDIC HARDWARE: ICD-10-CM

## 2025-05-15 ENCOUNTER — ANESTHESIA EVENT (OUTPATIENT)
Dept: OPERATING ROOM | Age: 60
End: 2025-05-15
Payer: MEDICARE

## 2025-05-16 ENCOUNTER — ANESTHESIA (OUTPATIENT)
Dept: OPERATING ROOM | Age: 60
End: 2025-05-16
Payer: MEDICARE

## 2025-05-16 ENCOUNTER — HOSPITAL ENCOUNTER (OUTPATIENT)
Age: 60
Setting detail: OUTPATIENT SURGERY
Discharge: HOME | End: 2025-05-16
Attending: STUDENT IN AN ORGANIZED HEALTH CARE EDUCATION/TRAINING PROGRAM | Admitting: STUDENT IN AN ORGANIZED HEALTH CARE EDUCATION/TRAINING PROGRAM
Payer: MEDICARE

## 2025-05-16 ENCOUNTER — APPOINTMENT (OUTPATIENT)
Dept: GENERAL RADIOLOGY | Age: 60
End: 2025-05-16
Attending: STUDENT IN AN ORGANIZED HEALTH CARE EDUCATION/TRAINING PROGRAM
Payer: MEDICARE

## 2025-05-16 VITALS
RESPIRATION RATE: 19 BRPM | DIASTOLIC BLOOD PRESSURE: 66 MMHG | HEART RATE: 78 BPM | OXYGEN SATURATION: 95 % | SYSTOLIC BLOOD PRESSURE: 108 MMHG | BODY MASS INDEX: 23.56 KG/M2 | HEIGHT: 60 IN | TEMPERATURE: 96.8 F | WEIGHT: 120 LBS

## 2025-05-16 DIAGNOSIS — G89.18 POST-OP PAIN: Primary | ICD-10-CM

## 2025-05-16 PROCEDURE — 6360000002 HC RX W HCPCS: Performed by: NURSE ANESTHETIST, CERTIFIED REGISTERED

## 2025-05-16 PROCEDURE — 6360000002 HC RX W HCPCS: Performed by: ANESTHESIOLOGY

## 2025-05-16 PROCEDURE — 3600000004 HC SURGERY LEVEL 4 BASE: Performed by: STUDENT IN AN ORGANIZED HEALTH CARE EDUCATION/TRAINING PROGRAM

## 2025-05-16 PROCEDURE — 20680 REMOVAL OF IMPLANT DEEP: CPT | Performed by: STUDENT IN AN ORGANIZED HEALTH CARE EDUCATION/TRAINING PROGRAM

## 2025-05-16 PROCEDURE — 3600000014 HC SURGERY LEVEL 4 ADDTL 15MIN: Performed by: STUDENT IN AN ORGANIZED HEALTH CARE EDUCATION/TRAINING PROGRAM

## 2025-05-16 PROCEDURE — 3700000000 HC ANESTHESIA ATTENDED CARE: Performed by: STUDENT IN AN ORGANIZED HEALTH CARE EDUCATION/TRAINING PROGRAM

## 2025-05-16 PROCEDURE — 2709999900 HC NON-CHARGEABLE SUPPLY: Performed by: STUDENT IN AN ORGANIZED HEALTH CARE EDUCATION/TRAINING PROGRAM

## 2025-05-16 PROCEDURE — 7100000001 HC PACU RECOVERY - ADDTL 15 MIN: Performed by: STUDENT IN AN ORGANIZED HEALTH CARE EDUCATION/TRAINING PROGRAM

## 2025-05-16 PROCEDURE — 7100000000 HC PACU RECOVERY - FIRST 15 MIN: Performed by: STUDENT IN AN ORGANIZED HEALTH CARE EDUCATION/TRAINING PROGRAM

## 2025-05-16 PROCEDURE — 7100000011 HC PHASE II RECOVERY - ADDTL 15 MIN: Performed by: STUDENT IN AN ORGANIZED HEALTH CARE EDUCATION/TRAINING PROGRAM

## 2025-05-16 PROCEDURE — 64447 NJX AA&/STRD FEMORAL NRV IMG: CPT | Performed by: ANESTHESIOLOGY

## 2025-05-16 PROCEDURE — 64445 NJX AA&/STRD SCIATIC NRV IMG: CPT | Performed by: ANESTHESIOLOGY

## 2025-05-16 PROCEDURE — 3700000001 HC ADD 15 MINUTES (ANESTHESIA): Performed by: STUDENT IN AN ORGANIZED HEALTH CARE EDUCATION/TRAINING PROGRAM

## 2025-05-16 PROCEDURE — 2500000003 HC RX 250 WO HCPCS: Performed by: STUDENT IN AN ORGANIZED HEALTH CARE EDUCATION/TRAINING PROGRAM

## 2025-05-16 PROCEDURE — 2580000003 HC RX 258: Performed by: ANESTHESIOLOGY

## 2025-05-16 PROCEDURE — 7100000010 HC PHASE II RECOVERY - FIRST 15 MIN: Performed by: STUDENT IN AN ORGANIZED HEALTH CARE EDUCATION/TRAINING PROGRAM

## 2025-05-16 PROCEDURE — 2500000003 HC RX 250 WO HCPCS: Performed by: NURSE ANESTHETIST, CERTIFIED REGISTERED

## 2025-05-16 RX ORDER — NALOXONE HYDROCHLORIDE 0.4 MG/ML
INJECTION, SOLUTION INTRAMUSCULAR; INTRAVENOUS; SUBCUTANEOUS PRN
Status: DISCONTINUED | OUTPATIENT
Start: 2025-05-16 | End: 2025-05-16 | Stop reason: HOSPADM

## 2025-05-16 RX ORDER — HYDRALAZINE HYDROCHLORIDE 20 MG/ML
10 INJECTION INTRAMUSCULAR; INTRAVENOUS
Status: DISCONTINUED | OUTPATIENT
Start: 2025-05-16 | End: 2025-05-16 | Stop reason: HOSPADM

## 2025-05-16 RX ORDER — BUPIVACAINE HYDROCHLORIDE 5 MG/ML
20 INJECTION, SOLUTION EPIDURAL; INTRACAUDAL; PERINEURAL ONCE
Status: COMPLETED | OUTPATIENT
Start: 2025-05-16 | End: 2025-05-16

## 2025-05-16 RX ORDER — SODIUM CHLORIDE, SODIUM LACTATE, POTASSIUM CHLORIDE, CALCIUM CHLORIDE 600; 310; 30; 20 MG/100ML; MG/100ML; MG/100ML; MG/100ML
INJECTION, SOLUTION INTRAVENOUS CONTINUOUS
Status: DISCONTINUED | OUTPATIENT
Start: 2025-05-16 | End: 2025-05-16 | Stop reason: HOSPADM

## 2025-05-16 RX ORDER — METOCLOPRAMIDE HYDROCHLORIDE 5 MG/ML
10 INJECTION INTRAMUSCULAR; INTRAVENOUS
Status: DISCONTINUED | OUTPATIENT
Start: 2025-05-16 | End: 2025-05-16 | Stop reason: HOSPADM

## 2025-05-16 RX ORDER — HYDROCODONE BITARTRATE AND ACETAMINOPHEN 10; 325 MG/1; MG/1
1 TABLET ORAL EVERY 6 HOURS PRN
Qty: 12 TABLET | Refills: 0 | Status: SHIPPED | OUTPATIENT
Start: 2025-05-16 | End: 2025-05-19

## 2025-05-16 RX ORDER — SODIUM CHLORIDE 9 MG/ML
INJECTION, SOLUTION INTRAVENOUS PRN
Status: DISCONTINUED | OUTPATIENT
Start: 2025-05-16 | End: 2025-05-16 | Stop reason: HOSPADM

## 2025-05-16 RX ORDER — MEPERIDINE HYDROCHLORIDE 50 MG/ML
12.5 INJECTION INTRAMUSCULAR; INTRAVENOUS; SUBCUTANEOUS EVERY 5 MIN PRN
Status: DISCONTINUED | OUTPATIENT
Start: 2025-05-16 | End: 2025-05-16 | Stop reason: HOSPADM

## 2025-05-16 RX ORDER — DIPHENHYDRAMINE HYDROCHLORIDE 50 MG/ML
12.5 INJECTION, SOLUTION INTRAMUSCULAR; INTRAVENOUS
Status: DISCONTINUED | OUTPATIENT
Start: 2025-05-16 | End: 2025-05-16 | Stop reason: HOSPADM

## 2025-05-16 RX ORDER — MIDAZOLAM HYDROCHLORIDE 2 MG/2ML
2 INJECTION, SOLUTION INTRAMUSCULAR; INTRAVENOUS ONCE
Status: COMPLETED | OUTPATIENT
Start: 2025-05-16 | End: 2025-05-16

## 2025-05-16 RX ORDER — PROPOFOL 10 MG/ML
INJECTION, EMULSION INTRAVENOUS
Status: DISCONTINUED | OUTPATIENT
Start: 2025-05-16 | End: 2025-05-16 | Stop reason: SDUPTHER

## 2025-05-16 RX ORDER — DROPERIDOL 2.5 MG/ML
0.62 INJECTION, SOLUTION INTRAMUSCULAR; INTRAVENOUS
Status: DISCONTINUED | OUTPATIENT
Start: 2025-05-16 | End: 2025-05-16 | Stop reason: HOSPADM

## 2025-05-16 RX ORDER — ONDANSETRON 2 MG/ML
INJECTION INTRAMUSCULAR; INTRAVENOUS
Status: DISCONTINUED | OUTPATIENT
Start: 2025-05-16 | End: 2025-05-16 | Stop reason: SDUPTHER

## 2025-05-16 RX ORDER — CEFAZOLIN SODIUM 2 G/50ML
SOLUTION INTRAVENOUS
Status: DISCONTINUED | OUTPATIENT
Start: 2025-05-16 | End: 2025-05-16 | Stop reason: SDUPTHER

## 2025-05-16 RX ORDER — ROCURONIUM BROMIDE 10 MG/ML
INJECTION, SOLUTION INTRAVENOUS
Status: DISCONTINUED | OUTPATIENT
Start: 2025-05-16 | End: 2025-05-16 | Stop reason: SDUPTHER

## 2025-05-16 RX ORDER — SODIUM CHLORIDE 0.9 % (FLUSH) 0.9 %
5-40 SYRINGE (ML) INJECTION EVERY 12 HOURS SCHEDULED
Status: DISCONTINUED | OUTPATIENT
Start: 2025-05-16 | End: 2025-05-16 | Stop reason: HOSPADM

## 2025-05-16 RX ORDER — DEXAMETHASONE SODIUM PHOSPHATE 10 MG/ML
INJECTION, SOLUTION INTRA-ARTICULAR; INTRALESIONAL; INTRAMUSCULAR; INTRAVENOUS; SOFT TISSUE
Status: DISCONTINUED | OUTPATIENT
Start: 2025-05-16 | End: 2025-05-16 | Stop reason: SDUPTHER

## 2025-05-16 RX ORDER — FENTANYL CITRATE 50 UG/ML
100 INJECTION, SOLUTION INTRAMUSCULAR; INTRAVENOUS ONCE
Status: COMPLETED | OUTPATIENT
Start: 2025-05-16 | End: 2025-05-16

## 2025-05-16 RX ORDER — MAGNESIUM HYDROXIDE 1200 MG/15ML
LIQUID ORAL CONTINUOUS PRN
Status: COMPLETED | OUTPATIENT
Start: 2025-05-16 | End: 2025-05-16

## 2025-05-16 RX ORDER — LIDOCAINE HYDROCHLORIDE 10 MG/ML
INJECTION, SOLUTION EPIDURAL; INFILTRATION; INTRACAUDAL; PERINEURAL
Status: DISCONTINUED | OUTPATIENT
Start: 2025-05-16 | End: 2025-05-16 | Stop reason: SDUPTHER

## 2025-05-16 RX ORDER — BUPIVACAINE HYDROCHLORIDE 5 MG/ML
INJECTION, SOLUTION EPIDURAL; INTRACAUDAL; PERINEURAL
Status: COMPLETED | OUTPATIENT
Start: 2025-05-16 | End: 2025-05-16

## 2025-05-16 RX ORDER — SODIUM CHLORIDE 0.9 % (FLUSH) 0.9 %
5-40 SYRINGE (ML) INJECTION PRN
Status: DISCONTINUED | OUTPATIENT
Start: 2025-05-16 | End: 2025-05-16 | Stop reason: HOSPADM

## 2025-05-16 RX ADMIN — PROPOFOL 150 MG: 10 INJECTION, EMULSION INTRAVENOUS at 08:44

## 2025-05-16 RX ADMIN — MIDAZOLAM HYDROCHLORIDE 2 MG: 1 INJECTION, SOLUTION INTRAMUSCULAR; INTRAVENOUS at 08:07

## 2025-05-16 RX ADMIN — ROCURONIUM BROMIDE 50 MG: 50 INJECTION INTRAVENOUS at 08:44

## 2025-05-16 RX ADMIN — BUPIVACAINE HYDROCHLORIDE 10 ML: 5 INJECTION, SOLUTION EPIDURAL; INTRACAUDAL; PERINEURAL at 08:07

## 2025-05-16 RX ADMIN — BUPIVACAINE HYDROCHLORIDE 100 MG: 5 INJECTION, SOLUTION EPIDURAL; INTRACAUDAL; PERINEURAL at 08:07

## 2025-05-16 RX ADMIN — CEFAZOLIN SODIUM 2000 MG: 2 SOLUTION INTRAVENOUS at 09:02

## 2025-05-16 RX ADMIN — LIDOCAINE HYDROCHLORIDE 50 MG: 10 INJECTION, SOLUTION EPIDURAL; INFILTRATION; INTRACAUDAL; PERINEURAL at 08:44

## 2025-05-16 RX ADMIN — Medication 25 MG: at 08:56

## 2025-05-16 RX ADMIN — DEXAMETHASONE SODIUM PHOSPHATE 5 MG: 10 INJECTION INTRAMUSCULAR; INTRAVENOUS at 09:01

## 2025-05-16 RX ADMIN — SODIUM CHLORIDE, POTASSIUM CHLORIDE, SODIUM LACTATE AND CALCIUM CHLORIDE: 600; 310; 30; 20 INJECTION, SOLUTION INTRAVENOUS at 08:04

## 2025-05-16 RX ADMIN — FENTANYL CITRATE 100 MCG: 50 INJECTION INTRAMUSCULAR; INTRAVENOUS at 08:07

## 2025-05-16 RX ADMIN — SUGAMMADEX 200 MG: 100 INJECTION, SOLUTION INTRAVENOUS at 09:37

## 2025-05-16 RX ADMIN — ONDANSETRON 4 MG: 2 INJECTION, SOLUTION INTRAMUSCULAR; INTRAVENOUS at 09:27

## 2025-05-16 RX ADMIN — BUPIVACAINE 10 ML: 13.3 INJECTION, SUSPENSION, LIPOSOMAL INFILTRATION at 08:07

## 2025-05-16 RX ADMIN — BUPIVACAINE 133 MG: 13.3 INJECTION, SUSPENSION, LIPOSOMAL INFILTRATION at 08:07

## 2025-05-16 ASSESSMENT — PAIN SCALES - GENERAL
PAINLEVEL_OUTOF10: 0

## 2025-05-16 ASSESSMENT — PAIN - FUNCTIONAL ASSESSMENT
PAIN_FUNCTIONAL_ASSESSMENT: 0-10
PAIN_FUNCTIONAL_ASSESSMENT: FACE, LEGS, ACTIVITY, CRY, AND CONSOLABILITY (FLACC)

## 2025-05-16 ASSESSMENT — LIFESTYLE VARIABLES: SMOKING_STATUS: 1

## 2025-05-16 NOTE — OP NOTE
Operative Note      Patient: Madonna Manrique  YOB: 1965  MRN: [id number]    Date of Procedure: 5/16/2025    Pre-Op Diagnosis Codes:      * Retained orthopedic hardware [Z96.9] left tibia    Post-Op Diagnosis: Post-Op Diagnosis Codes:     * Retained orthopedic hardware [Z96.9]-left tibia       Procedure(s):  Removal deep implant left tibia    Surgeon(s):  Seferino Prakash DO    Assistant:   Resident: Jeremy Reynolds DO; Manuel Casanova DO    Anesthesia: General    Estimated Blood Loss (mL): 5 cc    IV fluid: 1000 cc crystalloid    Complications: None    Specimens:   * No specimens in log *    Implants:  Implant Name Type Inv. Item Serial No.  Lot No. LRB No. Used Action   3 SCREWS      Left 3 Explanted         Drains: * No LDAs found *    Findings:  Infection Present At Time Of Surgery (PATOS) (choose all levels that have infection present):  No infection present      Detailed Description of Procedure:       Indications:   This is a 60yo Female who presents for operative intervention of their Left tibia.  Patient is status post intramedullary nail fixation of left tibia/ankle that occurred several years ago.  She complained about hardware prominence, and pain and requested hardware removal.  All treatment options, including conservative and operative, were discussed with the patient in detail including the risks and benefits of each. Risks including but not limited to  bleeding, blood clots, infection, damage to nearby tissues, vessels and nerves, wound healing complications, failed procedure, stiffness, loss of motion, intraoperative fracture, anesthesia risk, loss of life were all discussed with the patient.  Knowing these risks, the patient wished to proceed with surgery as indicated. Consent was obtained. Site Marked. All questions answered to the patient's satisfaction.      Operative:    The patient was taken to the operative suite, placed under general anesthesia without any

## 2025-05-16 NOTE — PROGRESS NOTES
Writer called patient's  (Desean) and reviewed discharge instructions with both him and patient. On discharge, patient taken to pharmacy by PCT and retrieved pain medication ordered by surgeon. All questions answered.

## 2025-05-16 NOTE — H&P
History and Physical    Pt Name: Madonna Manrique  MRN: [id number]  YOB: 1965  Date of evaluation: 5/16/2025  Primary Care Physician: Sierra De Oliveira MD    SUBJECTIVE:   History of Chief Complaint:    Madonna Manrique is a 59 y.o. female who is scheduled today for ANKLE HARDWARE REMOVAL SCREWS (SYNTHES TIBIAL NAIL SCREW , PRE-OP NERVE BLOCK, 3080 TABLE WITH EXTENSION, SUPINE, C-ARM) - Left. Patient states she had surgical repair with hardware placement to her left ankle in 2011 and in the last eight weeks, she has been having left ankle pain with radiculopathy to her left lower leg. She denies any numbness or tingling to her left lower extremity.   Allergies  is allergic to aspirin, nsaids, succinylcholine chloride, and tramadol.  Medications  Prior to Admission medications    Medication Sig Start Date End Date Taking? Authorizing Provider   furosemide (LASIX) 20 MG tablet TAKE 1 TABLET BY MOUTH EVERY 48 HOURS AS NEEDED (LEG SWELLING) 5/12/25 7/11/25 Yes Sierra De Oliveira MD   tiZANidine (ZANAFLEX) 4 MG tablet Take 1 tablet by mouth every 8 hours as needed (back pain) 5/12/25  Yes Manuel Casanova,    fluticasone furoate-vilanterol (BREO ELLIPTA) 100-25 MCG/ACT inhaler INHALE 1 DOSE BY MOUTH DAILY 5/2/25  Yes Sierra De Oliveira MD   gabapentin (NEURONTIN) 300 MG capsule Take 2 capsules by mouth 3 times daily for 30 days. Intended supply: 90 days 4/29/25 5/29/25 Yes Sierra De Oliveira MD   albuterol sulfate HFA (PROVENTIL;VENTOLIN;PROAIR) 108 (90 Base) MCG/ACT inhaler Inhale 2 puffs into the lungs every 6 hours as needed for Wheezing or Shortness of Breath 3/3/25  Yes Sierra De Oliveira MD   folic acid (FOLVITE) 1 MG tablet Take 1 tablet by mouth daily 3/3/25  Yes Sierra De Oliveira MD   guaiFENesin (MUCINEX) 600 MG extended release tablet Take 1 tablet by mouth 2 times daily 3/3/25  Yes Sierra De Oliveira MD   pantoprazole (PROTONIX) 40 MG tablet TAKE ONE TABLET BY MOUTH ONCE DAILY 30-60 MINUTES PRIOR TO A  Left.    GLADIS Batista - CNP   Electronically signed 5/16/2025 at 7:44 AM

## 2025-05-16 NOTE — ANESTHESIA PRE PROCEDURE
hypercapnia (HCC) J96.01, J96.02   • Anastomotic ulcer S/P gastric bypass K28.9   • Bipolar affective disorder, currently depressed, moderate ([facility]) F31.32   • Chronic cholecystitis with calculus K80.10   • Deep venous thrombosis of peroneal vein ([facility]) I82.459   • Displacement of lumbar intervertebral disc without myelopathy M51.26   • Generalized osteoarthritis M15.9   • GERD (gastroesophageal reflux disease) K21.9   • Varicose veins of right leg with edema I83.891   • Community acquired pneumonia of left lower lobe of lung J18.9   • Pulmonary emboli ([facility]) I26.99   • Long QT interval R94.31   • Retained orthopedic hardware Z96.9       Past Medical History:        Diagnosis Date   • Alcoholism ([facility])     sober since    • Anxiety    • Asthma    • Bipolar 1 disorder ([facility])    • Borderline personality disorder ([facility])    • Chronic uveitis of both eyes    • Depression    • Drug abuse, opioid type ([facility])    • DVT (deep vein thrombosis) in pregnancy left   • History of blood transfusion    • Long QT interval 2025   • May-Thurner syndrome    • Multiple sclerosis ([facility])    • Osteoporosis    • Pulmonary emboli ([facility])    • Severe depression ([facility])    • Suicide gesture ([facility]) 2019, 10/2019    DRUG OVERDOSE   • Uveitis of both eyes        Past Surgical History:        Procedure Laterality Date   • CARPAL TUNNEL RELEASE Bilateral    •  SECTION      x 3   • CHOLECYSTECTOMY     • GASTRIC BYPASS SURGERY      weighted 286   • HYSTERECTOMY (CERVIX STATUS UNKNOWN)     • LEG SURGERY Left     broken leg in 3 places    • LEG SURGERY Right 2023    RIGHT EXTERNAL FIXATOR APPLICATION OF RIGHT LEG performed by Seferino Prakash DO at [facility] OR   • LEG SURGERY Right 2023    TIBIAL PLATEAU FRACTURE OPEN REDUCTION INTERNAL FIXATION, , EX- FIX REMOVAL ( SYNTHES , C-ARM performed by Seferino Prakash DO at [facility] OR   • OTHER SURGICAL HISTORY      left iliac artery stenting, IVC filter placement and

## 2025-05-16 NOTE — ANESTHESIA POSTPROCEDURE EVALUATION
Department of Anesthesiology  Postprocedure Note    Patient: Madonna Manrique  MRN: [id number]  YOB: 1965  Date of evaluation: 5/16/2025    Procedure Summary       Date: 05/16/25 Room / Location: [address]    Anesthesia Start: 0836 Anesthesia Stop: 0947    Procedure: ANKLE HARDWARE REMOVAL SCREWS (Left: Ankle) Diagnosis:       Retained orthopedic hardware      (Retained orthopedic hardware [Z96.9])    Surgeons: Seferino Prakash DO Responsible Provider: Devyn Davila MD    Anesthesia Type: general ASA Status: 2            Anesthesia Type: No value filed.    Sierra Phase I: Sierra Score: 10    Sierra Phase II: Sierra Score: 10    Anesthesia Post Evaluation    Patient location during evaluation: bedside  Patient participation: complete - patient participated  Level of consciousness: awake and alert  Airway patency: patent  Nausea & Vomiting: no nausea and no vomiting  Cardiovascular status: hemodynamically stable  Respiratory status: acceptable  Hydration status: stable  Comments: /66   Pulse 78   Temp 96.8 °F (36 °C) (Temporal)   Resp 19   Ht 1.524 m (5')   Wt 54.4 kg (120 lb)   SpO2 95%   BMI 23.44 kg/m²     Pain management: adequate    No notable events documented.

## 2025-05-16 NOTE — ANESTHESIA PROCEDURE NOTES
Peripheral Block    Patient location during procedure: pre-op  Reason for block: post-op pain management and at surgeon's request  Start time: 5/16/2025 8:07 AM  End time: 5/16/2025 8:13 AM  Staffing  Performed: anesthesiologist   Anesthesiologist: Devyn Davila MD  Performed by: Devyn Davila MD  Authorized by: Devyn Davila MD    Preanesthetic Checklist  Completed: patient identified, IV checked, site marked, risks and benefits discussed, surgical/procedural consents, equipment checked, pre-op evaluation, timeout performed, anesthesia consent given, oxygen available, monitors applied/VS acknowledged, fire risk safety assessment completed and verbalized and blood product R/B/A discussed and consented  Peripheral Block   Patient position: supine  Prep: ChloraPrep  Provider prep: mask and sterile gloves  Patient monitoring: continuous pulse ox, frequent blood pressure checks and IV access  Block type: Sciatic  Popliteal  Laterality: left  Injection technique: single-shot  Guidance: ultrasound guided    Needle   Needle type: short-bevel   Needle gauge: 20 G  Needle localization: ultrasound guidance  Needle length: 10 cm  Assessment   Injection assessment: negative aspiration for heme, no paresthesia on injection, local visualized surrounding nerve on ultrasound and no intravascular symptoms  Paresthesia pain: none  Slow fractionated injection: yes  Hemodynamics: stable  Outcomes: uncomplicated and patient tolerated procedure well    Medications Administered  BUPivacaine (MARCAINE) PF injection 0.5% - Perineural   10 mL - 5/16/2025 8:07:00 AM  BUPivacaine liposome (EXPAREL) injection 1.3% - Perineural   10 mL - 5/16/2025 8:07:00 AM

## 2025-05-16 NOTE — ANESTHESIA PROCEDURE NOTES
Peripheral Block    Patient location during procedure: pre-op  Reason for block: post-op pain management and at surgeon's request  Start time: 5/16/2025 8:07 AM  End time: 5/16/2025 8:13 AM  Staffing  Performed: anesthesiologist   Anesthesiologist: Devyn Davila MD  Performed by: Devyn Davila MD  Authorized by: Devyn Davila MD    Preanesthetic Checklist  Completed: patient identified, IV checked, site marked, risks and benefits discussed, surgical/procedural consents, equipment checked, pre-op evaluation, timeout performed, anesthesia consent given, oxygen available, monitors applied/VS acknowledged, fire risk safety assessment completed and verbalized and blood product R/B/A discussed and consented  Peripheral Block   Patient position: supine  Prep: ChloraPrep  Provider prep: mask and sterile gloves  Patient monitoring: continuous pulse ox, frequent blood pressure checks and IV access  Block type: Saphenous  Laterality: left  Injection technique: single-shot  Guidance: ultrasound guided    Needle   Needle type: short-bevel   Needle gauge: 20 G  Needle localization: ultrasound guidance  Needle length: 10 cm  Assessment   Injection assessment: negative aspiration for heme, no paresthesia on injection, local visualized surrounding nerve on ultrasound and no intravascular symptoms  Paresthesia pain: none  Slow fractionated injection: yes  Hemodynamics: stable  Outcomes: uncomplicated and patient tolerated procedure well    Medications Administered  BUPivacaine (MARCAINE) PF injection 0.5% - Perineural   10 mL - 5/16/2025 8:07:00 AM

## 2025-05-16 NOTE — DISCHARGE INSTRUCTIONS
No alcoholic beverages, no driving or operating machinery, no making important decisions for 24 hours.   You may have a normal diet but should eat lightly day of surgery.  Drink plenty of fluids.  Urinate within 8 hours after surgery, if unable to urinate call your doctor    Call your doctor for the following:   Chills   Temperature greater than 101   Pain that is not tolerable despite taking pain medicine as ordered   There is increased swelling, redness or warmth at surgical site   There is increased drainage or bleeding from surgical site   Do not remove surgical dressing unless instructed to do so by your surgeon             Orthopaedic Instructions:  -Weight bearing status: Weight bearing as tolerated with the left leg  -Starting three days after surgery, okay for daily dressing changes until wound/surgical incision site is dry. Dressing changes can be performed with simple Band-aids or gauze pads secured with tape/ace bandages. Once you no longer see drainage from your wounds on your dressings, it is okay to shower. Do not scrub vigorously, just let water run over wound/surgical sites. Additionally, at this point one no longer needs to change dressings daily. It is important that you do not soak the wound/incision site underwater, though. This includes baths, hot tubs, swimming pools, etc. Do not apply lotions or creams over the incision sites.  -Always look for signs of compartment syndrome: pain out of proportion to the injury, pain not controlled with pain medication, numbness in digits, changing of color of digits (paleness). If these signs occur return to ED immediately for reassessment.  -Ice (20 minutes on and off 1 hour) and elevate to reduce swelling and throbbing pain.  -Should urinate within 8 hours of surgery.  -Call the office or come to Emergency Room if signs of infection appear (hot, swollen, red, draining pus, fever)  -Take medications as prescribed.  -Wean off narcotics (percocet/norco) as

## 2025-05-16 NOTE — BRIEF OP NOTE
Brief Postoperative Note      Patient: Madonna Manrique  YOB: 1965  MRN: [id number]    Date of Procedure: 5/16/2025    Pre-Op Diagnosis Codes:   Left ankle hardware irritation.     Post-Op Diagnosis: Same       Procedure(s):  Left ankle hardware removal, deep implants. x3 screws.    Surgeon(s):  Seferino Prakash DO    Assistant:  Resident: Jeremy Reynolds DO; Manuel Casanova DO    Anesthesia: General    Estimated Blood Loss (mL): 5 cc    Fluids: 1L fluids crystalloid    Complications: None    Specimens:   * No specimens in log *    Implants:  Implant Name Type Inv. Item Serial No.  Lot No. LRB No. Used Action   3 SCREWS      Left 3 Explanted         Drains: * No LDAs found *    Findings:  Infection Present At Time Of Surgery (PATOS) (choose all levels that have infection present):  No infection present  Other Findings: Removal of three screws from the left ankle.     Electronically signed by Manuel Casanova DO on 5/16/2025 at 9:40 AM

## 2025-05-19 ENCOUNTER — TELEPHONE (OUTPATIENT)
Dept: ORTHOPEDIC SURGERY | Age: 60
End: 2025-05-19

## 2025-05-19 DIAGNOSIS — G89.18 POST-OP PAIN: ICD-10-CM

## 2025-05-19 RX ORDER — HYDROCODONE BITARTRATE AND ACETAMINOPHEN 10; 325 MG/1; MG/1
1 TABLET ORAL EVERY 6 HOURS PRN
Qty: 12 TABLET | Refills: 0 | Status: CANCELLED | OUTPATIENT
Start: 2025-05-19 | End: 2025-05-22

## 2025-05-19 RX ORDER — HYDROCODONE BITARTRATE AND ACETAMINOPHEN 5; 325 MG/1; MG/1
1 TABLET ORAL EVERY 8 HOURS PRN
Qty: 21 TABLET | Refills: 0 | Status: SHIPPED | OUTPATIENT
Start: 2025-05-19 | End: 2025-05-26

## 2025-05-19 NOTE — TELEPHONE ENCOUNTER
Patient calling for refill of HYDROcodone-acetaminophen (NORCO)  MG per tablet  Pharmacy-- [facility] on [address] and [address]

## 2025-05-28 DIAGNOSIS — G89.18 POST-OP PAIN: ICD-10-CM

## 2025-05-28 RX ORDER — HYDROCODONE BITARTRATE AND ACETAMINOPHEN 5; 325 MG/1; MG/1
1 TABLET ORAL EVERY 8 HOURS PRN
Qty: 21 TABLET | Refills: 0 | Status: SHIPPED | OUTPATIENT
Start: 2025-05-28 | End: 2025-06-04

## 2025-06-02 ENCOUNTER — OFFICE VISIT (OUTPATIENT)
Dept: ORTHOPEDIC SURGERY | Age: 60
End: 2025-06-02

## 2025-06-02 VITALS — HEIGHT: 60 IN | BODY MASS INDEX: 23.44 KG/M2

## 2025-06-02 DIAGNOSIS — G89.18 POST-OP PAIN: Primary | ICD-10-CM

## 2025-06-02 DIAGNOSIS — Z96.9 RETAINED ORTHOPEDIC HARDWARE: ICD-10-CM

## 2025-06-02 PROCEDURE — 99024 POSTOP FOLLOW-UP VISIT: CPT | Performed by: STUDENT IN AN ORGANIZED HEALTH CARE EDUCATION/TRAINING PROGRAM

## 2025-06-02 RX ORDER — HYDROCODONE BITARTRATE AND ACETAMINOPHEN 5; 325 MG/1; MG/1
1 TABLET ORAL EVERY 8 HOURS PRN
Qty: 21 TABLET | Refills: 0 | Status: SHIPPED | OUTPATIENT
Start: 2025-06-02 | End: 2025-06-09

## 2025-06-02 NOTE — PROGRESS NOTES
[facility]  [address]  [place] [address]  Dept Phone: [phone]  Dept Fax: [phone]      Orthopaedic Trauma Clinic Follow Up      Subjective:   Date of Surgery: 5/16/2025    Madonna Manrique is a 59 y.o. year old female who presents to the clinic today for follow up status post removal of deep implant left tibia.  Patient presents with her .  Patient does have some residual pain, she thought she may have broken something, she was ambulating significant amount several days postoperatively.  Denies any numbness or tingling.    Review of Systems  Gen: no fever, chills, malaise  CV: no chest pain or palpitations  Resp: no cough or shortness of breath  GI: no nausea, vomiting, diarrhea, or constipation  Neuro: no seizures, vertigo, or headache  Msk: Per HPI  10 remaining systems reviewed and negative    Objective :   There were no vitals filed for this visit.Body mass index is 23.44 kg/m².  General: No acute distress, resting comfortably in the clinic  Neuro: alert. oriented  Eyes: Extra-ocular muscles intact  Pulm: Respirations unlabored and regular.  Skin: warm, well perfused  Psych:   Patient has good fund of knowledge and displays understanding of exam, diagnosis, and plan.  Left Lower Extremity: sutures in place. Wound well approximated. No erythema or signs of infection. Compartments soft/compressible. Extremity warm/well perfused. EHL/FHL/TA/GSC motor intact. Patient expresses normal sensation L3-S1 distribution     Radiology:  Imaging studies from today were independently reviewed and read as listed below. Any relevant images obtained prior to today's visit were also independently interpreted.        History: 59-year-old female status post hardware removal left ankle    Comparison: 5/16/2025    Findings: 3 views of the left ankle (AP/lateral/mortise) in a skeletally mature patient showing redemonstration orthopedic hardware in the form plate and screws to the

## 2025-06-03 DIAGNOSIS — G35 MULTIPLE SCLEROSIS (HCC): ICD-10-CM

## 2025-06-03 DIAGNOSIS — J45.909 ASTHMA WITH SEVERITY TO BE DETERMINED: ICD-10-CM

## 2025-06-03 DIAGNOSIS — M79.2 NEUROPATHIC PAIN: ICD-10-CM

## 2025-06-03 RX ORDER — GABAPENTIN 300 MG/1
600 CAPSULE ORAL 3 TIMES DAILY
Qty: 180 CAPSULE | Refills: 0 | Status: SHIPPED | OUTPATIENT
Start: 2025-06-03 | End: 2025-07-03

## 2025-06-03 RX ORDER — ALBUTEROL SULFATE 90 UG/1
2 INHALANT RESPIRATORY (INHALATION) EVERY 6 HOURS PRN
Qty: 1 EACH | Refills: 3 | Status: SHIPPED | OUTPATIENT
Start: 2025-06-03

## 2025-06-03 NOTE — TELEPHONE ENCOUNTER
Last visit: 3/3/25  Last Med refill:   Does patient have enough medication for 72 hours: No:     Next Visit Date:  Future Appointments   Date Time Provider Department Center   6/30/2025  2:15 PM Seferino Prakash,  ORTHO SPECIA [facility]   9/1/2025 10:00 AM STV STA CHAIR 01 [facility] STA MED [place]       Health Maintenance   Topic Date Due    Diabetic Alb to Cr ratio (uACR) test  Never done    Hepatitis B vaccine (1 of 3 - 19+ 3-dose series) Never done    Colorectal Cancer Screen  05/06/2021    Breast cancer screen  09/30/2022    Lung Cancer Screening &/or Counseling  11/01/2024    A1C test (Diabetic or Prediabetic)  11/19/2024    Lipids  12/17/2024    Annual Wellness Visit (Medicare Advantage)  Never done    Depression Monitoring  01/07/2026    GFR test (Diabetes, CKD 3-4, OR last GFR 15-59)  02/25/2026    DTaP/Tdap/Td vaccine (3 - Td or Tdap) 06/13/2033    Flu vaccine  Completed    Shingles vaccine  Completed    Pneumococcal 50+ years Vaccine  Completed    COVID-19 Vaccine  Completed    Hepatitis C screen  Completed    HIV screen  Completed    Hepatitis A vaccine  Aged Out    Hib vaccine  Aged Out    Polio vaccine  Aged Out    Meningococcal (ACWY) vaccine  Aged Out    Meningococcal B vaccine  Aged Out    Pneumococcal 0-49 years Vaccine  Discontinued    Diabetes screen  Discontinued       Hemoglobin A1C (%)   Date Value   11/19/2023 5.7   12/29/2020 5.0             ( goal A1C is < 7)   No components found for: \"LABMICR\"  No components found for: \"LDLCHOLESTEROL\", \"LDLCALC\"    (goal LDL is <100)   AST (U/L)   Date Value   02/25/2025 29     ALT (U/L)   Date Value   02/25/2025 17     BUN (mg/dL)   Date Value   02/25/2025 10     BP Readings from Last 3 Encounters:   05/16/25 108/66   03/03/25 98/64   02/25/25 103/68          (goal 120/80)    All Future Testing planned in [facility]  Lab Frequency Next Occurrence   MORIS DIGITAL SCREEN W OR WO CAD BILATERAL Once 04/19/2024   Hemoglobin and Hematocrit, Blood, Post

## 2025-06-10 ENCOUNTER — TELEPHONE (OUTPATIENT)
Dept: ORTHOPEDIC SURGERY | Age: 60
End: 2025-06-10

## 2025-06-10 NOTE — TELEPHONE ENCOUNTER
Patient informed to transition to over the counter medications per last ovn- norco was filled the last time on 6/2/25

## 2025-06-10 NOTE — TELEPHONE ENCOUNTER
Patient calling for refill of norco 5/[address] [facility] on [address].  SHe is starting therapy next week

## 2025-06-17 ENCOUNTER — TELEPHONE (OUTPATIENT)
Dept: ORTHOPEDIC SURGERY | Age: 60
End: 2025-06-17

## 2025-06-17 NOTE — TELEPHONE ENCOUNTER
Patient called back, CALL CENTER pushed patient through to ortho trauma line re: foot pain / plantar fasciitis (per patient).   Patient to discuss concerns of foot pain to provider at next follow up.

## 2025-06-17 NOTE — TELEPHONE ENCOUNTER
Patient called back and was informed that patient would discuss foot at upcoming appointment on 6/30/25. Patient verbalized understanding and stated that she did contact podiatry for appointment as well.

## 2025-06-17 NOTE — TELEPHONE ENCOUNTER
----- Message from Justin JURADO sent at 6/17/2025 12:20 PM EDT -----  Regarding: Message to Provider  Pt is calling due to foot pain in the left foot she had surgery on with . Pt is unsure if it is related to her surgery or if she should schedule with a podiatrist.    Pt can be reached at [phone]

## 2025-06-17 NOTE — TELEPHONE ENCOUNTER
Returned call to patient. No answer. Voicemailbox is full. Patient can follow up at scheduled appointment.

## 2025-06-23 DIAGNOSIS — G35 MULTIPLE SCLEROSIS (HCC): ICD-10-CM

## 2025-06-23 NOTE — TELEPHONE ENCOUNTER
Last visit: 03/03/2025  Last Med refill: 06/03/2025  Does patient have enough medication for 72 hours: No:     Next Visit Date:  Future Appointments   Date Time Provider Department Center   6/24/2025  5:00 PM Stacy Vences, PT [facility] SF PT [facility]   6/30/2025  2:15 PM Seferino Prakash, DO ORTHO SPECIA [facility]   7/11/2025  2:15 PM Sierra De Oliveira MD [facility] DEP   7/21/2025  8:00 AM Soraida Stone, DO Neuro Spec Neurology -   9/1/2025 10:00 AM STV STA CHAIR 01 STVZ STA MED [place]       Health Maintenance   Topic Date Due    Diabetic Alb to Cr ratio (uACR) test  Never done    Hepatitis B vaccine (1 of 3 - 19+ 3-dose series) Never done    Colorectal Cancer Screen  05/06/2021    Breast cancer screen  09/30/2022    Lung Cancer Screening &/or Counseling  11/01/2024    A1C test (Diabetic or Prediabetic)  11/19/2024    Lipids  12/17/2024    Annual Wellness Visit (Medicare Advantage)  Never done    Depression Monitoring  01/07/2026    GFR test (Diabetes, CKD 3-4, OR last GFR 15-59)  02/25/2026    DTaP/Tdap/Td vaccine (3 - Td or Tdap) 06/13/2033    Flu vaccine  Completed    Shingles vaccine  Completed    Pneumococcal 50+ years Vaccine  Completed    COVID-19 Vaccine  Completed    Hepatitis C screen  Completed    HIV screen  Completed    Hepatitis A vaccine  Aged Out    Hib vaccine  Aged Out    Polio vaccine  Aged Out    Meningococcal (ACWY) vaccine  Aged Out    Meningococcal B vaccine  Aged Out    Pneumococcal 0-49 years Vaccine  Discontinued    Diabetes screen  Discontinued       Hemoglobin A1C (%)   Date Value   11/19/2023 5.7   12/29/2020 5.0             ( goal A1C is < 7)   No components found for: \"LABMICR\"  No components found for: \"LDLCHOLESTEROL\", \"LDLCALC\"    (goal LDL is <100)   AST (U/L)   Date Value   02/25/2025 29     ALT (U/L)   Date Value   02/25/2025 17     BUN (mg/dL)   Date Value   02/25/2025 10     BP Readings from Last 3 Encounters:   05/16/25 108/66   03/03/25 98/64   02/25/25 103/68

## 2025-06-30 DIAGNOSIS — M79.2 NEUROPATHIC PAIN: ICD-10-CM

## 2025-06-30 RX ORDER — GABAPENTIN 300 MG/1
600 CAPSULE ORAL 3 TIMES DAILY
Qty: 180 CAPSULE | Refills: 0 | Status: SHIPPED | OUTPATIENT
Start: 2025-06-30 | End: 2025-07-18 | Stop reason: SDUPTHER

## 2025-07-05 DIAGNOSIS — K21.9 GASTROESOPHAGEAL REFLUX DISEASE, UNSPECIFIED WHETHER ESOPHAGITIS PRESENT: ICD-10-CM

## 2025-07-05 DIAGNOSIS — J44.9 CHRONIC OBSTRUCTIVE PULMONARY DISEASE, UNSPECIFIED COPD TYPE (HCC): ICD-10-CM

## 2025-07-07 RX ORDER — UMECLIDINIUM 62.5 UG/1
AEROSOL, POWDER ORAL
Qty: 1 EACH | Refills: 2 | Status: SHIPPED | OUTPATIENT
Start: 2025-07-07

## 2025-07-07 RX ORDER — PANTOPRAZOLE SODIUM 40 MG/1
TABLET, DELAYED RELEASE ORAL
Qty: 30 TABLET | Refills: 3 | Status: SHIPPED | OUTPATIENT
Start: 2025-07-07

## 2025-07-07 NOTE — TELEPHONE ENCOUNTER
Last visit: 03/03/25  Last Med refill: 03/03/25  Does patient have enough medication for 72 hours: No:     Next Visit Date:  Future Appointments   Date Time Provider Department Center   7/11/2025  2:15 PM Sierra De Oliveira MD [facility] DEP   7/21/2025  8:00 AM Bertha Soraida PERES DO Neuro Spec Neurology -   9/1/2025 10:00 AM STV STA CHAIR 01 STVZ STA MED [place]       Health Maintenance   Topic Date Due    Diabetic Alb to Cr ratio (uACR) test  Never done    Hepatitis B vaccine (1 of 3 - 19+ 3-dose series) Never done    Colorectal Cancer Screen  05/06/2021    Breast cancer screen  09/30/2022    Lung Cancer Screening &/or Counseling  11/01/2024    A1C test (Diabetic or Prediabetic)  11/19/2024    Lipids  12/17/2024    Annual Wellness Visit (Medicare Advantage)  Never done    Flu vaccine (1) 08/01/2025    Depression Monitoring  01/07/2026    GFR test (Diabetes, CKD 3-4, OR last GFR 15-59)  02/25/2026    DTaP/Tdap/Td vaccine (3 - Td or Tdap) 06/13/2033    Shingles vaccine  Completed    Pneumococcal 50+ years Vaccine  Completed    COVID-19 Vaccine  Completed    Hepatitis C screen  Completed    HIV screen  Completed    Hepatitis A vaccine  Aged Out    Hib vaccine  Aged Out    Polio vaccine  Aged Out    Meningococcal (ACWY) vaccine  Aged Out    Meningococcal B vaccine  Aged Out    Pneumococcal 0-49 years Vaccine  Discontinued    Diabetes screen  Discontinued       Hemoglobin A1C (%)   Date Value   11/19/2023 5.7   12/29/2020 5.0             ( goal A1C is < 7)   No components found for: \"LABMICR\"  No components found for: \"LDLCHOLESTEROL\", \"LDLCALC\"    (goal LDL is <100)   AST (U/L)   Date Value   02/25/2025 29     ALT (U/L)   Date Value   02/25/2025 17     BUN (mg/dL)   Date Value   02/25/2025 10     BP Readings from Last 3 Encounters:   05/16/25 108/66   03/03/25 98/64   02/25/25 103/68          (goal 120/80)    All Future Testing planned in [facility]  Lab Frequency Next Occurrence   Hemoglobin and Hematocrit, Blood,

## 2025-07-10 DIAGNOSIS — R60.0 LOWER LEG EDEMA: ICD-10-CM

## 2025-07-10 RX ORDER — FUROSEMIDE 20 MG/1
20 TABLET ORAL
Qty: 15 TABLET | Refills: 1 | Status: SHIPPED | OUTPATIENT
Start: 2025-07-10 | End: 2025-09-08

## 2025-07-10 NOTE — TELEPHONE ENCOUNTER
Last visit: 03/03/25  Last Med refill: 05/12/25  Does patient have enough medication for 72 hours: No:     Next Visit Date:  Future Appointments   Date Time Provider Department Center   7/11/2025  2:15 PM Sierra De Oliveira MD [facility]   7/16/2025  8:30 AM Seferino Prakash, DO ORTHO SPECIA [facility]   7/21/2025  8:00 AM Soraida Stone, DO Neuro Spec Neurology -   9/1/2025 10:00 AM STV STA CHAIR 01 STVZ STA MED [facility] Maintenance   Topic Date Due    Diabetic Alb to Cr ratio (uACR) test  Never done    Hepatitis B vaccine (1 of 3 - 19+ 3-dose series) Never done    Colorectal Cancer Screen  05/06/2021    Breast cancer screen  09/30/2022    Lung Cancer Screening &/or Counseling  11/01/2024    A1C test (Diabetic or Prediabetic)  11/19/2024    Lipids  12/17/2024    Annual Wellness Visit (Medicare Advantage)  Never done    Flu vaccine (1) 08/01/2025    Depression Monitoring  01/07/2026    GFR test (Diabetes, CKD 3-4, OR last GFR 15-59)  02/25/2026    DTaP/Tdap/Td vaccine (3 - Td or Tdap) 06/13/2033    Shingles vaccine  Completed    Pneumococcal 50+ years Vaccine  Completed    COVID-19 Vaccine  Completed    Hepatitis C screen  Completed    HIV screen  Completed    Hepatitis A vaccine  Aged Out    Hib vaccine  Aged Out    Polio vaccine  Aged Out    Meningococcal (ACWY) vaccine  Aged Out    Meningococcal B vaccine  Aged Out    Pneumococcal 0-49 years Vaccine  Discontinued    Diabetes screen  Discontinued       Hemoglobin A1C (%)   Date Value   11/19/2023 5.7   12/29/2020 5.0             ( goal A1C is < 7)   No components found for: \"LABMICR\"  No components found for: \"LDLCHOLESTEROL\", \"LDLCALC\"    (goal LDL is <100)   AST (U/L)   Date Value   02/25/2025 29     ALT (U/L)   Date Value   02/25/2025 17     BUN (mg/dL)   Date Value   02/25/2025 10     BP Readings from Last 3 Encounters:   05/16/25 108/66   03/03/25 98/64   02/25/25 103/68          (goal 120/80)    All Future Testing planned in

## 2025-07-18 ENCOUNTER — HOSPITAL ENCOUNTER (OUTPATIENT)
Age: 60
Setting detail: SPECIMEN
Discharge: HOME | End: 2025-07-18

## 2025-07-18 ENCOUNTER — OFFICE VISIT (OUTPATIENT)
Age: 60
End: 2025-07-18
Payer: MEDICARE

## 2025-07-18 VITALS
BODY MASS INDEX: 24.19 KG/M2 | DIASTOLIC BLOOD PRESSURE: 68 MMHG | WEIGHT: 123.2 LBS | HEART RATE: 78 BPM | HEIGHT: 60 IN | SYSTOLIC BLOOD PRESSURE: 95 MMHG

## 2025-07-18 DIAGNOSIS — R60.0 LOWER LEG EDEMA: ICD-10-CM

## 2025-07-18 DIAGNOSIS — K21.9 GASTROESOPHAGEAL REFLUX DISEASE, UNSPECIFIED WHETHER ESOPHAGITIS PRESENT: ICD-10-CM

## 2025-07-18 DIAGNOSIS — E11.9 TYPE 2 DIABETES MELLITUS WITHOUT COMPLICATION, WITHOUT LONG-TERM CURRENT USE OF INSULIN (HCC): ICD-10-CM

## 2025-07-18 DIAGNOSIS — J45.909 ASTHMA WITH SEVERITY TO BE DETERMINED: ICD-10-CM

## 2025-07-18 DIAGNOSIS — J45.901 ASTHMA WITH ACUTE EXACERBATION, UNSPECIFIED ASTHMA SEVERITY, UNSPECIFIED WHETHER PERSISTENT: ICD-10-CM

## 2025-07-18 DIAGNOSIS — J44.9 CHRONIC OBSTRUCTIVE PULMONARY DISEASE, UNSPECIFIED COPD TYPE (HCC): ICD-10-CM

## 2025-07-18 DIAGNOSIS — M79.2 NEUROPATHIC PAIN: ICD-10-CM

## 2025-07-18 DIAGNOSIS — Z12.31 ENCOUNTER FOR SCREENING MAMMOGRAM FOR MALIGNANT NEOPLASM OF BREAST: ICD-10-CM

## 2025-07-18 DIAGNOSIS — I87.1 MAY-THURNER SYNDROME: ICD-10-CM

## 2025-07-18 DIAGNOSIS — Z12.11 COLON CANCER SCREENING: ICD-10-CM

## 2025-07-18 DIAGNOSIS — G35 MULTIPLE SCLEROSIS (HCC): Primary | ICD-10-CM

## 2025-07-18 DIAGNOSIS — Z12.31 ENCOUNTER FOR SCREENING MAMMOGRAM FOR BREAST CANCER: ICD-10-CM

## 2025-07-18 LAB
CHOLEST SERPL-MCNC: 181 MG/DL (ref 0–199)
CHOLESTEROL/HDL RATIO: 2.7
HBA1C MFR BLD: 4.7 %
HDLC SERPL-MCNC: 66 MG/DL
LDLC SERPL CALC-MCNC: 98 MG/DL (ref 0–100)
TRIGL SERPL-MCNC: 87 MG/DL
VLDLC SERPL CALC-MCNC: 17 MG/DL (ref 1–30)

## 2025-07-18 PROCEDURE — 2022F DILAT RTA XM EVC RTNOPTHY: CPT

## 2025-07-18 PROCEDURE — 4004F PT TOBACCO SCREEN RCVD TLK: CPT

## 2025-07-18 PROCEDURE — 3023F SPIROM DOC REV: CPT

## 2025-07-18 PROCEDURE — 3017F COLORECTAL CA SCREEN DOC REV: CPT

## 2025-07-18 PROCEDURE — 99213 OFFICE O/P EST LOW 20 MIN: CPT

## 2025-07-18 PROCEDURE — G8427 DOCREV CUR MEDS BY ELIG CLIN: HCPCS

## 2025-07-18 PROCEDURE — G8420 CALC BMI NORM PARAMETERS: HCPCS

## 2025-07-18 PROCEDURE — 3044F HG A1C LEVEL LT 7.0%: CPT

## 2025-07-18 PROCEDURE — 83036 HEMOGLOBIN GLYCOSYLATED A1C: CPT

## 2025-07-18 RX ORDER — FUROSEMIDE 20 MG/1
20 TABLET ORAL
Qty: 15 TABLET | Refills: 1 | Status: SHIPPED | OUTPATIENT
Start: 2025-07-18 | End: 2025-09-16

## 2025-07-18 RX ORDER — CETIRIZINE HYDROCHLORIDE 10 MG/1
10 TABLET ORAL DAILY
Qty: 30 TABLET | Refills: 0 | Status: SHIPPED | OUTPATIENT
Start: 2025-07-18

## 2025-07-18 RX ORDER — UMECLIDINIUM 62.5 UG/1
1 AEROSOL, POWDER ORAL DAILY
Qty: 1 EACH | Refills: 2 | Status: SHIPPED | OUTPATIENT
Start: 2025-07-18

## 2025-07-18 RX ORDER — ALBUTEROL SULFATE 0.83 MG/ML
2.5 SOLUTION RESPIRATORY (INHALATION) 4 TIMES DAILY PRN
Qty: 120 EACH | Refills: 3 | Status: SHIPPED | OUTPATIENT
Start: 2025-07-18

## 2025-07-18 RX ORDER — GABAPENTIN 300 MG/1
600 CAPSULE ORAL 3 TIMES DAILY
Qty: 180 CAPSULE | Refills: 0 | Status: SHIPPED | OUTPATIENT
Start: 2025-07-18 | End: 2025-08-17

## 2025-07-18 RX ORDER — FLUTICASONE FUROATE AND VILANTEROL 100; 25 UG/1; UG/1
1 POWDER RESPIRATORY (INHALATION) DAILY
Qty: 28 EACH | Refills: 3 | Status: SHIPPED | OUTPATIENT
Start: 2025-07-18

## 2025-07-18 RX ORDER — FOLIC ACID 1 MG/1
1 TABLET ORAL DAILY
Qty: 30 TABLET | Refills: 3 | Status: SHIPPED | OUTPATIENT
Start: 2025-07-18 | End: 2025-07-21

## 2025-07-18 RX ORDER — ALBUTEROL SULFATE 90 UG/1
2 INHALANT RESPIRATORY (INHALATION) EVERY 6 HOURS PRN
Qty: 1 EACH | Refills: 3 | Status: SHIPPED | OUTPATIENT
Start: 2025-07-18

## 2025-07-18 RX ORDER — FLUTICASONE PROPIONATE 50 MCG
1 SPRAY, SUSPENSION (ML) NASAL DAILY
Qty: 32 G | Refills: 1 | Status: SHIPPED | OUTPATIENT
Start: 2025-07-18

## 2025-07-18 RX ORDER — THIAMINE MONONITRATE (VIT B1) 100 MG
100 TABLET ORAL DAILY
Qty: 30 TABLET | Refills: 0 | Status: SHIPPED | OUTPATIENT
Start: 2025-07-18

## 2025-07-18 RX ORDER — MIDODRINE HYDROCHLORIDE 5 MG/1
5 TABLET ORAL 3 TIMES DAILY
Qty: 90 TABLET | Refills: 2 | Status: SHIPPED | OUTPATIENT
Start: 2025-07-18 | End: 2025-10-16

## 2025-07-18 RX ORDER — PANTOPRAZOLE SODIUM 40 MG/1
TABLET, DELAYED RELEASE ORAL
Qty: 30 TABLET | Refills: 3 | Status: SHIPPED | OUTPATIENT
Start: 2025-07-18

## 2025-07-18 ASSESSMENT — PATIENT HEALTH QUESTIONNAIRE - PHQ9
1. LITTLE INTEREST OR PLEASURE IN DOING THINGS: NOT AT ALL
10. IF YOU CHECKED OFF ANY PROBLEMS, HOW DIFFICULT HAVE THESE PROBLEMS MADE IT FOR YOU TO DO YOUR WORK, TAKE CARE OF THINGS AT HOME, OR GET ALONG WITH OTHER PEOPLE: NOT DIFFICULT AT ALL
SUM OF ALL RESPONSES TO PHQ QUESTIONS 1-9: 0
SUM OF ALL RESPONSES TO PHQ QUESTIONS 1-9: 0
3. TROUBLE FALLING OR STAYING ASLEEP: NOT AT ALL
SUM OF ALL RESPONSES TO PHQ QUESTIONS 1-9: 0
8. MOVING OR SPEAKING SO SLOWLY THAT OTHER PEOPLE COULD HAVE NOTICED. OR THE OPPOSITE, BEING SO FIGETY OR RESTLESS THAT YOU HAVE BEEN MOVING AROUND A LOT MORE THAN USUAL: NOT AT ALL
9. THOUGHTS THAT YOU WOULD BE BETTER OFF DEAD, OR OF HURTING YOURSELF: NOT AT ALL
SUM OF ALL RESPONSES TO PHQ QUESTIONS 1-9: 0
4. FEELING TIRED OR HAVING LITTLE ENERGY: NOT AT ALL
2. FEELING DOWN, DEPRESSED OR HOPELESS: NOT AT ALL
6. FEELING BAD ABOUT YOURSELF - OR THAT YOU ARE A FAILURE OR HAVE LET YOURSELF OR YOUR FAMILY DOWN: NOT AT ALL
DEPRESSION UNABLE TO ASSESS: PT REFUSES
7. TROUBLE CONCENTRATING ON THINGS, SUCH AS READING THE NEWSPAPER OR WATCHING TELEVISION: NOT AT ALL
5. POOR APPETITE OR OVEREATING: NOT AT ALL

## 2025-07-18 NOTE — PROGRESS NOTES
[facility] Residency Program - Outpatient Note      Subjective:    Madonna Manrique is a 59 y.o. female with  has a past medical history of Alcoholism (Formerly [facility]), Anemia, Anxiety, Asthma, Bipolar 1 disorder (Formerly [facility]), Borderline personality disorder (Formerly [facility]), CHF (congestive heart failure) (Formerly [facility]), Chronic uveitis of both eyes, Depression, Drug abuse, opioid type (Formerly [facility]), DVT (deep vein thrombosis) in pregnancy, History of blood transfusion, Long QT interval, May-Thurner syndrome, Multiple sclerosis (Formerly [facility]), Osteoporosis, Pulmonary emboli (Formerly [facility]), Severe depression (Formerly [facility]), Suicide gesture (Formerly [facility]), and Uveitis of both eyes.    Presented to the office today for:  Chief Complaint   Patient presents with    Allergies     Wants a stronger med         HPI  The patient is a 59-year-old female significant past medical history of multiple sclerosis, May Espinal syndrome, MDD presenting to clinic for follow-up.  Patient is status post removal of retained orthopedic hardware of the left tibia on 5/16/2025.  Patient followed up with orthopedics.  Patient overall denies any acute symptoms.  States she is sneezing having runny nose  agrees.  States she is currently on cetirizine but is requesting increase the dosage from 5 mg to 10 mg.  Patient does have a history of COPD/asthma/allergies he suspects its allergies acting up.  Denies any fever nausea vomiting nor any wheezing or shortness of breath.  Patient denies any overt weakness states walking daily and is up-to-date with her rheumatology infusions every 6 months.        Review of Systems   Constitutional:  Negative for appetite change, fatigue and fever.   Respiratory:  Negative for shortness of breath.    Cardiovascular:  Negative for chest pain.   Gastrointestinal:  Negative for abdominal pain, constipation, diarrhea and nausea.   Genitourinary:  Negative for decreased urine volume, difficulty urinating and frequency.   Neurological:  Negative for

## 2025-07-18 NOTE — PROGRESS NOTES
Attending Physician Statement  I  have discussed the care of Madonna Manrique including pertinent history and exam findings with the resident. I agree with the assessment, plan and orders as documented by the resident.      BP 95/68 (BP Site: Right Upper Arm, Patient Position: Sitting, BP Cuff Size: Medium Adult)   Pulse 78   Ht 1.524 m (5')   Wt 55.9 kg (123 lb 3.2 oz)   BMI 24.06 kg/m²    BP Readings from Last 3 Encounters:   07/18/25 95/68   05/16/25 108/66   03/03/25 98/64     Wt Readings from Last 3 Encounters:   07/18/25 55.9 kg (123 lb 3.2 oz)   05/16/25 54.4 kg (120 lb)   05/12/25 54.4 kg (120 lb)          Diagnosis Orders   1. Multiple sclerosis (HCC)  tiZANidine (ZANAFLEX) 4 MG tablet      2. Encounter for screening mammogram for breast cancer  MORIS Digital Screen Bilateral [[id number]]      3. Type 2 diabetes mellitus without complication, without long-term current use of insulin (HCC)  POCT glycosylated hemoglobin (Hb A1C)    Lipid Panel      4. Encounter for screening mammogram for malignant neoplasm of breast        5. Colon cancer screening  Fecal DNA Colorectal cancer screening (Cologuard)      6. Asthma with acute exacerbation, unspecified asthma severity, unspecified whether persistent  albuterol (PROVENTIL) (2.5 MG/3ML) 0.083% nebulizer solution      7. Asthma with severity to be determined  albuterol sulfate HFA (PROVENTIL;VENTOLIN;PROAIR) 108 (90 Base) MCG/ACT inhaler      8. Lower leg edema  furosemide (LASIX) 20 MG tablet      9. Neuropathic pain  gabapentin (NEURONTIN) 300 MG capsule      10. Gastroesophageal reflux disease, unspecified whether esophagitis present  pantoprazole (PROTONIX) 40 MG tablet      11. May-Thurner syndrome  rivaroxaban (XARELTO) 20 MG TABS tablet      12. Chronic obstructive pulmonary disease, unspecified COPD type (HCC)  umeclidinium bromide (INCRUSE ELLIPTA) 62.5 MCG/ACT inhaler            Ernesto Ceron DO 7/18/2025 3:09 PM

## 2025-07-18 NOTE — PROGRESS NOTES
[facility] NEUROLOGY SPECIALIST  [address]  [place] [address]  Dept: [phone]    PATIENT NAME: Madonna Manrique  PATIENT MRN: [id number]  PRIMARY CARE PHYSICIAN: Sierra De Oliveira MD    History     Madonna Manrique is a 59 y.o. female who I initially saw in consultation on 11/13/2023 for relapsing multiple sclerosis.  Her history is summarized as follows:      Madonna Manrique has a history of substance use disorder in the past, Bipolar disorder, and anxiety and depression, who presents to clinic today for evaluation of multiple sclerosis.  She presented today to clinic alone. She was previously followed at [facility] and presented today to transfer care due to insurance coverage concerns.      I reviewed records from [facility] and her history is summarized as follows:  She was initially seen by outpatient neurology on 12/10/2022. She had presented prior to this in the ER with cognitive concerns and behavioral changes.  She was found to have a UTI. Her neurological exam was abnormal and Mris were done, revealing T2-hyperintense lesions in the brain, cervical, and thoracic spinal cord concerning for multiple sclerosis.  Per reports, there were no enhancing lesions.  An EEG was done given altered mental status, which showed some intermittent generalized slowing.  LP was recommended but the patient left AMA.  CSF studies were later done and did show unpaired oligoclonal bands.  She was diagnosed with multiple sclerosis and started on ocrelizumab.  She received her first set of infusions on 4/5/23 and 4/21/23 and states that she tolerated them well.  She has not received her next dose, which was due in 10/2023. Ms. Manrique also has a history of chronic headache and was started on rimegepant every other day within the past year for preventative therapy.      Ms. Manrique also reports a history of recurrent uveitis involving both eyes.  She has been seen by Dr. Olman Lim  in ophthalmology in the

## 2025-07-18 NOTE — PATIENT INSTRUCTIONS
Thank you for letting us take care of you today. We hope all your questions were addressed. If a question was overlooked or something else comes to mind after you return home, please contact a member of your Care Team listed below.      Your Care Team at [facility] is Team #2  Rhona Jones M.D. (Faculty)  Blake Motta M.D. (Resident)  Janice Cooley M.D. (Resident)  Sierra De Oliveira M.D. (Resident)  Lidya Jorge M.D. (Resident)  Ingrid Espinosa M.D. (Resident)  Grant Dukes, [facility]  Sandra Tena, [facility]  Laila Zhou,  [facility]  Alis Cohn, [facility]  Shakira Park, [facility]  Shireen Carrasco, [facility]  Star Chacon (LJ) David WINSTON (Clinical Practice Manager)  Renea Ray [facility] (Clinical Pharmacist)     Office phone number: [phone]    If you need to get in right away due to illness, please be advised we have \"Same Day\" appointments available Monday-Friday. Please call us at [phone] option #3 to schedule your \"Same Day\" appointment.

## 2025-07-18 NOTE — PROGRESS NOTES
Visit Information    Have you changed or started any medications since your last visit including any over-the-counter medicines, vitamins, or herbal medicines? no   Have you stopped taking any of your medications? Is so, why? -  no  Are you having any side effects from any of your medications? - no    Have you seen any other physician or provider since your last visit?  yes - Ortho   Have you had any other diagnostic tests since your last visit?  yes - XR, Barium swallow   Have you been seen in the emergency room and/or had an admission in a hospital since we last saw you?  yes - [facility]   Have you had your routine dental cleaning in the past 6 months?  no     Do you have an active MyChart account? If no, what is the barrier?  Yes    Patient Care Team:  Sierra De Oliveira MD as PCP - General (Family Medicine)  Shaniqua Owens MD as Consulting Physician (Gastroenterology)  Tiana Silver as Financial Navigator    Medical History Review  Past Medical, Family, and Social History reviewed and does not contribute to the patient presenting condition    Health Maintenance   Topic Date Due    Diabetic Alb to Cr ratio (uACR) test  Never done    Hepatitis B vaccine (1 of 3 - 19+ 3-dose series) Never done    Colorectal Cancer Screen  05/06/2021    Breast cancer screen  09/30/2022    Lung Cancer Screening &/or Counseling  11/01/2024    A1C test (Diabetic or Prediabetic)  11/19/2024    Lipids  12/17/2024    Annual Wellness Visit (Medicare Advantage)  Never done    Flu vaccine (1) 08/01/2025    Depression Monitoring  01/07/2026    GFR test (Diabetes, CKD 3-4, OR last GFR 15-59)  02/25/2026    DTaP/Tdap/Td vaccine (3 - Td or Tdap) 06/13/2033    Shingles vaccine  Completed    Pneumococcal 50+ years Vaccine  Completed    COVID-19 Vaccine  Completed    Hepatitis C screen  Completed    HIV screen  Completed    Hepatitis A vaccine  Aged Out    Hib vaccine  Aged Out    Polio vaccine  Aged Out    Meningococcal (ACWY) vaccine  Aged Out

## 2025-07-21 ENCOUNTER — RESULTS FOLLOW-UP (OUTPATIENT)
Dept: NEUROLOGY | Age: 60
End: 2025-07-21

## 2025-07-21 ENCOUNTER — OFFICE VISIT (OUTPATIENT)
Dept: NEUROLOGY | Age: 60
End: 2025-07-21
Payer: MEDICARE

## 2025-07-21 ENCOUNTER — HOSPITAL ENCOUNTER (OUTPATIENT)
Age: 60
Discharge: HOME | End: 2025-07-21
Payer: MEDICARE

## 2025-07-21 ENCOUNTER — TELEPHONE (OUTPATIENT)
Dept: NEUROLOGY | Age: 60
End: 2025-07-21

## 2025-07-21 VITALS
HEIGHT: 60 IN | DIASTOLIC BLOOD PRESSURE: 57 MMHG | WEIGHT: 124 LBS | BODY MASS INDEX: 24.35 KG/M2 | HEART RATE: 89 BPM | SYSTOLIC BLOOD PRESSURE: 81 MMHG

## 2025-07-21 DIAGNOSIS — D72.810 LYMPHOPENIA: ICD-10-CM

## 2025-07-21 DIAGNOSIS — G43.019 INTRACTABLE MIGRAINE WITHOUT AURA AND WITHOUT STATUS MIGRAINOSUS: ICD-10-CM

## 2025-07-21 DIAGNOSIS — Z79.899 HIGH RISK MEDICATION USE: ICD-10-CM

## 2025-07-21 DIAGNOSIS — Z11.59 NEED FOR HEPATITIS B SCREENING TEST: ICD-10-CM

## 2025-07-21 DIAGNOSIS — G35 MULTIPLE SCLEROSIS (HCC): Primary | ICD-10-CM

## 2025-07-21 DIAGNOSIS — G35 MULTIPLE SCLEROSIS (HCC): ICD-10-CM

## 2025-07-21 DIAGNOSIS — Z72.89 OTHER PROBLEMS RELATED TO LIFESTYLE: ICD-10-CM

## 2025-07-21 DIAGNOSIS — R53.82 CHRONIC FATIGUE: ICD-10-CM

## 2025-07-21 LAB
ALBUMIN SERPL-MCNC: 3.6 G/DL (ref 3.5–5.2)
ALBUMIN/GLOB SERPL: 1.7 {RATIO} (ref 1–2.5)
ALP SERPL-CCNC: 92 U/L (ref 35–104)
ALT SERPL-CCNC: 14 U/L (ref 10–35)
ANION GAP SERPL CALCULATED.3IONS-SCNC: 14 MMOL/L (ref 9–16)
AST SERPL-CCNC: 20 U/L (ref 10–35)
BASOPHILS # BLD: <0.03 K/UL (ref 0–0.2)
BASOPHILS NFR BLD: 0 % (ref 0–2)
BILIRUB SERPL-MCNC: 0.2 MG/DL (ref 0–1.2)
BUN SERPL-MCNC: 11 MG/DL (ref 6–20)
CALCIUM SERPL-MCNC: 9.4 MG/DL (ref 8.6–10.4)
CHLORIDE SERPL-SCNC: 104 MMOL/L (ref 98–107)
CO2 SERPL-SCNC: 19 MMOL/L (ref 20–31)
CREAT SERPL-MCNC: 0.6 MG/DL (ref 0.6–0.9)
EOSINOPHIL # BLD: 0.23 K/UL (ref 0–0.44)
EOSINOPHILS RELATIVE PERCENT: 5 % (ref 1–4)
ERYTHROCYTE [DISTWIDTH] IN BLOOD BY AUTOMATED COUNT: 16.7 % (ref 11.8–14.4)
FOLATE SERPL-MCNC: 4.3 NG/ML (ref 4.8–24.2)
GFR, ESTIMATED: >90 ML/MIN/1.73M2
GLUCOSE SERPL-MCNC: 102 MG/DL (ref 74–99)
HCT VFR BLD AUTO: 28.3 % (ref 36.3–47.1)
HGB BLD-MCNC: 8.9 G/DL (ref 11.9–15.1)
IGG SERPL-MCNC: 332 MG/DL (ref 700–1600)
IGM SERPL-MCNC: <25 MG/DL (ref 40–230)
IMM GRANULOCYTES # BLD AUTO: <0.03 K/UL (ref 0–0.3)
IMM GRANULOCYTES NFR BLD: 0 %
LYMPHOCYTES NFR BLD: 0.72 K/UL (ref 1.1–3.7)
LYMPHOCYTES RELATIVE PERCENT: 15 % (ref 24–43)
MCH RBC QN AUTO: 26.6 PG (ref 25.2–33.5)
MCHC RBC AUTO-ENTMCNC: 31.4 G/DL (ref 28.4–34.8)
MCV RBC AUTO: 84.5 FL (ref 82.6–102.9)
MONOCYTES NFR BLD: 0.39 K/UL (ref 0.1–1.2)
MONOCYTES NFR BLD: 8 % (ref 3–12)
NEUTROPHILS NFR BLD: 72 % (ref 36–65)
NEUTS SEG NFR BLD: 3.49 K/UL (ref 1.5–8.1)
NRBC BLD-RTO: 0 PER 100 WBC
PLATELET # BLD AUTO: 311 K/UL (ref 138–453)
PMV BLD AUTO: 11 FL (ref 8.1–13.5)
POTASSIUM SERPL-SCNC: 4 MMOL/L (ref 3.7–5.3)
PROT SERPL-MCNC: 5.7 G/DL (ref 6.6–8.7)
RBC # BLD AUTO: 3.35 M/UL (ref 3.95–5.11)
RBC # BLD: ABNORMAL 10*6/UL
SODIUM SERPL-SCNC: 137 MMOL/L (ref 136–145)
TSH SERPL DL<=0.05 MIU/L-ACNC: 1.38 UIU/ML (ref 0.27–4.2)
VIT B12 SERPL-MCNC: 350 PG/ML (ref 232–1245)
WBC OTHER # BLD: 4.9 K/UL (ref 3.5–11.3)

## 2025-07-21 PROCEDURE — 36415 COLL VENOUS BLD VENIPUNCTURE: CPT

## 2025-07-21 PROCEDURE — 82607 VITAMIN B-12: CPT

## 2025-07-21 PROCEDURE — G8420 CALC BMI NORM PARAMETERS: HCPCS | Performed by: PSYCHIATRY & NEUROLOGY

## 2025-07-21 PROCEDURE — 83921 ORGANIC ACID SINGLE QUANT: CPT

## 2025-07-21 PROCEDURE — 80053 COMPREHEN METABOLIC PANEL: CPT

## 2025-07-21 PROCEDURE — 4004F PT TOBACCO SCREEN RCVD TLK: CPT | Performed by: PSYCHIATRY & NEUROLOGY

## 2025-07-21 PROCEDURE — 82746 ASSAY OF FOLIC ACID SERUM: CPT

## 2025-07-21 PROCEDURE — 84443 ASSAY THYROID STIM HORMONE: CPT

## 2025-07-21 PROCEDURE — 82784 ASSAY IGA/IGD/IGG/IGM EACH: CPT

## 2025-07-21 PROCEDURE — 86480 TB TEST CELL IMMUN MEASURE: CPT

## 2025-07-21 PROCEDURE — G8427 DOCREV CUR MEDS BY ELIG CLIN: HCPCS | Performed by: PSYCHIATRY & NEUROLOGY

## 2025-07-21 PROCEDURE — 99214 OFFICE O/P EST MOD 30 MIN: CPT | Performed by: PSYCHIATRY & NEUROLOGY

## 2025-07-21 PROCEDURE — 3017F COLORECTAL CA SCREEN DOC REV: CPT | Performed by: PSYCHIATRY & NEUROLOGY

## 2025-07-21 PROCEDURE — 85025 COMPLETE CBC W/AUTO DIFF WBC: CPT

## 2025-07-21 RX ORDER — FOLIC ACID 1 MG/1
1 TABLET ORAL DAILY
Qty: 30 TABLET | Refills: 5 | Status: SHIPPED | OUTPATIENT
Start: 2025-07-21

## 2025-07-21 NOTE — TELEPHONE ENCOUNTER
Plan is to switch patient to Kesimpta from Ocrevus. Patient was advised she can start her Kesimpta two weeks before her Ocervus would be due. Ocrevus is due on 9/1/2025, she can start the Kesimpta on 9/18/2025.    Patient aware labs need completed and she will complete these before we can fill her Kesimpta. Injection training was discussed and demonstrated for the patient and all questions were answered. Will wait to send script to Dr. Stone until patient has completed her labs.

## 2025-07-21 NOTE — PATIENT INSTRUCTIONS
Kesimpta (ofatumumab):     - Kesimpta is a monoclonal antibody directed against anti-CD20.  This medication depletes the adult B cells and calms the immune system.  Kesimpta works in a similar way to some other multiple sclerosis medications including ocrelizumab (Ocrevus) and ublituximab (Briumvi).  With Kesimpta, the B cells may grow back more quickly than with these other medications.     - Kesimpta is given through a subcutaneous injection in an autoinjector.  It is given on the following schedule:     Start Kesimpta (ofatumumab) 20 mg/0.4 mL subcutaneous injection weekly x 3 weeks (weeks 0, 1, and 2).  On week 4, begin 20 mg  subcutaneous every 4 weeks.      - Kesimpta was shown to be a highly effective medicaiton through a two clinical trials called ASCLEPIOS I and II  in which Kesimpta was compared against another medication, Aubagio (teriflunomide).  Teriflunomide reduces the annual relapse rate by about 30 to 40%.   In the clinical trials, Kesimpta was about 50% more effective than Aubagio at stopping relapses.  It is very effective at stopping new lesions from forming as well.     - Risks of taking Kesimpta include infections and injection reactions.     Dosing for Kesimpta Start    Start Kesimpta (ofatumumab) 20 mg/0.4 mL subcutaneous injection weekly x 3 weeks (weeks 0, 1, and 2).  On week 4, begin 20 mg  subcutaneous every 4 weeks.

## 2025-07-22 LAB
GAMMA INTERFERON BACKGROUND BLD IA-ACNC: 0.08 IU/ML
M TB IFN-G BLD-IMP: NEGATIVE IU/ML
M TB IFN-G CD4+ BCKGRND COR BLD-ACNC: 0.01 IU/ML (ref 0–0.34)
M TB IFN-G CD4+CD8+ BCKGRND COR BLD-ACNC: 0 IU/ML (ref 0–0.34)
MITOGEN IGNF BCKGRD COR BLD-ACNC: 9.92 IU/ML

## 2025-07-22 NOTE — TELEPHONE ENCOUNTER
Patient had labs completed, message from Dr. Stone received:  \"Her immunoglobulins are very low and I actually want her to stop anti-CD20 therapy.  I want to see her back in clinic to discuss next steps.  If she has additional infections, we may need to give IVIG.\"    I called and spoke with patient advised to hold off on any of her MS medications and we are going to hold off on the Kesimpta for now. Patient voiced understanding, also discussed possible IVIG depending on future labs. She is aware that if any new infections arise she needs to contact our office.     Patient added on to Dr. Vega schedule for tomorrow 7/22/2025 at 0820.

## 2025-07-23 ENCOUNTER — OFFICE VISIT (OUTPATIENT)
Dept: NEUROLOGY | Age: 60
End: 2025-07-23
Payer: MEDICARE

## 2025-07-23 VITALS
BODY MASS INDEX: 24.39 KG/M2 | HEART RATE: 80 BPM | WEIGHT: 124.2 LBS | SYSTOLIC BLOOD PRESSURE: 103 MMHG | HEIGHT: 60 IN | DIASTOLIC BLOOD PRESSURE: 66 MMHG

## 2025-07-23 DIAGNOSIS — Z79.899 HIGH RISK MEDICATION USE: ICD-10-CM

## 2025-07-23 DIAGNOSIS — D72.810 LYMPHOPENIA: ICD-10-CM

## 2025-07-23 DIAGNOSIS — G35 MULTIPLE SCLEROSIS (HCC): Primary | ICD-10-CM

## 2025-07-23 PROCEDURE — G8427 DOCREV CUR MEDS BY ELIG CLIN: HCPCS | Performed by: PSYCHIATRY & NEUROLOGY

## 2025-07-23 PROCEDURE — 99214 OFFICE O/P EST MOD 30 MIN: CPT | Performed by: PSYCHIATRY & NEUROLOGY

## 2025-07-23 PROCEDURE — 4004F PT TOBACCO SCREEN RCVD TLK: CPT | Performed by: PSYCHIATRY & NEUROLOGY

## 2025-07-23 PROCEDURE — G8420 CALC BMI NORM PARAMETERS: HCPCS | Performed by: PSYCHIATRY & NEUROLOGY

## 2025-07-23 PROCEDURE — 3017F COLORECTAL CA SCREEN DOC REV: CPT | Performed by: PSYCHIATRY & NEUROLOGY

## 2025-07-23 RX ORDER — PREDNISONE 50 MG/1
1250 TABLET ORAL DAILY
Qty: 75 TABLET | Refills: 0 | Status: SHIPPED | OUTPATIENT
Start: 2025-07-23 | End: 2025-07-26

## 2025-07-23 NOTE — PROGRESS NOTES
Patient reports no SDOH concerns.   Patient was provided updated ms support group and wellness flyer.   
  Muscle bulk grossly normal.  No atrophy.  No tremor, fasciculations, or other abnormal movements noted.      Manual Muscle Testing:     D B T WE FF IO HF KF KE DF PF   Right 5 5 5 5 5 5 4+ 5 5 5 5   Left 5 5 5 5 5 3 4 5 4 5 5                                         Deep Tendon Reflexes:    Bicep Tricep BR Patellar Ankle   Right 3 3 3 3 3   Left 3 3 3 2 2      Additional Deep Tendon Reflexes:    Ramirez Supra-patellar Plantar    Right - + MUTE   Left + + MUTE                  SENSORY:  Light touch: intact BUE and BLE  Pin: Subjectively decreased LUE and BLE.     Temp: Absent BLE and decreased LUE  Vibration: Absent at the great toe bilaterally.  Intact at the right medial malleolus.  4/8 at the left medial malleolus.  Intact 1st UE DIPs.   Propioception: Absent at the left great toe and impaired right great toe.      COORDINATION: Finger nose finger impaired on the right.  Fine finger movements slow on the left.       GAIT:   Sit to stand normal.  Romberg revealed swaying with eyes closed.  Casual gait antalgic with limping on the right.      T25W: 8.15 seconds     Previous T25W:   - 7/21/2025: 9.59 seconds  - 2/24/2025: 15 seconds  - 8/5/2024: 18.43 seconds      DATA:     LABS:     Component      Latest Ref Rng 7/21/2025   WBC      3.5 - 11.3 k/uL 4.9    RBC      3.95 - 5.11 m/uL 3.35 (L)    Hemoglobin Quant      11.9 - 15.1 g/dL 8.9 (L)    Hematocrit      36.3 - 47.1 % 28.3 (L)    MCV      82.6 - 102.9 fL 84.5    MCH      25.2 - 33.5 pg 26.6    MCHC      28.4 - 34.8 g/dL 31.4    RDW      11.8 - 14.4 % 16.7 (H)    Platelet Count      138 - 453 k/uL 311    MPV      8.1 - 13.5 fL 11.0    NRBC Automated      0.0 per 100 WBC 0.0    Neutrophils %      36 - 65 % 72 (H)    Lymphocyte %      24 - 43 % 15 (L)    Monocytes %      3 - 12 % 8    Eosinophils %      1 - 4 % 5 (H)    Basophils %      0 - 2 % 0    Immature Granulocytes %      0 % 0    Neutrophils Absolute      1.50 - 8.10 k/uL 3.49    Lymphocytes Absolute

## 2025-07-23 NOTE — PATIENT INSTRUCTIONS
Information about future DMTs:        Teriflunomide (Aubagio) is a disease modifying therapy to treat relapsing remitting multiple sclerosis.     Mechanism of Action: It is the active metabolite of Leflunomide (ARAVA) in treatment of Rheumatoid Arthritis. This once a day pill interrupts pyrimidine synthesis and works as a form of mild immunosuppression.     Efficacy, : We reviewed efficacy, safety and tolerability data from the TEMSO, TOWER and TENERE  trials, long term follow-up studies, and the [facility]'s experience with teriflunomide (Aubagio). In the TEMSO trial, 14mg teriflunomide  (ARR 0.37) decreased relapse rate by 31% compared to placebo (ARR 0.54). In the TENERE trial, no differences in risk of treatment failure (from relapse or other reasons to stop drug) were found between teriflunomide and IFNb1a (rebif). Both drugs had similar relapse rates (teriflunomide ARR 0.26, rebif  ARR 0.22). STEPHANIE (no evidence of disease activity: no attacks, no 3 month confirmed disability progression, no new / enlarged T2 bright lesions or wilbur+ lesions on MRI) was achieved in 23% of 14mg teriflunomide treated patients, compared to 14% of placebo patients.     Safety and Tolerability data: Common side effects of teriflunomide include diarrhea, nausea, transient hair thinning, ALT elevations and changes to blood pressure.    Teriflunomide and preganancy: Teriflunomide is preganancy category X. Patients who might consider having children in the next several years may not be the best fit for this drug. It takes up to 18-24 months for the drug to clear your system, unless a rapid elimination protocol is used to clear the drug faster. Any patient taking teriflunomide must use proper birth control.     Required screening tests: We must check a TB test (PPD or quantiferon), pregnancy test, CBC and LFTs before starting this therapy.     Saftey Monitoring: We must check monthly ALT for the first 6 months while on

## 2025-07-24 LAB — METHYLMALONATE SERPL-SCNC: 0.3 UMOL/L (ref 0–0.4)

## 2025-07-28 ENCOUNTER — OFFICE VISIT (OUTPATIENT)
Dept: ORTHOPEDIC SURGERY | Age: 60
End: 2025-07-28

## 2025-07-28 ENCOUNTER — TELEPHONE (OUTPATIENT)
Age: 60
End: 2025-07-28

## 2025-07-28 ENCOUNTER — TELEPHONE (OUTPATIENT)
Dept: ORTHOPEDIC SURGERY | Age: 60
End: 2025-07-28

## 2025-07-28 VITALS — WEIGHT: 126 LBS | RESPIRATION RATE: 18 BRPM | HEIGHT: 60 IN | OXYGEN SATURATION: 97 % | BODY MASS INDEX: 24.74 KG/M2

## 2025-07-28 DIAGNOSIS — M25.562 LEFT KNEE PAIN, UNSPECIFIED CHRONICITY: ICD-10-CM

## 2025-07-28 DIAGNOSIS — Z96.9 RETAINED ORTHOPEDIC HARDWARE: ICD-10-CM

## 2025-07-28 DIAGNOSIS — M17.12 ARTHRITIS OF LEFT KNEE: Primary | ICD-10-CM

## 2025-07-28 RX ORDER — METHYLPREDNISOLONE ACETATE 80 MG/ML
80 INJECTION, SUSPENSION INTRA-ARTICULAR; INTRALESIONAL; INTRAMUSCULAR; SOFT TISSUE ONCE
Status: COMPLETED | OUTPATIENT
Start: 2025-07-28 | End: 2025-07-28

## 2025-07-28 RX ORDER — BUPIVACAINE HYDROCHLORIDE 2.5 MG/ML
2 INJECTION, SOLUTION INFILTRATION; PERINEURAL ONCE
Status: COMPLETED | OUTPATIENT
Start: 2025-07-28 | End: 2025-07-28

## 2025-07-28 RX ADMIN — BUPIVACAINE HYDROCHLORIDE 5 MG: 2.5 INJECTION, SOLUTION INFILTRATION; PERINEURAL at 13:35

## 2025-07-28 RX ADMIN — METHYLPREDNISOLONE ACETATE 80 MG: 80 INJECTION, SUSPENSION INTRA-ARTICULAR; INTRALESIONAL; INTRAMUSCULAR; SOFT TISSUE at 13:35

## 2025-07-28 ASSESSMENT — ENCOUNTER SYMPTOMS
VOMITING: 0
COUGH: 0
COLOR CHANGE: 0
SHORTNESS OF BREATH: 0

## 2025-07-28 NOTE — TELEPHONE ENCOUNTER
Patient called in to the office requesting pain medication to be sent in for her knee pain. She received an injection at her appointment today, and was upset to know that it can take a few days to take effect. She stated that she needs something now for her pain, and nothing she has tried is working. When I advised for her to take ibuprofen and Tylenol she started to scream at me. When I asked her what she was wanting for pain she said \"Vicodin\". I told her that our prescribers do not prescribe narcotics unless it is surgical or a fx issue, so OTC medications are what we recommend at this time as well as the R.I.C.E method (rest, ice, compression, elevation) until her pain starts to subside, and I told her I am sure she was told there are no guarantees that injections will help but we like to try.   She wanted to know why we don't prescribe pain medications and when I tried to explain it to her again she hung up on me.

## 2025-07-28 NOTE — TELEPHONE ENCOUNTER
Thank you for the update. I agree. She needs to give it 24-48 hours to see if the injection will help her discomfort. She does have a history of prior narcotic misuse as well. She can continue with OTC medication at this time.

## 2025-07-28 NOTE — PROGRESS NOTES
Financial Resource Strain: Low Risk  (2/7/2025)    Received from [facility]    Overall Financial Resource Strain (CARDIA)     Difficulty of Paying Living Expenses: Not hard at all   Food Insecurity: No Food Insecurity (2/7/2025)    Received from [facility]    Hunger Vital Sign     Within the past 12 months, you worried that your food would run out before you got the money to buy more.: Never true     Ran Out of Food in the Last Year: Not on file   Transportation Needs: No Transportation Needs (2/7/2025)    Received from [facility]    Transportation     In the past 12 months, has lack of transportation kept you from medical appointments or from getting medications?: No     Lack of Transportation (Non-Medical): Not on file   Physical Activity: Not on file   Stress: Not on file   Social Connections: Not on file   Intimate Partner Violence: Unknown (2/7/2025)    Received from [facility]    Humiliation, Afraid, Rape, and Kick questionnaire     Fear of Current or Ex-Partner: No     Emotionally Abused: Not on file     Physically Abused: Not on file     Sexually Abused: Not on file   Housing Stability: Low Risk  (2/7/2025)    Received from [facility]    Housing Stability Vital Sign     In the last 12 months, was there a time when you were not able to pay the mortgage or rent on time?: No     Number of Times Moved in the Last Year: Not on file     At any time in the past 12 months, were you homeless or living in a shelter (including now)?: No       Family History:  Family History   Problem Relation Age of Onset    Heart Disease Mother     Neuropathy Father     Migraines Sister     Alzheimer's Disease Neg Hx     Dementia Neg Hx     Cerebral Aneurysm Neg Hx     Epilepsy Neg Hx     Mult Sclerosis Neg Hx     Parkinsonism Neg Hx     Seizures Neg Hx     Stroke Neg Hx        REVIEW OF SYSTEMS:  Review of Systems   Constitutional:  Negative for activity change

## 2025-07-28 NOTE — TELEPHONE ENCOUNTER
WRITER CALLED PT TO RESCHEDULE INFUSION ON 9/1/25 DUE TO IT BEING LABOR DAY & OUR OFFICE IS NOT OPEN. PT DID NOT ANSWER & VM WAS LEFT FOR PT TO CALL OFFICE BACK.

## 2025-07-30 DIAGNOSIS — G35 MULTIPLE SCLEROSIS (HCC): ICD-10-CM

## 2025-07-30 RX ORDER — FLUTICASONE PROPIONATE 50 MCG
1 SPRAY, SUSPENSION (ML) NASAL DAILY
Qty: 32 G | Refills: 1 | OUTPATIENT
Start: 2025-07-30

## 2025-07-30 NOTE — TELEPHONE ENCOUNTER
Patient called into the office again requesting Vicodin. I expressed that I have already spoken with you and that we recommend OTC medications at this time to help with her pain. She stated that \"it's not like she's asking for a horse tranquilizer, or Oxycontin to knock her out she just wants help with her pain.\" She is not able to take ibuprofen because it makes her stomach bleed, and she has already tried everything and nothing touches her pain.   She stated that she has been bedridden for the past 5 days, and it's not just her knee it's also her ankle (DOS: 5/16/25 Removal of hardware L ankle). She has an appointment with Dr. Prakash on 9/10/25 and she said that is too long to go without any help for her pain.   I told her the only thing I could do was send a message to Mindy, but told her there are no guarantees on anything being called in for her. I told her that I already relayed Mindy's recommendation to her, but I would send another message. She had no further questions.     Please advise. I am not sure what else to tell the patient at this point.

## 2025-07-30 NOTE — TELEPHONE ENCOUNTER
Last visit:   Last Med refill:   Does patient have enough medication for 72 hours: No:     Next Visit Date:  Future Appointments   Date Time Provider Department Center   9/1/2025 10:00 AM [facility] 01 [facility]   9/10/2025 10:15 AM Seferino Prakash,  [facility]   10/1/2025  8:00 AM Soraida Stone,  [facility] -       Health Maintenance   Topic Date Due    Diabetic Alb to Cr ratio (uACR) test  Never done    Hepatitis B vaccine (1 of 3 - 19+ 3-dose series) Never done    Colorectal Cancer Screen  05/06/2021    Breast cancer screen  09/30/2022    Lung Cancer Screening &/or Counseling  11/01/2024    Annual Wellness Visit (Medicare Advantage)  Never done    Flu vaccine (1) 08/01/2025    Depression Monitoring  07/18/2026    GFR test (Diabetes, CKD 3-4, OR last GFR 15-59)  07/21/2026    Lipids  07/18/2030    DTaP/Tdap/Td vaccine (3 - Td or Tdap) 06/13/2033    Shingles vaccine  Completed    Pneumococcal 50+ years Vaccine  Completed    COVID-19 Vaccine  Completed    Hepatitis C screen  Completed    HIV screen  Completed    Hepatitis A vaccine  Aged Out    Hib vaccine  Aged Out    Polio vaccine  Aged Out    Meningococcal (ACWY) vaccine  Aged Out    Meningococcal B vaccine  Aged Out    A1C test (Diabetic or Prediabetic)  Discontinued    Pneumococcal 0-49 years Vaccine  Discontinued    Diabetes screen  Discontinued       Hemoglobin A1C (%)   Date Value   07/18/2025 4.7   11/19/2023 5.7   12/29/2020 5.0             ( goal A1C is < 7)   No components found for: \"LABMICR\"  No components found for: \"LDLCHOLESTEROL\", \"LDLCALC\"    (goal LDL is <100)   AST (U/L)   Date Value   07/21/2025 20     ALT (U/L)   Date Value   07/21/2025 14     BUN (mg/dL)   Date Value   07/21/2025 11     BP Readings from Last 3 Encounters:   07/23/25 103/66   07/21/25 (!) 81/57   07/18/25 95/68          (goal 120/80)    All Future Testing planned in [facility]  Lab Frequency Next Occurrence   [facility] Digital Screen

## 2025-07-30 NOTE — TELEPHONE ENCOUNTER
I will not be prescribing narcotic pain medication at this time. I recommend she reached out to her PCP or we can get her a referral to pain management if she is interested. She can contact Dr Prakash's office for medication in regards to her ankle and if they deem it appropriate then they may fill the medication.

## 2025-07-30 NOTE — TELEPHONE ENCOUNTER
DOS: 5/16/25 L Ankle Hardware Removal   Last OV (for ankle) - 6/2/25 (Patient Canceled her 6/30 & 7/16 appts, and No showed to her 7/21 appt with you for post op)  Next OV: 9/10/25 - for L Knee - Seen Mindy on 7/28/25     Dr. Prakash,     Patient seen Mindy on 7/28 where she received an injection to her left knee. Same day she called in requesting a script for Vicodin. Mindy recommended she use OTC medications and wait for the injection to start working. She called back again today requesting the same thing. Explained that no narcotics will be prescribed, but she still demanded that I send another message to Mindy asking for her. Mindy responded again that she is not prescribing narcotics, and when I spoke with patient she got very upset and now would like for you to call her something in since she had surgery with you. She had hardware removal on 5/16/25 but has only showed to one post op appointment and has canceled or no showed to the others. She follows up with you on 9/10/25 for her knee.     Please see all messages attached to this message regarding patients calls and Mindy's responses. Please advise as to what you would like to do. Thank you.

## 2025-08-11 DIAGNOSIS — G35 MULTIPLE SCLEROSIS (HCC): ICD-10-CM

## 2025-08-11 RX ORDER — FLUTICASONE PROPIONATE 50 MCG
1 SPRAY, SUSPENSION (ML) NASAL DAILY
Qty: 32 G | Refills: 1 | OUTPATIENT
Start: 2025-08-11

## 2025-08-12 ENCOUNTER — TELEPHONE (OUTPATIENT)
Age: 60
End: 2025-08-12

## 2025-08-12 DIAGNOSIS — N64.4 BILATERAL MASTODYNIA: Primary | ICD-10-CM

## 2025-08-13 ENCOUNTER — TELEPHONE (OUTPATIENT)
Dept: NEUROLOGY | Age: 60
End: 2025-08-13

## 2025-08-15 ENCOUNTER — TELEPHONE (OUTPATIENT)
Age: 60
End: 2025-08-15

## 2025-08-15 ENCOUNTER — HOSPITAL ENCOUNTER (OUTPATIENT)
Dept: LAB | Age: 60
Discharge: HOME | End: 2025-08-15
Payer: MEDICARE

## 2025-08-15 DIAGNOSIS — G35 MULTIPLE SCLEROSIS (HCC): ICD-10-CM

## 2025-08-15 DIAGNOSIS — D72.810 LYMPHOPENIA: ICD-10-CM

## 2025-08-15 LAB
BASOPHILS # BLD: 0 K/UL (ref 0–0.2)
BASOPHILS NFR BLD: 0 % (ref 0–2)
EOSINOPHIL # BLD: 0.29 K/UL (ref 0–0.4)
EOSINOPHILS RELATIVE PERCENT: 3 % (ref 1–4)
ERYTHROCYTE [DISTWIDTH] IN BLOOD BY AUTOMATED COUNT: 16.9 % (ref 11.8–14.4)
HCT VFR BLD AUTO: 28.4 % (ref 36.3–47.1)
HGB BLD-MCNC: 8.5 G/DL (ref 11.9–15.1)
IMM GRANULOCYTES # BLD AUTO: 0 K/UL (ref 0–0.3)
IMM GRANULOCYTES NFR BLD: 0 %
LYMPHOCYTES NFR BLD: 0.58 K/UL (ref 1–4.8)
LYMPHOCYTES RELATIVE PERCENT: 6 % (ref 24–44)
MCH RBC QN AUTO: 24.5 PG (ref 25.2–33.5)
MCHC RBC AUTO-ENTMCNC: 29.9 G/DL (ref 28.4–34.8)
MCV RBC AUTO: 81.8 FL (ref 82.6–102.9)
MONOCYTES NFR BLD: 0.29 K/UL (ref 0.1–0.8)
MONOCYTES NFR BLD: 3 % (ref 1–7)
MORPHOLOGY: ABNORMAL
NEUTROPHILS NFR BLD: 88 % (ref 36–66)
NEUTS SEG NFR BLD: 8.44 K/UL (ref 1.8–7.7)
NRBC BLD-RTO: 0 PER 100 WBC
PLATELET # BLD AUTO: 352 K/UL (ref 138–453)
PMV BLD AUTO: 10.8 FL (ref 8.1–13.5)
RBC # BLD AUTO: 3.47 M/UL (ref 3.95–5.11)
WBC OTHER # BLD: 9.6 K/UL (ref 3.5–11.3)

## 2025-08-15 PROCEDURE — 36415 COLL VENOUS BLD VENIPUNCTURE: CPT

## 2025-08-15 PROCEDURE — 85025 COMPLETE CBC W/AUTO DIFF WBC: CPT

## 2025-08-17 ENCOUNTER — RESULTS FOLLOW-UP (OUTPATIENT)
Dept: NEUROLOGY | Age: 60
End: 2025-08-17

## 2025-08-17 DIAGNOSIS — Z79.899 HIGH RISK MEDICATION USE: ICD-10-CM

## 2025-08-17 DIAGNOSIS — D72.810 LYMPHOPENIA: Primary | ICD-10-CM

## 2025-08-18 ENCOUNTER — TELEPHONE (OUTPATIENT)
Dept: ORTHOPEDIC SURGERY | Age: 60
End: 2025-08-18

## 2025-08-25 ENCOUNTER — OFFICE VISIT (OUTPATIENT)
Dept: NEUROLOGY | Age: 60
End: 2025-08-25
Payer: MEDICARE

## 2025-08-25 VITALS
BODY MASS INDEX: 25.09 KG/M2 | HEART RATE: 76 BPM | DIASTOLIC BLOOD PRESSURE: 74 MMHG | HEIGHT: 60 IN | WEIGHT: 127.8 LBS | SYSTOLIC BLOOD PRESSURE: 110 MMHG

## 2025-08-25 DIAGNOSIS — D72.810 LYMPHOPENIA: ICD-10-CM

## 2025-08-25 DIAGNOSIS — G35 MULTIPLE SCLEROSIS (HCC): ICD-10-CM

## 2025-08-25 DIAGNOSIS — Z79.899 HIGH RISK MEDICATION USE: Primary | ICD-10-CM

## 2025-08-25 DIAGNOSIS — D80.1 HYPOGAMMAGLOBULINEMIA: ICD-10-CM

## 2025-08-25 PROCEDURE — 4004F PT TOBACCO SCREEN RCVD TLK: CPT | Performed by: PSYCHIATRY & NEUROLOGY

## 2025-08-25 PROCEDURE — 3017F COLORECTAL CA SCREEN DOC REV: CPT | Performed by: PSYCHIATRY & NEUROLOGY

## 2025-08-25 PROCEDURE — 99214 OFFICE O/P EST MOD 30 MIN: CPT | Performed by: PSYCHIATRY & NEUROLOGY

## 2025-08-25 PROCEDURE — G8427 DOCREV CUR MEDS BY ELIG CLIN: HCPCS | Performed by: PSYCHIATRY & NEUROLOGY

## 2025-08-25 PROCEDURE — G8420 CALC BMI NORM PARAMETERS: HCPCS | Performed by: PSYCHIATRY & NEUROLOGY

## 2025-08-27 DIAGNOSIS — M79.2 NEUROPATHIC PAIN: ICD-10-CM

## 2025-08-28 DIAGNOSIS — G35 MULTIPLE SCLEROSIS (HCC): ICD-10-CM

## 2025-08-28 DIAGNOSIS — M79.2 NEUROPATHIC PAIN: ICD-10-CM

## 2025-08-28 RX ORDER — GABAPENTIN 300 MG/1
600 CAPSULE ORAL 3 TIMES DAILY
Qty: 180 CAPSULE | Refills: 0 | Status: SHIPPED | OUTPATIENT
Start: 2025-08-28 | End: 2025-09-27

## 2025-08-28 RX ORDER — GABAPENTIN 300 MG/1
CAPSULE ORAL
Qty: 180 CAPSULE | Refills: 0 | OUTPATIENT
Start: 2025-08-28

## 2025-08-29 RX ORDER — GABAPENTIN 300 MG/1
600 CAPSULE ORAL 3 TIMES DAILY
Qty: 180 CAPSULE | Refills: 0 | OUTPATIENT
Start: 2025-08-29 | End: 2025-09-28

## 2025-09-03 ENCOUNTER — HOSPITAL ENCOUNTER (OUTPATIENT)
Dept: MAMMOGRAPHY | Age: 60
Discharge: HOME | End: 2025-09-05
Payer: MEDICARE

## 2025-09-03 ENCOUNTER — HOSPITAL ENCOUNTER (OUTPATIENT)
Dept: ULTRASOUND IMAGING | Age: 60
Discharge: HOME | End: 2025-09-05
Payer: MEDICARE

## 2025-09-03 VITALS — BODY MASS INDEX: 24.94 KG/M2 | WEIGHT: 127 LBS | HEIGHT: 60 IN

## 2025-09-03 DIAGNOSIS — N64.4 BILATERAL MASTODYNIA: ICD-10-CM

## 2025-09-03 PROCEDURE — G0279 TOMOSYNTHESIS, MAMMO: HCPCS

## 2025-09-03 PROCEDURE — 76642 ULTRASOUND BREAST LIMITED: CPT

## 2025-09-04 ENCOUNTER — TELEPHONE (OUTPATIENT)
Dept: NEUROLOGY | Age: 60
End: 2025-09-04

## 2025-09-04 DIAGNOSIS — G35 MULTIPLE SCLEROSIS (HCC): Primary | ICD-10-CM

## 2025-09-04 RX ORDER — GLATIRAMER 40 MG/ML
INJECTION, SOLUTION SUBCUTANEOUS
Qty: 12 ML | Refills: 5 | Status: SHIPPED | OUTPATIENT
Start: 2025-09-05

## (undated) DEVICE — BIT DRL L250MM DIA2.8MM CALIB L95MM QUIK CPL W/ STP FOR

## (undated) DEVICE — PLATE BNE L93MM 4 H NONSTERILE R MED PROX TIB S STL LOK: Type: IMPLANTABLE DEVICE | Site: TIBIA | Status: NON-FUNCTIONAL

## (undated) DEVICE — CYSTO/BLADDER IRRIGATION SET, REGULATING CLAMP

## (undated) DEVICE — CONNECTING ROD: Brand: HOFFMANN

## (undated) DEVICE — STRAP ARMBRD W1.5XL32IN FOAM STR YET SFT W/ HK AND LOOP

## (undated) DEVICE — APPLICATOR MEDICATED 26 CC SOLUTION HI LT ORNG CHLORAPREP

## (undated) DEVICE — DRAPE,U/ SHT,SPLIT,PLAS,STERIL: Brand: MEDLINE

## (undated) DEVICE — APPLICATOR MEDICATED 10.5 CC SOLUTION CLR STRL CHLORAPREP

## (undated) DEVICE — BANDAGE COMPR W6INXL15YD WHT BGE POLY COT WV E HK LOOP CLSR

## (undated) DEVICE — GAUZE,SPONGE,FLUFF,6"X6.75",STRL,5/TRAY: Brand: MEDLINE

## (undated) DEVICE — PAD,ABDOMINAL,5"X9",ST,LF,25/BX: Brand: MEDLINE INDUSTRIES, INC.

## (undated) DEVICE — GOWN,AURORA,NONREINFORCED,LARGE: Brand: MEDLINE

## (undated) DEVICE — SURGICAL SUCTION CONNECTING TUBE WITH MALE CONNECTOR AND SUCTION CLAMP, 2 FT. LONG (.6 M), 5 MM I.D.: Brand: CONMED

## (undated) DEVICE — GLOVE ORTHO 8   MSG9480

## (undated) DEVICE — STRAP,POSITIONING,KNEE/BODY,FOAM,4X60": Brand: MEDLINE

## (undated) DEVICE — SUTURE MONOCRYL SZ 2-0 L27IN ABSRB UD SH L26MM TAPERPOINT NDL Y417H

## (undated) DEVICE — GLOVE ORANGE PI 8   MSG9080

## (undated) DEVICE — PADDING,UNDERCAST,COTTON, 4"X4YD STERILE: Brand: MEDLINE

## (undated) DEVICE — STOCKINETTE,IMPERVIOUS,12X48,STERILE: Brand: MEDLINE

## (undated) DEVICE — ELECTRODE PT RET AD L9FT HI MOIST COND ADH HYDRGEL CORDED

## (undated) DEVICE — K WIRE FIX L150MM DIA1.6MM S STL TRCR PNT
Type: IMPLANTABLE DEVICE | Site: TIBIA | Status: NON-FUNCTIONAL
Removed: 2023-11-21

## (undated) DEVICE — BIT DRL QC 2.8X170 MM 80 MM CALIB NS

## (undated) DEVICE — BANDAGE,GAUZE,BULKEE II,4.5"X4.1YD,STRL: Brand: MEDLINE

## (undated) DEVICE — PREDRILLING ASSEMBLY - LONG

## (undated) DEVICE — GOWN,SIRUS,NONRNF,SETINSLV,XL,20/CS: Brand: MEDLINE

## (undated) DEVICE — SUTURE VCRL SZ 0 L27IN ABSRB UD L36MM CT-1 1/2 CIR J260H

## (undated) DEVICE — ZIMMER® STERILE DISPOSABLE TOURNIQUET CUFF WITH PROTECTIVE SLEEVE AND PLC, DUAL PORT, SINGLE BLADDER, 24 IN. (61 CM)

## (undated) DEVICE — DRESSING,GAUZE,XEROFORM,CURAD,1"X8",ST: Brand: CURAD

## (undated) DEVICE — SCRUB DYNAHEX  4% CHG  8 OZ BOTTLE  24 EA

## (undated) DEVICE — POST; STRAIGHT: Brand: HOFFMANN

## (undated) DEVICE — SUTURE MCRYL SZ 2-0 L27IN ABSRB UD SH L26MM TAPERPOINT NDL Y417H

## (undated) DEVICE — BNDG,ELSTC,MATRIX,STRL,6"X5YD,LF,HOOK&LP: Brand: MEDLINE

## (undated) DEVICE — C-ARMOR C-ARM EQUIPMENT COVERS CLEAR STERILE UNIVERSAL FIT 12 PER CASE: Brand: C-ARMOR

## (undated) DEVICE — GLOVE ORANGE PI 7 1/2   MSG9075

## (undated) DEVICE — SCREW BNE L30MM DIA3.5MM CORT S STL ST NONCANNULATED LOK: Type: IMPLANTABLE DEVICE | Site: TIBIA | Status: NON-FUNCTIONAL

## (undated) DEVICE — C-ARM: Brand: UNBRANDED

## (undated) DEVICE — DISPOSABLE END CAPS (BLUE) APEX   DIA 5MM: Brand: APEX

## (undated) DEVICE — MARKER,SKIN,WI/RULER AND LABELS: Brand: MEDLINE

## (undated) DEVICE — SCREW BNE L14MM DIA3.5MM PROX TIB S STL ST FULL THRD W/ T15: Type: IMPLANTABLE DEVICE | Site: TIBIA | Status: NON-FUNCTIONAL

## (undated) DEVICE — 1LYRTR 16FR10ML100%SIL UMS SNP: Brand: MEDLINE INDUSTRIES, INC.

## (undated) DEVICE — BANDAGE COBAN 6 IN WND 6INX5YD FOAM

## (undated) DEVICE — INTENT TO BE USED WITH SUTURE MATERIAL FOR TISSUE CLOSURE: Brand: RICHARD-ALLAN® NEEDLE 1/2 CIRCLE TAPER

## (undated) DEVICE — BANDAGE COMPR W6INXL12FT SMOOTH FOR LIMB EXSANG ESMARCH

## (undated) DEVICE — BIT DRL L250MM DIA2.5MM CALIB L95MM QUIK CPL W/ STP FOR

## (undated) DEVICE — SUTURE PDS II SZ 0 L27IN ABSRB VLT L36MM CT-1 1/2 CIR Z340H

## (undated) DEVICE — 450 ML BOTTLE OF 0.05% CHLORHEXIDINE GLUCONATE IN 99.95% STERILE WATER FOR IRRIGATION, USP AND APPLICATOR.: Brand: IRRISEPT ANTIMICROBIAL WOUND LAVAGE

## (undated) DEVICE — SUTURE ETHLN SZ 2-0 L18IN NONABSORBABLE BLK L26MM PS 3/8 585H

## (undated) DEVICE — YANKAUER,FLEXIBLE HANDLE,REGLR CAPACITY: Brand: MEDLINE INDUSTRIES, INC.

## (undated) DEVICE — PIN CLAMP  4-6MM: Brand: HOFFMANN

## (undated) DEVICE — CUFF REPROCESS BP TOURN SING BLDR 2 PORT 4X24IN

## (undated) DEVICE — ZIMMER® STERILE DISPOSABLE TOURNIQUET CUFF WITH PROTECTIVE SLEEVE AND PLC, DUAL PORT, SINGLE BLADDER, 34 IN. (86 CM)

## (undated) DEVICE — APPLICATOR MEDICATED 10.5 CC SOLUTION HI LT ORNG CHLORAPREP

## (undated) DEVICE — SUTURE VCRL SZ 1 L27IN ABSRB VLT L26MM CT-2 1/2 CIR J335H

## (undated) DEVICE — SELF-DRILLING HALF PIN: Brand: APEX

## (undated) DEVICE — BLADE,CARBON-STEEL,15,STRL,DISPOSABLE: Brand: MEDLINE

## (undated) DEVICE — POST; 30 DEGREE ANGLED: Brand: HOFFMANN

## (undated) DEVICE — SCRUB TRAY DRY SKIN PREM WNG SPNG SPNG STK ABSORBENT TOWEL

## (undated) DEVICE — SVMMC ORTH SPL DRP PK

## (undated) DEVICE — GAUZE,SPONGE,4"X4",16PLY,XRAY,STRL,LF: Brand: MEDLINE

## (undated) DEVICE — DUAL HOSE W/CPC CONNECTORS: Brand: A.T.S.® TOURNIQUET SYSTEM

## (undated) DEVICE — SUTURE SZ 0 27IN 5/8 CIR UR-6  TAPER PT VIOLET ABSRB VICRYL J603H

## (undated) DEVICE — FOAM BUMP, LARGE: Brand: MEDLINE INDUSTRIES, INC.

## (undated) DEVICE — GOWN,SIRUS,NONRNF,SETINSLV,2XL,18/CS: Brand: MEDLINE

## (undated) DEVICE — ROD TO ROD COUPLING DIA 5,8,11MM: Brand: HOFFMANN

## (undated) DEVICE — SUTURE ETHILON SZ 2-0 L18IN NONABSORBABLE BLK L26MM PS 3/8 585H

## (undated) DEVICE — Device

## (undated) DEVICE — BNDG,ELSTC,MATRIX,STRL,4"X5YD,LF,HOOK&LP: Brand: MEDLINE

## (undated) DEVICE — PADDING CAST W6INXL4YD COT LO LINTING WYTEX

## (undated) DEVICE — BIT DRL QC 2.5X170 MM 80 MM CALIB NS

## (undated) DEVICE — 3M™ IOBAN™ 2 ANTIMICROBIAL INCISE DRAPE 6650EZ: Brand: IOBAN™ 2

## (undated) DEVICE — IMPLANTABLE DEVICE
Type: IMPLANTABLE DEVICE | Site: TIBIA | Status: NON-FUNCTIONAL
Removed: 2023-11-21

## (undated) DEVICE — SCREW BNE L28MM DIA3.5MM CORT S STL ST NONCANNULATED LOK: Type: IMPLANTABLE DEVICE | Site: TIBIA | Status: NON-FUNCTIONAL

## (undated) DEVICE — 60-7070-104 TRNQT,DPSB,PLC GREEN: Brand: MEDLINE RENEWAL